# Patient Record
Sex: MALE | Race: WHITE | NOT HISPANIC OR LATINO | Employment: OTHER | URBAN - METROPOLITAN AREA
[De-identification: names, ages, dates, MRNs, and addresses within clinical notes are randomized per-mention and may not be internally consistent; named-entity substitution may affect disease eponyms.]

---

## 2022-07-01 ENCOUNTER — TRANSCRIBE ORDERS (OUTPATIENT)
Dept: ADMINISTRATIVE | Facility: HOSPITAL | Age: 77
End: 2022-07-01

## 2022-07-01 DIAGNOSIS — I72.3 ILIAC ARTERY ANEURYSM: ICD-10-CM

## 2022-07-01 DIAGNOSIS — E78.2 MIXED HYPERLIPIDEMIA: ICD-10-CM

## 2022-07-01 DIAGNOSIS — I71.40 ABDOMINAL AORTIC ANEURYSM, WITHOUT RUPTURE: Primary | ICD-10-CM

## 2022-07-12 ENCOUNTER — HOSPITAL ENCOUNTER (OUTPATIENT)
Dept: CARDIOLOGY | Facility: HOSPITAL | Age: 77
Discharge: HOME | End: 2022-07-12
Admitting: SURGERY

## 2022-07-12 DIAGNOSIS — E78.2 MIXED HYPERLIPIDEMIA: ICD-10-CM

## 2022-07-12 DIAGNOSIS — I72.3 ILIAC ARTERY ANEURYSM: ICD-10-CM

## 2022-07-12 DIAGNOSIS — I71.40 ABDOMINAL AORTIC ANEURYSM, WITHOUT RUPTURE: ICD-10-CM

## 2022-07-12 LAB
ABDOMINAL DIST AORTA AP: 4.07 CM
ABDOMINAL DIST AORTA TRANS: 4.5 CM
ABDOMINAL LT COM ILIAC AP: 0.77 CM
ABDOMINAL LT COM ILIAC TRANS: 0.66 CM
ABDOMINAL MID AORTA AP: 2.71 CM
ABDOMINAL MID AORTA TRANS: 2.75 CM
ABDOMINAL PROX AORTA AP: 1.92 CM
ABDOMINAL PROX AORTA TRANS: 1.64 CM
ABDOMINAL RT COM ILIAC AP: 0.75 CM
ABDOMINAL RT COM ILIAC TRANS: 1.07 CM
MAXIMAL PREDICTED HEART RATE: 141 BPM
STRESS TARGET HR: 120 BPM

## 2022-07-12 PROCEDURE — 93978 VASCULAR STUDY: CPT | Performed by: SURGERY

## 2022-07-12 PROCEDURE — 93978 VASCULAR STUDY: CPT

## 2022-09-20 ENCOUNTER — TRANSCRIBE ORDERS (OUTPATIENT)
Dept: ADMINISTRATIVE | Facility: HOSPITAL | Age: 77
End: 2022-09-20

## 2022-09-20 DIAGNOSIS — I71.30 AAA (ABDOMINAL AORTIC ANEURYSM, RUPTURED): Primary | ICD-10-CM

## 2022-10-07 ENCOUNTER — HOSPITAL ENCOUNTER (OUTPATIENT)
Dept: CARDIOLOGY | Facility: HOSPITAL | Age: 77
Discharge: HOME | End: 2022-10-07
Admitting: SURGERY

## 2022-10-07 DIAGNOSIS — I71.30 AAA (ABDOMINAL AORTIC ANEURYSM, RUPTURED): ICD-10-CM

## 2022-10-07 LAB
ABDOMINAL DIST AORTA AP: 4.1 CM
ABDOMINAL DIST AORTA TRANS: 4.5 CM
ABDOMINAL DIST AORTA VEL: 51 CM/S
ABDOMINAL MID AORTA AP: 3.1 CM
ABDOMINAL MID AORTA TRANS: 3 CM
ABDOMINAL MID AORTA VEL: 60 CM/S
ABDOMINAL PROX AORTA AP: 1.9 CM
ABDOMINAL PROX AORTA TRANS: 2.1 CM
ABDOMINAL PROX AORTA VEL: 89 CM/S
MAXIMAL PREDICTED HEART RATE: 141 BPM
STRESS TARGET HR: 120 BPM

## 2022-10-07 PROCEDURE — 93978 VASCULAR STUDY: CPT

## 2022-10-13 PROCEDURE — 51702 INSERT TEMP BLADDER CATH: CPT

## 2022-10-13 PROCEDURE — 99283 EMERGENCY DEPT VISIT LOW MDM: CPT

## 2022-10-14 ENCOUNTER — HOSPITAL ENCOUNTER (EMERGENCY)
Facility: HOSPITAL | Age: 77
Discharge: HOME | End: 2022-10-14
Attending: EMERGENCY MEDICINE | Admitting: EMERGENCY MEDICINE

## 2022-10-14 ENCOUNTER — APPOINTMENT (OUTPATIENT)
Dept: CT IMAGING | Facility: HOSPITAL | Age: 77
End: 2022-10-14

## 2022-10-14 VITALS
HEART RATE: 64 BPM | TEMPERATURE: 98.1 F | SYSTOLIC BLOOD PRESSURE: 143 MMHG | BODY MASS INDEX: 25.33 KG/M2 | OXYGEN SATURATION: 96 % | HEIGHT: 75 IN | WEIGHT: 203.71 LBS | DIASTOLIC BLOOD PRESSURE: 82 MMHG | RESPIRATION RATE: 18 BRPM

## 2022-10-14 DIAGNOSIS — R31.0 GROSS HEMATURIA: Primary | ICD-10-CM

## 2022-10-14 LAB
ALBUMIN SERPL-MCNC: 3.8 G/DL (ref 3.5–5.2)
ALBUMIN/GLOB SERPL: 1.2 G/DL
ALP SERPL-CCNC: 107 U/L (ref 39–117)
ALT SERPL W P-5'-P-CCNC: 14 U/L (ref 1–41)
ANION GAP SERPL CALCULATED.3IONS-SCNC: 10.2 MMOL/L (ref 5–15)
AST SERPL-CCNC: 15 U/L (ref 1–40)
BACTERIA UR QL AUTO: ABNORMAL /HPF
BASOPHILS # BLD AUTO: 0.03 10*3/MM3 (ref 0–0.2)
BASOPHILS NFR BLD AUTO: 0.5 % (ref 0–1.5)
BILIRUB SERPL-MCNC: 0.4 MG/DL (ref 0–1.2)
BILIRUB UR QL STRIP: ABNORMAL
BUN SERPL-MCNC: 26 MG/DL (ref 8–23)
BUN/CREAT SERPL: 25.5 (ref 7–25)
CALCIUM SPEC-SCNC: 9 MG/DL (ref 8.6–10.5)
CHLORIDE SERPL-SCNC: 106 MMOL/L (ref 98–107)
CLARITY UR: ABNORMAL
CO2 SERPL-SCNC: 23.8 MMOL/L (ref 22–29)
COLOR UR: ABNORMAL
CREAT SERPL-MCNC: 1.02 MG/DL (ref 0.76–1.27)
DEPRECATED RDW RBC AUTO: 41.4 FL (ref 37–54)
EGFRCR SERPLBLD CKD-EPI 2021: 74.8 ML/MIN/1.73
EOSINOPHIL # BLD AUTO: 0.12 10*3/MM3 (ref 0–0.4)
EOSINOPHIL NFR BLD AUTO: 1.9 % (ref 0.3–6.2)
ERYTHROCYTE [DISTWIDTH] IN BLOOD BY AUTOMATED COUNT: 12.9 % (ref 12.3–15.4)
GLOBULIN UR ELPH-MCNC: 3.3 GM/DL
GLUCOSE SERPL-MCNC: 129 MG/DL (ref 65–99)
GLUCOSE UR STRIP-MCNC: ABNORMAL MG/DL
GRAN CASTS URNS QL MICRO: ABNORMAL /LPF
HCT VFR BLD AUTO: 37.7 % (ref 37.5–51)
HGB BLD-MCNC: 12.6 G/DL (ref 13–17.7)
HGB UR QL STRIP.AUTO: ABNORMAL
HYALINE CASTS UR QL AUTO: ABNORMAL /LPF
IMM GRANULOCYTES # BLD AUTO: 0.03 10*3/MM3 (ref 0–0.05)
IMM GRANULOCYTES NFR BLD AUTO: 0.5 % (ref 0–0.5)
KETONES UR QL STRIP: ABNORMAL
LEUKOCYTE ESTERASE UR QL STRIP.AUTO: ABNORMAL
LIPASE SERPL-CCNC: 23 U/L (ref 13–60)
LYMPHOCYTES # BLD AUTO: 0.7 10*3/MM3 (ref 0.7–3.1)
LYMPHOCYTES NFR BLD AUTO: 11.3 % (ref 19.6–45.3)
MCH RBC QN AUTO: 29.6 PG (ref 26.6–33)
MCHC RBC AUTO-ENTMCNC: 33.4 G/DL (ref 31.5–35.7)
MCV RBC AUTO: 88.7 FL (ref 79–97)
MONOCYTES # BLD AUTO: 0.59 10*3/MM3 (ref 0.1–0.9)
MONOCYTES NFR BLD AUTO: 9.5 % (ref 5–12)
NEUTROPHILS NFR BLD AUTO: 4.75 10*3/MM3 (ref 1.7–7)
NEUTROPHILS NFR BLD AUTO: 76.3 % (ref 42.7–76)
NITRITE UR QL STRIP: ABNORMAL
NRBC BLD AUTO-RTO: 0 /100 WBC (ref 0–0.2)
PH UR STRIP.AUTO: ABNORMAL [PH]
PLATELET # BLD AUTO: 209 10*3/MM3 (ref 140–450)
PMV BLD AUTO: 9.5 FL (ref 6–12)
POTASSIUM SERPL-SCNC: 4.3 MMOL/L (ref 3.5–5.2)
PROT SERPL-MCNC: 7.1 G/DL (ref 6–8.5)
PROT UR QL STRIP: ABNORMAL
RBC # BLD AUTO: 4.25 10*6/MM3 (ref 4.14–5.8)
RBC # UR STRIP: ABNORMAL /HPF
REF LAB TEST METHOD: ABNORMAL
SODIUM SERPL-SCNC: 140 MMOL/L (ref 136–145)
SP GR UR STRIP: 1.01 (ref 1–1.03)
SQUAMOUS #/AREA URNS HPF: ABNORMAL /HPF
UROBILINOGEN UR QL STRIP: ABNORMAL
WBC # UR STRIP: ABNORMAL /HPF
WBC NRBC COR # BLD: 6.22 10*3/MM3 (ref 3.4–10.8)

## 2022-10-14 PROCEDURE — 36415 COLL VENOUS BLD VENIPUNCTURE: CPT | Performed by: EMERGENCY MEDICINE

## 2022-10-14 PROCEDURE — 81001 URINALYSIS AUTO W/SCOPE: CPT | Performed by: EMERGENCY MEDICINE

## 2022-10-14 PROCEDURE — 87086 URINE CULTURE/COLONY COUNT: CPT | Performed by: EMERGENCY MEDICINE

## 2022-10-14 PROCEDURE — 80053 COMPREHEN METABOLIC PANEL: CPT | Performed by: EMERGENCY MEDICINE

## 2022-10-14 PROCEDURE — 85025 COMPLETE CBC W/AUTO DIFF WBC: CPT | Performed by: EMERGENCY MEDICINE

## 2022-10-14 PROCEDURE — 0 IOPAMIDOL PER 1 ML: Performed by: EMERGENCY MEDICINE

## 2022-10-14 PROCEDURE — 83690 ASSAY OF LIPASE: CPT | Performed by: EMERGENCY MEDICINE

## 2022-10-14 PROCEDURE — 74177 CT ABD & PELVIS W/CONTRAST: CPT

## 2022-10-14 RX ORDER — ASPIRIN 81 MG/1
81 TABLET, CHEWABLE ORAL DAILY
COMMUNITY
End: 2022-10-19 | Stop reason: HOSPADM

## 2022-10-14 RX ADMIN — IOPAMIDOL 100 ML: 755 INJECTION, SOLUTION INTRAVENOUS at 02:41

## 2022-10-14 NOTE — DISCHARGE INSTRUCTIONS
Please follow-up with the urologist for further testing to determine the source of the bleeding in your urine.  CT scan is unable to identify if there are any suspicious lesions within your bladder.

## 2022-10-14 NOTE — ED PROVIDER NOTES
Time: 3:10 AM EDT  Chief Complaint: difficulty urinating, hematuria  Chief Complaint   Patient presents with   • Difficulty Urinating       History of Present Illness:  Patient is a 77 y.o. year old male who presents to the emergency department with difficulty urinating and hematuria. Pt reports he has been experiencing hematuria for approximately one month, and he states the blood in his urine is blood clots. He reports he hasn't been able to urinate since noon today. He reports he did have abdominal pain earlier today, but he states it has completely resolved now. He also reports mild fatigue, and he denies associated flank pain, back pain, fever, or chills. He denies use of blood thinners. He reports hx of liver failure.            History provided by:  Patient   used: No            Patient Care Team  Primary Care Provider: Vita Starks APRN    Past Medical History:     No Known Allergies  Past Medical History:   Diagnosis Date   • Aneurysm of abdominal aorta branch vessel      Past Surgical History:   Procedure Laterality Date   • SKIN CANCER EXCISION Right      History reviewed. No pertinent family history.    Home Medications:  Prior to Admission medications    Not on File        Social History:   Social History     Tobacco Use   • Smoking status: Never   Substance Use Topics   • Alcohol use: Yes     Alcohol/week: 1.0 standard drink     Types: 1 Cans of beer per week     Comment: 1 beer daily   • Drug use: Never         Review of Systems:  Review of Systems   Constitutional: Positive for fatigue (mild). Negative for chills and fever.   HENT: Negative for congestion, rhinorrhea and sore throat.    Eyes: Negative for pain and visual disturbance.   Respiratory: Negative for apnea, cough, chest tightness and shortness of breath.    Cardiovascular: Negative for chest pain and palpitations.   Gastrointestinal: Negative for abdominal pain, diarrhea, nausea and vomiting.   Genitourinary:  "Positive for difficulty urinating and hematuria (blood clots in urine). Negative for dysuria and flank pain.   Musculoskeletal: Negative for back pain, joint swelling and myalgias.   Skin: Negative for color change.   Neurological: Negative for seizures and headaches.   Psychiatric/Behavioral: Negative.    All other systems reviewed and are negative.       Physical Exam:  /82   Pulse 64   Temp 98.1 °F (36.7 °C) (Oral)   Resp 18   Ht 190.5 cm (75\")   Wt 92.4 kg (203 lb 11.3 oz)   SpO2 96%   BMI 25.46 kg/m²     Physical Exam  Vitals and nursing note reviewed.   Constitutional:       General: He is not in acute distress.     Appearance: Normal appearance. He is not toxic-appearing.   HENT:      Head: Normocephalic and atraumatic.      Jaw: There is normal jaw occlusion.   Eyes:      General: Lids are normal.      Extraocular Movements: Extraocular movements intact.      Conjunctiva/sclera: Conjunctivae normal.      Pupils: Pupils are equal, round, and reactive to light.   Cardiovascular:      Rate and Rhythm: Normal rate and regular rhythm.      Pulses: Normal pulses.      Heart sounds: Normal heart sounds.   Pulmonary:      Effort: Pulmonary effort is normal. No respiratory distress.      Breath sounds: Normal breath sounds. No wheezing or rhonchi.   Abdominal:      General: Abdomen is flat. There is no distension.      Palpations: Abdomen is soft.      Tenderness: There is no abdominal tenderness. There is no guarding or rebound.   Genitourinary:     Comments: Beckford catheter in place with bright red blood in catheter bag  Musculoskeletal:         General: Normal range of motion.      Cervical back: Normal range of motion and neck supple.      Right lower leg: No edema.      Left lower leg: No edema.   Skin:     General: Skin is warm and dry.      Coloration: Skin is not cyanotic.   Neurological:      Mental Status: He is alert and oriented to person, place, and time. Mental status is at baseline. "   Psychiatric:         Attention and Perception: Attention and perception normal.         Mood and Affect: Mood normal.                Medications in the Emergency Department:  Medications   iopamidol (ISOVUE-370) 76 % injection 100 mL (100 mL Intravenous Given 10/14/22 0241)        Labs  Lab Results (last 24 hours)     ** No results found for the last 24 hours. **           Imaging:  No Radiology Exams Resulted Within Past 24 Hours    Procedures:  Procedures    Progress  ED Course as of 10/15/22 0646   Fri Oct 14, 2022   0013 --- PROVIDER IN TRIAGE NOTE ---    The patient was evaluated my Bessy cuellar in triage. Orders were placed and the patient is currently awaiting disposition.    [AJ]      ED Course User Index  [AJ] Bessy Cano PA-C                            The patient was initially evaluated in the triage area where orders were placed. The patient was later dispositioned by aRmses Ware MD.      The patient was advised to stay for completion of workup which includes but is not limited to communication of labs and radiological results, reassessment and plan. The patient was advised that leaving prior to disposition by a provider could result in critical findings that are not communicated to the patient.     Medical Decision Making:  MDM  Number of Diagnoses or Management Options  Gross hematuria  Diagnosis management comments: Discussed patient's CT findings and the importance of close follow-up with urology as patient will likely need cystoscopy for further evaluation due to concern for possible malignancy.  Patient and spouse at bedside expressed understanding and agreement with plan.  We discussed return precautions including worsening symptoms or any additional concerns.       Amount and/or Complexity of Data Reviewed  Clinical lab tests: reviewed and ordered  Tests in the radiology section of CPT®: ordered and reviewed  Tests in the medicine section of CPT®: ordered and reviewed  Decide  to obtain previous medical records or to obtain history from someone other than the patient: yes  Review and summarize past medical records: yes             The following orders were placed after triage and evaluation:  Orders Placed This Encounter   Procedures   • Urine Culture - Urine,   • CT Abdomen Pelvis With Contrast   • Comprehensive Metabolic Panel   • Urinalysis With Microscopic If Indicated (No Culture) - Urine, Clean Catch   • Lipase   • CBC Auto Differential   • Urinalysis, Microscopic Only - Urine, Clean Catch   • Insert Indwelling Urinary Catheter   • CBC & Differential       Final diagnoses:   Gross hematuria          Disposition:  ED Disposition     ED Disposition   Discharge    Condition   Stable    Comment   --             This medical record created using voice recognition software.    Documentation assistance provided by Juan Macario acting as scribe for Ramses Ware MD. Information recorded by the scribe was done at my direction and has been verified and validated by me.         Juan Macario  10/14/22 0317       Ramses Ware MD  10/15/22 0651

## 2022-10-15 ENCOUNTER — NURSE TRIAGE (OUTPATIENT)
Dept: CALL CENTER | Facility: HOSPITAL | Age: 77
End: 2022-10-15

## 2022-10-15 LAB — BACTERIA SPEC AEROBE CULT: NO GROWTH

## 2022-10-15 NOTE — TELEPHONE ENCOUNTER
"Caller went to ER yesterday with hematuria.  He had a catheter placed and has a leg bag.  He had clots at home but has had none since catheter insertion.  He is weak but no weaker than he was prior to going to the ER, denies dizziness.  States that urine is unchanged and is a medium red.  Discussed that the would need to return to the ER if he became weaker, dizzy, or was unable to pass urine.  He has an appointment with urology on Tuesday.  Call back if further questions or problems.  Reason for Disposition  • Health Information question, no triage required and triager able to answer question    Additional Information  • Negative: [1] Caller is not with the adult (patient) AND [2] reporting urgent symptoms  • Negative: Lab result questions  • Negative: Medication questions  • Negative: Caller can't be reached by phone  • Negative: Caller has already spoken to PCP or another triager  • Negative: RN needs further essential information from caller in order to complete triage  • Negative: Requesting regular office appointment  • Negative: [1] Caller requesting NON-URGENT health information AND [2] PCP's office is the best resource  • Negative: General information question, no triage required and triager able to answer question    Answer Assessment - Initial Assessment Questions  1. REASON FOR CALL or QUESTION: \"What is your reason for calling today?\" or \"How can I best help you?\" or \"What question do you have that I can help answer?\"      I need to know at what point I would I need to return to the ER?    Protocols used: INFORMATION ONLY CALL - NO TRIAGE-ADULT-    "

## 2022-10-16 ENCOUNTER — HOSPITAL ENCOUNTER (EMERGENCY)
Facility: HOSPITAL | Age: 77
Discharge: HOME | End: 2022-10-16
Attending: EMERGENCY MEDICINE | Admitting: EMERGENCY MEDICINE

## 2022-10-16 VITALS
OXYGEN SATURATION: 95 % | SYSTOLIC BLOOD PRESSURE: 148 MMHG | HEART RATE: 81 BPM | DIASTOLIC BLOOD PRESSURE: 90 MMHG | TEMPERATURE: 98.2 F | HEIGHT: 75 IN | RESPIRATION RATE: 16 BRPM | WEIGHT: 200.62 LBS | BODY MASS INDEX: 24.94 KG/M2

## 2022-10-16 VITALS
HEART RATE: 59 BPM | WEIGHT: 202.38 LBS | RESPIRATION RATE: 18 BRPM | TEMPERATURE: 98.2 F | OXYGEN SATURATION: 100 % | HEIGHT: 75 IN | DIASTOLIC BLOOD PRESSURE: 89 MMHG | SYSTOLIC BLOOD PRESSURE: 174 MMHG | BODY MASS INDEX: 25.16 KG/M2

## 2022-10-16 DIAGNOSIS — N13.1 HYDRONEPHROSIS WITH URETERAL STRICTURE, NOT ELSEWHERE CLASSIFIED: ICD-10-CM

## 2022-10-16 DIAGNOSIS — R31.0 GROSS HEMATURIA: Primary | ICD-10-CM

## 2022-10-16 DIAGNOSIS — N32.89 BLADDER MASS: ICD-10-CM

## 2022-10-16 DIAGNOSIS — R33.9 URINARY RETENTION: ICD-10-CM

## 2022-10-16 PROBLEM — N20.0 NEPHROLITHIASIS: Status: ACTIVE | Noted: 2022-10-16

## 2022-10-16 PROCEDURE — 99283 EMERGENCY DEPT VISIT LOW MDM: CPT

## 2022-10-16 PROCEDURE — 99282 EMERGENCY DEPT VISIT SF MDM: CPT

## 2022-10-16 PROCEDURE — 51702 INSERT TEMP BLADDER CATH: CPT | Performed by: UROLOGY

## 2022-10-16 PROCEDURE — 51702 INSERT TEMP BLADDER CATH: CPT

## 2022-10-16 RX ORDER — LIDOCAINE HYDROCHLORIDE 20 MG/ML
JELLY TOPICAL AS NEEDED
Status: DISCONTINUED | OUTPATIENT
Start: 2022-10-16 | End: 2022-10-17 | Stop reason: HOSPADM

## 2022-10-16 RX ADMIN — LIDOCAINE HYDROCHLORIDE: 20 JELLY TOPICAL at 22:12

## 2022-10-16 NOTE — DISCHARGE INSTRUCTIONS
CT evaluation of your abdomen/pelvis on October 14 showed abnormalities involving the bladder and right ureter, these are concerning for possible mass.  Follow-up with Dr. Westfall on Tuesday as scheduled.

## 2022-10-16 NOTE — ED NOTES
Upon assessment of urinary catheter it appeared to be partially occluded at the bag. Replaced the tubing and bag and flushed catheter with good return using new tubing and bag. Beckford did have leakage while pt states he relaxes. Post replacement of tubing bladder scan was zero. Provider made aware.

## 2022-10-16 NOTE — ED NOTES
Pt was here earlier today, had catheter replaced due to blockage. Pt states there is blood and clots in the urine.  Pt states the catheter is not draining and he is having to urinate on his own around the catheter.

## 2022-10-16 NOTE — ED PROVIDER NOTES
Time: 5:50 PM EDT  Chief Complaint:   Chief Complaint   Patient presents with   • Urinary Retention           History of Present Illness:  Patient is a 77 y.o. year old male who presents to the emergency department with complaints of his Beckford catheter not working since 1:30 PM today when he emptied his bag.  He reports that he was here earlier to our ER and had the bag replaced and now this is occurred.  He is in a lot of pain and rates his pain an 9 out of 10 currently.        History provided by:  Patient  Urinary Retention  Location:  Urinary catheter  Quality:  Not draining  Severity:  Unable to specify  Onset quality:  Unable to specify  Duration:  4 days  Timing:  Constant  Progression:  Waxing and waning  Chronicity:  New  Context:  Patient has been unable to urinate since Thursday.  Came in and had a catheter placed.  Saturday had to return for irrigation because it would not drain and returned earlier today for similar problems.  Back tonight because catheter will not drain and has not been able to urinate since 1  Relieved by:  Nothing  Worsened by:  Nothing  Ineffective treatments:  Position changes and repeated irrigation  Associated symptoms: abdominal pain ( Low abdomen pressure)    Associated symptoms: no chest pain, no cough, no diarrhea, no ear pain, no fatigue, no fever, no headaches, no myalgias, no rash, no rhinorrhea, no shortness of breath, no sore throat and no vomiting            Patient Care Team  Primary Care Provider: Vita Starks APRN    Past Medical History:     No Known Allergies  Past Medical History:   Diagnosis Date   • Aneurysm of abdominal aorta branch vessel      Past Surgical History:   Procedure Laterality Date   • SKIN CANCER EXCISION Right      History reviewed. No pertinent family history.    Home Medications:  Prior to Admission medications    Medication Sig Start Date End Date Taking? Authorizing Provider   aspirin 81 MG chewable tablet Chew 1 tablet Daily.     "Provider, Historical, MD        Social History:   Social History     Tobacco Use   • Smoking status: Never   Substance Use Topics   • Alcohol use: Yes     Alcohol/week: 1.0 standard drink     Types: 1 Cans of beer per week     Comment: 1 beer daily   • Drug use: Never         Review of Systems:  Review of Systems   Constitutional: Negative for chills, fatigue and fever.   HENT: Negative for ear pain, rhinorrhea and sore throat.    Eyes: Negative for visual disturbance.   Respiratory: Negative for cough and shortness of breath.    Cardiovascular: Negative for chest pain.   Gastrointestinal: Positive for abdominal pain ( Low abdomen pressure). Negative for diarrhea and vomiting.   Genitourinary: Positive for hematuria. Negative for difficulty urinating.   Musculoskeletal: Negative for arthralgias, back pain and myalgias.   Skin: Negative for rash.   Neurological: Negative for light-headedness and headaches.   Hematological: Negative for adenopathy.   Psychiatric/Behavioral: Negative.    All other systems reviewed and are negative.       Physical Exam:  /90 (BP Location: Right arm, Patient Position: Sitting)   Pulse 81   Temp 98.2 °F (36.8 °C) (Oral)   Resp 16   Ht 190.5 cm (75\")   Wt 91 kg (200 lb 9.9 oz)   SpO2 95%   BMI 25.08 kg/m²     Physical Exam  Vitals and nursing note reviewed.   Constitutional:       Appearance: Normal appearance.   HENT:      Head: Normocephalic.   Eyes:      Extraocular Movements: Extraocular movements intact.      Conjunctiva/sclera: Conjunctivae normal.   Cardiovascular:      Rate and Rhythm: Normal rate and regular rhythm.      Heart sounds: Normal heart sounds.   Pulmonary:      Effort: Pulmonary effort is normal.      Breath sounds: Normal breath sounds.   Abdominal:      General: Bowel sounds are normal. There is no distension.      Palpations: Abdomen is soft.      Tenderness: There is no right CVA tenderness or left CVA tenderness.   Genitourinary:     Comments: " Hypospadias    Catheter in place with clotted tubing  Musculoskeletal:         General: Normal range of motion.      Cervical back: Normal range of motion.   Skin:     General: Skin is warm and dry.      Coloration: Skin is not cyanotic.   Neurological:      Mental Status: He is alert and oriented to person, place, and time.   Psychiatric:         Attention and Perception: Attention and perception normal.         Mood and Affect: Mood normal.         Behavior: Behavior normal.                Medications in the Emergency Department:  Medications   Lidocaine HCl gel (XYLOCAINE) urethral/mucosal syringe ( Topical Given by Other 10/16/22 2212)        Labs  Lab Results (last 24 hours)     ** No results found for the last 24 hours. **           Imaging:  No Radiology Exams Resulted Within Past 24 Hours    Procedures:  Procedures    Progress  ED Course as of 10/17/22 0018   Sun Oct 16, 2022   1749 --- PROVIDER IN TRIAGE NOTE ---    The patient was seen and evaluated by polo Mcgowan in triage. Orders were placed and the patient is currently awaiting disposition. [KS]   5757 Patient had a catheter placed by Dr. Suki De that is currently draining light red urine.  She feels patient can be discharged and keep follow-up with Dr. Westfall on Tuesday [DS]      ED Course User Index  [DS] Mei Sanchez APRN  [KS] Nanci Mcgowan APRN                            The patient was initially evaluated in the triage area where orders were placed. The patient was later dispositioned by CATA Garcia.      The patient was advised to stay for completion of workup which includes but is not limited to communication of labs and radiological results, reassessment and plan. The patient was advised that leaving prior to disposition by a provider could result in critical findings that are not communicated to the patient.     Medical Decision Making:  MDM  Number of Diagnoses or Management Options  Bladder mass  Gross  hematuria  Hydronephrosis with ureteral stricture, not elsewhere classified  Urinary retention  Diagnosis management comments: Pt seen and evaluated by dr de in ED and catheter placed. Pt ok to dc and keep appt on Tuesday with dr parkinson. Pt in agreement with plan       Amount and/or Complexity of Data Reviewed  Tests in the medicine section of CPT®: ordered and reviewed  Decide to obtain previous medical records or to obtain history from someone other than the patient: yes  Review and summarize past medical records: yes (Reviewed CT from October 14 which shows findings concerning for possible malignancy in bladder as well as ureteral mass possible.  Right-sided hydronephrosis and hydroureter)  Discuss the patient with other providers: yes (2111-spoke with Dr. De the on-call urologist reviewed patient's recent HPI as well as imaging findings and inability to replace catheter.  Dr. De will come to the emergency department for evaluation)    Risk of Complications, Morbidity, and/or Mortality  Presenting problems: low  Diagnostic procedures: low  Management options: moderate    Patient Progress  Patient progress: stable           The following orders were placed after triage and evaluation:  Orders Placed This Encounter   Procedures   • Irrigation of Bladder   • Replace catheter with coude that is larger  Misc Nursing Order (Specify)   • Measure post void residual   • Urology (on-call MD unless specified)       Final diagnoses:   Gross hematuria   Urinary retention   Bladder mass   Hydronephrosis with ureteral stricture, not elsewhere classified          Disposition:  ED Disposition     ED Disposition   Discharge    Condition   Stable    Comment   --             This medical record created using voice recognition software.           Mei Sanchez, APRN  10/17/22 0018

## 2022-10-16 NOTE — ED PROVIDER NOTES
Time: 1:34 PM EDT  Arrived by: private car  Chief Complaint: urinary retention  History provided by: patient, spouse (wife), medical records  History is limited by: N/A    History of Present Illness:  Patient is a 77 y.o. year old male who presents to the emergency department with urinary retention.    Patient arrives to ED via private car with spouse at bedside who helps report clinical history. Patient has a medical history of abdominal aortic aneurysm (infrarenal), irregular heart rhythm with PACs, back pain, and bilateral hearing loss. Patient has no history of smoking, is a current alcohol user, and has no history of drug use.    Patient was evaluated by Dr. Malik at [facility] (07/15/2022) who monitors his abdominal, infrarenal aortic aneurysm.    Today (10/16/2022), patient complains of leaking around the insertion point and notes his bladder feels full. Patient reports he started experiencing gross hematuria, without pain, symptoms approximately 1 month ago that did not resolve. Patient states he had a bradley catheterization on Thursday (10/13/2022) for urinary retention and blood clots in urine and had a CT Abdomen Pelvis with contrast study (10/14/2022) but notes he and his spouse state they were not aware of the abnormalities found in the study (abnormalities including concerns for hemorrhage and/or malignancy of urinary bladder, hydronephrosis, nephrolithiasis bilaterally, infrarenal abdominal aortic aneurysm, and atheroscleoritic changes noted. See imaging findings and impression for specific results). Patient is following up with Dr. Westfall, Urology on Tuesday (10/20/2022).      History provided by:  Patient, relative and medical records   used: No        Patient Care Team  Primary Care Provider: Vita Starks APRN    Past Medical History:     No Known Allergies  Past Medical History:   Diagnosis Date   • Aneurysm of abdominal aorta branch vessel      Past Surgical  "History:   Procedure Laterality Date   • SKIN CANCER EXCISION Right      No family history on file.    Home Medications:  Prior to Admission medications    Medication Sig Start Date End Date Taking? Authorizing Provider   aspirin 81 MG chewable tablet Chew 1 tablet Daily.    Provider, Kristel, MD        Social History:   Social History     Tobacco Use   • Smoking status: Never   • Smokeless tobacco: Never   Substance Use Topics   • Alcohol use: Yes     Alcohol/week: 1.0 standard drink     Types: 1 Cans of beer per week     Comment: 1 beer daily   • Drug use: Never     Recent travel: not applicable    Review of Systems:  Review of Systems   Constitutional: Negative for chills and fever.   HENT: Negative for sore throat.    Eyes: Negative for photophobia.   Respiratory: Negative for shortness of breath.    Cardiovascular: Negative for chest pain.   Gastrointestinal: Negative for abdominal pain, diarrhea, nausea and vomiting.   Genitourinary: Positive for decreased urine volume, difficulty urinating and hematuria (gross). Negative for dysuria, flank pain and frequency.        Positive urinary retention   Musculoskeletal: Negative for neck pain.   Skin: Negative for wound.   Neurological: Negative for headaches.   All other systems reviewed and are negative.       Physical Exam:  /89   Pulse 59   Temp 98.2 °F (36.8 °C)   Resp 18   Ht 190.5 cm (75\")   Wt 91.8 kg (202 lb 6.1 oz)   SpO2 100%   BMI 25.30 kg/m²     Physical Exam  Vitals and nursing note reviewed.   Constitutional:       General: He is not in acute distress.  HENT:      Head: Normocephalic and atraumatic.   Eyes:      Extraocular Movements: Extraocular movements intact.   Cardiovascular:      Rate and Rhythm: Normal rate and regular rhythm.   Pulmonary:      Effort: Pulmonary effort is normal. No respiratory distress.      Breath sounds: Normal breath sounds.   Abdominal:      General: Abdomen is flat.      Palpations: Abdomen is soft.      " Tenderness: There is no abdominal tenderness.   Genitourinary:     Comments: Beckford catheterization replaced and draining now, but gross hematuria is still present.  Musculoskeletal:         General: Normal range of motion.      Cervical back: Normal range of motion and neck supple.   Skin:     General: Skin is warm and dry.      Capillary Refill: Capillary refill takes less than 2 seconds.   Neurological:      Mental Status: He is alert and oriented to person, place, and time. Mental status is at baseline.                Medications in the Emergency Department:  Medications - No data to display     Labs  Lab Results (last 24 hours)     ** No results found for the last 24 hours. **           Imaging:  FL < 1 Hour    Result Date: 10/19/2022  This procedure was auto-finalized with no dictation required.    XR Abdomen KUB    Result Date: 10/19/2022  PROCEDURE: XR ABDOMEN KUB  COMPARISON: [facility], CT, CT ABDOMEN PELVIS W CONTRAST, 10/14/2022, 2:35.  INDICATIONS:  IMAGE/BLADDER TUMOR/0:01 SEC FLUORO TIME/1.13mGy/ 1 image  FINDINGS:  Single intraoperative fluoroscopic image demonstrates presence of multiple calcified pelvic phleboliths.  There is a possible stone within the right kidney.  There is a dextrocurvature of the lumbar spine.        1. Intraoperative fluoroscopy during cystoscopy.  Please see operative report for full findings and details.      TARA LANTIGUA MD       Electronically Signed and Approved By: TARA LANTIGUA MD on 10/19/2022 at 16:02               Procedures:  Procedures    Progress                            Medical Decision Making:  MDM   77-year-old male patient presents with a Beckford catheter.  Patient has gross hematuria present  Ongoing for several weeks.  The Beckford catheter flushes fine after tubing replacement.  Patient will follow up with his urologist.        Final diagnoses:   Gross hematuria        Disposition:  ED Disposition     ED Disposition   Discharge     Condition   Stable    Comment   --             This medical record created using voice recognition software and a virtual scribe.    Documentation assistance provided by Gin Kidd acting as scribe for Dr. Russell Botello DO. Information recorded by the scribe was done at my direction and has been verified and validated by me.       Gin Kidd  10/16/22 1336       Gin Kidd  10/16/22 1336       Gin Kidd  10/16/22 1337       Russell Irvin DO  10/19/22 4298

## 2022-10-16 NOTE — PROGRESS NOTES
Chief Complaint: Urologic complaint    Subjective         History of Present Illness  Korey Rodriguez is a 77 y.o. male       Gross hematuria  Nephrolithiasis        Patient with gross hematuria with clots last week.  Catheter was placed in the emergency room, has had some trouble with clots and is been back to the ER couple more times to have this flushed.    Currently draining okay but still bloody.  Mildly uncomfortable.    10/22 UA-TNTC RBCs, 3-5 WBC, no bacteria,    1.0, GFR 74  10/22 urine culture-negative  10/14/2022 CT abdomen/pelvis with - moderate to severe right-sided hydro.  Left-sided delayed phase good.  6 mm and 4 mm nonobstructing left renal stone.  3 stones in the right kidney, nonobstructing largest being 5 mm.  Images reviewed .  No stone in ureter.  Abnormal density in the lumen of the urinary bladder.  5 cm AAA    Patient voiding without issue before as far he knew.    No history of kidney  stone.    No urologic family history,   no history of urologic surgery.    No cardiopulmonary history.  Non-smoker.  Aspirin 81        Objective     Past Medical History:   Diagnosis Date   • Aneurysm of abdominal aorta branch vessel        Past Surgical History:   Procedure Laterality Date   • SKIN CANCER EXCISION Right          Current Outpatient Medications:   •  aspirin 81 MG chewable tablet, Chew 1 tablet Daily., Disp: , Rfl:     No Known Allergies     No family history on file.    Social History     Socioeconomic History   • Marital status:    Tobacco Use   • Smoking status: Never   Substance and Sexual Activity   • Alcohol use: Yes     Alcohol/week: 1.0 standard drink     Types: 1 Cans of beer per week     Comment: 1 beer daily   • Drug use: Never       Vital Signs:   There were no vitals taken for this visit.     Physical exam    Alert and orient x3  Well appearing, well developed, in no acute distress   Unlabored respirations  Nontender/nondistended      Grossly oriented to person, place and  time, judgment is intact, normal mood and affect              Assessment and Plan    Diagnoses and all orders for this visit:    1. Gross hematuria (Primary)    2. Nephrolithiasis      Patient with continued gross hematuria we need to rule out underlying urologic pathology which could be detrimental to his health or cause death.  Patient voiced understanding we will get him set up for       Cystoscopy possible TURBT right ureteroscopy with laser and basket extraction of stone.  Left retrograde pyelogram.  Risks and benefits were discussed including bleeding, infection and damage to the urinary system.  We also discussed the risk of anesthesia up to and including death.  Patient voiced understanding and would like to proceed.    Hold ASA 81

## 2022-10-16 NOTE — ED TRIAGE NOTES
Bradley cath placed on Thursday 10/13 for urinary retention and blood clots in urine. Pt complains of bradley cath leaking around insertion and feeling like his bladder is full.

## 2022-10-17 NOTE — DISCHARGE INSTRUCTIONS
Keep your appointment Tuesday with Dr. Westfall for further evaluation and any additional treatment deemed indicated.    Return for new or worsening symptoms

## 2022-10-17 NOTE — POST-PROCEDURE NOTE
Irrigation of Bladder    Date/Time: 10/16/2022 10:07 PM  Performed by: Suki De MD  Authorized by: Suki De MD   Consent: Verbal consent obtained.  Risks and benefits: risks, benefits and alternatives were discussed  Consent given by: patient  Patient understanding: patient states understanding of the procedure being performed  Preparation: Patient was prepped and draped in the usual sterile fashion.  Local anesthesia used: yes    Anesthesia:  Local anesthesia used: yes  Local anesthetic: Lidocaine Urojet.    Sedation:  Patient sedated: no    Patient tolerance: patient tolerated the procedure well with no immediate complications  Comments: Patient was prepped and draped in normal fashion for catheter placement.      A 16 Persian coude catheter was inserted per urethra into the bladder without difficulty.  The patient does have hypospadias.  There was return of red urine.      After inserting the catheter, I hand irrigated 2L of sterile water using a 60cc cath tip syringe.  There was return of a few small clots.  Urine at the end of irrigation was light pink with no clots.     Patient tolerated procedure well.      He will follow up with Dr. Westfall as planned on Tuesday in the office.

## 2022-10-18 ENCOUNTER — OFFICE VISIT (OUTPATIENT)
Dept: UROLOGY | Facility: CLINIC | Age: 77
End: 2022-10-18

## 2022-10-18 ENCOUNTER — LAB (OUTPATIENT)
Dept: LAB | Facility: HOSPITAL | Age: 77
End: 2022-10-18

## 2022-10-18 ENCOUNTER — PREP FOR SURGERY (OUTPATIENT)
Dept: OTHER | Facility: HOSPITAL | Age: 77
End: 2022-10-18

## 2022-10-18 VITALS — RESPIRATION RATE: 18 BRPM

## 2022-10-18 DIAGNOSIS — R31.0 GROSS HEMATURIA: Primary | ICD-10-CM

## 2022-10-18 DIAGNOSIS — R31.9 HEMATURIA: Primary | ICD-10-CM

## 2022-10-18 DIAGNOSIS — R31.0 GROSS HEMATURIA: ICD-10-CM

## 2022-10-18 DIAGNOSIS — N20.0 NEPHROLITHIASIS: ICD-10-CM

## 2022-10-18 LAB
DEPRECATED RDW RBC AUTO: 41.3 FL (ref 37–54)
ERYTHROCYTE [DISTWIDTH] IN BLOOD BY AUTOMATED COUNT: 12.7 % (ref 12.3–15.4)
HCT VFR BLD AUTO: 37.1 % (ref 37.5–51)
HGB BLD-MCNC: 11.9 G/DL (ref 13–17.7)
MCH RBC QN AUTO: 28.7 PG (ref 26.6–33)
MCHC RBC AUTO-ENTMCNC: 32.1 G/DL (ref 31.5–35.7)
MCV RBC AUTO: 89.4 FL (ref 79–97)
PLATELET # BLD AUTO: 257 10*3/MM3 (ref 140–450)
PMV BLD AUTO: 10.1 FL (ref 6–12)
PSA SERPL-MCNC: 1.12 NG/ML (ref 0–4)
RBC # BLD AUTO: 4.15 10*6/MM3 (ref 4.14–5.8)
WBC NRBC COR # BLD: 7.22 10*3/MM3 (ref 3.4–10.8)

## 2022-10-18 PROCEDURE — 36415 COLL VENOUS BLD VENIPUNCTURE: CPT

## 2022-10-18 PROCEDURE — 84153 ASSAY OF PSA TOTAL: CPT

## 2022-10-18 PROCEDURE — 85027 COMPLETE CBC AUTOMATED: CPT

## 2022-10-18 PROCEDURE — 99204 OFFICE O/P NEW MOD 45 MIN: CPT | Performed by: UROLOGY

## 2022-10-18 RX ORDER — ATORVASTATIN CALCIUM 20 MG/1
20 TABLET, FILM COATED ORAL NIGHTLY
COMMUNITY
Start: 2022-09-12

## 2022-10-18 RX ORDER — SODIUM CHLORIDE 9 MG/ML
100 INJECTION, SOLUTION INTRAVENOUS CONTINUOUS
Status: CANCELLED | OUTPATIENT
Start: 2022-10-18

## 2022-10-18 RX ORDER — LEVOFLOXACIN 5 MG/ML
500 INJECTION, SOLUTION INTRAVENOUS ONCE
Status: CANCELLED | OUTPATIENT
Start: 2022-10-18 | End: 2022-10-18

## 2022-10-18 NOTE — H&P
[facility]   UROLOGY HISTORY AND PHYSICAL    Patient Name: Korey Rodriguez  : 1945  MRN: [id number]  Primary Care Physician:  Vita Starks APRN  Date of admission: (Not on file)    Subjective   Subjective     Chief Complaint:     Gross hematuria    HPI:    Korey Rodriguez is a 77 y.o. male gross hematuria    Review of Systems     10 systems reviewed and are negative other than what is listed in HPI    Personal History     Past Medical History:   Diagnosis Date   • Aneurysm of abdominal aorta branch vessel        Past Surgical History:   Procedure Laterality Date   • SKIN CANCER EXCISION Right        Family History: family history is not on file. Otherwise pertinent FHx was reviewed and not pertinent to current issue.    Social History:  reports that he has never smoked. He does not have any smokeless tobacco history on file. He reports current alcohol use of about 1.0 standard drink per week. He reports that he does not use drugs.    Home Medications:  aspirin and atorvastatin      Allergies:  No Known Allergies    Objective   Objective     Vitals:   Resp:  [18] 18  Physical Exam    Constitutional: Awake, alert    Respiratory: Clear to auscultation bilaterally, nonlabored respirations    Cardiovascular: RRR, no murmurs, rubs, or gallops, palpable pedal pulses bilaterally   Gastrointestinal: Positive bowel sounds, soft, nontender, nondistended    Skin: No rashes     Result Review    Result Review:  I have personally reviewed the results from the time of this admission to 10/18/2022 13:59 EDT and agree with these findings:  []  Laboratory  []  Microbiology  []  Radiology  []  EKG/Telemetry   []  Cardiology/Vascular   []  Pathology  []  Old records  []  Other:    Assessment & Plan   Assessment / Plan     Brief Patient Summary:  Korey Rodriguez is a 77 y.o. male     Active Hospital Problems:  There are no active hospital problems to display for this patient.  Gross hematuria    Plan:     Cystoscopy  with possible transurethral resection of bladder tumor right ureteroscopy with laser and basket extraction of stone.  Left retrograde pyelogram..  Risks and benefits were discussed including bleeding, infection and damage to the urinary system.  We also discussed the risk of anesthesia up to and including death.  Patient voiced understanding and would like to proceed.      Electronically signed by Aung Westfall MD, 10/18/22, 1:59 PM EDT.

## 2022-10-18 NOTE — H&P (VIEW-ONLY)
[facility]   UROLOGY HISTORY AND PHYSICAL    Patient Name: Korey Rodriguez  : 1945  MRN: [id number]  Primary Care Physician:  Vita Starks APRN  Date of admission: (Not on file)    Subjective   Subjective     Chief Complaint:     Gross hematuria    HPI:    Korey Rodriguez is a 77 y.o. male gross hematuria    Review of Systems     10 systems reviewed and are negative other than what is listed in HPI    Personal History     Past Medical History:   Diagnosis Date   • Aneurysm of abdominal aorta branch vessel        Past Surgical History:   Procedure Laterality Date   • SKIN CANCER EXCISION Right        Family History: family history is not on file. Otherwise pertinent FHx was reviewed and not pertinent to current issue.    Social History:  reports that he has never smoked. He does not have any smokeless tobacco history on file. He reports current alcohol use of about 1.0 standard drink per week. He reports that he does not use drugs.    Home Medications:  aspirin and atorvastatin      Allergies:  No Known Allergies    Objective   Objective     Vitals:   Resp:  [18] 18  Physical Exam    Constitutional: Awake, alert    Respiratory: Clear to auscultation bilaterally, nonlabored respirations    Cardiovascular: RRR, no murmurs, rubs, or gallops, palpable pedal pulses bilaterally   Gastrointestinal: Positive bowel sounds, soft, nontender, nondistended    Skin: No rashes     Result Review    Result Review:  I have personally reviewed the results from the time of this admission to 10/18/2022 13:59 EDT and agree with these findings:  []  Laboratory  []  Microbiology  []  Radiology  []  EKG/Telemetry   []  Cardiology/Vascular   []  Pathology  []  Old records  []  Other:    Assessment & Plan   Assessment / Plan     Brief Patient Summary:  Korey Rodriguez is a 77 y.o. male     Active Hospital Problems:  There are no active hospital problems to display for this patient.  Gross hematuria    Plan:     Cystoscopy  with possible transurethral resection of bladder tumor right ureteroscopy with laser and basket extraction of stone.  Left retrograde pyelogram..  Risks and benefits were discussed including bleeding, infection and damage to the urinary system.  We also discussed the risk of anesthesia up to and including death.  Patient voiced understanding and would like to proceed.      Electronically signed by Aung Westfall MD, 10/18/22, 1:59 PM EDT.

## 2022-10-19 ENCOUNTER — ANESTHESIA (OUTPATIENT)
Dept: PERIOP | Facility: HOSPITAL | Age: 77
End: 2022-10-19

## 2022-10-19 ENCOUNTER — HOSPITAL ENCOUNTER (OUTPATIENT)
Facility: HOSPITAL | Age: 77
Setting detail: HOSPITAL OUTPATIENT SURGERY
Discharge: HOME | End: 2022-10-19
Attending: UROLOGY | Admitting: UROLOGY

## 2022-10-19 ENCOUNTER — APPOINTMENT (OUTPATIENT)
Dept: GENERAL RADIOLOGY | Facility: HOSPITAL | Age: 77
End: 2022-10-19

## 2022-10-19 ENCOUNTER — ANESTHESIA EVENT (OUTPATIENT)
Dept: PERIOP | Facility: HOSPITAL | Age: 77
End: 2022-10-19

## 2022-10-19 VITALS
WEIGHT: 199.74 LBS | HEART RATE: 64 BPM | OXYGEN SATURATION: 94 % | BODY MASS INDEX: 24.83 KG/M2 | TEMPERATURE: 97.7 F | SYSTOLIC BLOOD PRESSURE: 170 MMHG | DIASTOLIC BLOOD PRESSURE: 70 MMHG | RESPIRATION RATE: 16 BRPM | HEIGHT: 75 IN

## 2022-10-19 DIAGNOSIS — N20.0 NEPHROLITHIASIS: Primary | ICD-10-CM

## 2022-10-19 DIAGNOSIS — R31.9 HEMATURIA: ICD-10-CM

## 2022-10-19 PROCEDURE — 25010000002 ONDANSETRON PER 1 MG: Performed by: NURSE ANESTHETIST, CERTIFIED REGISTERED

## 2022-10-19 PROCEDURE — 25010000002 HYDROMORPHONE 1 MG/ML SOLUTION

## 2022-10-19 PROCEDURE — 76000 FLUOROSCOPY <1 HR PHYS/QHP: CPT

## 2022-10-19 PROCEDURE — 74018 RADEX ABDOMEN 1 VIEW: CPT

## 2022-10-19 PROCEDURE — 88307 TISSUE EXAM BY PATHOLOGIST: CPT | Performed by: UROLOGY

## 2022-10-19 PROCEDURE — 25010000002 FENTANYL CITRATE (PF) 50 MCG/ML SOLUTION: Performed by: NURSE ANESTHETIST, CERTIFIED REGISTERED

## 2022-10-19 PROCEDURE — 52001 CYSTO W/IRRG&EVAC MLT CLOTS: CPT | Performed by: UROLOGY

## 2022-10-19 PROCEDURE — 25010000002 MIDAZOLAM PER 1 MG: Performed by: ANESTHESIOLOGY

## 2022-10-19 PROCEDURE — 25010000002 PROPOFOL 10 MG/ML EMULSION: Performed by: NURSE ANESTHETIST, CERTIFIED REGISTERED

## 2022-10-19 PROCEDURE — C1758 CATHETER, URETERAL: HCPCS | Performed by: UROLOGY

## 2022-10-19 PROCEDURE — 52240 CYSTOSCOPY AND TREATMENT: CPT | Performed by: UROLOGY

## 2022-10-19 PROCEDURE — C1769 GUIDE WIRE: HCPCS | Performed by: UROLOGY

## 2022-10-19 PROCEDURE — 25010000002 LEVOFLOXACIN PER 250 MG: Performed by: UROLOGY

## 2022-10-19 PROCEDURE — 25010000002 DEXAMETHASONE PER 1 MG: Performed by: NURSE ANESTHETIST, CERTIFIED REGISTERED

## 2022-10-19 RX ORDER — SODIUM CHLORIDE 9 MG/ML
100 INJECTION, SOLUTION INTRAVENOUS CONTINUOUS
Status: DISCONTINUED | OUTPATIENT
Start: 2022-10-19 | End: 2022-10-19 | Stop reason: HOSPADM

## 2022-10-19 RX ORDER — MAGNESIUM HYDROXIDE 1200 MG/15ML
LIQUID ORAL AS NEEDED
Status: DISCONTINUED | OUTPATIENT
Start: 2022-10-19 | End: 2022-10-19 | Stop reason: HOSPADM

## 2022-10-19 RX ORDER — FENTANYL CITRATE 50 UG/ML
INJECTION, SOLUTION INTRAMUSCULAR; INTRAVENOUS AS NEEDED
Status: DISCONTINUED | OUTPATIENT
Start: 2022-10-19 | End: 2022-10-19 | Stop reason: SURG

## 2022-10-19 RX ORDER — MIDAZOLAM HYDROCHLORIDE 1 MG/ML
0.5 INJECTION INTRAMUSCULAR; INTRAVENOUS ONCE
Status: COMPLETED | OUTPATIENT
Start: 2022-10-19 | End: 2022-10-19

## 2022-10-19 RX ORDER — ACETAMINOPHEN 500 MG
1000 TABLET ORAL ONCE
Status: COMPLETED | OUTPATIENT
Start: 2022-10-19 | End: 2022-10-19

## 2022-10-19 RX ORDER — ROCURONIUM BROMIDE 10 MG/ML
INJECTION, SOLUTION INTRAVENOUS AS NEEDED
Status: DISCONTINUED | OUTPATIENT
Start: 2022-10-19 | End: 2022-10-19 | Stop reason: SURG

## 2022-10-19 RX ORDER — HYDROCODONE BITARTRATE AND ACETAMINOPHEN 5; 325 MG/1; MG/1
1 TABLET ORAL ONCE AS NEEDED
Status: DISCONTINUED | OUTPATIENT
Start: 2022-10-19 | End: 2022-10-19 | Stop reason: HOSPADM

## 2022-10-19 RX ORDER — LIDOCAINE HYDROCHLORIDE 20 MG/ML
INJECTION, SOLUTION EPIDURAL; INFILTRATION; INTRACAUDAL; PERINEURAL AS NEEDED
Status: DISCONTINUED | OUTPATIENT
Start: 2022-10-19 | End: 2022-10-19 | Stop reason: SURG

## 2022-10-19 RX ORDER — ONDANSETRON 2 MG/ML
4 INJECTION INTRAMUSCULAR; INTRAVENOUS ONCE AS NEEDED
Status: DISCONTINUED | OUTPATIENT
Start: 2022-10-19 | End: 2022-10-19 | Stop reason: HOSPADM

## 2022-10-19 RX ORDER — HYDROCODONE BITARTRATE AND ACETAMINOPHEN 5; 325 MG/1; MG/1
1 TABLET ORAL EVERY 6 HOURS PRN
Qty: 12 TABLET | Refills: 0 | Status: SHIPPED | OUTPATIENT
Start: 2022-10-19 | End: 2023-01-03

## 2022-10-19 RX ORDER — LEVOFLOXACIN 5 MG/ML
500 INJECTION, SOLUTION INTRAVENOUS ONCE
Status: COMPLETED | OUTPATIENT
Start: 2022-10-19 | End: 2022-10-19

## 2022-10-19 RX ORDER — ONDANSETRON 4 MG/1
4 TABLET, FILM COATED ORAL ONCE AS NEEDED
Status: DISCONTINUED | OUTPATIENT
Start: 2022-10-19 | End: 2022-10-19 | Stop reason: HOSPADM

## 2022-10-19 RX ORDER — ACETAMINOPHEN 325 MG/1
650 TABLET ORAL ONCE
Status: DISCONTINUED | OUTPATIENT
Start: 2022-10-19 | End: 2022-10-19 | Stop reason: HOSPADM

## 2022-10-19 RX ORDER — OXYCODONE HYDROCHLORIDE 5 MG/1
5 TABLET ORAL
Status: DISCONTINUED | OUTPATIENT
Start: 2022-10-19 | End: 2022-10-19 | Stop reason: HOSPADM

## 2022-10-19 RX ORDER — ONDANSETRON 2 MG/ML
INJECTION INTRAMUSCULAR; INTRAVENOUS AS NEEDED
Status: DISCONTINUED | OUTPATIENT
Start: 2022-10-19 | End: 2022-10-19 | Stop reason: SURG

## 2022-10-19 RX ORDER — PROPOFOL 10 MG/ML
VIAL (ML) INTRAVENOUS AS NEEDED
Status: DISCONTINUED | OUTPATIENT
Start: 2022-10-19 | End: 2022-10-19 | Stop reason: SURG

## 2022-10-19 RX ORDER — PROMETHAZINE HYDROCHLORIDE 12.5 MG/1
12.5 TABLET ORAL ONCE AS NEEDED
Status: DISCONTINUED | OUTPATIENT
Start: 2022-10-19 | End: 2022-10-19 | Stop reason: HOSPADM

## 2022-10-19 RX ORDER — GLYCOPYRROLATE 0.2 MG/ML
0.2 INJECTION INTRAMUSCULAR; INTRAVENOUS
Status: COMPLETED | OUTPATIENT
Start: 2022-10-19 | End: 2022-10-19

## 2022-10-19 RX ORDER — SODIUM CHLORIDE, SODIUM LACTATE, POTASSIUM CHLORIDE, CALCIUM CHLORIDE 600; 310; 30; 20 MG/100ML; MG/100ML; MG/100ML; MG/100ML
9 INJECTION, SOLUTION INTRAVENOUS CONTINUOUS PRN
Status: DISCONTINUED | OUTPATIENT
Start: 2022-10-19 | End: 2022-10-19 | Stop reason: HOSPADM

## 2022-10-19 RX ORDER — DEXAMETHASONE SODIUM PHOSPHATE 4 MG/ML
INJECTION, SOLUTION INTRA-ARTICULAR; INTRALESIONAL; INTRAMUSCULAR; INTRAVENOUS; SOFT TISSUE AS NEEDED
Status: DISCONTINUED | OUTPATIENT
Start: 2022-10-19 | End: 2022-10-19 | Stop reason: SURG

## 2022-10-19 RX ADMIN — HYDROMORPHONE HYDROCHLORIDE 0.5 MG: 1 INJECTION, SOLUTION INTRAMUSCULAR; INTRAVENOUS; SUBCUTANEOUS at 16:25

## 2022-10-19 RX ADMIN — GLYCOPYRROLATE 0.2 MG: 0.2 INJECTION INTRAMUSCULAR; INTRAVENOUS at 14:11

## 2022-10-19 RX ADMIN — PROPOFOL 50 MG: 10 INJECTION, EMULSION INTRAVENOUS at 14:56

## 2022-10-19 RX ADMIN — SUGAMMADEX 200 MG: 100 INJECTION, SOLUTION INTRAVENOUS at 15:46

## 2022-10-19 RX ADMIN — DEXAMETHASONE SODIUM PHOSPHATE 8 MG: 4 INJECTION, SOLUTION INTRA-ARTICULAR; INTRALESIONAL; INTRAMUSCULAR; INTRAVENOUS; SOFT TISSUE at 14:40

## 2022-10-19 RX ADMIN — ACETAMINOPHEN 1000 MG: 500 TABLET, FILM COATED ORAL at 14:09

## 2022-10-19 RX ADMIN — ROCURONIUM BROMIDE 10 MG: 10 INJECTION INTRAVENOUS at 14:56

## 2022-10-19 RX ADMIN — LIDOCAINE HYDROCHLORIDE 60 MG: 20 INJECTION, SOLUTION EPIDURAL; INFILTRATION; INTRACAUDAL; PERINEURAL at 14:33

## 2022-10-19 RX ADMIN — SODIUM CHLORIDE, POTASSIUM CHLORIDE, SODIUM LACTATE AND CALCIUM CHLORIDE: 600; 310; 30; 20 INJECTION, SOLUTION INTRAVENOUS at 14:28

## 2022-10-19 RX ADMIN — ROCURONIUM BROMIDE 50 MG: 10 INJECTION INTRAVENOUS at 14:33

## 2022-10-19 RX ADMIN — ONDANSETRON 4 MG: 2 INJECTION INTRAMUSCULAR; INTRAVENOUS at 15:46

## 2022-10-19 RX ADMIN — LEVOFLOXACIN 500 MG: 500 INJECTION, SOLUTION INTRAVENOUS at 14:38

## 2022-10-19 RX ADMIN — Medication 0.5 MG: at 16:25

## 2022-10-19 RX ADMIN — ROCURONIUM BROMIDE 20 MG: 10 INJECTION INTRAVENOUS at 15:23

## 2022-10-19 RX ADMIN — ROCURONIUM BROMIDE 10 MG: 10 INJECTION INTRAVENOUS at 15:38

## 2022-10-19 RX ADMIN — FENTANYL CITRATE 50 MCG: 50 INJECTION, SOLUTION INTRAMUSCULAR; INTRAVENOUS at 14:56

## 2022-10-19 RX ADMIN — MIDAZOLAM 0.5 MG: 1 INJECTION, SOLUTION INTRAMUSCULAR; INTRAVENOUS at 14:11

## 2022-10-19 RX ADMIN — PROPOFOL 150 MG: 10 INJECTION, EMULSION INTRAVENOUS at 14:33

## 2022-10-19 RX ADMIN — FENTANYL CITRATE 50 MCG: 50 INJECTION, SOLUTION INTRAMUSCULAR; INTRAVENOUS at 14:33

## 2022-10-19 NOTE — ANESTHESIA POSTPROCEDURE EVALUATION
Patient: Korey Rodriguez    Procedure Summary     Date: 10/19/22 Room / Location: [facility] OR 07 / [facility] MAIN OR    Anesthesia Start: 1428 Anesthesia Stop: 1602    Procedure: Cystoscopy with transurethral resection of bladder tumor (Right) Diagnosis:       Hematuria      (Hematuria [R31.9])    Surgeons: Aung Westfall MD Provider: Dimitri Hirsch MD    Anesthesia Type: general ASA Status: 2          Anesthesia Type: general    Vitals  Vitals Value Taken Time   /69 10/19/22 1651   Temp 36.9 °C (98.4 °F) 10/19/22 1650   Pulse 66 10/19/22 1655   Resp 15 10/19/22 1650   SpO2 94 % 10/19/22 1655           Post Anesthesia Care and Evaluation    Patient location during evaluation: bedside  Patient participation: complete - patient participated  Level of consciousness: awake  Pain management: adequate    Airway patency: patent  Anesthetic complications: No anesthetic complications  PONV Status: none  Cardiovascular status: acceptable and stable  Respiratory status: acceptable  Hydration status: acceptable    Comments: An Anesthesiologist personally participated in the most demanding procedures (including induction and emergence if applicable) in the anesthesia plan, monitored the course of anesthesia administration at frequent intervals and remained physically present and available for immediate diagnosis and treatment of emergencies.

## 2022-10-19 NOTE — DISCHARGE INSTRUCTIONS
DISCHARGE INSTRUCTIONS   TRANSURETHRAL RESECTION   OF BLADDER TUMOR            For your surgery you had:  [x] General anesthesia (you may have a sore throat for the first 24 hours)    You may experience dizziness, drowsiness, or lightheadedness for several hours following surgery.  Do not stay alone today or tonight.  Limit your activity for 24 hours.   You should not drive, operate machinery, drink alcohol, or sign legally binding documents for 24 hours or while you are taking pain medication.   Resume your diet slowly. Follow any special dietary instructions you may have been given by your doctor.     NOTIFY YOUR DOCTOR IF YOU EXPERIENCE ANY OF THE FOLLOWING:  Temperature greater than 101 degrees Fahrenheit  Shaking chills  Nausea, vomiting, and/or pain that is not controlled by prescribed medications  Increase in bleeding or bleeding that is excessive  If unable to reach your doctor, please go to the closest Emergency Room   You will have a catheter to drain your bladder. Catheters are usually removed in 24-48 hours. Wash around the catheter with soap and water and rinse well.   Drink 6 glasses of water a day for 3-4 days.   You may see blood in your urine for up to a week, there may be small blood clots. If so, increase the amount you are drinking.   If you are passing large clots, call your doctor.   Avoid lifting or straining for 4 weeks.   You may have burning, pain, and frequency of urination for 48 hours after catheter is removed. Call your doctor if it continues longer.     [x] Last dose of pain medication given at:          SPECIAL INSTRUCTIONS:  -Remove catheter at home in 12 days per instructions.  -Arie's office will call with follow up appointment.

## 2022-10-19 NOTE — OP NOTE
CYSTOSCOPY TRANSURETHRAL RESECTION OF BLADDER TUMOR  Procedure Report    Patient Name:  Korey Rodriguez  YOB: 1945    Date of Surgery:  10/19/2022      Pre-op Diagnosis:   Hematuria [R31.9]       Postop diagnosis:    Same    Procedure/CPT® Codes:      Procedure(s):       transurethral resection of bladder tumor,, large 7 cm x 4 cm  Cystoscopy clot evacuation fulguration of bleeding    Staff:  Surgeon(s):  Aung Westfall MD         Anesthesia: General    Estimated Blood Loss: 50 mL    Implants:    Nothing was implanted during the procedure    Specimen:          Specimens     ID Source Type Tests Collected By Collected At Frozen?    A Urinary Bladder Tissue · TISSUE PATHOLOGY EXAM   Aung Westfall MD 10/19/22 [id number]     Description: bladder tumor              Findings:    4 cm prostate    Grossly abnormal bladder    60% of the bladder involved    Erythematous raised tumor with papillary components overlying the trigone right lateral wall some of the posterior wall most of the dome and surrounding the bladder neck.  Tumor overlying the right ureteral orifice.  Resected this and opened the right ureteral orifice without issue.  Wide open at the end of the procedure.  No tumor emanating from the right ureter.  It was hydronephrotic      All tumor was not removed during this procedure.  There was still tumor on the left trigone and some scattered areas of tumor around the bladder neck.    Some of the other mucosal also look erythematous and abnormal.  Really not much bladder mucosa looked normal      Complications: none    Description of Procedure:     Brief description of procedure    After informed consent patient was taken to the operating room.  Patient was placed supine and placed under general anesthesia by the anesthesia team.  Patient was prepped and draped in normal sterile fashion after being placed in dorsal lithotomy position.  A multidisciplinary timeout was undertaken documenting the  correct patient site and procedure.  At this point a 22 rigid cystoscope was advanced into the urethra.  Anterior urethra and prostatic urethra were normal.  Bladder was examined    Bladder was grossly abnormal, lots of clots so could not get a good examination.     At this time I used a visual obturator to place a resectoscope into the bladder.    Ellik evacuator was used to evacuate all the clots.      A medium bipolar loop was then placed into the bladder      Patient was found to have very abnormal bladder.  See findings section    I resected starting at the right dome all the way down the right wall and onto the right trigone.  This area was bleeding and also covering the right ureteral orifice.  Large area resected approximately 7 cm x 4 cm.    Spent greater than 1 hour fulgurating bleeding.    All pieces were flushed out of the bladder.  Good hemostasis was obtained.    22 Mauritanian two-way catheter placed with 10 cc of sterile water placed to straight drainage.  Flushed and clear.     This was placed to straight drainage and also attached to continuous bladder irrigation.  Patient tolerated the procedure well and was taken to the post anesthesia area without issue.    Going to leave the catheter in for 10 to 12 days as some of the bladder wall was thin      Aung Westfall MD     Date: 10/19/2022  Time: 15:58 EDT

## 2022-10-19 NOTE — ANESTHESIA PREPROCEDURE EVALUATION
Anesthesia Evaluation     Patient summary reviewed and Nursing notes reviewed   no history of anesthetic complications:  NPO Solid Status: > 8 hours  NPO Liquid Status: > 2 hours           Airway   Mallampati: II  TM distance: >3 FB  Neck ROM: full  No difficulty expected  Dental    (+) partials    Pulmonary - negative pulmonary ROS and normal exam    breath sounds clear to auscultation  Cardiovascular - normal exam  Exercise tolerance: good (4-7 METS)    Rhythm: regular  Rate: normal    (+) PVD (AAA), hyperlipidemia,       Neuro/Psych- negative ROS  GI/Hepatic/Renal/Endo    (+)   renal disease stones,     Musculoskeletal (-) negative ROS    Abdominal    Substance History - negative use     OB/GYN negative ob/gyn ROS         Other - negative ROS       ROS/Med Hx Other: PAT Nursing Notes unavailable.                 Anesthesia Plan    ASA 2     general     (Patient understands anesthesia not responsible for dental damage.)  intravenous induction     Anesthetic plan, risks, benefits, and alternatives have been provided, discussed and informed consent has been obtained with: patient.  Pre-procedure education provided  Use of blood products discussed with patient .   Plan discussed with CRNA.        CODE STATUS:

## 2022-10-20 ENCOUNTER — TELEPHONE (OUTPATIENT)
Dept: UROLOGY | Facility: CLINIC | Age: 77
End: 2022-10-20

## 2022-10-20 DIAGNOSIS — I71.43 INFRARENAL ABDOMINAL AORTIC ANEURYSM (AAA) WITHOUT RUPTURE: ICD-10-CM

## 2022-10-20 DIAGNOSIS — R31.0 GROSS HEMATURIA: Primary | ICD-10-CM

## 2022-10-20 NOTE — TELEPHONE ENCOUNTER
Spoke to  Vascular Surgery to cancel patients appt with Dr Kirk, as pt already sees vascular with [facility]     ----- Message from Aung Westfall MD sent at 10/19/2022  4:05 PM EDT -----  Regarding: apt  Needs appointment with Dr. Bansal for consideration of cystectomy in 1 to 2 weeks.  No sooner than 1 week to let the pathology come back.  Also needs an appointment with vascular surgery for 5 cm AAA.  He could also do this at the [facility]

## 2022-10-21 ENCOUNTER — TELEPHONE (OUTPATIENT)
Dept: UROLOGY | Facility: CLINIC | Age: 77
End: 2022-10-21

## 2022-10-21 LAB
CYTO UR: NORMAL
LAB AP CASE REPORT: NORMAL
LAB AP CLINICAL INFORMATION: NORMAL
PATH REPORT.FINAL DX SPEC: NORMAL
PATH REPORT.GROSS SPEC: NORMAL

## 2022-10-21 NOTE — TELEPHONE ENCOUNTER
Pastora called from pathology.  Urinary bladder transurethral resection: high grade papillary urothelial carcinoma, invasive into muscularis propria.

## 2022-11-16 ENCOUNTER — TRANSCRIBE ORDERS (OUTPATIENT)
Dept: ADMINISTRATIVE | Facility: HOSPITAL | Age: 77
End: 2022-11-16

## 2022-11-16 DIAGNOSIS — C67.2 MALIGNANT NEOPLASM OF LATERAL WALL OF URINARY BLADDER: Primary | ICD-10-CM

## 2022-11-17 ENCOUNTER — HOSPITAL ENCOUNTER (OUTPATIENT)
Dept: PET IMAGING | Facility: HOSPITAL | Age: 77
Discharge: HOME | End: 2022-11-17

## 2022-11-17 DIAGNOSIS — C67.2 MALIGNANT NEOPLASM OF LATERAL WALL OF URINARY BLADDER: ICD-10-CM

## 2022-11-17 PROCEDURE — 0 FLUDEOXYGLUCOSE F18 SOLUTION: Performed by: UROLOGY

## 2022-11-17 PROCEDURE — 78815 PET IMAGE W/CT SKULL-THIGH: CPT

## 2022-11-17 PROCEDURE — A9552 F18 FDG: HCPCS | Performed by: UROLOGY

## 2022-11-17 RX ADMIN — FLUDEOXYGLUCOSE F18 1 DOSE: 300 INJECTION INTRAVENOUS at 14:51

## 2022-12-19 ENCOUNTER — TRANSCRIBE ORDERS (OUTPATIENT)
Dept: LAB | Facility: HOSPITAL | Age: 77
End: 2022-12-19

## 2022-12-19 ENCOUNTER — LAB (OUTPATIENT)
Dept: LAB | Facility: HOSPITAL | Age: 77
End: 2022-12-19

## 2022-12-19 DIAGNOSIS — N17.9 ACUTE KIDNEY INJURY: Primary | ICD-10-CM

## 2022-12-19 DIAGNOSIS — D64.9 ANEMIA, UNSPECIFIED TYPE: ICD-10-CM

## 2022-12-19 DIAGNOSIS — N17.9 ACUTE KIDNEY INJURY: ICD-10-CM

## 2022-12-19 LAB
ANION GAP SERPL CALCULATED.3IONS-SCNC: 10 MMOL/L (ref 5–15)
BUN SERPL-MCNC: 18 MG/DL (ref 8–23)
BUN/CREAT SERPL: 21.2 (ref 7–25)
CALCIUM SPEC-SCNC: 9.4 MG/DL (ref 8.6–10.5)
CHLORIDE SERPL-SCNC: 102 MMOL/L (ref 98–107)
CO2 SERPL-SCNC: 27 MMOL/L (ref 22–29)
CREAT SERPL-MCNC: 0.85 MG/DL (ref 0.76–1.27)
DEPRECATED RDW RBC AUTO: 37.4 FL (ref 37–54)
EGFRCR SERPLBLD CKD-EPI 2021: 88.4 ML/MIN/1.73
ERYTHROCYTE [DISTWIDTH] IN BLOOD BY AUTOMATED COUNT: 12.6 % (ref 12.3–15.4)
GLUCOSE SERPL-MCNC: 123 MG/DL (ref 65–99)
HCT VFR BLD AUTO: 34.1 % (ref 37.5–51)
HGB BLD-MCNC: 11.1 G/DL (ref 13–17.7)
MCH RBC QN AUTO: 26.6 PG (ref 26.6–33)
MCHC RBC AUTO-ENTMCNC: 32.6 G/DL (ref 31.5–35.7)
MCV RBC AUTO: 81.8 FL (ref 79–97)
PLATELET # BLD AUTO: 290 10*3/MM3 (ref 140–450)
PMV BLD AUTO: 10.3 FL (ref 6–12)
POTASSIUM SERPL-SCNC: 4.4 MMOL/L (ref 3.5–5.2)
RBC # BLD AUTO: 4.17 10*6/MM3 (ref 4.14–5.8)
SODIUM SERPL-SCNC: 139 MMOL/L (ref 136–145)
WBC NRBC COR # BLD: 8.91 10*3/MM3 (ref 3.4–10.8)

## 2022-12-19 PROCEDURE — 85027 COMPLETE CBC AUTOMATED: CPT

## 2022-12-19 PROCEDURE — 36415 COLL VENOUS BLD VENIPUNCTURE: CPT

## 2022-12-19 PROCEDURE — 80048 BASIC METABOLIC PNL TOTAL CA: CPT

## 2022-12-30 ENCOUNTER — TRANSCRIBE ORDERS (OUTPATIENT)
Dept: ADMINISTRATIVE | Facility: HOSPITAL | Age: 77
End: 2022-12-30
Payer: MEDICARE

## 2022-12-30 DIAGNOSIS — C67.2 MALIGNANT NEOPLASM OF LATERAL WALL OF URINARY BLADDER: Primary | ICD-10-CM

## 2023-01-01 ENCOUNTER — HOSPITAL ENCOUNTER (INPATIENT)
Facility: HOSPITAL | Age: 78
LOS: 3 days | DRG: 065 | End: 2023-12-09
Attending: EMERGENCY MEDICINE | Admitting: INTERNAL MEDICINE
Payer: MEDICARE

## 2023-01-01 ENCOUNTER — APPOINTMENT (OUTPATIENT)
Dept: INTERVENTIONAL RADIOLOGY/VASCULAR | Facility: HOSPITAL | Age: 78
DRG: 065 | End: 2023-01-01
Payer: MEDICARE

## 2023-01-01 ENCOUNTER — APPOINTMENT (OUTPATIENT)
Dept: MRI IMAGING | Facility: HOSPITAL | Age: 78
DRG: 065 | End: 2023-01-01
Payer: MEDICARE

## 2023-01-01 ENCOUNTER — TELEPHONE (OUTPATIENT)
Dept: INTERNAL MEDICINE | Facility: CLINIC | Age: 78
End: 2023-01-01

## 2023-01-01 ENCOUNTER — APPOINTMENT (OUTPATIENT)
Dept: CT IMAGING | Facility: HOSPITAL | Age: 78
DRG: 065 | End: 2023-01-01
Payer: MEDICARE

## 2023-01-01 ENCOUNTER — APPOINTMENT (OUTPATIENT)
Facility: HOSPITAL | Age: 78
DRG: 065 | End: 2023-01-01
Payer: MEDICARE

## 2023-01-01 ENCOUNTER — APPOINTMENT (OUTPATIENT)
Dept: GENERAL RADIOLOGY | Facility: HOSPITAL | Age: 78
DRG: 065 | End: 2023-01-01
Payer: MEDICARE

## 2023-01-01 VITALS
HEART RATE: 115 BPM | SYSTOLIC BLOOD PRESSURE: 130 MMHG | BODY MASS INDEX: 23.77 KG/M2 | RESPIRATION RATE: 32 BRPM | HEIGHT: 75 IN | DIASTOLIC BLOOD PRESSURE: 67 MMHG | OXYGEN SATURATION: 76 % | WEIGHT: 191.14 LBS | TEMPERATURE: 98.1 F

## 2023-01-01 DIAGNOSIS — Z78.9 DECREASED ACTIVITIES OF DAILY LIVING (ADL): ICD-10-CM

## 2023-01-01 DIAGNOSIS — R26.2 DIFFICULTY IN WALKING: ICD-10-CM

## 2023-01-01 DIAGNOSIS — R13.12 OROPHARYNGEAL DYSPHAGIA: ICD-10-CM

## 2023-01-01 DIAGNOSIS — I63.9 CEREBROVASCULAR ACCIDENT (CVA), UNSPECIFIED MECHANISM: Primary | ICD-10-CM

## 2023-01-01 LAB
ALBUMIN SERPL-MCNC: 2.8 G/DL (ref 3.5–5.2)
ALBUMIN SERPL-MCNC: 3.2 G/DL (ref 3.5–5.2)
ALBUMIN/GLOB SERPL: 1.1 G/DL
ALBUMIN/GLOB SERPL: 1.1 G/DL
ALP SERPL-CCNC: 616 U/L (ref 39–117)
ALP SERPL-CCNC: 768 U/L (ref 39–117)
ALT SERPL W P-5'-P-CCNC: 125 U/L (ref 1–41)
ALT SERPL W P-5'-P-CCNC: 135 U/L (ref 1–41)
ANION GAP SERPL CALCULATED.3IONS-SCNC: 11.2 MMOL/L (ref 5–15)
ANION GAP SERPL CALCULATED.3IONS-SCNC: 13 MMOL/L (ref 5–15)
APTT PPP: 30.8 SECONDS (ref 24.2–34.2)
AST SERPL-CCNC: 132 U/L (ref 1–40)
AST SERPL-CCNC: 141 U/L (ref 1–40)
BACTERIA UR QL AUTO: ABNORMAL /HPF
BASOPHILS # BLD AUTO: 0.02 10*3/MM3 (ref 0–0.2)
BASOPHILS NFR BLD AUTO: 0.2 % (ref 0–1.5)
BH CV XLRA MEAS LEFT CAROTID BULB EDV: 7.45 CM/SEC
BH CV XLRA MEAS LEFT CAROTID BULB PSV: 25.1 CM/SEC
BH CV XLRA MEAS LEFT DIST CCA EDV: -10.2 CM/SEC
BH CV XLRA MEAS LEFT DIST CCA PSV: -129.4 CM/SEC
BH CV XLRA MEAS LEFT DIST ICA EDV: -31 CM/SEC
BH CV XLRA MEAS LEFT DIST ICA PSV: -143.3 CM/SEC
BH CV XLRA MEAS LEFT ICA/CCA RATIO: -0.76
BH CV XLRA MEAS LEFT MID ICA EDV: -28.1 CM/SEC
BH CV XLRA MEAS LEFT MID ICA PSV: -122 CM/SEC
BH CV XLRA MEAS LEFT PROX CCA EDV: 12.5 CM/SEC
BH CV XLRA MEAS LEFT PROX CCA PSV: 101.9 CM/SEC
BH CV XLRA MEAS LEFT PROX ECA EDV: 0 CM/SEC
BH CV XLRA MEAS LEFT PROX ECA PSV: -103.6 CM/SEC
BH CV XLRA MEAS LEFT PROX ICA EDV: 24.9 CM/SEC
BH CV XLRA MEAS LEFT PROX ICA PSV: 98.2 CM/SEC
BH CV XLRA MEAS LEFT VERTEBRAL A EDV: 11.5 CM/SEC
BH CV XLRA MEAS LEFT VERTEBRAL A PSV: 78.5 CM/SEC
BH CV XLRA MEAS RIGHT CAROTID BULB EDV: 12.4 CM/SEC
BH CV XLRA MEAS RIGHT CAROTID BULB PSV: 81.4 CM/SEC
BH CV XLRA MEAS RIGHT DIST CCA EDV: 7.5 CM/SEC
BH CV XLRA MEAS RIGHT DIST CCA PSV: 78.3 CM/SEC
BH CV XLRA MEAS RIGHT DIST ICA EDV: -20.3 CM/SEC
BH CV XLRA MEAS RIGHT DIST ICA PSV: -89 CM/SEC
BH CV XLRA MEAS RIGHT ICA/CCA RATIO: -1.08
BH CV XLRA MEAS RIGHT MID ICA EDV: -21.7 CM/SEC
BH CV XLRA MEAS RIGHT MID ICA PSV: -98.8 CM/SEC
BH CV XLRA MEAS RIGHT PROX CCA EDV: 9.9 CM/SEC
BH CV XLRA MEAS RIGHT PROX CCA PSV: 89.5 CM/SEC
BH CV XLRA MEAS RIGHT PROX ECA PSV: 199 CM/SEC
BH CV XLRA MEAS RIGHT PROX ICA EDV: -9.8 CM/SEC
BH CV XLRA MEAS RIGHT PROX ICA PSV: -84.8 CM/SEC
BH CV XLRA MEAS RIGHT VERTEBRAL A EDV: 6 CM/SEC
BH CV XLRA MEAS RIGHT VERTEBRAL A PSV: 29.4 CM/SEC
BILIRUB SERPL-MCNC: 1 MG/DL (ref 0–1.2)
BILIRUB SERPL-MCNC: 1.1 MG/DL (ref 0–1.2)
BILIRUB UR QL STRIP: NEGATIVE
BUN SERPL-MCNC: 41 MG/DL (ref 8–23)
BUN SERPL-MCNC: 43 MG/DL (ref 8–23)
BUN/CREAT SERPL: 25.3 (ref 7–25)
BUN/CREAT SERPL: 29.5 (ref 7–25)
CALCIUM SPEC-SCNC: 8.4 MG/DL (ref 8.6–10.5)
CALCIUM SPEC-SCNC: 8.7 MG/DL (ref 8.6–10.5)
CHLORIDE SERPL-SCNC: 100 MMOL/L (ref 98–107)
CHLORIDE SERPL-SCNC: 99 MMOL/L (ref 98–107)
CHOLEST SERPL-MCNC: 151 MG/DL (ref 0–200)
CLARITY UR: CLEAR
CO2 SERPL-SCNC: 23.8 MMOL/L (ref 22–29)
CO2 SERPL-SCNC: 24 MMOL/L (ref 22–29)
COLOR UR: YELLOW
CREAT SERPL-MCNC: 1.46 MG/DL (ref 0.76–1.27)
CREAT SERPL-MCNC: 1.62 MG/DL (ref 0.76–1.27)
DEPRECATED RDW RBC AUTO: 47.4 FL (ref 37–54)
DEPRECATED RDW RBC AUTO: 48.2 FL (ref 37–54)
EGFRCR SERPLBLD CKD-EPI 2021: 42.6 ML/MIN/1.73
EGFRCR SERPLBLD CKD-EPI 2021: 48.3 ML/MIN/1.73
EOSINOPHIL # BLD AUTO: 0.04 10*3/MM3 (ref 0–0.4)
EOSINOPHIL NFR BLD AUTO: 0.4 % (ref 0.3–6.2)
ERYTHROCYTE [DISTWIDTH] IN BLOOD BY AUTOMATED COUNT: 16 % (ref 12.3–15.4)
ERYTHROCYTE [DISTWIDTH] IN BLOOD BY AUTOMATED COUNT: 16.1 % (ref 12.3–15.4)
GLOBULIN UR ELPH-MCNC: 2.6 GM/DL
GLOBULIN UR ELPH-MCNC: 3 GM/DL
GLUCOSE BLDC GLUCOMTR-MCNC: 111 MG/DL (ref 70–99)
GLUCOSE BLDC GLUCOMTR-MCNC: 83 MG/DL (ref 70–99)
GLUCOSE SERPL-MCNC: 108 MG/DL (ref 65–99)
GLUCOSE SERPL-MCNC: 87 MG/DL (ref 65–99)
GLUCOSE UR STRIP-MCNC: NEGATIVE MG/DL
HBA1C MFR BLD: 5.7 % (ref 4.8–5.6)
HCT VFR BLD AUTO: 28.4 % (ref 37.5–51)
HCT VFR BLD AUTO: 30.6 % (ref 37.5–51)
HDLC SERPL-MCNC: 52 MG/DL (ref 40–60)
HGB BLD-MCNC: 9.1 G/DL (ref 13–17.7)
HGB BLD-MCNC: 9.5 G/DL (ref 13–17.7)
HGB UR QL STRIP.AUTO: ABNORMAL
HOLD SPECIMEN: NORMAL
HOLD SPECIMEN: NORMAL
HYALINE CASTS UR QL AUTO: ABNORMAL /LPF
IMM GRANULOCYTES # BLD AUTO: 0.08 10*3/MM3 (ref 0–0.05)
IMM GRANULOCYTES NFR BLD AUTO: 0.7 % (ref 0–0.5)
INR PPP: 1.29 (ref 0.86–1.15)
KETONES UR QL STRIP: NEGATIVE
LDLC SERPL CALC-MCNC: 77 MG/DL (ref 0–100)
LDLC/HDLC SERPL: 1.44 {RATIO}
LEFT ARM BP: NORMAL MMHG
LEUKOCYTE ESTERASE UR QL STRIP.AUTO: ABNORMAL
LYMPHOCYTES # BLD AUTO: 0.28 10*3/MM3 (ref 0.7–3.1)
LYMPHOCYTES NFR BLD AUTO: 2.6 % (ref 19.6–45.3)
MCH RBC QN AUTO: 27.3 PG (ref 26.6–33)
MCH RBC QN AUTO: 27.7 PG (ref 26.6–33)
MCHC RBC AUTO-ENTMCNC: 31 G/DL (ref 31.5–35.7)
MCHC RBC AUTO-ENTMCNC: 32 G/DL (ref 31.5–35.7)
MCV RBC AUTO: 86.6 FL (ref 79–97)
MCV RBC AUTO: 87.9 FL (ref 79–97)
MONOCYTES # BLD AUTO: 0.44 10*3/MM3 (ref 0.1–0.9)
MONOCYTES NFR BLD AUTO: 4.1 % (ref 5–12)
NEUTROPHILS NFR BLD AUTO: 9.81 10*3/MM3 (ref 1.7–7)
NEUTROPHILS NFR BLD AUTO: 92 % (ref 42.7–76)
NITRITE UR QL STRIP: NEGATIVE
NRBC BLD AUTO-RTO: 0.4 /100 WBC (ref 0–0.2)
PH UR STRIP.AUTO: 5.5 [PH] (ref 5–8)
PLATELET # BLD AUTO: 20 10*3/MM3 (ref 140–450)
PLATELET # BLD AUTO: 30 10*3/MM3 (ref 140–450)
PMV BLD AUTO: 11.1 FL (ref 6–12)
PMV BLD AUTO: 11.1 FL (ref 6–12)
POTASSIUM SERPL-SCNC: 4.7 MMOL/L (ref 3.5–5.2)
POTASSIUM SERPL-SCNC: 4.7 MMOL/L (ref 3.5–5.2)
PROT SERPL-MCNC: 5.4 G/DL (ref 6–8.5)
PROT SERPL-MCNC: 6.2 G/DL (ref 6–8.5)
PROT UR QL STRIP: ABNORMAL
PROTHROMBIN TIME: 16.3 SECONDS (ref 11.8–14.9)
QT INTERVAL: 369 MS
QTC INTERVAL: 461 MS
RBC # BLD AUTO: 3.28 10*6/MM3 (ref 4.14–5.8)
RBC # BLD AUTO: 3.48 10*6/MM3 (ref 4.14–5.8)
RBC # UR STRIP: ABNORMAL /HPF
RBC MORPH BLD: NORMAL
REF LAB TEST METHOD: ABNORMAL
RIGHT ARM BP: NORMAL MMHG
SMALL PLATELETS BLD QL SMEAR: NORMAL
SODIUM SERPL-SCNC: 135 MMOL/L (ref 136–145)
SODIUM SERPL-SCNC: 136 MMOL/L (ref 136–145)
SP GR UR STRIP: 1.02 (ref 1–1.03)
SQUAMOUS #/AREA URNS HPF: ABNORMAL /HPF
TRIGL SERPL-MCNC: 121 MG/DL (ref 0–150)
TROPONIN T SERPL HS-MCNC: 38 NG/L
UROBILINOGEN UR QL STRIP: ABNORMAL
VLDLC SERPL-MCNC: 22 MG/DL (ref 5–40)
WBC # UR STRIP: ABNORMAL /HPF
WBC MORPH BLD: NORMAL
WBC NRBC COR # BLD AUTO: 10.67 10*3/MM3 (ref 3.4–10.8)
WBC NRBC COR # BLD AUTO: 9.16 10*3/MM3 (ref 3.4–10.8)
WHOLE BLOOD HOLD COAG: NORMAL
WHOLE BLOOD HOLD SPECIMEN: NORMAL

## 2023-01-01 PROCEDURE — 80053 COMPREHEN METABOLIC PANEL: CPT | Performed by: INTERNAL MEDICINE

## 2023-01-01 PROCEDURE — 80061 LIPID PANEL: CPT | Performed by: INTERNAL MEDICINE

## 2023-01-01 PROCEDURE — 70450 CT HEAD/BRAIN W/O DYE: CPT

## 2023-01-01 PROCEDURE — 84484 ASSAY OF TROPONIN QUANT: CPT | Performed by: EMERGENCY MEDICINE

## 2023-01-01 PROCEDURE — 83036 HEMOGLOBIN GLYCOSYLATED A1C: CPT | Performed by: INTERNAL MEDICINE

## 2023-01-01 PROCEDURE — 25010000002 MORPHINE PER 10 MG: Performed by: INTERNAL MEDICINE

## 2023-01-01 PROCEDURE — 97165 OT EVAL LOW COMPLEX 30 MIN: CPT

## 2023-01-01 PROCEDURE — 93880 EXTRACRANIAL BILAT STUDY: CPT

## 2023-01-01 PROCEDURE — 99232 SBSQ HOSP IP/OBS MODERATE 35: CPT | Performed by: INTERNAL MEDICINE

## 2023-01-01 PROCEDURE — 99222 1ST HOSP IP/OBS MODERATE 55: CPT | Performed by: PSYCHIATRY & NEUROLOGY

## 2023-01-01 PROCEDURE — 97161 PT EVAL LOW COMPLEX 20 MIN: CPT

## 2023-01-01 PROCEDURE — 85007 BL SMEAR W/DIFF WBC COUNT: CPT | Performed by: EMERGENCY MEDICINE

## 2023-01-01 PROCEDURE — 25010000002 ONDANSETRON PER 1 MG: Performed by: EMERGENCY MEDICINE

## 2023-01-01 PROCEDURE — 93880 EXTRACRANIAL BILAT STUDY: CPT | Performed by: SURGERY

## 2023-01-01 PROCEDURE — 92610 EVALUATE SWALLOWING FUNCTION: CPT

## 2023-01-01 PROCEDURE — 99223 1ST HOSP IP/OBS HIGH 75: CPT | Performed by: INTERNAL MEDICINE

## 2023-01-01 PROCEDURE — 25810000003 LACTATED RINGERS PER 1000 ML: Performed by: INTERNAL MEDICINE

## 2023-01-01 PROCEDURE — 70551 MRI BRAIN STEM W/O DYE: CPT

## 2023-01-01 PROCEDURE — 85025 COMPLETE CBC W/AUTO DIFF WBC: CPT | Performed by: EMERGENCY MEDICINE

## 2023-01-01 PROCEDURE — 93010 ELECTROCARDIOGRAM REPORT: CPT | Performed by: SPECIALIST

## 2023-01-01 PROCEDURE — 93005 ELECTROCARDIOGRAM TRACING: CPT | Performed by: EMERGENCY MEDICINE

## 2023-01-01 PROCEDURE — 80053 COMPREHEN METABOLIC PANEL: CPT | Performed by: EMERGENCY MEDICINE

## 2023-01-01 PROCEDURE — 82948 REAGENT STRIP/BLOOD GLUCOSE: CPT

## 2023-01-01 PROCEDURE — 81001 URINALYSIS AUTO W/SCOPE: CPT | Performed by: EMERGENCY MEDICINE

## 2023-01-01 PROCEDURE — 85027 COMPLETE CBC AUTOMATED: CPT | Performed by: INTERNAL MEDICINE

## 2023-01-01 PROCEDURE — 25010000002 ONDANSETRON PER 1 MG: Performed by: INTERNAL MEDICINE

## 2023-01-01 PROCEDURE — 85730 THROMBOPLASTIN TIME PARTIAL: CPT | Performed by: EMERGENCY MEDICINE

## 2023-01-01 PROCEDURE — 71045 X-RAY EXAM CHEST 1 VIEW: CPT

## 2023-01-01 PROCEDURE — 99285 EMERGENCY DEPT VISIT HI MDM: CPT

## 2023-01-01 PROCEDURE — 99233 SBSQ HOSP IP/OBS HIGH 50: CPT | Performed by: INTERNAL MEDICINE

## 2023-01-01 PROCEDURE — 85610 PROTHROMBIN TIME: CPT | Performed by: EMERGENCY MEDICINE

## 2023-01-01 RX ORDER — SODIUM CHLORIDE 0.9 % (FLUSH) 0.9 %
10 SYRINGE (ML) INJECTION AS NEEDED
Status: DISCONTINUED | OUTPATIENT
Start: 2023-01-01 | End: 2023-01-01

## 2023-01-01 RX ORDER — IPRATROPIUM BROMIDE AND ALBUTEROL SULFATE 2.5; .5 MG/3ML; MG/3ML
3 SOLUTION RESPIRATORY (INHALATION) EVERY 4 HOURS PRN
Status: DISCONTINUED | OUTPATIENT
Start: 2023-01-01 | End: 2023-01-01 | Stop reason: HOSPADM

## 2023-01-01 RX ORDER — LORAZEPAM 2 MG/ML
2 CONCENTRATE ORAL
Status: DISCONTINUED | OUTPATIENT
Start: 2023-01-01 | End: 2023-01-01

## 2023-01-01 RX ORDER — LORAZEPAM 2 MG/ML
1 CONCENTRATE ORAL
Status: DISCONTINUED | OUTPATIENT
Start: 2023-01-01 | End: 2023-01-01 | Stop reason: HOSPADM

## 2023-01-01 RX ORDER — LORAZEPAM 0.5 MG/1
0.5 TABLET ORAL
Status: DISCONTINUED | OUTPATIENT
Start: 2023-01-01 | End: 2023-01-01

## 2023-01-01 RX ORDER — LORAZEPAM 2 MG/ML
0.5 INJECTION INTRAMUSCULAR
Status: DISCONTINUED | OUTPATIENT
Start: 2023-01-01 | End: 2023-01-01

## 2023-01-01 RX ORDER — SODIUM CHLORIDE, SODIUM LACTATE, POTASSIUM CHLORIDE, CALCIUM CHLORIDE 600; 310; 30; 20 MG/100ML; MG/100ML; MG/100ML; MG/100ML
75 INJECTION, SOLUTION INTRAVENOUS CONTINUOUS
Status: DISCONTINUED | OUTPATIENT
Start: 2023-01-01 | End: 2023-01-01

## 2023-01-01 RX ORDER — GLYCOPYRROLATE 0.2 MG/ML
0.2 INJECTION INTRAMUSCULAR; INTRAVENOUS
Status: DISCONTINUED | OUTPATIENT
Start: 2023-01-01 | End: 2023-01-01 | Stop reason: HOSPADM

## 2023-01-01 RX ORDER — ACETAMINOPHEN 650 MG/1
650 SUPPOSITORY RECTAL EVERY 4 HOURS PRN
Status: DISCONTINUED | OUTPATIENT
Start: 2023-01-01 | End: 2023-01-01

## 2023-01-01 RX ORDER — MORPHINE SULFATE 20 MG/ML
10 SOLUTION ORAL
Status: DISCONTINUED | OUTPATIENT
Start: 2023-01-01 | End: 2023-01-01 | Stop reason: HOSPADM

## 2023-01-01 RX ORDER — GLYCOPYRROLATE 0.2 MG/ML
0.4 INJECTION INTRAMUSCULAR; INTRAVENOUS
Status: DISCONTINUED | OUTPATIENT
Start: 2023-01-01 | End: 2023-01-01

## 2023-01-01 RX ORDER — ACETAMINOPHEN 650 MG/1
650 SUPPOSITORY RECTAL EVERY 4 HOURS PRN
Status: DISCONTINUED | OUTPATIENT
Start: 2023-01-01 | End: 2023-01-01 | Stop reason: HOSPADM

## 2023-01-01 RX ORDER — ONDANSETRON 2 MG/ML
4 INJECTION INTRAMUSCULAR; INTRAVENOUS EVERY 6 HOURS PRN
Status: DISCONTINUED | OUTPATIENT
Start: 2023-01-01 | End: 2023-01-01 | Stop reason: HOSPADM

## 2023-01-01 RX ORDER — LORAZEPAM 2 MG/ML
1 CONCENTRATE ORAL
Status: DISCONTINUED | OUTPATIENT
Start: 2023-01-01 | End: 2023-01-01

## 2023-01-01 RX ORDER — LIDOCAINE HYDROCHLORIDE 20 MG/ML
20 INJECTION, SOLUTION INFILTRATION; PERINEURAL ONCE
Status: DISCONTINUED | OUTPATIENT
Start: 2023-01-01 | End: 2023-01-01

## 2023-01-01 RX ORDER — LORAZEPAM 0.5 MG/1
1 TABLET ORAL
Status: DISCONTINUED | OUTPATIENT
Start: 2023-01-01 | End: 2023-01-01

## 2023-01-01 RX ORDER — ACETAMINOPHEN 325 MG/1
650 TABLET ORAL EVERY 4 HOURS PRN
Status: DISCONTINUED | OUTPATIENT
Start: 2023-01-01 | End: 2023-01-01 | Stop reason: HOSPADM

## 2023-01-01 RX ORDER — MORPHINE SULFATE 20 MG/ML
10 SOLUTION ORAL
Status: DISCONTINUED | OUTPATIENT
Start: 2023-01-01 | End: 2023-01-01

## 2023-01-01 RX ORDER — LORAZEPAM 0.5 MG/1
2 TABLET ORAL
Status: DISCONTINUED | OUTPATIENT
Start: 2023-01-01 | End: 2023-01-01

## 2023-01-01 RX ORDER — LORAZEPAM 2 MG/ML
0.5 CONCENTRATE ORAL
Status: DISCONTINUED | OUTPATIENT
Start: 2023-01-01 | End: 2023-01-01

## 2023-01-01 RX ORDER — PANTOPRAZOLE SODIUM 40 MG/10ML
40 INJECTION, POWDER, LYOPHILIZED, FOR SOLUTION INTRAVENOUS
Status: DISCONTINUED | OUTPATIENT
Start: 2023-01-01 | End: 2023-01-01

## 2023-01-01 RX ORDER — BISACODYL 10 MG
10 SUPPOSITORY, RECTAL RECTAL DAILY PRN
Status: DISCONTINUED | OUTPATIENT
Start: 2023-01-01 | End: 2023-01-01 | Stop reason: HOSPADM

## 2023-01-01 RX ORDER — LORAZEPAM 2 MG/ML
2 INJECTION INTRAMUSCULAR
Status: DISCONTINUED | OUTPATIENT
Start: 2023-01-01 | End: 2023-01-01

## 2023-01-01 RX ORDER — MORPHINE SULFATE 20 MG/ML
20 SOLUTION ORAL
Status: DISCONTINUED | OUTPATIENT
Start: 2023-01-01 | End: 2023-01-01

## 2023-01-01 RX ORDER — LORAZEPAM 2 MG/ML
1 INJECTION INTRAMUSCULAR
Status: DISCONTINUED | OUTPATIENT
Start: 2023-01-01 | End: 2023-01-01

## 2023-01-01 RX ORDER — GLYCOPYRROLATE 0.2 MG/ML
0.2 INJECTION INTRAMUSCULAR; INTRAVENOUS
Status: DISCONTINUED | OUTPATIENT
Start: 2023-01-01 | End: 2023-01-01

## 2023-01-01 RX ORDER — ACETAMINOPHEN 325 MG/1
650 TABLET ORAL EVERY 4 HOURS PRN
Status: DISCONTINUED | OUTPATIENT
Start: 2023-01-01 | End: 2023-01-01

## 2023-01-01 RX ORDER — ONDANSETRON 2 MG/ML
4 INJECTION INTRAMUSCULAR; INTRAVENOUS ONCE
Status: COMPLETED | OUTPATIENT
Start: 2023-01-01 | End: 2023-01-01

## 2023-01-01 RX ORDER — ATROPINE SULFATE 10 MG/ML
2 SOLUTION/ DROPS OPHTHALMIC 2 TIMES DAILY PRN
Status: DISCONTINUED | OUTPATIENT
Start: 2023-01-01 | End: 2023-01-01 | Stop reason: HOSPADM

## 2023-01-01 RX ORDER — ACETAMINOPHEN 160 MG/5ML
650 SOLUTION ORAL EVERY 4 HOURS PRN
Status: DISCONTINUED | OUTPATIENT
Start: 2023-01-01 | End: 2023-01-01 | Stop reason: HOSPADM

## 2023-01-01 RX ORDER — LORAZEPAM 2 MG/ML
1 INJECTION INTRAMUSCULAR
Status: DISCONTINUED | OUTPATIENT
Start: 2023-01-01 | End: 2023-01-01 | Stop reason: HOSPADM

## 2023-01-01 RX ORDER — SODIUM CHLORIDE 9 MG/ML
40 INJECTION, SOLUTION INTRAVENOUS AS NEEDED
Status: DISCONTINUED | OUTPATIENT
Start: 2023-01-01 | End: 2023-01-01

## 2023-01-01 RX ORDER — SODIUM CHLORIDE 0.9 % (FLUSH) 0.9 %
10 SYRINGE (ML) INJECTION EVERY 12 HOURS SCHEDULED
Status: DISCONTINUED | OUTPATIENT
Start: 2023-01-01 | End: 2023-01-01

## 2023-01-01 RX ORDER — SCOLOPAMINE TRANSDERMAL SYSTEM 1 MG/1
1 PATCH, EXTENDED RELEASE TRANSDERMAL
Status: DISCONTINUED | OUTPATIENT
Start: 2023-01-01 | End: 2023-01-01 | Stop reason: HOSPADM

## 2023-01-01 RX ORDER — DIPHENOXYLATE HYDROCHLORIDE AND ATROPINE SULFATE 2.5; .025 MG/1; MG/1
1 TABLET ORAL
Status: DISCONTINUED | OUTPATIENT
Start: 2023-01-01 | End: 2023-01-01 | Stop reason: HOSPADM

## 2023-01-01 RX ADMIN — MORPHINE SULFATE 10 MG: 10 SOLUTION ORAL at 06:47

## 2023-01-01 RX ADMIN — MORPHINE SULFATE 4 MG: 4 INJECTION, SOLUTION INTRAMUSCULAR; INTRAVENOUS at 07:57

## 2023-01-01 RX ADMIN — SODIUM CHLORIDE, POTASSIUM CHLORIDE, SODIUM LACTATE AND CALCIUM CHLORIDE 75 ML/HR: 600; 310; 30; 20 INJECTION, SOLUTION INTRAVENOUS at 05:08

## 2023-01-01 RX ADMIN — SCOPALAMINE 1 PATCH: 1 PATCH, EXTENDED RELEASE TRANSDERMAL at 16:02

## 2023-01-01 RX ADMIN — LORAZEPAM 1 MG: 2 SOLUTION, CONCENTRATE ORAL at 10:15

## 2023-01-01 RX ADMIN — ONDANSETRON 4 MG: 2 INJECTION INTRAMUSCULAR; INTRAVENOUS at 06:47

## 2023-01-01 RX ADMIN — MORPHINE SULFATE 10 MG: 10 SOLUTION ORAL at 01:38

## 2023-01-01 RX ADMIN — ONDANSETRON 4 MG: 2 INJECTION INTRAMUSCULAR; INTRAVENOUS at 11:59

## 2023-01-01 RX ADMIN — PANTOPRAZOLE SODIUM 40 MG: 40 INJECTION, POWDER, FOR SOLUTION INTRAVENOUS at 05:08

## 2023-01-01 RX ADMIN — ATROPINE SULFATE 2 DROP: 10 SOLUTION/ DROPS OPHTHALMIC at 16:02

## 2023-01-01 RX ADMIN — GLYCOPYRROLATE 0.2 MG: 0.2 INJECTION INTRAMUSCULAR; INTRAVENOUS at 09:38

## 2023-01-01 RX ADMIN — LORAZEPAM 1 MG: 2 SOLUTION, CONCENTRATE ORAL at 01:38

## 2023-01-01 RX ADMIN — SODIUM CHLORIDE, POTASSIUM CHLORIDE, SODIUM LACTATE AND CALCIUM CHLORIDE 75 ML/HR: 600; 310; 30; 20 INJECTION, SOLUTION INTRAVENOUS at 18:08

## 2023-01-01 RX ADMIN — MORPHINE SULFATE 4 MG: 4 INJECTION, SOLUTION INTRAMUSCULAR; INTRAVENOUS at 09:42

## 2023-01-01 RX ADMIN — MORPHINE SULFATE 10 MG: 10 SOLUTION ORAL at 11:53

## 2023-01-01 RX ADMIN — MORPHINE SULFATE 4 MG: 4 INJECTION, SOLUTION INTRAMUSCULAR; INTRAVENOUS at 19:57

## 2023-01-03 ENCOUNTER — CONSULT (OUTPATIENT)
Dept: ONCOLOGY | Facility: HOSPITAL | Age: 78
End: 2023-01-03
Payer: MEDICARE

## 2023-01-03 VITALS
SYSTOLIC BLOOD PRESSURE: 146 MMHG | DIASTOLIC BLOOD PRESSURE: 63 MMHG | RESPIRATION RATE: 18 BRPM | WEIGHT: 194.45 LBS | BODY MASS INDEX: 24.3 KG/M2 | OXYGEN SATURATION: 98 % | HEART RATE: 50 BPM | TEMPERATURE: 97.8 F

## 2023-01-03 DIAGNOSIS — C67.9 UROTHELIAL CARCINOMA OF BLADDER WITH INVASION OF MUSCLE: ICD-10-CM

## 2023-01-03 DIAGNOSIS — C67.2 MALIGNANT NEOPLASM OF LATERAL WALL OF BLADDER: Primary | ICD-10-CM

## 2023-01-03 PROBLEM — H93.19 TINNITUS: Status: ACTIVE | Noted: 2022-02-09

## 2023-01-03 PROBLEM — H91.90 HEARING LOSS: Status: ACTIVE | Noted: 2022-02-09

## 2023-01-03 PROBLEM — D04.9 SQUAMOUS CELL CARCINOMA IN SITU (SCCIS) OF SKIN: Status: ACTIVE | Noted: 2021-11-23

## 2023-01-03 PROBLEM — G89.29 CHRONIC BACK PAIN: Status: ACTIVE | Noted: 2022-02-09

## 2023-01-03 PROBLEM — Z00.00 ENCOUNTER FOR GENERAL ADULT MEDICAL EXAMINATION WITHOUT ABNORMAL FINDINGS: Status: ACTIVE | Noted: 2017-11-30

## 2023-01-03 PROBLEM — M54.9 CHRONIC BACK PAIN: Status: ACTIVE | Noted: 2022-02-09

## 2023-01-03 PROCEDURE — 1160F RVW MEDS BY RX/DR IN RCRD: CPT | Performed by: INTERNAL MEDICINE

## 2023-01-03 PROCEDURE — 1159F MED LIST DOCD IN RCRD: CPT | Performed by: INTERNAL MEDICINE

## 2023-01-03 PROCEDURE — 99205 OFFICE O/P NEW HI 60 MIN: CPT | Performed by: INTERNAL MEDICINE

## 2023-01-03 PROCEDURE — G0463 HOSPITAL OUTPT CLINIC VISIT: HCPCS | Performed by: INTERNAL MEDICINE

## 2023-01-03 PROCEDURE — 1126F AMNT PAIN NOTED NONE PRSNT: CPT | Performed by: INTERNAL MEDICINE

## 2023-01-03 RX ORDER — LEVOFLOXACIN 250 MG/1
TABLET ORAL
COMMUNITY
Start: 2022-12-15 | End: 2023-01-06

## 2023-01-03 RX ORDER — ENOXAPARIN SODIUM 100 MG/ML
40 INJECTION SUBCUTANEOUS
COMMUNITY
Start: 2022-12-14 | End: 2023-01-07

## 2023-01-03 RX ORDER — POLYETHYLENE GLYCOL 3350 17 G/17G
POWDER, FOR SOLUTION ORAL
COMMUNITY
Start: 2022-12-08 | End: 2023-01-06

## 2023-01-03 RX ORDER — TRAMADOL HYDROCHLORIDE 50 MG/1
TABLET ORAL
COMMUNITY
Start: 2022-12-15 | End: 2023-01-06

## 2023-01-03 RX ORDER — POLYETHYLENE GLYCOL 3350, SODIUM SULFATE ANHYDROUS, SODIUM BICARBONATE, SODIUM CHLORIDE, POTASSIUM CHLORIDE 236; 22.74; 6.74; 5.86; 2.97 G/4L; G/4L; G/4L; G/4L; G/4L
POWDER, FOR SOLUTION ORAL
COMMUNITY
Start: 2022-11-22 | End: 2023-01-06

## 2023-01-03 RX ORDER — ASPIRIN 81 MG/1
81 TABLET ORAL DAILY
COMMUNITY
End: 2023-01-06

## 2023-01-03 RX ORDER — CIPROFLOXACIN 500 MG/1
500 TABLET, FILM COATED ORAL 2 TIMES DAILY
COMMUNITY
Start: 2022-11-26 | End: 2023-01-06

## 2023-01-03 NOTE — PROGRESS NOTES
Chief Complaint  Bladder Cancer    Vita Starks, CATA Starks, Vita, CATA    Subjective          Korey Rodriguez presents to [facility] HEMATOLOGY & ONCOLOGY for urothelial carcinoma    Mr. Rodriguez is a pleasant 76 yo male with a history of AAA (infrarenal), chronic back pain, HLD, hearing loss who presents for new oncology evaluation regarding urothelial carcinoma.  Patient had radical cystoprostatectomy on December 12 [facility] by Dr. Bansal.  He is recovered well from that surgery.  He has ileal conduit in place is draining clear yellow fluid.  He denies fevers, chills, infection.  His chronic back pain.  He states that he is active and that his wife is on for increasing his activity.  He has had low appetite.  His fatigue is stable.  He is willing to get chemotherapy and radiation.     Oncology/Hematology History Overview Note   10/14/22: CT abdomen/pelvis obtained due to gross hematuria for 6 weeks. This showed right hydroureteronephrosis down to bladder with findings concerning for large bladder tumor. He also had 20 lbs weight loss during this time.     10/19/2022: TURBT performed by Dr. Westfall. Per op reprot large area resected (7 x 4 cm), visualization difficult due to clot burden. 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     11/17/2022: PET CT showed asymmetric thickening along the right lateral and posterior walls of the bladder. No evidence of metastatic disease.     12/12/22: Radical cystoprostectomy, bilateral PLND, ileal conduit by Dr. Bansal ([facility]): Left distal and right distal ureter both positive for carcinoma, 2/3 right external lymph nodes positive for metastatic carcinoma, 2/3 right common external lymph nodes positive for carcinoma, bladder and prostate with urothelial carcinoma with micropapillary features and invades directly into prostate, extensive CIS noted, 4/22 total lymph nodes positive, [id number]     Malignant  neoplasm of lateral wall of bladder (HCC)   10/26/2022 Initial Diagnosis    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 (HCC)           Review of Systems   Constitutional: Positive for fatigue.   Musculoskeletal: Positive for back pain.   Psychiatric/Behavioral: Positive for sleep disturbance.   All other systems reviewed and are negative.    Current Outpatient Medications on File Prior to Visit   Medication Sig Dispense Refill   • Enoxaparin Sodium (LOVENOX) 40 MG/0.4ML solution prefilled syringe syringe 40 mg.     • levoFLOXacin (LEVAQUIN) 250 MG tablet TAKE ONE TABLET BY MOUTH EVERY 24 HOURS FOR 3 DAYS. START ON 12/20 AND TAKE 12/20, 12/21, AND 12/22.     • polyethylene glycol (MIRALAX) 17 GM/SCOOP powder MIX 17 GRAMS OF POWDER IN 8 OUNCES OF LIQUID AND DRINK ONCE DAILY FOR 14 DAYS     • traMADol (ULTRAM) 50 MG tablet TAKE 1 TABLET BY MOUTH EVERY 4 HOURS AS NEEDED FOR PAIN FOR 3 DAYS     • aspirin 81 MG EC tablet Take 81 mg by mouth Daily.     • atorvastatin (LIPITOR) 20 MG tablet Take 1 tablet by mouth Daily.     • ciprofloxacin (CIPRO) 500 MG tablet Take 500 mg by mouth 2 (Two) Times a Day. for 10 days     • PEG 3350-KCl-NaBcb-NaCl-NaSulf (PEG-3350/Electrolytes) 236 g reconstituted solution TAKE 4,000 ML BY MOUTH ONE TIME FOR ONE DOSE TAKE BEGINNING 12/11/22 (NIGHT BEFORE SURGERY) ONE CUP EVERY 15 MINUTES UNTIL AT LEAST HALF OF THE SOLUTION HAS BEEN INGESTED.     • [DISCONTINUED] HYDROcodone-acetaminophen (NORCO) 5-325 MG per tablet Take 1 tablet by mouth Every 6 (Six) Hours As Needed (Pain). 12 tablet 0     No current facility-administered medications on file prior to visit.       No Known Allergies  Past Medical History:   Diagnosis Date   • Aneurysm of abdominal aorta branch vessel      Past Surgical History:   Procedure Laterality Date   • SKIN CANCER EXCISION Right    • TRANSURETHRAL RESECTION OF BLADDER TUMOR Right 10/19/2022    Procedure: Cystoscopy with transurethral resection of bladder tumor;   Surgeon: Aung Westfall MD;  Location: [facility];  Service: Urology;  Laterality: Right;     Social History     Socioeconomic History   • Marital status:    Tobacco Use   • Smoking status: Never   • Smokeless tobacco: Never   Substance and Sexual Activity   • Alcohol use: Yes     Alcohol/week: 1.0 standard drink     Types: 1 Cans of beer per week     Comment: 1 beer daily   • Drug use: Never   • Sexual activity: Defer     History reviewed. No pertinent family history.    Objective   Physical Exam  Well appearing patient in no acute distress on RA  Anicteric sclera, no rash on exposed skin  Respirations non-labored  Awake, alert, and oriented x 4. Speech intact. No gross neurologic deficit  Appropriate mood and affect    Vitals:    01/03/23 1415   BP: 146/63   Pulse: 50   Resp: 18   Temp: 97.8 °F (36.6 °C)   TempSrc: Temporal   SpO2: 98%   Weight: 88.2 kg (194 lb 7.1 oz)   PainSc: 0-No pain               PHQ-9 Total Score:                      Result Review :   The following data was reviewed by: Arvin Coburn MD on 01/03/2023:  Lab Results   Component Value Date    HGB 11.1 (L) 12/19/2022    HCT 34.1 (L) 12/19/2022    MCV 81.8 12/19/2022     12/19/2022    WBC 8.91 12/19/2022    NEUTROABS 6.1 (H) 12/12/2022    LYMPHSABS 0.70 10/14/2022    MONOSABS 0.59 10/14/2022    EOSABS 0.1 12/12/2022    BASOSABS 0.1 12/12/2022     Lab Results   Component Value Date    GLUCOSE 123 (H) 12/19/2022    BUN 18 12/19/2022    CREATININE 0.85 12/19/2022     12/19/2022    K 4.4 12/19/2022     12/19/2022    CO2 27.0 12/19/2022    CALCIUM 9.4 12/19/2022    PROTEINTOT 7.1 10/14/2022    ALBUMIN 3.80 10/14/2022    BILITOT 0.4 10/14/2022    ALKPHOS 107 10/14/2022    AST 15 10/14/2022    ALT 14 10/14/2022     No results found for: MG, PHOS, FREET4, TSH       Surgical Pathology Reviewed: [id number] with left distal margin positive.    [facility] urology documentation and progress notes from  hospital personally reviewed.      Assessment and Plan    Diagnoses and all orders for this visit:    1. 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 (HCC) (Primary)  -     Ambulatory Referral to General Surgery    Mr. Thompson is a 77-year-old male status post radical cystoprostatectomy bilateral PLND which demonstrated stage [id number], stage IIIB urothelial carcinoma. 4/22 lymph nodes positive and right and left and right ureter positive with distal margin of left ureter positive.  Patient with disease that is at high risk of recurrence. With this stage, would favor a recommendation of adjuvant radiation and chemotherapy to hopefully decrease the chance of recurrence. Plan to personally discussed management with radiation oncologist.  Discussed chemotherapy regimen with 5-FU and mitomycin.  This included discussion of risk, benefit, side effects and dosing schedule.  Discussed need for port.  All questions were answered and consent was obtained.  We will refer for port placement.  5-FU and mitomycin are both IV medicines.  5-FU will be given via continuous IV infusion over 5 days. Mitomycin only given on Day 1. Instructional sheets provided. PRN anti-emetics to be prescribed.    This is an acute or chronic illness that poses a threat to life or bodily function. The above treatment plan involves a high risk of complications and/or mortality of patient management.    The patient was seen and examined. Work by the provider also included review and/or ordering of lab tests, review and/or ordering of radiology tests, review and/or ordering of medicine tests, discussion with other physicians or providers, independent review of data, obtaining old records, review/summation of old records, and/or other review.     I have reviewed the family history, social history, and past medical history for this patient. Previous information and data has been reviewed and updated as needed. I have reviewed and verified the chief  complaint, history, and other documentation. The patient was interviewed and examined at the bedside and the chart reviewed. The previous observations, recommendations, and conclusions were reviewed including those of other providers.     The plan was discussed with the patient and/or family. The patient was given time to ask questions and these questions were answered. At the conclusion of their visit they had no additional questions or concerns and all questions were answered to their satisfaction.         I spent 60 minutes caring for Korey on this date of service. This time includes time spent by me in the following activities:preparing for the visit, reviewing tests, obtaining and/or reviewing a separately obtained history, performing a medically appropriate examination and/or evaluation , counseling and educating the patient/family/caregiver, ordering medications, tests, or procedures, referring and communicating with other health care professionals , documenting information in the medical record, independently interpreting results and communicating that information with the patient/family/caregiver and care coordination    Patient Follow Up: 2-3 weeks   Patient was given instructions and counseling regarding his condition or for health maintenance advice. Please see specific information pulled into the AVS if appropriate.

## 2023-01-05 ENCOUNTER — PREP FOR SURGERY (OUTPATIENT)
Dept: OTHER | Facility: HOSPITAL | Age: 78
End: 2023-01-05
Payer: MEDICARE

## 2023-01-05 DIAGNOSIS — C67.2 MALIGNANT NEOPLASM OF LATERAL WALL OF BLADDER: Primary | ICD-10-CM

## 2023-01-05 RX ORDER — CEFAZOLIN SODIUM 2 G/100ML
2 INJECTION, SOLUTION INTRAVENOUS ONCE
Status: CANCELLED | OUTPATIENT
Start: 2023-01-05 | End: 2023-01-05

## 2023-01-05 NOTE — PROGRESS NOTES
Chief Complaint:  Port consult     Primary Care Provider: Vita Starks APRN    Referring Provider:  Dr. Coburn    History of Present Illness  Korey Rodriguez is a 77 y.o. male referred to have a portacatheter by Dr. Coburn.  Patient has bladder cancer and is going to receive intravenous therapy.  Patient's bladder has been removed.  Portacatheter placement is requested.  She is currently taking Lovenox but he is going to stop taking it tomorrow.    Allergies: Patient has no known allergies.    Outpatient Medications Marked as Taking for the 23 encounter (Office Visit) with Richie Edward MD   Medication Sig Dispense Refill   • atorvastatin (LIPITOR) 20 MG tablet Take 20 mg by mouth Every Night.     • Enoxaparin Sodium (LOVENOX) 40 MG/0.4ML solution prefilled syringe syringe Inject 40 mg under the skin into the appropriate area as directed Daily. LAST DOSE 23 TREATMENT COMPLETED POST CYSTECTOMY         Past Medical History:   • AAA (abdominal aortic aneurysm)    BEING MONITORED NO REPAIR NEEDED YET   • Aneurysm of abdominal aorta branch vessel   • Cancer (HCC)    BLADDER AND SKIN CANCERS/REMOVED   • History of urostomy    PT CURRENTLY HAS HOME HEALTH TO HELP WITH OSTOMY        Past Surgical History:   • CYSTECTOMY    2022  W/UROSTOMY   • SKIN CANCER EXCISION   • TRANSURETHRAL RESECTION OF BLADDER TUMOR    Procedure: Cystoscopy with transurethral resection of bladder tumor;  Surgeon: Aung Westfall MD;  Location: [facility];  Service: Urology;  Laterality: Right;       Family History:   Family History   Problem Relation Age of Onset   • Arthritis Mother    • Cancer Father    • Hearing loss Father    • Hypertension Father    • Malig Hyperthermia Neg Hx         Social History:  Social History     Tobacco Use   • Smoking status: Former     Packs/day: 1.00     Years: 20.00     Pack years: 20.00     Types: Cigarettes     Start date: 1962     Quit date: 1990     Years since quittin.0    • Smokeless tobacco: Never   Substance Use Topics   • Alcohol use: Yes     Alcohol/week: 1.0 standard drink     Types: 1 Cans of beer per week     Comment: 1 beer daily       Objective     Vital Signs:  Resp 14   Ht 190.5 cm (75\")   Wt 86.5 kg (190 lb 12.8 oz)   BMI 23.85 kg/m²   • Constitutional: alert, no acute distress, reliable historian  • HENT:  NCAT, no visible deformities or lesions  • Eyes:  sclerae clear, conjunctivae clear, EOMI  • Neck:  normal appearance, no masses, trachea midline  • Respiratory:  breathing not labored, respiratory effort appears normal  • Cardiovascular:  heart regular rate  • Abdomen:  nondistended    • Skin and subcutaneous tissue:  no visible concerning rashes or lesions, no jaundice  • Musculoskeletal: moving all extremities symmetrically and purposefully  • Neurologic:  no obvious motor or sensory deficits, normal gait, able to stand without difficulty, cerebellar function without any obvious abnormalities, alert & oriented x 3, speech clear  • Psychiatric:  judgment and insight intact, mood normal, affect appropriate, cooperative      Assessment:  Diagnoses and all orders for this visit:    1. Urothelial carcinoma of bladder with invasion of muscle (HCC) (Primary)      Plan:  Port-a-catheter placement    Discussion: Indications, options, risks, benefits, and expected outcomes of planned surgery were discussed with the patient and he agrees to proceed.    Richie Edward MD  01/06/2023    Electronically signed by Richie Edward MD, 01/06/23, 1:55 PM EST.

## 2023-01-05 NOTE — H&P (VIEW-ONLY)
Chief Complaint:  Port consult     Primary Care Provider: Vita Starks APRN    Referring Provider:  Dr. Coburn    History of Present Illness  Korey Rodriguez is a 77 y.o. male referred to have a portacatheter by Dr. Coburn.  Patient has bladder cancer and is going to receive intravenous therapy.  Patient's bladder has been removed.  Portacatheter placement is requested.  She is currently taking Lovenox but he is going to stop taking it tomorrow.    Allergies: Patient has no known allergies.    Outpatient Medications Marked as Taking for the 23 encounter (Office Visit) with Richie Edward MD   Medication Sig Dispense Refill   • atorvastatin (LIPITOR) 20 MG tablet Take 20 mg by mouth Every Night.     • Enoxaparin Sodium (LOVENOX) 40 MG/0.4ML solution prefilled syringe syringe Inject 40 mg under the skin into the appropriate area as directed Daily. LAST DOSE 23 TREATMENT COMPLETED POST CYSTECTOMY         Past Medical History:   • AAA (abdominal aortic aneurysm)    BEING MONITORED NO REPAIR NEEDED YET   • Aneurysm of abdominal aorta branch vessel   • Cancer (HCC)    BLADDER AND SKIN CANCERS/REMOVED   • History of urostomy    PT CURRENTLY HAS HOME HEALTH TO HELP WITH OSTOMY        Past Surgical History:   • CYSTECTOMY    2022  W/UROSTOMY   • SKIN CANCER EXCISION   • TRANSURETHRAL RESECTION OF BLADDER TUMOR    Procedure: Cystoscopy with transurethral resection of bladder tumor;  Surgeon: Aung Westfall MD;  Location: [facility];  Service: Urology;  Laterality: Right;       Family History:   Family History   Problem Relation Age of Onset   • Arthritis Mother    • Cancer Father    • Hearing loss Father    • Hypertension Father    • Malig Hyperthermia Neg Hx         Social History:  Social History     Tobacco Use   • Smoking status: Former     Packs/day: 1.00     Years: 20.00     Pack years: 20.00     Types: Cigarettes     Start date: 1962     Quit date: 1990     Years since quittin.0  "  • Smokeless tobacco: Never   Substance Use Topics   • Alcohol use: Yes     Alcohol/week: 1.0 standard drink     Types: 1 Cans of beer per week     Comment: 1 beer daily       Objective     Vital Signs:  Resp 14   Ht 190.5 cm (75\")   Wt 86.5 kg (190 lb 12.8 oz)   BMI 23.85 kg/m²   • Constitutional: alert, no acute distress, reliable historian  • HENT:  NCAT, no visible deformities or lesions  • Eyes:  sclerae clear, conjunctivae clear, EOMI  • Neck:  normal appearance, no masses, trachea midline  • Respiratory:  breathing not labored, respiratory effort appears normal  • Cardiovascular:  heart regular rate  • Abdomen:  nondistended    • Skin and subcutaneous tissue:  no visible concerning rashes or lesions, no jaundice  • Musculoskeletal: moving all extremities symmetrically and purposefully  • Neurologic:  no obvious motor or sensory deficits, normal gait, able to stand without difficulty, cerebellar function without any obvious abnormalities, alert & oriented x 3, speech clear  • Psychiatric:  judgment and insight intact, mood normal, affect appropriate, cooperative      Assessment:  Diagnoses and all orders for this visit:    1. Urothelial carcinoma of bladder with invasion of muscle (HCC) (Primary)      Plan:  Port-a-catheter placement    Discussion: Indications, options, risks, benefits, and expected outcomes of planned surgery were discussed with the patient and he agrees to proceed.    Richie Edward MD  01/06/2023    Electronically signed by Richei Edward MD, 01/06/23, 1:55 PM EST.      "

## 2023-01-06 ENCOUNTER — PATIENT ROUNDING (BHMG ONLY) (OUTPATIENT)
Dept: ONCOLOGY | Facility: HOSPITAL | Age: 78
End: 2023-01-06
Payer: MEDICARE

## 2023-01-06 ENCOUNTER — TRANSCRIBE ORDERS (OUTPATIENT)
Dept: ADMINISTRATIVE | Facility: HOSPITAL | Age: 78
End: 2023-01-06
Payer: MEDICARE

## 2023-01-06 ENCOUNTER — OFFICE VISIT (OUTPATIENT)
Dept: SURGERY | Facility: CLINIC | Age: 78
End: 2023-01-06
Payer: MEDICARE

## 2023-01-06 VITALS — HEIGHT: 75 IN | RESPIRATION RATE: 14 BRPM | WEIGHT: 190.8 LBS | BODY MASS INDEX: 23.72 KG/M2

## 2023-01-06 DIAGNOSIS — C67.9 UROTHELIAL CARCINOMA OF BLADDER WITH INVASION OF MUSCLE: Primary | ICD-10-CM

## 2023-01-06 DIAGNOSIS — I71.43 INFRARENAL ABDOMINAL AORTIC ANEURYSM (AAA) WITHOUT RUPTURE: Primary | ICD-10-CM

## 2023-01-06 DIAGNOSIS — I72.3 ANEURYSM OF ILIAC ARTERY: ICD-10-CM

## 2023-01-06 PROCEDURE — 99202 OFFICE O/P NEW SF 15 MIN: CPT | Performed by: SURGERY

## 2023-01-06 RX ORDER — ONDANSETRON HYDROCHLORIDE 8 MG/1
8 TABLET, FILM COATED ORAL 3 TIMES DAILY PRN
Qty: 30 TABLET | Refills: 5 | Status: SHIPPED | OUTPATIENT
Start: 2023-01-06 | End: 2023-01-10

## 2023-01-06 NOTE — PROGRESS NOTES
January 6, 2023    Hello, may I speak with Korey Rodriguez?    My name is Bhavani.    I am  with [facility]  [address] [place], [place] [address]  [place] [address] [phone].    Before we get started may I verify your date of birth? 1945    I am calling to officially welcome you to our practice and ask about your recent visit. Is this a good time to talk? YES    Tell me about your visit with us. What things went well?      I spoke to the patient and he stated that the visit went wonderful. He did not have any questions or concerns at this time but I made sure he had our phone number just in case.     We're always looking for ways to make our patients' experiences even better. Do you have recommendations on ways we may improve?  NO    Overall were you satisfied with your first visit to our practice? YES       I appreciate you taking the time to speak with me today. Is there anything else I can do for you? NO      Thank you, and have a great day.

## 2023-01-09 ENCOUNTER — HOSPITAL ENCOUNTER (OUTPATIENT)
Facility: HOSPITAL | Age: 78
Setting detail: HOSPITAL OUTPATIENT SURGERY
Discharge: HOME | End: 2023-01-09
Attending: SURGERY | Admitting: SURGERY
Payer: MEDICARE

## 2023-01-09 ENCOUNTER — ANESTHESIA EVENT (OUTPATIENT)
Dept: PERIOP | Facility: HOSPITAL | Age: 78
End: 2023-01-09
Payer: MEDICARE

## 2023-01-09 ENCOUNTER — ANESTHESIA (OUTPATIENT)
Dept: PERIOP | Facility: HOSPITAL | Age: 78
End: 2023-01-09
Payer: MEDICARE

## 2023-01-09 ENCOUNTER — APPOINTMENT (OUTPATIENT)
Dept: GENERAL RADIOLOGY | Facility: HOSPITAL | Age: 78
End: 2023-01-09
Payer: MEDICARE

## 2023-01-09 VITALS
SYSTOLIC BLOOD PRESSURE: 138 MMHG | RESPIRATION RATE: 17 BRPM | TEMPERATURE: 97.8 F | WEIGHT: 193.78 LBS | DIASTOLIC BLOOD PRESSURE: 60 MMHG | BODY MASS INDEX: 24.09 KG/M2 | OXYGEN SATURATION: 95 % | HEART RATE: 68 BPM | HEIGHT: 75 IN

## 2023-01-09 DIAGNOSIS — C67.9 UROTHELIAL CARCINOMA OF BLADDER WITH INVASION OF MUSCLE: Primary | ICD-10-CM

## 2023-01-09 DIAGNOSIS — C67.2 MALIGNANT NEOPLASM OF LATERAL WALL OF BLADDER: ICD-10-CM

## 2023-01-09 PROCEDURE — 71045 X-RAY EXAM CHEST 1 VIEW: CPT

## 2023-01-09 PROCEDURE — 25010000002 FENTANYL CITRATE (PF) 50 MCG/ML SOLUTION: Performed by: NURSE ANESTHETIST, CERTIFIED REGISTERED

## 2023-01-09 PROCEDURE — 25010000002 CEFAZOLIN IN DEXTROSE 2-4 GM/100ML-% SOLUTION: Performed by: SURGERY

## 2023-01-09 PROCEDURE — 25010000002 HEPARIN (PORCINE) PER 1000 UNITS: Performed by: SURGERY

## 2023-01-09 PROCEDURE — 76000 FLUOROSCOPY <1 HR PHYS/QHP: CPT

## 2023-01-09 PROCEDURE — C1788 PORT, INDWELLING, IMP: HCPCS | Performed by: SURGERY

## 2023-01-09 PROCEDURE — 25010000002 MIDAZOLAM PER 1 MG: Performed by: ANESTHESIOLOGY

## 2023-01-09 PROCEDURE — 25010000002 PROPOFOL 10 MG/ML EMULSION: Performed by: NURSE ANESTHETIST, CERTIFIED REGISTERED

## 2023-01-09 PROCEDURE — 36561 INSERT TUNNELED CV CATH: CPT | Performed by: SURGERY

## 2023-01-09 PROCEDURE — 77001 FLUOROGUIDE FOR VEIN DEVICE: CPT | Performed by: SURGERY

## 2023-01-09 DEVICE — PRT INTRO VASC/INTERV VORTEX FILL/HL DETACH/POLYURET/CATH 8F: Type: IMPLANTABLE DEVICE | Site: CHEST | Status: FUNCTIONAL

## 2023-01-09 RX ORDER — CEFAZOLIN SODIUM 2 G/100ML
2 INJECTION, SOLUTION INTRAVENOUS ONCE
Status: COMPLETED | OUTPATIENT
Start: 2023-01-09 | End: 2023-01-09

## 2023-01-09 RX ORDER — MEPERIDINE HYDROCHLORIDE 25 MG/ML
12.5 INJECTION INTRAMUSCULAR; INTRAVENOUS; SUBCUTANEOUS
Status: DISCONTINUED | OUTPATIENT
Start: 2023-01-09 | End: 2023-01-09 | Stop reason: HOSPADM

## 2023-01-09 RX ORDER — SODIUM CHLORIDE, SODIUM LACTATE, POTASSIUM CHLORIDE, CALCIUM CHLORIDE 600; 310; 30; 20 MG/100ML; MG/100ML; MG/100ML; MG/100ML
9 INJECTION, SOLUTION INTRAVENOUS CONTINUOUS PRN
Status: DISCONTINUED | OUTPATIENT
Start: 2023-01-09 | End: 2023-01-09 | Stop reason: HOSPADM

## 2023-01-09 RX ORDER — PROMETHAZINE HYDROCHLORIDE 25 MG/1
25 SUPPOSITORY RECTAL ONCE AS NEEDED
Status: DISCONTINUED | OUTPATIENT
Start: 2023-01-09 | End: 2023-01-09 | Stop reason: HOSPADM

## 2023-01-09 RX ORDER — PROMETHAZINE HYDROCHLORIDE 12.5 MG/1
25 TABLET ORAL ONCE AS NEEDED
Status: DISCONTINUED | OUTPATIENT
Start: 2023-01-09 | End: 2023-01-09 | Stop reason: HOSPADM

## 2023-01-09 RX ORDER — OXYCODONE HYDROCHLORIDE 5 MG/1
5 TABLET ORAL
Status: DISCONTINUED | OUTPATIENT
Start: 2023-01-09 | End: 2023-01-09 | Stop reason: HOSPADM

## 2023-01-09 RX ORDER — FENTANYL CITRATE 50 UG/ML
INJECTION, SOLUTION INTRAMUSCULAR; INTRAVENOUS AS NEEDED
Status: DISCONTINUED | OUTPATIENT
Start: 2023-01-09 | End: 2023-01-09 | Stop reason: SURG

## 2023-01-09 RX ORDER — ONDANSETRON 2 MG/ML
4 INJECTION INTRAMUSCULAR; INTRAVENOUS ONCE AS NEEDED
Status: DISCONTINUED | OUTPATIENT
Start: 2023-01-09 | End: 2023-01-09 | Stop reason: HOSPADM

## 2023-01-09 RX ORDER — HYDROCODONE BITARTRATE AND ACETAMINOPHEN 5; 325 MG/1; MG/1
1 TABLET ORAL EVERY 6 HOURS PRN
Qty: 6 TABLET | Refills: 0 | Status: SHIPPED | OUTPATIENT
Start: 2023-01-09 | End: 2023-01-10

## 2023-01-09 RX ORDER — ACETAMINOPHEN 500 MG
1000 TABLET ORAL ONCE
Status: COMPLETED | OUTPATIENT
Start: 2023-01-09 | End: 2023-01-09

## 2023-01-09 RX ORDER — PROPOFOL 10 MG/ML
VIAL (ML) INTRAVENOUS AS NEEDED
Status: DISCONTINUED | OUTPATIENT
Start: 2023-01-09 | End: 2023-01-09 | Stop reason: SURG

## 2023-01-09 RX ORDER — MAGNESIUM HYDROXIDE 1200 MG/15ML
LIQUID ORAL AS NEEDED
Status: DISCONTINUED | OUTPATIENT
Start: 2023-01-09 | End: 2023-01-09 | Stop reason: HOSPADM

## 2023-01-09 RX ORDER — BUPIVACAINE HYDROCHLORIDE 2.5 MG/ML
INJECTION, SOLUTION EPIDURAL; INFILTRATION; INTRACAUDAL AS NEEDED
Status: DISCONTINUED | OUTPATIENT
Start: 2023-01-09 | End: 2023-01-09 | Stop reason: HOSPADM

## 2023-01-09 RX ORDER — MIDAZOLAM HYDROCHLORIDE 1 MG/ML
0.5 INJECTION INTRAMUSCULAR; INTRAVENOUS ONCE
Status: COMPLETED | OUTPATIENT
Start: 2023-01-09 | End: 2023-01-09

## 2023-01-09 RX ADMIN — PROPOFOL 150 MCG/KG/MIN: 10 INJECTION, EMULSION INTRAVENOUS at 09:30

## 2023-01-09 RX ADMIN — ACETAMINOPHEN 1000 MG: 500 TABLET ORAL at 08:18

## 2023-01-09 RX ADMIN — FENTANYL CITRATE 25 MCG: 50 INJECTION, SOLUTION INTRAMUSCULAR; INTRAVENOUS at 09:30

## 2023-01-09 RX ADMIN — MIDAZOLAM HYDROCHLORIDE 0.5 MG: 2 INJECTION, SOLUTION INTRAMUSCULAR; INTRAVENOUS at 09:16

## 2023-01-09 RX ADMIN — CEFAZOLIN SODIUM 2 G: 2 INJECTION, SOLUTION INTRAVENOUS at 09:26

## 2023-01-09 RX ADMIN — FENTANYL CITRATE 25 MCG: 50 INJECTION, SOLUTION INTRAMUSCULAR; INTRAVENOUS at 09:26

## 2023-01-09 RX ADMIN — SODIUM CHLORIDE, POTASSIUM CHLORIDE, SODIUM LACTATE AND CALCIUM CHLORIDE 9 ML/HR: 600; 310; 30; 20 INJECTION, SOLUTION INTRAVENOUS at 08:14

## 2023-01-09 RX ADMIN — PROPOFOL 50 MG: 10 INJECTION, EMULSION INTRAVENOUS at 09:30

## 2023-01-09 NOTE — DISCHARGE INSTRUCTIONS
Dr. Edward's Instructions          DIET  Resume your regular diet.     ACTIVITY  Do not lift anything greater than 25 pounds with the arm on the same side the port was placed for one week.  After one week, you have no activity restrictions and may gradually increase your activities using common sense.     WOUND CARE & SHOWERING/BATHING  Your incision is covered with a plastic type material that will flake off on its own in the next few weeks.  If the plastic type material has not flaked off on its own by 2 weeks after your surgery then use tweezers to pull it off.  You have sutures in your incision but they are placed below the level of the skin and they will slowly dissolve (they do not need to be removed).  The skin around your incision will likely have some bruising.  This is normal.  You can shower beginning tomorrow but wait two weeks after your surgery before taking any tub baths.      HOME MEDICATIONS  Resume all your normal home medications.     PAIN CONTROL  You will receive a narcotic pain medicine prescription before you are discharged home.  Be sure to take the narcotic pain medication with some food so as not to upset your stomach.  Do not drive while you are taking the prescription pain medication.  Take Tylenol or Motrin for mild pain.     BOWEL MOVEMENTS  It is not unusual for narcotic pain medications to cause constipation.  Also, the medications and anesthesia you received for your surgery can have a constipating effect.  To help avoid constipation, drink at least four eight-ounce glasses of water each day and use over-the-counter laxatives/stool softeners (dulcolax, milk of magnesia, senokot, etc.).  I recommend drinking the aforementioned amount of water daily and taking 30 ml of milk of magnesia two times each day while you are using the prescribed narcotic pain medication.  If your bowel movements become too loose or too frequent, then simply stop following these recommendations unless you  start to feel constipated.     FOLLOW-UP VISIT & QUESTIONS/CONCERNS  Call Dr. Noel office at [phone] and schedule a follow-up appointment for about two weeks after your surgery date.  However, if you are doing fine and don´t have any concerns then simply cancel the follow-up appointment.  Should you have any questions or concerns, please call Dr. Edward´s office.

## 2023-01-09 NOTE — ANESTHESIA POSTPROCEDURE EVALUATION
Patient: Korey Rodriguez    Procedure Summary     Date: 01/09/23 Room / Location: [facility]  / [facility]    Anesthesia Start: 0926 Anesthesia Stop: 1005    Procedure: INSERTION VENOUS ACCESS DEVICE (Right) Diagnosis:       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 (HCC)      (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 (HCC) [C67.2])    Surgeons: Richie Edward MD Provider: Jackson Malhotra MD    Anesthesia Type: general ASA Status: 3          Anesthesia Type: general    Vitals  Vitals Value Taken Time   /81 01/09/23 1036   Temp 36.2 °C (97.1 °F) 01/09/23 1005   Pulse 61 01/09/23 1039   Resp 17 01/09/23 1005   SpO2 96 % 01/09/23 1039   Vitals shown include unvalidated device data.        Post Anesthesia Care and Evaluation    Patient location during evaluation: bedside  Patient participation: complete - patient participated  Level of consciousness: awake  Pain management: adequate    Airway patency: patent  PONV Status: none  Cardiovascular status: acceptable  Respiratory status: acceptable  Hydration status: acceptable    Comments: An Anesthesiologist personally participated in the most demanding procedures (including induction and emergence if applicable) in the anesthesia plan, monitored the course of anesthesia administration at frequent intervals and remained physically present and available for immediate diagnosis and treatment of emergencies.

## 2023-01-09 NOTE — ANESTHESIA PREPROCEDURE EVALUATION
Anesthesia Evaluation     Patient summary reviewed and Nursing notes reviewed                Airway   Mallampati: I  TM distance: >3 FB  Neck ROM: full  No difficulty expected  Dental    (+) lower dentures and upper dentures    Pulmonary - negative pulmonary ROS and normal exam    breath sounds clear to auscultation  Cardiovascular - normal exam    Rhythm: regular  Rate: normal    (+) hyperlipidemia,       Neuro/Psych- negative ROS  GI/Hepatic/Renal/Endo    (+)   renal disease,     Musculoskeletal (-) negative ROS    Abdominal    Substance History - negative use     OB/GYN negative ob/gyn ROS         Other      history of cancer                    Anesthesia Plan    ASA 3     general     intravenous induction     Anesthetic plan, risks, benefits, and alternatives have been provided, discussed and informed consent has been obtained with: patient.        CODE STATUS:

## 2023-01-09 NOTE — OP NOTE
INSERTION VENOUS ACCESS DEVICE  Procedure Report    Patient Name:  Korey Rodriguez  YOB: 1945    Date of Surgery:  1/9/2023     Pre-op Diagnosis:   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 (HCC) [C67.2]    Pre-Op Diagnosis Codes:     * 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 (HCC) [C67.2]       Post-op Diagnosis:   Post-Op Diagnosis Codes:     * 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 (HCC) [C67.2]    Procedure/CPT® Codes:   76385    Procedure(s):  INSERTION VENOUS ACCESS DEVICE    Staff:  Surgeon(s):  Richie Edward MD    Anesthesia: Monitored Anesthesia Care    Estimated Blood Loss: Minimal    Complications:  None    Drains:  None    Packing:  None    Implants:    Implant Name Type Inv. Item Serial No.  Lot No. LRB No. Used Action   PRT INTRO VASC/INTERV VORTEX FILL/HL DETACH/POLYURET/CATH 8F - [id number] Implant PRT INTRO VASC/INTERV VORTEX FILL/HL DETACH/POLYURET/CATH 8F  [employer] [id number] Right 1 Implanted     Specimen: None    Indications: The patient has bladder cancer and is going to receive intravenous therapy.  Portacatheter placement is needed.     Findings:  Angiodynamics Smartport placed via right subclavian vein.     Description of Procedure: Patient was taken to the operating room placed supine on the operating table.  Timeout verification was performed. MAC anesthesia was administered.  Patient was prepped and draped in usual fashion.  Using a needle attached to a syringe, the right subclavian vein was accessed with two passes of the needle.  Fluoroscopy was used to confirm the guidewire was positioned appropriately in the superior vena cava.  A subcutaneous pocket was created at the right upper chest to accommodate the port.  The inner dilator was placed inside of the tear-away sheath and both were placed over the guidewire into the right subclavian vein.  The inner guidewire and dilator were removed.  The catheter was placed through the tear-away  sheath and the sheath was withdrawn.  Then under direct visualization with fluoroscopy, the catheter was pulled back until the tip was positioned appropriately in the superior vena cava.  The catheter was cut to the appropriate length and the locking cap was placed onto the catheter.   The catheter was connected to the port, the locking cap was secured, and the port was placed within the subcutaneous pocket.  I accessed the port with a Barrios needle.  I could easily aspirate venous blood.  The port was then flushed with heparinized solution.  The wound was closed appropriately with buried absorbable suture followed by appropriate dressings.  No complications.  Sponge needle and instrument counts were correct.  Patient was transported to the recovery area in stable condition.    Assistant: Leopoldo Smith CSA was responsible for performing the following activities: Retraction, Suturing, Closing and Placing Dressing.  His skilled assistance was necessary for the success of this case.    Richie Edward MD     Date: 1/9/2023  Time: 10:09 EST    Electronically signed by Richie Edward MD, 01/09/23, 10:09 AM EST.

## 2023-01-10 ENCOUNTER — CONSULT (OUTPATIENT)
Dept: RADIATION ONCOLOGY | Facility: HOSPITAL | Age: 78
End: 2023-01-10
Payer: MEDICARE

## 2023-01-10 VITALS
HEART RATE: 77 BPM | OXYGEN SATURATION: 100 % | BODY MASS INDEX: 24.39 KG/M2 | SYSTOLIC BLOOD PRESSURE: 136 MMHG | WEIGHT: 195.11 LBS | TEMPERATURE: 97.7 F | DIASTOLIC BLOOD PRESSURE: 78 MMHG | RESPIRATION RATE: 16 BRPM

## 2023-01-10 DIAGNOSIS — C67.2 MALIGNANT NEOPLASM OF LATERAL WALL OF URINARY BLADDER: Primary | ICD-10-CM

## 2023-01-10 PROBLEM — R31.9 BLOOD IN URINE: Status: ACTIVE | Noted: 2022-10-18

## 2023-01-10 PROBLEM — D04.9 SQUAMOUS CELL CARCINOMA IN SITU OF SKIN: Status: ACTIVE | Noted: 2021-11-23

## 2023-01-10 PROBLEM — I71.9 AORTIC ANEURYSM: Status: ACTIVE | Noted: 2023-01-10

## 2023-01-10 PROBLEM — N20.0 KIDNEY STONE: Status: ACTIVE | Noted: 2022-10-16

## 2023-01-10 PROBLEM — N39.0 UTI (URINARY TRACT INFECTION): Status: ACTIVE | Noted: 2023-01-10

## 2023-01-10 PROBLEM — C67.9 TRANSITIONAL CELL CARCINOMA OF BLADDER: Status: ACTIVE | Noted: 2022-10-25

## 2023-01-10 PROBLEM — H91.93 BILATERAL HEARING LOSS: Status: ACTIVE | Noted: 2022-02-09

## 2023-01-10 PROBLEM — E78.2 MIXED HYPERLIPIDEMIA: Status: ACTIVE | Noted: 2022-02-09

## 2023-01-10 PROBLEM — I72.3 ANEURYSM OF ILIAC ARTERY: Status: ACTIVE | Noted: 2017-11-30

## 2023-01-10 LAB
ALBUMIN SERPL-MCNC: 3.7 G/DL (ref 3.5–5.2)
ALBUMIN/GLOB SERPL: 1.2 G/DL
ALP SERPL-CCNC: 92 U/L (ref 39–117)
ALT SERPL W P-5'-P-CCNC: 12 U/L (ref 1–41)
ANION GAP SERPL CALCULATED.3IONS-SCNC: 6.3 MMOL/L (ref 5–15)
AST SERPL-CCNC: 14 U/L (ref 1–40)
BILIRUB SERPL-MCNC: 0.4 MG/DL (ref 0–1.2)
BUN SERPL-MCNC: 17 MG/DL (ref 8–23)
BUN/CREAT SERPL: 22.4 (ref 7–25)
CALCIUM SPEC-SCNC: 9.3 MG/DL (ref 8.6–10.5)
CHLORIDE SERPL-SCNC: 105 MMOL/L (ref 98–107)
CO2 SERPL-SCNC: 26.7 MMOL/L (ref 22–29)
CREAT SERPL-MCNC: 0.76 MG/DL (ref 0.76–1.27)
EGFRCR SERPLBLD CKD-EPI 2021: 91.4 ML/MIN/1.73
GLOBULIN UR ELPH-MCNC: 3.2 GM/DL
GLUCOSE SERPL-MCNC: 101 MG/DL (ref 65–99)
POTASSIUM SERPL-SCNC: 4.6 MMOL/L (ref 3.5–5.2)
PROT SERPL-MCNC: 6.9 G/DL (ref 6–8.5)
SODIUM SERPL-SCNC: 138 MMOL/L (ref 136–145)

## 2023-01-10 PROCEDURE — G0463 HOSPITAL OUTPT CLINIC VISIT: HCPCS | Performed by: RADIOLOGY

## 2023-01-10 PROCEDURE — 80053 COMPREHEN METABOLIC PANEL: CPT | Performed by: RADIOLOGY

## 2023-01-10 PROCEDURE — 99205 OFFICE O/P NEW HI 60 MIN: CPT | Performed by: RADIOLOGY

## 2023-01-10 PROCEDURE — 36415 COLL VENOUS BLD VENIPUNCTURE: CPT | Performed by: RADIOLOGY

## 2023-01-10 NOTE — PROGRESS NOTES
New Patient Office Visit      Encounter Date: 01/10/2023   Patient Name: Korey Rodriguez  YOB: 1945   Medical Record Number: [id number]   Primary Diagnosis: Malignant neoplasm of lateral wall of urinary bladder (HCC) [C67.2]   Cancer Staging: Cancer Staging   No matching staging information was found for the patient.    Chief Complaint:    Chief Complaint   Patient presents with   • Consult       History of Present Illness: Korey Rodriguez is a 77 y.o. male who is here for consultation regarding his new diagnosis of bladder cancer. He presented with gross hematuria, prompting an evaluation by Dr. Westfall. He had a TURBT in 10/2022, which identified a large bleeding bladder tumor. 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 PET/CT 11/2022 was negative for metastatic disease. He was seen by Dr. Bansal, who performed a radical cystoprostatectomy with bilateral pelvic LN dissection and formation of an ileal conduit on 12/12/22. An omental pedical flap was also created. 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 Dr. Bansal reported extension of the primary tumor into the left periurethral adventitial fat, which extended to the level of the GAY. 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 This was removed with an R1 resection, with microscopic disease identified at the left distal ureter. Left sided lymph nodes dissected were negative (external iliac 0/5, common iliac 0/4, obturator 0/2, presacral 0/1). Right obturator and presacral lymph nodes were negative. However 2/3 right external iliac nodes were involved and 2/3 right common iliac lymph nodes were involved.  He has been recovering well since his surgery.       Subjective      Prior  Radiation History: No  Pacemaker or ICD: No      Review of Systems: Review of Systems   Constitutional: Positive for appetite change (Decreased but improving) and unexpected weight change (Down 20lbs over last couple of months). Negative for fatigue.   HENT: Positive for hearing loss (Ongoing) and tinnitus (Ongoing).    Eyes: Negative for visual disturbance.   Respiratory: Negative for cough and shortness of breath.    Cardiovascular: Negative for chest pain, palpitations and leg swelling.   Gastrointestinal: Positive for constipation (On stool softener daily with relief.  ). Negative for diarrhea, nausea and vomiting.   Genitourinary: Negative for decreased urine volume and hematuria.        Has urostomy with clear, yellow urine.     Musculoskeletal: Positive for back pain (Ongoing). Negative for arthralgias, gait problem and joint swelling.   Skin: Negative for color change and rash.   Neurological: Negative for dizziness, weakness and headaches.        Occasional balance issues- x couple of months.     Psychiatric/Behavioral: Positive for sleep disturbance (Wakes due to back pain.). Negative for self-injury and suicidal ideas. The patient is not nervous/anxious.        Past Medical History:   Past Medical History:   Diagnosis Date   • AAA (abdominal aortic aneurysm)     BEING MONITORED NO REPAIR NEEDED YET   • Aneurysm of abdominal aorta branch vessel    • Cancer (HCC)     BLADDER AND SKIN CANCERS/REMOVED   • History of urostomy     PT CURRENTLY HAS HOME HEALTH TO HELP WITH OSTOMY   • Kidney stone 10/16/2022       Past Surgical History:   Past Surgical History:   Procedure Laterality Date   • COLONOSCOPY  2018   • CYSTECTOMY      12/2022  W/UROSTOMY   • SKIN CANCER EXCISION Right    • TRANSURETHRAL RESECTION OF BLADDER TUMOR Right 10/19/2022    Procedure: Cystoscopy with transurethral resection of bladder tumor;  Surgeon: Aung Westfall MD;  Location: [facility] OR;  Service: Urology;  Laterality: Right;   •  VENOUS ACCESS DEVICE (PORT) INSERTION Right 2023    Procedure: INSERTION VENOUS ACCESS DEVICE;  Surgeon: Richie Edward MD;  [facility]: [facility] OR [facility];  Service: General;  Laterality: Right;       Family History:   Family History   Problem Relation Age of Onset   • Cancer Mother    • Cancer Father    • Hearing loss Father    • Hypertension Father    • Malig Hyperthermia Neg Hx        Social History:   Social History     Socioeconomic History   • Marital status:    Tobacco Use   • Smoking status: Former     Packs/day: 1.00     Years: 20.00     Pack years: 20.00     Types: Cigarettes     Start date: 1962     Quit date: 1990     Years since quittin.0   • Smokeless tobacco: Never   Vaping Use   • Vaping Use: Never used   Substance and Sexual Activity   • Alcohol use: Yes     Alcohol/week: 1.0 standard drink     Types: 1 Cans of beer per week     Comment: 1 beer daily   • Drug use: Never   • Sexual activity: Not Currently     Partners: Female       Medications:     Current Outpatient Medications:   •  atorvastatin (LIPITOR) 20 MG tablet, Take 20 mg by mouth Every Night., Disp: , Rfl:     Allergies:   No Known Allergies    Measures:   Pain: (on a scale of 0-10)   Pain Score    01/10/23 1001   PainSc: 0-No pain       Advanced Care Plan: N Advanced Care Planning was discussed. The patient does not have a living will documented in the medical record and declined to perform one today.  KPS/Quality of Life: 80 - Restricted Physical Activity  ECOG: (1) Restricted in Physically Strenuous Activity, Ambulatory & Able to Do Work of Light Nature  Depression Screening:   Patient screened positive for depression based on a PHQ-9 score of 0 on 1/10/2023.     Objective     Physical Exam:   Vital Signs:   Vitals:    01/10/23 1001   BP: 136/78   Pulse: 77   Resp: 16   Temp: 97.7 °F (36.5 °C)   TempSrc: Temporal   SpO2: 100%   Weight: 88.5 kg (195 lb 1.7 oz)   PainSc: 0-No pain     Body mass index is 24.39 kg/m².      Constitutional: The patient is a well-developed, well-nourished male  in no acute distress.  Alert and oriented ×3.  General: NAD, sitting comfortably  Eye: EOMI, anicteric sclerae  HENT: NC/AT, MMM  Neck: No JVD or cervical lymphadenopathy  Respiratory: Symmetric expansion, nonlabored respiration  Cardiovascular: Regular rate and rhythm.  No murmurs, rubs, or gallops are appreciated.  Abdomen: nontender, nondistended. +ileal conduit  Musculoskeletal: No obvious joint deformities, range of motion intact in all 4 extremities  Neuro: Alert oriented x3, cranial nerves III through XII are grossly intact, with no focal neurological deficits noted on exam.  Psych: Mood and affect appropriate    PET/CT 11/2022:                 1. Asymmetric thickening along the right lateral and posterior walls of the urinary bladder   compatible the patient's history of bladder cancer.  There is also moderate right-sided   hydronephrosis which appears to be related to obstruction at the level of the ureteral vesicle   junction.  2. Bilateral nonobstructing renal stones.  No evidence of ureteral stone.  3. No evidence of metastatic disease within the neck, chest, abdomen, or pelvis.            Assessment / Plan      Assessment/Plan:   Korey Rodriguez is a pleasant 77 y.o. 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 An omental pedical flap was also created. He did not receive neoadjuvant cisplatin based therapy. Based on his pathologic findings, his team at [facility] recommended adjuvant chemotherapy and radiation. He is here to establish care closer to home. We talked about radiation, which would be delivered with the intention of preventing local recurrence given the stage of disease and the microscopic positive margin. Per NCCN guidelines, radiation  therapy can be considered as a category 2b recommendation for this situation. We also discussed that while radiation is an effective modality for offering local control, chemotherapy is meant to address risk of developing distant metastatic disease.   We will discuss our case in our tumor board meeting this afternoon.       Diagnoses and all orders for this visit:    1. Malignant neoplasm of lateral wall of urinary bladder (HCC) (Primary)  -     Comprehensive Metabolic Panel; Future  -     Comprehensive Metabolic Panel         Follow Up:  Return in about 2 weeks (around 1/24/2023) for Office Visit.      Time:   I spent 65 minutes on this encounter today, 01/10/23. Activities that took place during this time include: preparing to see the patient, obtaining history, reviewing separately obtained history, performing a medically appropriate examination and evaluation, counseling and educating the patient, ordering medications/tests/procedures, communicating with other healthcare providers, documenting clinical information in the health record, and coordinating care for this patient.     Sincerely,        Kimberly Melgoza MD  Radiation Oncology  [facility] [place]    This document has been signed by Kimebrly Melgoza MD on January 10, 2023 14:48 EST     NOTICE TO PATIENTS:   At [facility], we believe that sharing information builds trust and better relationships. You are receiving this note because you recently visited [facility]. It is possible you will see health information before a provider has talked with you about it. This kind of information can be easy to misunderstand. To help you fully understand what it means for your health, we urge you to discuss this note with your provider.

## 2023-01-12 ENCOUNTER — OFFICE VISIT (OUTPATIENT)
Dept: ONCOLOGY | Facility: HOSPITAL | Age: 78
End: 2023-01-12
Payer: MEDICARE

## 2023-01-12 ENCOUNTER — APPOINTMENT (OUTPATIENT)
Dept: ONCOLOGY | Facility: HOSPITAL | Age: 78
End: 2023-01-12
Payer: MEDICARE

## 2023-01-12 VITALS
HEART RATE: 88 BPM | OXYGEN SATURATION: 97 % | DIASTOLIC BLOOD PRESSURE: 84 MMHG | WEIGHT: 193.78 LBS | BODY MASS INDEX: 24.22 KG/M2 | RESPIRATION RATE: 18 BRPM | TEMPERATURE: 98.5 F | SYSTOLIC BLOOD PRESSURE: 155 MMHG

## 2023-01-12 DIAGNOSIS — C67.2 MALIGNANT NEOPLASM OF LATERAL WALL OF BLADDER: Primary | ICD-10-CM

## 2023-01-12 PROBLEM — C67.9 TRANSITIONAL CELL CARCINOMA OF BLADDER: Status: RESOLVED | Noted: 2022-10-25 | Resolved: 2023-01-12

## 2023-01-12 PROBLEM — C67.9 UROTHELIAL CARCINOMA OF BLADDER WITH INVASION OF MUSCLE: Status: RESOLVED | Noted: 2022-10-25 | Resolved: 2023-01-12

## 2023-01-12 PROCEDURE — 99214 OFFICE O/P EST MOD 30 MIN: CPT | Performed by: INTERNAL MEDICINE

## 2023-01-12 PROCEDURE — G0463 HOSPITAL OUTPT CLINIC VISIT: HCPCS | Performed by: INTERNAL MEDICINE

## 2023-01-12 RX ORDER — ONDANSETRON HYDROCHLORIDE 8 MG/1
8 TABLET, FILM COATED ORAL 3 TIMES DAILY PRN
Qty: 30 TABLET | Refills: 5 | Status: SHIPPED | OUTPATIENT
Start: 2023-01-12 | End: 2023-04-07

## 2023-01-12 RX ORDER — OLANZAPINE 5 MG/1
5 TABLET ORAL NIGHTLY
Qty: 3 TABLET | Refills: 5 | Status: SHIPPED | OUTPATIENT
Start: 2023-01-12 | End: 2023-04-07

## 2023-01-12 NOTE — PROGRESS NOTES
Chief Complaint  Bladder Cancer    Vita Starks, CATA Starks, Vita, CATA    Subjective          Koreyabram Rodriguez presents to [facility] for urothelial carcinoma    Mr. Rodriguez is a pleasant 78 yo male with a history of AAA (infrarenal), chronic back pain, HLD, hearing loss who presents for new oncology evaluation regarding urothelial carcinoma.  Patient had radical cystoprostatectomy on December 12 [facility] by Dr. Bansal.  He is recovered well from that surgery.  He has ileal conduit in place is draining clear yellow fluid.  He is doing well today. No acute complaints.     Oncology/Hematology History Overview Note   10/14/22: CT abdomen/pelvis obtained due to gross hematuria for 6 weeks. This showed right hydroureteronephrosis down to bladder with findings concerning for large bladder tumor. He also had 20 lbs weight loss during this time.     10/19/2022: TURBT performed by Dr. Westfall. Per op reprot large area resected (7 x 4 cm), visualization difficult due to clot burden. 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     11/17/2022: PET CT showed asymmetric thickening along the right lateral and posterior walls of the bladder. No evidence of metastatic disease.     12/12/22: Radical cystoprostectomy, bilateral PLND, ileal conduit by Dr. Bansal ([facility]): Left distal and right distal ureter both positive for carcinoma, 2/3 right external lymph nodes positive for metastatic carcinoma, 2/3 right common external lymph nodes positive for carcinoma, bladder and prostate with urothelial carcinoma with micropapillary features and invades directly into prostate, extensive CIS noted, 4/22 total lymph nodes positive, [id number]     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 (HCC)   10/26/2022 Initial Diagnosis    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 (HCC)     1/13/2023 - 1/13/2023 Chemotherapy    OP BLADDER MitoMYcin /  Fluorouracil CIV + XRT     Urothelial carcinoma of bladder with invasion of muscle (HCC)   10/25/2022 Initial Diagnosis    Urothelial carcinoma of bladder with invasion of muscle (HCC)     2023 - 2023 Chemotherapy    OP BLADDER MitoMYcin / Fluorouracil CIV + XRT           Review of Systems   Constitutional: Positive for fatigue.   Musculoskeletal: Positive for back pain.   Psychiatric/Behavioral: Positive for sleep disturbance.   All other systems reviewed and are negative.    Current Outpatient Medications on File Prior to Visit   Medication Sig Dispense Refill   • atorvastatin (LIPITOR) 20 MG tablet Take 20 mg by mouth Every Night.       No current facility-administered medications on file prior to visit.       No Known Allergies  Past Medical History:   Diagnosis Date   • AAA (abdominal aortic aneurysm)     BEING MONITORED NO REPAIR NEEDED YET   • Aneurysm of abdominal aorta branch vessel    • Cancer (HCC)     BLADDER AND SKIN CANCERS/REMOVED   • History of urostomy     PT CURRENTLY HAS HOME HEALTH TO HELP WITH OSTOMY   • Kidney stone 10/16/2022     Past Surgical History:   Procedure Laterality Date   • COLONOSCOPY  2018   • CYSTECTOMY      2022  W/UROSTOMY   • SKIN CANCER EXCISION Right    • TRANSURETHRAL RESECTION OF BLADDER TUMOR Right 10/19/2022    Procedure: Cystoscopy with transurethral resection of bladder tumor;  Surgeon: Aung Westfall MD;  Location: [facility] OR;  Service: Urology;  Laterality: Right;   • VENOUS ACCESS DEVICE (PORT) INSERTION Right 2023    Procedure: INSERTION VENOUS ACCESS DEVICE;  Surgeon: Richie Edward MD;  Location: [facility];  Service: General;  Laterality: Right;     Social History     Socioeconomic History   • Marital status:    Tobacco Use   • Smoking status: Former     Packs/day: 1.00     Years: 20.00     Pack years: 20.00     Types: Cigarettes     Start date: 1962     Quit date: 1990     Years since quittin.0   • Smokeless  tobacco: Never   Vaping Use   • Vaping Use: Never used   Substance and Sexual Activity   • Alcohol use: Yes     Alcohol/week: 1.0 standard drink     Types: 1 Cans of beer per week     Comment: 1 beer daily   • Drug use: Never   • Sexual activity: Not Currently     Partners: Female     Family History   Problem Relation Age of Onset   • Cancer Mother    • Cancer Father    • Hearing loss Father    • Hypertension Father    • Malig Hyperthermia Neg Hx        Objective   Physical Exam    Well appearing patient in no acute distress on RA  Anicteric sclera, no rash on exposed skin  Respirations non-labored  Awake, alert, and oriented x 4. Speech intact.   Appropriate mood and affect    Vitals:    01/12/23 0754   BP: 155/84   Pulse: 88   Resp: 18   Temp: 98.5 °F (36.9 °C)   TempSrc: Temporal   SpO2: 97%   Weight: 87.9 kg (193 lb 12.6 oz)   PainSc: 0-No pain               PHQ-9 Total Score:                Result Review :   The following data was reviewed by: Arvin Coburn MD on 01/03/2023:  Lab Results   Component Value Date    HGB 11.1 (L) 12/19/2022    HCT 34.1 (L) 12/19/2022    MCV 81.8 12/19/2022     12/19/2022    WBC 8.91 12/19/2022    NEUTROABS 6.1 (H) 12/12/2022    LYMPHSABS 0.70 10/14/2022    MONOSABS 0.59 10/14/2022    EOSABS 0.1 12/12/2022    BASOSABS 0.1 12/12/2022     Lab Results   Component Value Date    GLUCOSE 101 (H) 01/10/2023    BUN 17 01/10/2023    CREATININE 0.76 01/10/2023     01/10/2023    K 4.6 01/10/2023     01/10/2023    CO2 26.7 01/10/2023    CALCIUM 9.3 01/10/2023    PROTEINTOT 6.9 01/10/2023    ALBUMIN 3.7 01/10/2023    BILITOT 0.4 01/10/2023    ALKPHOS 92 01/10/2023    AST 14 01/10/2023    ALT 12 01/10/2023     No results found for: MG, PHOS, FREET4, TSH       Surgical Pathology Reviewed: [id number] with left distal margin positive.    [facility] urology documentation and progress notes from hospital personally reviewed.      Assessment and Plan    Diagnoses  "and all orders for this visit:    1. 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 (HCC) (Primary)  -     OLANZapine (zyPREXA) 5 MG tablet; Take 1 tablet by mouth Every Night. Take on days 2, 3 and 4 after chemotherapy.  Dispense: 3 tablet; Refill: 5  -     ondansetron (ZOFRAN) 8 MG tablet; Take 1 tablet by mouth 3 (Three) Times a Day As Needed for Nausea or Vomiting.  Dispense: 30 tablet; Refill: 5    Mr. Thompson is a 77-year-old male status post radical cystoprostatectomy bilateral PLND which demonstrated stage [id number], stage IIIB urothelial carcinoma. 4/22 lymph nodes positive and right and left and right ureter positive with distal margin of left ureter positive.  Patient with disease that is at high risk of recurrence. With this stage, would favor a recommendation of adjuvant radiation and chemotherapy to hopefully decrease the chance of recurrence. Management has been personally discussed with Dr. Melgoza with radiation oncology and we have decided to do \"sandwich\" therapy with 2 cycles of chemotherapy followed by radiation followed by 2 additional cycles of chemotherapy. This is a change in plan previously discussed with patient. The chemotherapy will include IV Gemcitabine and Cisplatin with Gemcitabine given on D1 and both drugs given on D8 of a 21 day cycle. Side effect profile was discussed with patient.   All questions were answered and consent was obtained.  Port has been placed. Plan to start chemotherapy next week.  Instructional sheets provided. PRN anti-emetics prescribed.    This is an acute or chronic illness that poses a threat to life or bodily function. The above treatment plan involves a high risk of complications and/or mortality of patient management.    The patient was seen and examined. Work by the provider also included review and/or ordering of lab tests, review and/or ordering of radiology tests, review and/or ordering of medicine tests, discussion with other physicians or providers, " independent review of data, obtaining old records, review/summation of old records, and/or other review.     I have reviewed the family history, social history, and past medical history for this patient. Previous information and data has been reviewed and updated as needed. I have reviewed and verified the chief complaint, history, and other documentation. The patient was interviewed and examined at the bedside and the chart reviewed. The previous observations, recommendations, and conclusions were reviewed including those of other providers.     The plan was discussed with the patient and/or family. The patient was given time to ask questions and these questions were answered. At the conclusion of their visit they had no additional questions or concerns and all questions were answered to their satisfaction.         I spent 25 minutes caring for Korey on this date of service. This time includes time spent by me in the following activities:preparing for the visit, reviewing tests, obtaining and/or reviewing a separately obtained history, performing a medically appropriate examination and/or evaluation , counseling and educating the patient/family/caregiver, ordering medications, tests, or procedures, referring and communicating with other health care professionals , documenting information in the medical record, independently interpreting results and communicating that information with the patient/family/caregiver and care coordination    Patient Follow Up: Prior to C2 chemo  Patient was given instructions and counseling regarding his condition or for health maintenance advice. Please see specific information pulled into the AVS if appropriate.

## 2023-01-17 ENCOUNTER — LAB (OUTPATIENT)
Dept: ONCOLOGY | Facility: HOSPITAL | Age: 78
End: 2023-01-17
Payer: MEDICARE

## 2023-01-17 ENCOUNTER — OFFICE VISIT (OUTPATIENT)
Dept: ONCOLOGY | Facility: HOSPITAL | Age: 78
End: 2023-01-17
Payer: MEDICARE

## 2023-01-17 VITALS
DIASTOLIC BLOOD PRESSURE: 72 MMHG | HEART RATE: 86 BPM | OXYGEN SATURATION: 97 % | SYSTOLIC BLOOD PRESSURE: 141 MMHG | BODY MASS INDEX: 24.22 KG/M2 | WEIGHT: 193.78 LBS | TEMPERATURE: 97.7 F | RESPIRATION RATE: 18 BRPM

## 2023-01-17 DIAGNOSIS — C67.2 MALIGNANT NEOPLASM OF LATERAL WALL OF BLADDER: Primary | ICD-10-CM

## 2023-01-17 DIAGNOSIS — Z71.9 ENCOUNTER FOR EDUCATION: ICD-10-CM

## 2023-01-17 DIAGNOSIS — C67.2 MALIGNANT NEOPLASM OF LATERAL WALL OF BLADDER: ICD-10-CM

## 2023-01-17 LAB
ALBUMIN SERPL-MCNC: 3.8 G/DL (ref 3.5–5.2)
ALBUMIN/GLOB SERPL: 1.3 G/DL
ALP SERPL-CCNC: 99 U/L (ref 39–117)
ALT SERPL W P-5'-P-CCNC: 11 U/L (ref 1–41)
ANION GAP SERPL CALCULATED.3IONS-SCNC: 8 MMOL/L (ref 5–15)
AST SERPL-CCNC: 12 U/L (ref 1–40)
BASOPHILS # BLD AUTO: 0.03 10*3/MM3 (ref 0–0.2)
BASOPHILS NFR BLD AUTO: 0.6 % (ref 0–1.5)
BILIRUB SERPL-MCNC: 0.3 MG/DL (ref 0–1.2)
BUN SERPL-MCNC: 19 MG/DL (ref 8–23)
BUN/CREAT SERPL: 23.5 (ref 7–25)
CALCIUM SPEC-SCNC: 9 MG/DL (ref 8.6–10.5)
CHLORIDE SERPL-SCNC: 106 MMOL/L (ref 98–107)
CO2 SERPL-SCNC: 27 MMOL/L (ref 22–29)
CREAT SERPL-MCNC: 0.81 MG/DL (ref 0.76–1.27)
DEPRECATED RDW RBC AUTO: 51.2 FL (ref 37–54)
EGFRCR SERPLBLD CKD-EPI 2021: 89.7 ML/MIN/1.73
EOSINOPHIL # BLD AUTO: 0.39 10*3/MM3 (ref 0–0.4)
EOSINOPHIL NFR BLD AUTO: 7.7 % (ref 0.3–6.2)
ERYTHROCYTE [DISTWIDTH] IN BLOOD BY AUTOMATED COUNT: 16.2 % (ref 12.3–15.4)
GLOBULIN UR ELPH-MCNC: 3 GM/DL
GLUCOSE SERPL-MCNC: 114 MG/DL (ref 65–99)
HCT VFR BLD AUTO: 35.7 % (ref 37.5–51)
HGB BLD-MCNC: 11.1 G/DL (ref 13–17.7)
IMM GRANULOCYTES # BLD AUTO: 0.02 10*3/MM3 (ref 0–0.05)
IMM GRANULOCYTES NFR BLD AUTO: 0.4 % (ref 0–0.5)
LYMPHOCYTES # BLD AUTO: 0.9 10*3/MM3 (ref 0.7–3.1)
LYMPHOCYTES NFR BLD AUTO: 17.7 % (ref 19.6–45.3)
MAGNESIUM SERPL-MCNC: 1.9 MG/DL (ref 1.6–2.4)
MCH RBC QN AUTO: 26.7 PG (ref 26.6–33)
MCHC RBC AUTO-ENTMCNC: 31.1 G/DL (ref 31.5–35.7)
MCV RBC AUTO: 85.8 FL (ref 79–97)
MONOCYTES # BLD AUTO: 0.52 10*3/MM3 (ref 0.1–0.9)
MONOCYTES NFR BLD AUTO: 10.2 % (ref 5–12)
NEUTROPHILS NFR BLD AUTO: 3.23 10*3/MM3 (ref 1.7–7)
NEUTROPHILS NFR BLD AUTO: 63.4 % (ref 42.7–76)
PLATELET # BLD AUTO: 189 10*3/MM3 (ref 140–450)
PMV BLD AUTO: 9 FL (ref 6–12)
POTASSIUM SERPL-SCNC: 4.1 MMOL/L (ref 3.5–5.2)
PROT SERPL-MCNC: 6.8 G/DL (ref 6–8.5)
RBC # BLD AUTO: 4.16 10*6/MM3 (ref 4.14–5.8)
SODIUM SERPL-SCNC: 141 MMOL/L (ref 136–145)
WBC NRBC COR # BLD: 5.09 10*3/MM3 (ref 3.4–10.8)

## 2023-01-17 PROCEDURE — 36415 COLL VENOUS BLD VENIPUNCTURE: CPT

## 2023-01-17 PROCEDURE — 85025 COMPLETE CBC W/AUTO DIFF WBC: CPT

## 2023-01-17 PROCEDURE — 83735 ASSAY OF MAGNESIUM: CPT

## 2023-01-17 PROCEDURE — 80053 COMPREHEN METABOLIC PANEL: CPT

## 2023-01-17 PROCEDURE — 99215 OFFICE O/P EST HI 40 MIN: CPT | Performed by: NURSE PRACTITIONER

## 2023-01-17 RX ORDER — OLANZAPINE 5 MG/1
5 TABLET ORAL ONCE
Status: CANCELLED | OUTPATIENT
Start: 2023-01-18 | End: 2023-01-18

## 2023-01-17 RX ORDER — PALONOSETRON 0.05 MG/ML
0.25 INJECTION, SOLUTION INTRAVENOUS ONCE
Status: CANCELLED | OUTPATIENT
Start: 2023-01-18

## 2023-01-17 RX ORDER — SODIUM CHLORIDE 9 MG/ML
250 INJECTION, SOLUTION INTRAVENOUS ONCE
Status: CANCELLED | OUTPATIENT
Start: 2023-01-18

## 2023-01-17 RX ORDER — SODIUM CHLORIDE 9 MG/ML
250 INJECTION, SOLUTION INTRAVENOUS ONCE
Status: CANCELLED | OUTPATIENT
Start: 2023-01-25

## 2023-01-17 NOTE — PROGRESS NOTES
CHEMOTHERAPY PREPARATION    Korey Rodriguez  [id number]  1945    Chief Complaint:   Chemo Education    History of present illness:  Korey Rodriguez is a 77 y.o. year old male who is here today for chemotherapy preparation and needs assessment.  The patient has been diagnosed with urothelial cancer and is scheduled to begin treatment with Cisplatin / Gemcitabine day 1, day 8 every 21 days x 6 cycles + radiation. Plans to start on 1/18/23 at 0800.     Oncology History:    Oncology/Hematology History Overview Note   10/14/22: CT abdomen/pelvis obtained due to gross hematuria for 6 weeks. This showed right hydroureteronephrosis down to bladder with findings concerning for large bladder tumor. He also had 20 lbs weight loss during this time.     10/19/2022: TURBT performed by Dr. Westfall. Per op reprot large area resected (7 x 4 cm), visualization difficult due to clot burden. 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     11/17/2022: PET CT showed asymmetric thickening along the right lateral and posterior walls of the bladder. No evidence of metastatic disease.     12/12/22: Radical cystoprostectomy, bilateral PLND, ileal conduit by Dr. Bansal ([facility]): Left distal and right distal ureter both positive for carcinoma, 2/3 right external lymph nodes positive for metastatic carcinoma, 2/3 right common external lymph nodes positive for carcinoma, bladder and prostate with urothelial carcinoma with micropapillary features and invades directly into prostate, extensive CIS noted, 4/22 total lymph nodes positive, [id number].         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     Past Medical History, Past Surgical History, Social History, Family History have been reviewed and are without significant changes except as mentioned.    Review of Systems:    Review of Systems   Constitutional: Negative.    HENT: Positive for tinnitus (underlying tinnitus. Rates tinnitus an 8 on scale. ).    Eyes: Negative.    Respiratory: Negative.    Cardiovascular: Negative.    Gastrointestinal: Negative.    Endocrine: Negative.    Genitourinary: Negative.    Skin: Negative.  Negative for color change.   Allergic/Immunologic: Negative.    Neurological: Negative.    Hematological: Negative.    Psychiatric/Behavioral: Negative.        Physical Exam:    Physical Exam     General: well appearing, in no acute distress  Cardiovascular: regular rate and rhythm, no murmurs noted  Lungs: clear to auscultation bilaterally, respirations even and unlabored  Extremities: no lower extremity edema  Skin: no rashes, lesions, bruising, or petechiae  Psych: Mood is stable    01/17/2023: Reviewed: CATA Charles.       Vitals:    01/17/23 0848   BP: 141/72   Pulse: 86   Resp: 18   Temp: 97.7 °F (36.5 °C)   SpO2: 97%     Vitals:    01/17/23 0848   PainSc: 0-No pain          ECOG: {findings; ecog performance status:47118            NEEDS ASSESSMENTS    VAD Assessment  The patient and I discussed planned intravenous chemotherapy as well as other IV treatments that are often needed throughout the course of treatment. These may include, but are not limited to blood transfusions, antibiotics, and IV hydration. The vasculature does not appear to be adequate for multiple peripheral IVs throughout their treatment course. Discussed risks and  benefits of VADs. The patient would like to pursue Port-A-Cath insertion prior to initiation of treatment.       Additional Referral needs  none      CHEMOTHERAPY EDUCATION    Booklets Given: Chemotherapy and You [x]  Eating Hints [x]    Sexuality/Fertility Books []      Chemotherapy/Biotherapy Education Sheets: (list all that apply)  wig information                                                                                                                                                                 Chemotherapy Regimen:   Treatment Plans     Name Type Plan Dates Plan Provider         Active    OP BLADDER CISplatin D1 / Gemcitabine D1,D8 ONCOLOGY TREATMENT  1/11/2023 - Present Arvin Coburn MD                    TOPICS EDUCATION PROVIDED COMMENTS   ANEMIA:  role of RBC, cause, s/s, ways to manage, role of transfusion [x]    THROMBOCYTOPENIA:  role of platelet, cause, s/s, ways to prevent bleeding, things to avoid, when to seek help [x]    NEUTROPENIA:  role of WBC, cause, infection precautions, s/s of infection, when to call MD [x]    NUTRITION & APPETITE CHANGES:  importance of maintaining healthy diet & weight, ways to manage to improve intake, dietary consult, exercise regimen [x]    DIARRHEA:  causes, s/s of dehydration, ways to manage, dietary changes, when to call MD [x]    CONSTIPATION:  causes, ways to manage, dietary changes, when to call MD [x]    NAUSEA & VOMITING:  cause, use of antiemetics, dietary changes, when to call MD [x]    MOUTH SORES:  causes, oral care, ways to manage [x]    ALOPECIA:  cause, ways to manage, resources [x]    INFERTILITY & SEXUALITY:  causes, fertility preservation options, sexuality changes, ways to manage, importance of birth control []    NERVOUS SYSTEM CHANGES:  causes, s/s, neuropathies, cognitive changes, ways to manage [x]    PAIN:  causes, ways to manage [x]    SKIN & NAIL CHANGES:  cause, s/s, ways to manage [x]    ORGAN TOXICITIES:  cause, s/s, need  for diagnostic tests, labs, when to notify MD [x]    SURVIVORSHIP:  distress, distress assessment, secondary malignancies, early/late effects, follow-up, social issues, social support []    HOME CARE:  use of spill kits, storing of PO chemo, how to manage bodily fluids []    MISCELLANEOUS:  drug interactions, administration, vesicant, et [x]        Assessment and Plan:    Diagnoses and all orders for this visit:    1. 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 (HCC) (Primary)  -     CBC and Differential; Future    2. Encounter for education    Other orders  -     sodium chloride 0.9 % infusion 250 mL  -     magnesium sulfate 1 g, potassium chloride 20 mEq in sodium chloride 0.9 % 1,000 mL IVPB  -     OLANZapine (zyPREXA) tablet 5 mg  -     palonosetron (ALOXI) injection 0.25 mg  -     fosaprepitant (EMEND) 150 mg in sodium chloride 0.9 % 100 mL IVPB  -     dexamethasone (DECADRON) 12 mg in sodium chloride 0.9 % IVPB  -     gemcitabine (GEMZAR) 2,150 mg in sodium chloride 0.9 % 306.5 mL chemo IVPB  -     CISplatin (PLATINOL) 152 mg in sodium chloride 0.9 % 652 mL chemo IVPB  -     magnesium sulfate 1 g, potassium chloride 20 mEq in sodium chloride 0.9 % 1,000 mL IVPB  -     sodium chloride 0.9 % infusion 250 mL  -     dexamethasone (DECADRON) 12 mg in sodium chloride 0.9 % IVPB  -     gemcitabine (GEMZAR) 2,150 mg in sodium chloride 0.9 % 306.5 mL chemo IVPB      Plans to start chemotherapy on 1/18/23 at 0800.   Dr. Coburn spoke to patient and family regarding change in chemotherapy plan with  after meeting with tumor board and discussing his case.    The patient, family and I have reviewed their cancer diagnosis and scheduled treatment plan. Needs assessment was completed including genetics, psychosocial needs, barriers to care, VAD evaluation, advanced care planning, and palliative care services. Referrals have been ordered as appropriate based upon our evaluation and patient desires.     Chemotherapy teaching  was also completed today as documented above. Adequate time was given to answer all questions to his satisfaction. Patient and family are aware of their care team members and contact information if they have questions or problems throughout the treatment course. The patient is adequately prepared to begin treatment as scheduled.     Reviewed with patient education regarding Zyprexa  and Zofran prescriptions sent to pharmacy.       I advised the patient that he can take Tylenol as needed for aches/pains related to cancer/treatment.  I also advised that his can use Senakot or Miralax as needed for constipation or Imodium as needed for diarrhea.       I reviewed with the patient the care team members. I also reviewed the option of the urgent care clinic through our oncology office for evaluation and management of symptoms related to treatment.    I spent 45 minutes caring for Korey on this date of service. This time includes time spent by me in the following activities: preparing for the visit, reviewing tests, obtaining and/or reviewing a separately obtained history, performing a medically appropriate examination and/or evaluation, counseling and educating the patient/family/caregiver, ordering medications, tests, or procedures, referring and communicating with other health care professionals, documenting information in the medical record, independently interpreting results and communicating that information with the patient/family/caregiver and care coordination.     Taylor Hansen, APRN

## 2023-01-17 NOTE — PROGRESS NOTES
Chief Complaint/Reason for Referral:  Bladder Cancer    Vita Starks APRN Reinberg, Emily, CATA    Records Obtained:  Records of the patients history including those obtained from  *** were reviewed and summarized in detail.    Subjective    History of Present Illness    Korey Rodriguez presents to [facility] for ***    Cancer Staging   No matching staging information was found for the patient.     Treatment intent: {curative/palliative:06256}    Oncology/Hematology History Overview Note   10/14/22: CT abdomen/pelvis obtained due to gross hematuria for 6 weeks. This showed right hydroureteronephrosis down to bladder with findings concerning for large bladder tumor. He also had 20 lbs weight loss during this time.     10/19/2022: TURBT performed by Dr. Westfall. Per op reprot large area resected (7 x 4 cm), visualization difficult due to clot burden. 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     11/17/2022: PET CT showed asymmetric thickening along the right lateral and posterior walls of the bladder. No evidence of metastatic disease.     12/12/22: Radical cystoprostectomy, bilateral PLND, ileal conduit by Dr. Bansal ([facility]): Left distal and right distal ureter both positive for carcinoma, 2/3 right external lymph nodes positive for metastatic carcinoma, 2/3 right common external lymph nodes positive for carcinoma, bladder and prostate with urothelial carcinoma with micropapillary features and invades directly into prostate, extensive CIS noted, 4/22 total lymph nodes positive, [id number].         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 (HCC)   10/26/2022 Initial Diagnosis    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 (HCC)     1/12/2023 -  Chemotherapy    OP BLADDER CISplatin D1 / Gemcitabine D1,D8     1/13/2023 - 1/13/2023 Chemotherapy    OP BLADDER MitoMYcin / Fluorouracil CIV + XRT     Urothelial carcinoma of bladder with  invasion of muscle (HCC) (Resolved)   10/25/2022 Initial Diagnosis    Urothelial carcinoma of bladder with invasion of muscle (HCC)     1/13/2023 - 1/13/2023 Chemotherapy    OP BLADDER MitoMYcin / Fluorouracil CIV + XRT         Review of Systems   Constitutional: Negative for appetite change, diaphoresis, fatigue, fever, unexpected weight gain and unexpected weight loss.   HENT: Positive for hearing loss. Negative for mouth sores, sore throat, swollen glands, trouble swallowing and voice change.    Eyes: Negative for blurred vision.   Respiratory: Negative for cough, shortness of breath and wheezing.    Cardiovascular: Negative for chest pain and palpitations.   Gastrointestinal: Negative for abdominal pain, blood in stool, constipation, diarrhea, nausea and vomiting.   Endocrine: Negative for cold intolerance and heat intolerance.   Genitourinary: Negative for difficulty urinating, dysuria, frequency, hematuria and urinary incontinence.   Musculoskeletal: Negative for arthralgias, back pain and myalgias.   Skin: Negative for rash, skin lesions and wound.   Neurological: Negative for dizziness, seizures, weakness, numbness and headache.   Hematological: Does not bruise/bleed easily.   Psychiatric/Behavioral: Negative for depressed mood. The patient is not nervous/anxious.        Current Outpatient Medications on File Prior to Visit   Medication Sig Dispense Refill   • atorvastatin (LIPITOR) 20 MG tablet Take 20 mg by mouth Every Night.     • OLANZapine (zyPREXA) 5 MG tablet Take 1 tablet by mouth Every Night. Take on days 2, 3 and 4 after chemotherapy. 3 tablet 5   • ondansetron (ZOFRAN) 8 MG tablet Take 1 tablet by mouth 3 (Three) Times a Day As Needed for Nausea or Vomiting. 30 tablet 5     No current facility-administered medications on file prior to visit.       No Known Allergies  Past Medical History:   Diagnosis Date   • AAA (abdominal aortic aneurysm)     BEING MONITORED NO REPAIR NEEDED YET   • Aneurysm of  abdominal aorta branch vessel    • Cancer (HCC)     BLADDER AND SKIN CANCERS/REMOVED   • History of urostomy     PT CURRENTLY HAS HOME HEALTH TO HELP WITH OSTOMY   • Kidney stone 10/16/2022     Past Surgical History:   Procedure Laterality Date   • COLONOSCOPY  2018   • CYSTECTOMY      2022  W/UROSTOMY   • SKIN CANCER EXCISION Right    • TRANSURETHRAL RESECTION OF BLADDER TUMOR Right 10/19/2022    Procedure: Cystoscopy with transurethral resection of bladder tumor;  Surgeon: Aung Westfall MD;  Location: [facility] OR;  Service: Urology;  Laterality: Right;   • VENOUS ACCESS DEVICE (PORT) INSERTION Right 2023    Procedure: INSERTION VENOUS ACCESS DEVICE;  Surgeon: Richie Edward MD;  Location: [facility] OR [facility];  Service: General;  Laterality: Right;     Social History     Socioeconomic History   • Marital status:    Tobacco Use   • Smoking status: Former     Packs/day: 1.00     Years: 20.00     Pack years: 20.00     Types: Cigarettes     Start date: 1962     Quit date: 1990     Years since quittin.0   • Smokeless tobacco: Never   Vaping Use   • Vaping Use: Never used   Substance and Sexual Activity   • Alcohol use: Yes     Alcohol/week: 1.0 standard drink     Types: 1 Cans of beer per week     Comment: 1 beer daily   • Drug use: Never   • Sexual activity: Not Currently     Partners: Female     Family History   Problem Relation Age of Onset   • Cancer Mother    • Cancer Father    • Hearing loss Father    • Hypertension Father    • Malig Hyperthermia Neg Hx      Immunization History   Administered Date(s) Administered   • COVID-19 (PFIZER) PURPLE CAP 2021, 2021, 10/04/2021   • Covid-19 (Pfizer) Gray Cap 2022   • Fluad Quad 65+ 2021   • Fluzone High Dose =>65 Years ([employer] ONLY) 2015, 10/19/2017, 10/22/2018, 10/21/2019, 2020, 2021   • Fluzone High-Dose 65+yrs 2020   • Pneumococcal Conjugate 13-Valent (PCV13) 2015   • Pneumococcal  Polysaccharide (PPSV23) 10/04/2016, 10/04/2016       Tobacco Use: Medium Risk   • Smoking Tobacco Use: Former   • Smokeless Tobacco Use: Never   • Passive Exposure: Not on file       Objective     Physical Exam    There were no vitals filed for this visit.    Wt Readings from Last 3 Encounters:   01/12/23 87.9 kg (193 lb 12.6 oz)   01/10/23 88.5 kg (195 lb 1.7 oz)   01/09/23 87.9 kg (193 lb 12.6 oz)        BMI is within normal parameters. No other follow-up for BMI required.                 ECOG: {findings; ecog performance status:65236}  Fall Risk Assessment was completed, and patient is at {LOW/MODERATE/HIGH:78138} risk for falls.  PHQ-9 Total Score:         The patient {is/is not:43137}  experiencing fatigue. Fatigue score: {0-10:17510}    PT/OT Functional Screening: {PT fx screen (Optional):66681}  Speech Functional Screening: {Speech fx screen (Optional):64372}  Rehab to be ordered: {Rehab to be ordered (Optional):83787}        Result Review :  The following data was reviewed by: Sravanthi Chandler MA on 01/17/2023:  Lab Results   Component Value Date    HGB 11.1 (L) 12/19/2022    HCT 34.1 (L) 12/19/2022    MCV 81.8 12/19/2022     12/19/2022    WBC 8.91 12/19/2022    NEUTROABS 6.1 (H) 12/12/2022    LYMPHSABS 0.70 10/14/2022    MONOSABS 0.59 10/14/2022    EOSABS 0.1 12/12/2022    BASOSABS 0.1 12/12/2022     Lab Results   Component Value Date    GLUCOSE 101 (H) 01/10/2023    BUN 17 01/10/2023    CREATININE 0.76 01/10/2023     01/10/2023    K 4.6 01/10/2023     01/10/2023    CO2 26.7 01/10/2023    CALCIUM 9.3 01/10/2023    PROTEINTOT 6.9 01/10/2023    ALBUMIN 3.7 01/10/2023    BILITOT 0.4 01/10/2023    ALKPHOS 92 01/10/2023    AST 14 01/10/2023    ALT 12 01/10/2023       CT Abdomen Pelvis With Contrast    Result Date: 10/14/2022    1. Moderate-to-severe right-sided hydronephrosis and right hydroureter are identified.  Differential considerations for the findings would include a malignant distal  right ureteral stricture.  2. Abnormal density is seen in the lumen of the urinary bladder, which contains a urinary bladder catheter.  Hemorrhage and/or a malignant urinary bladder process cannot be excluded.  3. There are bilateral nonobstructing renal calyceal stones, which measure about 1.4 cm in greatest diameter.  4. Consider Urology consultation for further assessment, management, treatment options, and surveillance if clinically warranted (and if not already obtained).  5. There is a 5 cm infrarenal abdominal aortic aneurysm without acute rupture.  No acute intraperitoneal or retroperitoneal hemorrhage.  Consider Vascular Surgery consultation for further assessment and management of this finding.  6. Extensive atherosclerotic changes are noted.  7. The study is limited by slight motion artifact.  8. Please see above comments for further detail.   1.   Please note that portions of this note were completed with a voice recognition program.  ABIODUN ULRICH JR, MD       Electronically Signed and Approved By: ABIODUN ULRICH JR, MD on 10/14/2022 at 3:47              XR Chest 1 View    Result Date: 1/9/2023    1. No acute cardiopulmonary disease       Alvaro Christian M.D.       Electronically Signed and Approved By: Alvaro Christian M.D. on 1/09/2023 at 11:27             NM PET/CT Skull Base to Mid Thigh    Result Date: 11/18/2022    1. Asymmetric thickening along the right lateral and posterior walls of the urinary bladder compatible the patient's history of bladder cancer.  There is also moderate right-sided hydronephrosis which appears to be related to obstruction at the level of the ureteral vesicle junction. 2. Bilateral nonobstructing renal stones.  No evidence of ureteral stone. 3. No evidence of metastatic disease within the neck, chest, abdomen, or pelvis.     JEANINE WOODS MD       Electronically Signed and Approved By: JEANINE WOODS MD on 11/18/2022 at 10:52             XR Abdomen KUB    Result Date:  10/19/2022    1. Intraoperative fluoroscopy during cystoscopy.  Please see operative report for full findings and details.      TARA LANTIGUA MD       Electronically Signed and Approved By: TARA LANTIGUA MD on 10/19/2022 at 16:02                    Assessment and Plan:  There are no diagnoses linked to this encounter.    {Time Spent (Optional):58301}    Patient Follow Up: ***  Patient was given instructions and counseling regarding his condition or for health maintenance advice. Please see specific information pulled into the AVS if appropriate.     Sravanthi Chandler MA    1/17/2023    .tob

## 2023-01-18 ENCOUNTER — DOCUMENTATION (OUTPATIENT)
Dept: ONCOLOGY | Facility: HOSPITAL | Age: 78
End: 2023-01-18
Payer: MEDICARE

## 2023-01-18 ENCOUNTER — HOSPITAL ENCOUNTER (OUTPATIENT)
Dept: ONCOLOGY | Facility: HOSPITAL | Age: 78
Setting detail: INFUSION SERIES
Discharge: HOME | End: 2023-01-18
Payer: MEDICARE

## 2023-01-18 VITALS
WEIGHT: 194.22 LBS | HEIGHT: 71 IN | BODY MASS INDEX: 27.19 KG/M2 | RESPIRATION RATE: 18 BRPM | HEART RATE: 83 BPM | SYSTOLIC BLOOD PRESSURE: 169 MMHG | TEMPERATURE: 97.4 F | DIASTOLIC BLOOD PRESSURE: 61 MMHG | OXYGEN SATURATION: 99 %

## 2023-01-18 DIAGNOSIS — C67.2 MALIGNANT NEOPLASM OF LATERAL WALL OF BLADDER: Primary | ICD-10-CM

## 2023-01-18 DIAGNOSIS — Z45.2 ENCOUNTER FOR ADJUSTMENT OR MANAGEMENT OF VASCULAR ACCESS DEVICE: ICD-10-CM

## 2023-01-18 PROCEDURE — 96368 THER/DIAG CONCURRENT INF: CPT

## 2023-01-18 PROCEDURE — 25010000002 PALONOSETRON PER 25 MCG: Performed by: INTERNAL MEDICINE

## 2023-01-18 PROCEDURE — 25010000002 MAGNESIUM SULFATE PER 500 MG OF MAGNESIUM: Performed by: INTERNAL MEDICINE

## 2023-01-18 PROCEDURE — 25010000002 CISPLATIN PER 50 MG: Performed by: INTERNAL MEDICINE

## 2023-01-18 PROCEDURE — 96413 CHEMO IV INFUSION 1 HR: CPT

## 2023-01-18 PROCEDURE — 96375 TX/PRO/DX INJ NEW DRUG ADDON: CPT

## 2023-01-18 PROCEDURE — 96367 TX/PROPH/DG ADDL SEQ IV INF: CPT

## 2023-01-18 PROCEDURE — 25010000002 POTASSIUM CHLORIDE PER 2 MEQ OF POTASSIUM: Performed by: INTERNAL MEDICINE

## 2023-01-18 PROCEDURE — 96415 CHEMO IV INFUSION ADDL HR: CPT

## 2023-01-18 PROCEDURE — 25010000002 FOSAPREPITANT PER 1 MG: Performed by: INTERNAL MEDICINE

## 2023-01-18 PROCEDURE — 25010000002 HEPARIN LOCK FLUSH PER 10 UNITS: Performed by: INTERNAL MEDICINE

## 2023-01-18 PROCEDURE — 25010000002 GEMCITABINE 1 GM/26.3ML SOLUTION 26.3 ML VIAL: Performed by: INTERNAL MEDICINE

## 2023-01-18 PROCEDURE — 25010000002 DEXAMETHASONE SODIUM PHOSPHATE 120 MG/30ML SOLUTION: Performed by: INTERNAL MEDICINE

## 2023-01-18 PROCEDURE — 96417 CHEMO IV INFUS EACH ADDL SEQ: CPT

## 2023-01-18 RX ORDER — HEPARIN SODIUM (PORCINE) LOCK FLUSH IV SOLN 100 UNIT/ML 100 UNIT/ML
500 SOLUTION INTRAVENOUS AS NEEDED
Status: CANCELLED | OUTPATIENT
Start: 2023-01-18

## 2023-01-18 RX ORDER — OLANZAPINE 2.5 MG/1
5 TABLET ORAL ONCE
Status: COMPLETED | OUTPATIENT
Start: 2023-01-18 | End: 2023-01-18

## 2023-01-18 RX ORDER — SODIUM CHLORIDE 0.9 % (FLUSH) 0.9 %
20 SYRINGE (ML) INJECTION AS NEEDED
Status: CANCELLED | OUTPATIENT
Start: 2023-01-18

## 2023-01-18 RX ORDER — SODIUM CHLORIDE 9 MG/ML
250 INJECTION, SOLUTION INTRAVENOUS ONCE
Status: COMPLETED | OUTPATIENT
Start: 2023-01-18 | End: 2023-01-18

## 2023-01-18 RX ORDER — AMOXICILLIN 250 MG
1 CAPSULE ORAL DAILY
COMMUNITY

## 2023-01-18 RX ORDER — HEPARIN SODIUM (PORCINE) LOCK FLUSH IV SOLN 100 UNIT/ML 100 UNIT/ML
500 SOLUTION INTRAVENOUS AS NEEDED
Status: DISCONTINUED | OUTPATIENT
Start: 2023-01-18 | End: 2023-01-19 | Stop reason: HOSPADM

## 2023-01-18 RX ORDER — SODIUM CHLORIDE 0.9 % (FLUSH) 0.9 %
20 SYRINGE (ML) INJECTION AS NEEDED
Status: DISCONTINUED | OUTPATIENT
Start: 2023-01-18 | End: 2023-01-19 | Stop reason: HOSPADM

## 2023-01-18 RX ORDER — PALONOSETRON 0.05 MG/ML
0.25 INJECTION, SOLUTION INTRAVENOUS ONCE
Status: COMPLETED | OUTPATIENT
Start: 2023-01-18 | End: 2023-01-18

## 2023-01-18 RX ADMIN — Medication 20 ML: at 15:52

## 2023-01-18 RX ADMIN — POTASSIUM CHLORIDE 1000 ML: 2 INJECTION, SOLUTION, CONCENTRATE INTRAVENOUS at 08:25

## 2023-01-18 RX ADMIN — SODIUM CHLORIDE 250 ML: 9 INJECTION, SOLUTION INTRAVENOUS at 08:26

## 2023-01-18 RX ADMIN — CISPLATIN 150 MG: 1 INJECTION, SOLUTION INTRAVENOUS at 11:57

## 2023-01-18 RX ADMIN — POTASSIUM CHLORIDE 1000 ML: 2 INJECTION, SOLUTION, CONCENTRATE INTRAVENOUS at 13:57

## 2023-01-18 RX ADMIN — GEMCITABINE HYDROCHLORIDE 2150 MG: 1 INJECTION, SOLUTION INTRAVENOUS at 11:25

## 2023-01-18 RX ADMIN — DEXAMETHASONE SODIUM PHOSPHATE 12 MG: 4 INJECTION, SOLUTION INTRA-ARTICULAR; INTRALESIONAL; INTRAMUSCULAR; INTRAVENOUS; SOFT TISSUE at 10:30

## 2023-01-18 RX ADMIN — OLANZAPINE 5 MG: 2.5 TABLET, FILM COATED ORAL at 08:29

## 2023-01-18 RX ADMIN — HEPARIN SODIUM (PORCINE) LOCK FLUSH IV SOLN 100 UNIT/ML 500 UNITS: 100 SOLUTION at 15:53

## 2023-01-18 RX ADMIN — FOSAPREPITANT 100 ML: 150 INJECTION, POWDER, LYOPHILIZED, FOR SOLUTION INTRAVENOUS at 10:46

## 2023-01-18 RX ADMIN — PALONOSETRON 0.25 MG: 0.05 INJECTION, SOLUTION INTRAVENOUS at 10:30

## 2023-01-18 NOTE — PROGRESS NOTES
"  Reason for Referral: Rounding with new patients at infusion    Diagnosis: Bladder cancer    Distress Score: 2 indicated for pain, sleep, and fatigue    PHQ Score: 0    Current Treatment Plan: Surgery and adjuvant chemotherapy and radiation    Previous Cancer TX: Patient stated this was his first cancer diagnosis.     Mental Status: Patient was very pleasant. OSW noted patient was experiencing considerable hearing loss. OSW also noted patient was struggling to recall and comprehend which could be related to his hearing loss. Patient was oriented x 3. Patient was very respectful and easy to engage. Patient denied suicidal or homicidal thoughts. OSW provided emotional support by active listening, providing empathy, normalizing and validating patient's thoughts and feelings.     Mental Health Treatment: Patient denied any prior history of being treated for depression or anxiety. Patient declined a behavioral health referral.     Substance Use/Tobacco Use: Patient stated it has been over 30 years since his last cigarette. Patient stated he has one light beer in the evening daily.     Spirituality: Patient stated he uses prayer and belongs to [employer].    Hobbies: Patient stated he enjoyed fishing but now it is outside of the house working in the yard trimming shrubs and mowing the grass.    Support systems: Patient stated he has 5 children, 7 grandchildren, and 2 great grandchildren and \"one on the way\".    Residential status/Who lives in the home: Patient stated he lives in his home with his wife. He stated there are no pets in the home.    Transportation: Patient stated his wife and daughter will be providing his transportation to treatment.     Financial Concerns: Patient stated his wife takes care of everything but he has sufficient income to meet his basic needs and adequate healthcare coverage.    Home Care Needs: Patient stated he has home health coming into his home and does not identify any " other needs.    Advanced Directive/Living Will: None on file    Insurance: Medicare and AARP supplement    Resources/Referrals: OSW provided patient with her business card and an overview of oncology social work services. Patient stated he appreciated OSW meeting with him but felt he had all of his supports needed in place. OSW will continue to monitor his need for additional support services as he progresses through treatment.

## 2023-01-18 NOTE — PROGRESS NOTES
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0   • Hematuria R31.9   • AAA (abdominal aortic aneurysm) without rupture I71.40   • Aneurysm of iliac artery (HCC) I72.3   • Bilateral hearing loss H91.93   • Chronic back pain M54.9, G89.29   • Encounter for general adult medical examination without abnormal findings Z00.00   • Hearing loss H91.90   • 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 (HCC) C67.2   • Mixed hyperlipidemia E78.2   • 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   • Tinnitus H93.19   • UTI (urinary tract infection) N39.0   • Aneurysm of iliac artery (HCC) I72.3   • Aortic aneurysm (HCC) I71.9   • Bilateral hearing loss H91.93   • Blood in urine R31.9   • 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    Weight Hx  Wt Readings from " "Last 30 Encounters:   01/18/23 0758 88.1 kg (194 lb 3.6 oz)   01/17/23 0848 87.9 kg (193 lb 12.6 oz)   01/12/23 0754 87.9 kg (193 lb 12.6 oz)   01/10/23 1001 88.5 kg (195 lb 1.7 oz)   01/09/23 0739 87.9 kg (193 lb 12.6 oz)   01/06/23 1321 86.5 kg (190 lb 12.8 oz)   01/03/23 1415 88.2 kg (194 lb 7.1 oz)   10/19/22 1100 90.6 kg (199 lb 11.8 oz)   10/16/22 1751 91 kg (200 lb 9.9 oz)   10/16/22 1132 91.8 kg (202 lb 6.1 oz)   10/13/22 2326 92.4 kg (203 lb 11.3 oz)         Estimated/Assessed Needs - Anthropometrics     Row Name 01/18/23 1305 01/18/23 0758       Anthropometrics    Height -- 181.1 cm (71.3\")    Weight -- 88.1 kg (194 lb 3.6 oz)    Weight for Calculation 88.1 kg (194 lb 3.6 oz) --       Estimated/Assessed Needs    Additional Documentation KCAL/KG (Group);Protein Requirements (Group);Fluid Requirements (Group) --       KCAL/KG    KCAL/KG 30 Kcal/Kg (kcal);35 Kcal/Kg (kcal) --    30 Kcal/Kg (kcal) 2643 --    35 Kcal/Kg (kcal) 3083.5 --       Protein Requirements    Weight Used For Protein Calculations 88.1 kg (194 lb 3.6 oz) --    Est Protein Requirement Amount (gms/kg) 1.5 gm protein --    Estimated Protein Requirements (gms/day) 132.15 --       Fluid Requirements    Fluid Requirements (mL/day) 2600 --    RDA Method (mL) 2600 --                 Labs/Medications        Pertinent Labs Reviewed.   Results from last 7 days   Lab Units 01/17/23  0847   SODIUM mmol/L 141   POTASSIUM mmol/L 4.1   CHLORIDE mmol/L 106   CO2 mmol/L 27.0   BUN mg/dL 19   CREATININE mg/dL 0.81   CALCIUM mg/dL 9.0   BILIRUBIN mg/dL 0.3   ALK PHOS U/L 99   ALT (SGPT) U/L 11   AST (SGOT) U/L 12   GLUCOSE mg/dL 114*     Results from last 7 days   Lab Units 01/17/23  0847   MAGNESIUM mg/dL 1.9   HEMOGLOBIN g/dL 11.1*   HEMATOCRIT % 35.7*     SARS-CoV-2, JADYN   Date Value Ref Range Status   12/12/2022 Negative Negative Final     Comment:     The 2019-CoV rRT-PCR Assay is only for use under a Food and Drug Administration Emergency Use " Authorization. The performance characteristics of the assay were verified by the Clinical Microbiology Laboratory at the [facility].   Results should be used in conjunction with the patient's clinical symptoms, medical history, and other clinical/laboratory findings to determine an overall clinical diagnosis. Negative results do not preclude infection with SARS-CoV-2 (COVID-19).    Test parameters have not been validated for screening asymptomatic patients.     No results found for: HGBA1C      Pertinent Medications OLANZapine, atorvastatin, ondansetron, and sennosides-docusate     Physical Findings        Malnutrition Severity Assessment     Row Name 01/18/23 1305          Malnutrition Severity Assessment    Malnutrition Type Chronic Disease - Related Malnutrition        Muscle Loss    Temple Region Moderate - slight depression                  Current Nutrition Orders & Evaluation of Intake       Oral Nutrition     Current PO Diet 3 meals/day, good variety, good protein sources   Supplement      Enteral Nutrition     Current Formula    Schedule      Parenteral Nutrition     Current Prescription    Schedule      Nutrition Diagnosis        Nutrition Dx Problem 1 Increased nutrient needs related to increased nutrient needs due to catabolic disease as evidenced by physiological causes increasing nutrient needs.       Nutrition Intervention       RD Action Nutritional counseling  Written materials: High Calorie / High protein diet, Nausea and Vomiting, Diarrhea, Oral Care, Taste Alterations  Nutrition F/U      Monitor/Evaluation       Monitor Per oncology nutrition protocol.     Comments:  Spoke with pt during initial infusion. He reports good appetite, consumes 3 meals/day with occasional snacks. He has a good variety as well as consumes high protein foods throughout the day. Pt has good support from family at home. He denies any food allergies. Discussed with pt importance of adequate nutrient  intake / kcal intake throughout tx as well as role of oncology RD. Discussed possible side effects from tx that may impact nutrition status and appropriate MNT (N/V/D, taste alterations, mucositis, anorexia). Pt v/u. Written materials provided. Encouraged pt to reach out with any nutrition related questions or concerns. Will f/u per protocol.     Electronically signed by:  Anayeli Guillermo RD  01/18/23 13:06 EST

## 2023-01-19 ENCOUNTER — HOSPITAL ENCOUNTER (OUTPATIENT)
Dept: ULTRASOUND IMAGING | Facility: HOSPITAL | Age: 78
Discharge: HOME | End: 2023-01-19
Payer: MEDICARE

## 2023-01-19 ENCOUNTER — OFFICE VISIT (OUTPATIENT)
Dept: RADIATION ONCOLOGY | Facility: HOSPITAL | Age: 78
End: 2023-01-19
Payer: MEDICARE

## 2023-01-19 VITALS
HEART RATE: 91 BPM | BODY MASS INDEX: 27.56 KG/M2 | WEIGHT: 199.3 LBS | TEMPERATURE: 98.5 F | SYSTOLIC BLOOD PRESSURE: 148 MMHG | OXYGEN SATURATION: 98 % | DIASTOLIC BLOOD PRESSURE: 79 MMHG | RESPIRATION RATE: 16 BRPM

## 2023-01-19 DIAGNOSIS — C67.2 MALIGNANT NEOPLASM OF LATERAL WALL OF URINARY BLADDER: ICD-10-CM

## 2023-01-19 DIAGNOSIS — C67.2 MALIGNANT NEOPLASM OF LATERAL WALL OF BLADDER: ICD-10-CM

## 2023-01-19 PROCEDURE — 99214 OFFICE O/P EST MOD 30 MIN: CPT | Performed by: RADIOLOGY

## 2023-01-19 PROCEDURE — G0463 HOSPITAL OUTPT CLINIC VISIT: HCPCS | Performed by: RADIOLOGY

## 2023-01-19 PROCEDURE — 76775 US EXAM ABDO BACK WALL LIM: CPT

## 2023-01-23 ENCOUNTER — PATIENT OUTREACH (OUTPATIENT)
Dept: ONCOLOGY | Facility: HOSPITAL | Age: 78
End: 2023-01-23
Payer: MEDICARE

## 2023-01-23 ENCOUNTER — TELEPHONE (OUTPATIENT)
Dept: ONCOLOGY | Facility: HOSPITAL | Age: 78
End: 2023-01-23
Payer: MEDICARE

## 2023-01-23 ENCOUNTER — OFFICE VISIT (OUTPATIENT)
Dept: SURGERY | Facility: CLINIC | Age: 78
End: 2023-01-23
Payer: MEDICARE

## 2023-01-23 VITALS — WEIGHT: 196 LBS | HEIGHT: 71 IN | BODY MASS INDEX: 27.44 KG/M2

## 2023-01-23 DIAGNOSIS — Z09 ENCOUNTER FOR FOLLOW-UP: Primary | ICD-10-CM

## 2023-01-23 DIAGNOSIS — C67.2 MALIGNANT NEOPLASM OF LATERAL WALL OF BLADDER: ICD-10-CM

## 2023-01-23 PROCEDURE — 99024 POSTOP FOLLOW-UP VISIT: CPT | Performed by: SURGERY

## 2023-01-23 RX ORDER — OLANZAPINE 5 MG/1
5 TABLET ORAL NIGHTLY
Qty: 3 TABLET | Refills: 5 | Status: CANCELLED | OUTPATIENT
Start: 2023-01-23

## 2023-01-23 NOTE — PROGRESS NOTES
Patient is here for follow-up after portacatheter placement on 1/9/2023.  Patient has no complaints or concerns.  Port site looks fine.  No new issues to address.  Patient will see me PRN.

## 2023-01-23 NOTE — TELEPHONE ENCOUNTER
Caller: Korey Rodriguez    Relationship: Self    Best call back number: [phone]    Who are you requesting to speak with (clinical staff, provider,  specific staff member): CLINICAL      What was the call regarding: NEED TO VERIFY IF OFFICE ORDERS OLANZAPINE OR IF PATIENT WILL NEED TO BEFORE EVERY CHEMO.    PATIENT IS CURRENTLY OUT AND COMES IN FOR TREATMENT WEDNESDAY    Do you require a callback: YES

## 2023-01-24 NOTE — TELEPHONE ENCOUNTER
Left voicemail for patient that he is only to take the Olanzapine with the first dose of each cycle when he gets his Cisplatin. Advised that he has 5 refills to be able to get them prior to the treatment. Instructed to call the office with any questions or concerns and to maintain all follow up visit and treatment visits.

## 2023-01-25 ENCOUNTER — HOSPITAL ENCOUNTER (OUTPATIENT)
Dept: ONCOLOGY | Facility: HOSPITAL | Age: 78
Setting detail: INFUSION SERIES
Discharge: HOME | End: 2023-01-25
Payer: MEDICARE

## 2023-01-25 ENCOUNTER — DOCUMENTATION (OUTPATIENT)
Dept: ONCOLOGY | Facility: HOSPITAL | Age: 78
End: 2023-01-25
Payer: MEDICARE

## 2023-01-25 VITALS
WEIGHT: 196.21 LBS | TEMPERATURE: 98 F | DIASTOLIC BLOOD PRESSURE: 64 MMHG | OXYGEN SATURATION: 95 % | SYSTOLIC BLOOD PRESSURE: 129 MMHG | HEIGHT: 71 IN | HEART RATE: 72 BPM | BODY MASS INDEX: 27.47 KG/M2 | RESPIRATION RATE: 18 BRPM

## 2023-01-25 DIAGNOSIS — Z45.2 ENCOUNTER FOR ADJUSTMENT OR MANAGEMENT OF VASCULAR ACCESS DEVICE: ICD-10-CM

## 2023-01-25 DIAGNOSIS — C67.2 MALIGNANT NEOPLASM OF LATERAL WALL OF BLADDER: Primary | ICD-10-CM

## 2023-01-25 LAB
BASOPHILS # BLD AUTO: 0.02 10*3/MM3 (ref 0–0.2)
BASOPHILS NFR BLD AUTO: 0.5 % (ref 0–1.5)
DEPRECATED RDW RBC AUTO: 49.7 FL (ref 37–54)
EOSINOPHIL # BLD AUTO: 0.13 10*3/MM3 (ref 0–0.4)
EOSINOPHIL NFR BLD AUTO: 3.4 % (ref 0.3–6.2)
ERYTHROCYTE [DISTWIDTH] IN BLOOD BY AUTOMATED COUNT: 15.7 % (ref 12.3–15.4)
HCT VFR BLD AUTO: 31.7 % (ref 37.5–51)
HGB BLD-MCNC: 10 G/DL (ref 13–17.7)
IMM GRANULOCYTES # BLD AUTO: 0.01 10*3/MM3 (ref 0–0.05)
IMM GRANULOCYTES NFR BLD AUTO: 0.3 % (ref 0–0.5)
LYMPHOCYTES # BLD AUTO: 0.76 10*3/MM3 (ref 0.7–3.1)
LYMPHOCYTES NFR BLD AUTO: 20.2 % (ref 19.6–45.3)
MCH RBC QN AUTO: 26.7 PG (ref 26.6–33)
MCHC RBC AUTO-ENTMCNC: 31.5 G/DL (ref 31.5–35.7)
MCV RBC AUTO: 84.8 FL (ref 79–97)
MONOCYTES # BLD AUTO: 0.22 10*3/MM3 (ref 0.1–0.9)
MONOCYTES NFR BLD AUTO: 5.8 % (ref 5–12)
NEUTROPHILS NFR BLD AUTO: 2.63 10*3/MM3 (ref 1.7–7)
NEUTROPHILS NFR BLD AUTO: 69.8 % (ref 42.7–76)
PLATELET # BLD AUTO: 97 10*3/MM3 (ref 140–450)
PMV BLD AUTO: 9.3 FL (ref 6–12)
RBC # BLD AUTO: 3.74 10*6/MM3 (ref 4.14–5.8)
WBC NRBC COR # BLD: 3.77 10*3/MM3 (ref 3.4–10.8)

## 2023-01-25 PROCEDURE — 96413 CHEMO IV INFUSION 1 HR: CPT

## 2023-01-25 PROCEDURE — 25010000002 HEPARIN LOCK FLUSH PER 10 UNITS: Performed by: INTERNAL MEDICINE

## 2023-01-25 PROCEDURE — 25010000002 DEXAMETHASONE SODIUM PHOSPHATE 120 MG/30ML SOLUTION: Performed by: INTERNAL MEDICINE

## 2023-01-25 PROCEDURE — 96375 TX/PRO/DX INJ NEW DRUG ADDON: CPT

## 2023-01-25 PROCEDURE — 85025 COMPLETE CBC W/AUTO DIFF WBC: CPT | Performed by: INTERNAL MEDICINE

## 2023-01-25 PROCEDURE — 25010000002 GEMCITABINE 1 GM/26.3ML SOLUTION 26.3 ML VIAL: Performed by: INTERNAL MEDICINE

## 2023-01-25 RX ORDER — SODIUM CHLORIDE 9 MG/ML
250 INJECTION, SOLUTION INTRAVENOUS ONCE
Status: COMPLETED | OUTPATIENT
Start: 2023-01-25 | End: 2023-01-25

## 2023-01-25 RX ORDER — SODIUM CHLORIDE 0.9 % (FLUSH) 0.9 %
20 SYRINGE (ML) INJECTION AS NEEDED
Status: CANCELLED | OUTPATIENT
Start: 2023-01-25

## 2023-01-25 RX ORDER — HEPARIN SODIUM (PORCINE) LOCK FLUSH IV SOLN 100 UNIT/ML 100 UNIT/ML
500 SOLUTION INTRAVENOUS AS NEEDED
Status: DISCONTINUED | OUTPATIENT
Start: 2023-01-25 | End: 2023-01-26 | Stop reason: HOSPADM

## 2023-01-25 RX ORDER — SODIUM CHLORIDE 0.9 % (FLUSH) 0.9 %
20 SYRINGE (ML) INJECTION AS NEEDED
Status: DISCONTINUED | OUTPATIENT
Start: 2023-01-25 | End: 2023-01-26 | Stop reason: HOSPADM

## 2023-01-25 RX ORDER — HEPARIN SODIUM (PORCINE) LOCK FLUSH IV SOLN 100 UNIT/ML 100 UNIT/ML
500 SOLUTION INTRAVENOUS AS NEEDED
Status: CANCELLED | OUTPATIENT
Start: 2023-01-25

## 2023-01-25 RX ADMIN — DEXAMETHASONE SODIUM PHOSPHATE 12 MG: 4 INJECTION, SOLUTION INTRA-ARTICULAR; INTRALESIONAL; INTRAMUSCULAR; INTRAVENOUS; SOFT TISSUE at 08:44

## 2023-01-25 RX ADMIN — SODIUM CHLORIDE 250 ML: 9 INJECTION, SOLUTION INTRAVENOUS at 08:41

## 2023-01-25 RX ADMIN — Medication 20 ML: at 10:04

## 2023-01-25 RX ADMIN — HEPARIN SODIUM (PORCINE) LOCK FLUSH IV SOLN 100 UNIT/ML 500 UNITS: 100 SOLUTION at 10:04

## 2023-01-25 RX ADMIN — GEMCITABINE HYDROCHLORIDE 2150 MG: 1 INJECTION, SOLUTION INTRAVENOUS at 09:13

## 2023-01-27 ENCOUNTER — TRANSCRIBE ORDERS (OUTPATIENT)
Dept: ADMINISTRATIVE | Facility: HOSPITAL | Age: 78
End: 2023-01-27
Payer: MEDICARE

## 2023-01-27 DIAGNOSIS — N13.30 HYDRONEPHROSIS, UNSPECIFIED HYDRONEPHROSIS TYPE: Primary | ICD-10-CM

## 2023-02-01 ENCOUNTER — HOSPITAL ENCOUNTER (OUTPATIENT)
Dept: CT IMAGING | Facility: HOSPITAL | Age: 78
Discharge: HOME | End: 2023-02-01
Admitting: SURGERY
Payer: MEDICARE

## 2023-02-01 DIAGNOSIS — I71.43 INFRARENAL ABDOMINAL AORTIC ANEURYSM (AAA) WITHOUT RUPTURE: ICD-10-CM

## 2023-02-01 DIAGNOSIS — I72.3 ANEURYSM OF ILIAC ARTERY: ICD-10-CM

## 2023-02-01 PROCEDURE — 74174 CTA ABD&PLVS W/CONTRAST: CPT

## 2023-02-01 PROCEDURE — 0 IOPAMIDOL PER 1 ML: Performed by: SURGERY

## 2023-02-01 RX ORDER — HEPARIN SODIUM (PORCINE) LOCK FLUSH IV SOLN 100 UNIT/ML 100 UNIT/ML
SOLUTION INTRAVENOUS
Status: DISPENSED
Start: 2023-02-01 | End: 2023-02-01

## 2023-02-01 RX ADMIN — IOPAMIDOL 100 ML: 755 INJECTION, SOLUTION INTRAVENOUS at 10:31

## 2023-02-07 ENCOUNTER — HOSPITAL ENCOUNTER (OUTPATIENT)
Dept: CARDIOLOGY | Facility: HOSPITAL | Age: 78
Discharge: HOME | End: 2023-02-07
Payer: MEDICARE

## 2023-02-07 ENCOUNTER — LAB (OUTPATIENT)
Dept: ONCOLOGY | Facility: HOSPITAL | Age: 78
End: 2023-02-07
Payer: MEDICARE

## 2023-02-07 ENCOUNTER — OFFICE VISIT (OUTPATIENT)
Dept: ONCOLOGY | Facility: HOSPITAL | Age: 78
End: 2023-02-07
Payer: MEDICARE

## 2023-02-07 VITALS
TEMPERATURE: 98.2 F | OXYGEN SATURATION: 95 % | DIASTOLIC BLOOD PRESSURE: 67 MMHG | HEART RATE: 63 BPM | RESPIRATION RATE: 16 BRPM | SYSTOLIC BLOOD PRESSURE: 154 MMHG

## 2023-02-07 DIAGNOSIS — C67.2 MALIGNANT NEOPLASM OF LATERAL WALL OF BLADDER: ICD-10-CM

## 2023-02-07 DIAGNOSIS — Z45.2 ENCOUNTER FOR ADJUSTMENT OR MANAGEMENT OF VASCULAR ACCESS DEVICE: ICD-10-CM

## 2023-02-07 DIAGNOSIS — R60.0 EDEMA OF LEFT LOWER LEG: ICD-10-CM

## 2023-02-07 DIAGNOSIS — C67.2 MALIGNANT NEOPLASM OF LATERAL WALL OF BLADDER: Primary | ICD-10-CM

## 2023-02-07 LAB
ALBUMIN SERPL-MCNC: 3.7 G/DL (ref 3.5–5.2)
ALBUMIN/GLOB SERPL: 1.4 G/DL
ALP SERPL-CCNC: 100 U/L (ref 39–117)
ALT SERPL W P-5'-P-CCNC: 11 U/L (ref 1–41)
ANION GAP SERPL CALCULATED.3IONS-SCNC: 10.2 MMOL/L (ref 5–15)
AST SERPL-CCNC: 13 U/L (ref 1–40)
BASOPHILS # BLD AUTO: 0.01 10*3/MM3 (ref 0–0.2)
BASOPHILS NFR BLD AUTO: 0.3 % (ref 0–1.5)
BH CV LOW VAS LEFT PERONEAL VESSEL: 1
BH CV LOW VAS LEFT POPLITEAL SPONT: 1
BH CV LOW VAS LEFT POSTERIOR TIBIAL VESSEL: 1
BH CV LOWER VASCULAR LEFT COMMON FEMORAL AUGMENT: NORMAL
BH CV LOWER VASCULAR LEFT COMMON FEMORAL COMPETENT: NORMAL
BH CV LOWER VASCULAR LEFT COMMON FEMORAL COMPRESS: NORMAL
BH CV LOWER VASCULAR LEFT COMMON FEMORAL PHASIC: NORMAL
BH CV LOWER VASCULAR LEFT COMMON FEMORAL SPONT: NORMAL
BH CV LOWER VASCULAR LEFT DISTAL FEMORAL COMPRESS: NORMAL
BH CV LOWER VASCULAR LEFT GASTRONEMIUS COMPRESS: NORMAL
BH CV LOWER VASCULAR LEFT GREATER SAPH AK COMPRESS: NORMAL
BH CV LOWER VASCULAR LEFT GREATER SAPH BK COMPRESS: NORMAL
BH CV LOWER VASCULAR LEFT LESSER SAPH COMPRESS: NORMAL
BH CV LOWER VASCULAR LEFT MID FEMORAL AUGMENT: NORMAL
BH CV LOWER VASCULAR LEFT MID FEMORAL COMPETENT: NORMAL
BH CV LOWER VASCULAR LEFT MID FEMORAL COMPRESS: NORMAL
BH CV LOWER VASCULAR LEFT MID FEMORAL PHASIC: NORMAL
BH CV LOWER VASCULAR LEFT MID FEMORAL SPONT: NORMAL
BH CV LOWER VASCULAR LEFT PERONEAL COMPRESS: NORMAL
BH CV LOWER VASCULAR LEFT PERONEAL THROMBUS: NORMAL
BH CV LOWER VASCULAR LEFT POPLITEAL AUGMENT: NORMAL
BH CV LOWER VASCULAR LEFT POPLITEAL COMPETENT: NORMAL
BH CV LOWER VASCULAR LEFT POPLITEAL COMPRESS: NORMAL
BH CV LOWER VASCULAR LEFT POPLITEAL PHASIC: NORMAL
BH CV LOWER VASCULAR LEFT POPLITEAL SPONT: NORMAL
BH CV LOWER VASCULAR LEFT POPLITEAL THROMBUS: NORMAL
BH CV LOWER VASCULAR LEFT POSTERIOR TIBIAL COMPRESS: NORMAL
BH CV LOWER VASCULAR LEFT POSTERIOR TIBIAL THROMBUS: NORMAL
BH CV LOWER VASCULAR LEFT PROXIMAL FEMORAL COMPRESS: NORMAL
BH CV LOWER VASCULAR LEFT SAPHENOFEMORAL JUNCTION COMPRESS: NORMAL
BH CV LOWER VASCULAR RIGHT COMMON FEMORAL AUGMENT: NORMAL
BH CV LOWER VASCULAR RIGHT COMMON FEMORAL COMPETENT: NORMAL
BH CV LOWER VASCULAR RIGHT COMMON FEMORAL COMPRESS: NORMAL
BH CV LOWER VASCULAR RIGHT COMMON FEMORAL PHASIC: NORMAL
BH CV LOWER VASCULAR RIGHT COMMON FEMORAL SPONT: NORMAL
BH CV VAS PRELIMINARY FINDINGS SCRIPTING: 1
BILIRUB SERPL-MCNC: 0.2 MG/DL (ref 0–1.2)
BUN SERPL-MCNC: 19 MG/DL (ref 8–23)
BUN/CREAT SERPL: 21.3 (ref 7–25)
CALCIUM SPEC-SCNC: 8.9 MG/DL (ref 8.6–10.5)
CHLORIDE SERPL-SCNC: 108 MMOL/L (ref 98–107)
CO2 SERPL-SCNC: 23.8 MMOL/L (ref 22–29)
CREAT SERPL-MCNC: 0.89 MG/DL (ref 0.76–1.27)
DEPRECATED RDW RBC AUTO: 50.3 FL (ref 37–54)
EGFRCR SERPLBLD CKD-EPI 2021: 87.2 ML/MIN/1.73
EOSINOPHIL # BLD AUTO: 0.15 10*3/MM3 (ref 0–0.4)
EOSINOPHIL NFR BLD AUTO: 5 % (ref 0.3–6.2)
ERYTHROCYTE [DISTWIDTH] IN BLOOD BY AUTOMATED COUNT: 17.3 % (ref 12.3–15.4)
GLOBULIN UR ELPH-MCNC: 2.7 GM/DL
GLUCOSE SERPL-MCNC: 109 MG/DL (ref 65–99)
HCT VFR BLD AUTO: 30.8 % (ref 37.5–51)
HGB BLD-MCNC: 9.8 G/DL (ref 13–17.7)
IMM GRANULOCYTES # BLD AUTO: 0 10*3/MM3 (ref 0–0.05)
IMM GRANULOCYTES NFR BLD AUTO: 0 % (ref 0–0.5)
LYMPHOCYTES # BLD AUTO: 0.75 10*3/MM3 (ref 0.7–3.1)
LYMPHOCYTES NFR BLD AUTO: 25 % (ref 19.6–45.3)
MAGNESIUM SERPL-MCNC: 2 MG/DL (ref 1.6–2.4)
MAXIMAL PREDICTED HEART RATE: 141 BPM
MCH RBC QN AUTO: 27.2 PG (ref 26.6–33)
MCHC RBC AUTO-ENTMCNC: 31.8 G/DL (ref 31.5–35.7)
MCV RBC AUTO: 85.6 FL (ref 79–97)
MONOCYTES # BLD AUTO: 0.5 10*3/MM3 (ref 0.1–0.9)
MONOCYTES NFR BLD AUTO: 16.7 % (ref 5–12)
NEUTROPHILS NFR BLD AUTO: 1.59 10*3/MM3 (ref 1.7–7)
NEUTROPHILS NFR BLD AUTO: 53 % (ref 42.7–76)
PLATELET # BLD AUTO: 258 10*3/MM3 (ref 140–450)
PMV BLD AUTO: 8.8 FL (ref 6–12)
POTASSIUM SERPL-SCNC: 4.4 MMOL/L (ref 3.5–5.2)
PROT SERPL-MCNC: 6.4 G/DL (ref 6–8.5)
RBC # BLD AUTO: 3.6 10*6/MM3 (ref 4.14–5.8)
SODIUM SERPL-SCNC: 142 MMOL/L (ref 136–145)
STRESS TARGET HR: 120 BPM
WBC NRBC COR # BLD: 3 10*3/MM3 (ref 3.4–10.8)

## 2023-02-07 PROCEDURE — 36591 DRAW BLOOD OFF VENOUS DEVICE: CPT

## 2023-02-07 PROCEDURE — 93971 EXTREMITY STUDY: CPT | Performed by: SURGERY

## 2023-02-07 PROCEDURE — 85025 COMPLETE CBC W/AUTO DIFF WBC: CPT

## 2023-02-07 PROCEDURE — 99214 OFFICE O/P EST MOD 30 MIN: CPT | Performed by: INTERNAL MEDICINE

## 2023-02-07 PROCEDURE — 25010000002 HEPARIN LOCK FLUSH PER 10 UNITS: Performed by: INTERNAL MEDICINE

## 2023-02-07 PROCEDURE — 80053 COMPREHEN METABOLIC PANEL: CPT

## 2023-02-07 PROCEDURE — 93971 EXTREMITY STUDY: CPT

## 2023-02-07 PROCEDURE — 83735 ASSAY OF MAGNESIUM: CPT

## 2023-02-07 RX ORDER — SODIUM CHLORIDE 0.9 % (FLUSH) 0.9 %
20 SYRINGE (ML) INJECTION AS NEEDED
Status: CANCELLED | OUTPATIENT
Start: 2023-02-07

## 2023-02-07 RX ORDER — HEPARIN SODIUM (PORCINE) LOCK FLUSH IV SOLN 100 UNIT/ML 100 UNIT/ML
500 SOLUTION INTRAVENOUS AS NEEDED
Status: CANCELLED | OUTPATIENT
Start: 2023-02-07

## 2023-02-07 RX ORDER — SODIUM CHLORIDE 9 MG/ML
250 INJECTION, SOLUTION INTRAVENOUS ONCE
Status: CANCELLED | OUTPATIENT
Start: 2023-02-08

## 2023-02-07 RX ORDER — APIXABAN 5 MG (74)
5 KIT ORAL TAKE AS DIRECTED
Qty: 74 TABLET | Refills: 0 | Status: SHIPPED | OUTPATIENT
Start: 2023-02-07 | End: 2023-02-07 | Stop reason: ALTCHOICE

## 2023-02-07 RX ORDER — SODIUM CHLORIDE 9 MG/ML
250 INJECTION, SOLUTION INTRAVENOUS ONCE
Status: CANCELLED | OUTPATIENT
Start: 2023-02-15

## 2023-02-07 RX ORDER — HEPARIN SODIUM (PORCINE) LOCK FLUSH IV SOLN 100 UNIT/ML 100 UNIT/ML
500 SOLUTION INTRAVENOUS AS NEEDED
Status: DISCONTINUED | OUTPATIENT
Start: 2023-02-07 | End: 2023-02-07 | Stop reason: HOSPADM

## 2023-02-07 RX ORDER — SODIUM CHLORIDE 0.9 % (FLUSH) 0.9 %
20 SYRINGE (ML) INJECTION AS NEEDED
Status: DISCONTINUED | OUTPATIENT
Start: 2023-02-07 | End: 2023-02-07 | Stop reason: HOSPADM

## 2023-02-07 RX ORDER — OLANZAPINE 5 MG/1
5 TABLET ORAL ONCE
Status: CANCELLED | OUTPATIENT
Start: 2023-02-08 | End: 2023-02-08

## 2023-02-07 RX ORDER — MELOXICAM 15 MG/1
15 TABLET ORAL DAILY
Qty: 30 TABLET | Refills: 3 | Status: SHIPPED | OUTPATIENT
Start: 2023-02-07

## 2023-02-07 RX ORDER — PALONOSETRON 0.05 MG/ML
0.25 INJECTION, SOLUTION INTRAVENOUS ONCE
Status: CANCELLED | OUTPATIENT
Start: 2023-02-08

## 2023-02-07 RX ADMIN — HEPARIN SODIUM (PORCINE) LOCK FLUSH IV SOLN 100 UNIT/ML 500 UNITS: 100 SOLUTION at 08:05

## 2023-02-07 RX ADMIN — Medication 20 ML: at 08:05

## 2023-02-07 NOTE — PROGRESS NOTES
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 Rodriguez is a pleasant 78 yo male with a history of AAA (infrarenal), chronic back pain, HLD, hearing loss who presents for follow up regarding urothelial carcinoma.  Patient had radical cystoprostatectomy on December 12 [facility] by Dr. Bansal.  He received first dose of chemotherapy with Cisplatin and Gemcitabine on 1/18/23. He tolerated first cycle well. He was fatigued and sleepy the day of chemo. He denies any nausea or vomiting. He is eating well. No fevers, chills, infections. Staying active. He denies any bleeding. His chronic tinnitus is stable. He denies any neuropathy. He did not have any diarrhea. He has had one week of left lower extremity swelling. It is not painful or red. It is worse today.     Oncology/Hematology History Overview Note   10/14/22: CT abdomen/pelvis obtained due to gross hematuria for 6 weeks. This showed right hydroureteronephrosis down to bladder with findings concerning for large bladder tumor. He also had 20 lbs weight loss during this time.     10/19/2022: TURBT performed by Dr. Westfall. Per op reprot large area resected (7 x 4 cm), visualization difficult due to clot burden. 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     11/17/2022: PET CT showed asymmetric thickening along the right lateral and posterior walls of the bladder. No evidence of metastatic disease.     12/12/22: Radical cystoprostectomy, bilateral PLND, ileal conduit by Dr. Bansal ([facility]): Left distal and right distal ureter both positive for carcinoma, 2/3 right external lymph nodes positive for metastatic carcinoma, 2/3 right common external lymph nodes positive for carcinoma, bladder and prostate with urothelial  carcinoma with micropapillary features and invades directly into prostate, extensive CIS noted, 4/22 total lymph nodes positive, [id number].    1/18//23: C1D1 Cisplatin/Gemcitabine. 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   Musculoskeletal: Positive for back pain and joint swelling (LEft ankle swelling ).   Psychiatric/Behavioral: Positive for sleep disturbance.   All other systems reviewed and are negative.    Current Outpatient Medications on File Prior to Visit   Medication Sig Dispense Refill   • atorvastatin (LIPITOR) 20 MG tablet Take 20 mg by mouth Every Night.     • OLANZapine (zyPREXA) 5 MG tablet Take 1 tablet by mouth Every Night. Take on days 2, 3 and 4 after chemotherapy. 3 tablet 5   • ondansetron (ZOFRAN) 8 MG tablet Take 1 tablet by mouth 3 (Three) Times a Day As Needed for Nausea or Vomiting. 30 tablet 5   • sennosides-docusate (PERICOLACE) 8.6-50 MG per tablet Take 1 tablet by mouth Daily.       Current Facility-Administered Medications on File Prior to Visit   Medication Dose Route Frequency Provider Last Rate Last Admin   • heparin injection 500 Units  500 Units Intravenous PRN Arvin Coburn MD   500 Units at 02/07/23 0805   • sodium chloride 0.9 % flush 20 mL  20 mL Intravenous PRN Arvin Coburn MD   20 mL at 02/07/23 0805       No Known  Allergies  Past Medical History:   Diagnosis Date   • AAA (abdominal aortic aneurysm)     BEING MONITORED NO REPAIR NEEDED YET   • Aneurysm of abdominal aorta branch vessel    • Cancer (HCC)     BLADDER AND SKIN CANCERS/REMOVED   • History of urostomy     PT CURRENTLY HAS HOME HEALTH TO HELP WITH OSTOMY   • Kidney stone 10/16/2022     Past Surgical History:   Procedure Laterality Date   • COLONOSCOPY  2018   • CYSTECTOMY      2022  W/UROSTOMY   • SKIN CANCER EXCISION Right    • TRANSURETHRAL RESECTION OF BLADDER TUMOR Right 10/19/2022    Procedure: Cystoscopy with transurethral resection of bladder tumor;  Surgeon: Aung Westfall MD;  Location: [facility] MAIN OR;  Service: Urology;  Laterality: Right;   • VENOUS ACCESS DEVICE (PORT) INSERTION Right 2023    Procedure: INSERTION VENOUS ACCESS DEVICE;  Surgeon: Richie Edward MD;  [place] [facility] [place] [facility]  Service: General;  Laterality: Right;     Social History     Socioeconomic History   • Marital status:    Tobacco Use   • Smoking status: Former     Packs/day: 1.00     Years: 20.00     Pack years: 20.00     Types: Cigarettes     Start date: 1962     Quit date: 1990     Years since quittin.1   • Smokeless tobacco: Never   Vaping Use   • Vaping Use: Never used   Substance and Sexual Activity   • Alcohol use: Yes     Alcohol/week: 1.0 standard drink     Types: 1 Cans of beer per week     Comment: 1 beer daily   • Drug use: Never   • Sexual activity: Not Currently     Partners: Female     Family History   Problem Relation Age of Onset   • Cancer Mother    • Cancer Father    • Hearing loss Father    • Hypertension Father    • Malig Hyperthermia Neg Hx        Objective   Physical Exam    Well appearing patient in no acute distress on RA  Anicteric sclera, no rash on exposed skin  Respirations non-labored  Awake, alert, and oriented x 4. Speech intact.   Appropriate mood and affect  + LLE edema, nonerythematous, nontender    Vitals:     02/07/23 0815   BP: 154/67   Pulse: 63   Resp: 16   Temp: 98.2 °F (36.8 °C)   TempSrc: Temporal   SpO2: 95%   Weight: Comment: Scale broken   PainSc: 0-No pain               PHQ-9 Total Score: 0              Result Review :   The following data was reviewed by: Arvin Coburn MD on 02/07/23:  Lab Results   Component Value Date    HGB 10.0 (L) 01/25/2023    HCT 31.7 (L) 01/25/2023    MCV 84.8 01/25/2023    PLT 97 (L) 01/25/2023    WBC 3.77 01/25/2023    NEUTROABS 2.63 01/25/2023    LYMPHSABS 0.76 01/25/2023    MONOSABS 0.22 01/25/2023    EOSABS 0.13 01/25/2023    BASOSABS 0.02 01/25/2023     Lab Results   Component Value Date    GLUCOSE 114 (H) 01/17/2023    BUN 19 01/17/2023    CREATININE 0.81 01/17/2023     01/17/2023    K 4.1 01/17/2023     01/17/2023    CO2 27.0 01/17/2023    CALCIUM 9.0 01/17/2023    PROTEINTOT 6.8 01/17/2023    ALBUMIN 3.8 01/17/2023    BILITOT 0.3 01/17/2023    ALKPHOS 99 01/17/2023    AST 12 01/17/2023    ALT 11 01/17/2023     Lab Results   Component Value Date    MG 1.9 01/17/2023       Labs personally reviewed. Mild anemia, mild neutropenia.     Surgical Pathology Reviewed: [id number] with left distal margin positive.    [facility] urology documentation and progress notes from hospital personally reviewed.      Assessment and Plan    Diagnoses and all orders for this visit:    1. 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 Edema of left lower leg  -     US Venous Doppler Lower Extremity Left (duplex); Future    Other orders  -     meloxicam (Mobic) 15 MG tablet; Take 1 tablet by mouth Daily.  Dispense: 30 tablet; Refill: 3  -     sodium chloride 0.9 % infusion 250 mL  -     magnesium sulfate 1 g, potassium chloride 20 mEq in sodium chloride 0.9 % 1,000 mL IVPB  -     OLANZapine (zyPREXA) tablet 5 mg  -     palonosetron (ALOXI) injection 0.25  "mg  -     fosaprepitant (EMEND) 150 mg in sodium chloride 0.9 % 100 mL IVPB  -     dexamethasone (DECADRON) 12 mg in sodium chloride 0.9 % IVPB  -     gemcitabine (GEMZAR) 2,150 mg in sodium chloride 0.9 % 306.5 mL chemo IVPB  -     CISplatin (PLATINOL) 152 mg in sodium chloride 0.9 % 652 mL chemo IVPB  -     magnesium sulfate 1 g, potassium chloride 20 mEq in sodium chloride 0.9 % 1,000 mL IVPB  -     sodium chloride 0.9 % infusion 250 mL  -     dexamethasone (DECADRON) 12 mg in sodium chloride 0.9 % IVPB  -     gemcitabine (GEMZAR) 2,150 mg in sodium chloride 0.9 % 306.5 mL chemo IVPB    Mr. Thompson is a 77-year-old male status post radical cystoprostatectomy bilateral PLND which demonstrated stage [id number], stage IIIB urothelial carcinoma. 4/22 lymph nodes positive and right and left and right ureter positive with distal margin of left ureter positive.  Patient with disease that is at high risk of recurrence. With this stage, would favor a recommendation of adjuvant radiation and chemotherapy to hopefully decrease the chance of recurrence. Management has been personally discussed with Dr. Melgoza with radiation oncology and we have decided to do \"sandwich\" therapy with 2 cycles of chemotherapy followed by radiation followed by 2 additional cycles of chemotherapy.  The chemotherapy will include IV Gemcitabine and Cisplatin. Both drugs on D1 and Ness alone on D8.     1/18/23: C1D1 Cisplatin/Gemcitabine. Tolerated well.    2/8/23: C2D1 planned Cisplatin/gemcitabine. Labs appropriate to proceed.    PRN anti-emetics prescribed.     This is last cycle of first round of chemotherapy prior to radiation. Will provide last 2 cycles of chemotherapy once radiation is completed.     This is an acute or chronic illness that poses a threat to life or bodily function. The above treatment plan involves a high risk of complications and/or mortality of patient management. Continue with labs to monitor for severe toxicities.     LLE " edema  DVT found on duplex ordered 2/7/23. Start Eliquis. Will need to be on this until completion of cancer treatment.           I spent 25 minutes caring for Korey on this date of service. This time includes time spent by me in the following activities:preparing for the visit, reviewing tests, obtaining and/or reviewing a separately obtained history, performing a medically appropriate examination and/or evaluation , counseling and educating the patient/family/caregiver, ordering medications, tests, or procedures, referring and communicating with other health care professionals , documenting information in the medical record, independently interpreting results and communicating that information with the patient/family/caregiver and care coordination    Patient Follow Up: Prior to C3 chemo (after radiation)  Patient was given instructions and counseling regarding his condition or for health maintenance advice. Please see specific information pulled into the AVS if appropriate.

## 2023-02-08 ENCOUNTER — HOSPITAL ENCOUNTER (OUTPATIENT)
Dept: ONCOLOGY | Facility: HOSPITAL | Age: 78
Discharge: HOME | End: 2023-02-08
Admitting: INTERNAL MEDICINE
Payer: MEDICARE

## 2023-02-08 VITALS
HEIGHT: 71 IN | TEMPERATURE: 97.6 F | HEART RATE: 70 BPM | SYSTOLIC BLOOD PRESSURE: 152 MMHG | DIASTOLIC BLOOD PRESSURE: 59 MMHG | RESPIRATION RATE: 18 BRPM | OXYGEN SATURATION: 96 % | WEIGHT: 197.75 LBS | BODY MASS INDEX: 27.69 KG/M2

## 2023-02-08 DIAGNOSIS — Z45.2 ENCOUNTER FOR ADJUSTMENT OR MANAGEMENT OF VASCULAR ACCESS DEVICE: ICD-10-CM

## 2023-02-08 DIAGNOSIS — C67.2 MALIGNANT NEOPLASM OF LATERAL WALL OF BLADDER: Primary | ICD-10-CM

## 2023-02-08 PROCEDURE — A9270 NON-COVERED ITEM OR SERVICE: HCPCS | Performed by: INTERNAL MEDICINE

## 2023-02-08 PROCEDURE — 25010000002 HEPARIN LOCK FLUSH PER 10 UNITS: Performed by: INTERNAL MEDICINE

## 2023-02-08 PROCEDURE — 96375 TX/PRO/DX INJ NEW DRUG ADDON: CPT

## 2023-02-08 PROCEDURE — 25010000002 CISPLATIN PER 50 MG: Performed by: INTERNAL MEDICINE

## 2023-02-08 PROCEDURE — 25010000002 DEXAMETHASONE SODIUM PHOSPHATE 120 MG/30ML SOLUTION: Performed by: INTERNAL MEDICINE

## 2023-02-08 PROCEDURE — 96417 CHEMO IV INFUS EACH ADDL SEQ: CPT

## 2023-02-08 PROCEDURE — 25010000002 POTASSIUM CHLORIDE PER 2 MEQ OF POTASSIUM: Performed by: INTERNAL MEDICINE

## 2023-02-08 PROCEDURE — 63710000001 OLANZAPINE 2.5 MG TABLET: Performed by: INTERNAL MEDICINE

## 2023-02-08 PROCEDURE — 25010000002 GEMCITABINE 1 GM/26.3ML SOLUTION 26.3 ML VIAL: Performed by: INTERNAL MEDICINE

## 2023-02-08 PROCEDURE — 96366 THER/PROPH/DIAG IV INF ADDON: CPT

## 2023-02-08 PROCEDURE — 96413 CHEMO IV INFUSION 1 HR: CPT

## 2023-02-08 PROCEDURE — 96415 CHEMO IV INFUSION ADDL HR: CPT

## 2023-02-08 PROCEDURE — 25010000002 FOSAPREPITANT PER 1 MG: Performed by: INTERNAL MEDICINE

## 2023-02-08 PROCEDURE — 25010000002 MAGNESIUM SULFATE PER 500 MG OF MAGNESIUM: Performed by: INTERNAL MEDICINE

## 2023-02-08 PROCEDURE — 96367 TX/PROPH/DG ADDL SEQ IV INF: CPT

## 2023-02-08 PROCEDURE — 25010000002 PALONOSETRON PER 25 MCG: Performed by: INTERNAL MEDICINE

## 2023-02-08 RX ORDER — OLANZAPINE 2.5 MG/1
5 TABLET ORAL ONCE
Status: COMPLETED | OUTPATIENT
Start: 2023-02-08 | End: 2023-02-08

## 2023-02-08 RX ORDER — SODIUM CHLORIDE 9 MG/ML
250 INJECTION, SOLUTION INTRAVENOUS ONCE
Status: COMPLETED | OUTPATIENT
Start: 2023-02-08 | End: 2023-02-08

## 2023-02-08 RX ORDER — SODIUM CHLORIDE 0.9 % (FLUSH) 0.9 %
20 SYRINGE (ML) INJECTION AS NEEDED
Status: DISCONTINUED | OUTPATIENT
Start: 2023-02-08 | End: 2023-02-09 | Stop reason: HOSPADM

## 2023-02-08 RX ORDER — SODIUM CHLORIDE 0.9 % (FLUSH) 0.9 %
20 SYRINGE (ML) INJECTION AS NEEDED
Status: CANCELLED | OUTPATIENT
Start: 2023-02-08

## 2023-02-08 RX ORDER — HEPARIN SODIUM (PORCINE) LOCK FLUSH IV SOLN 100 UNIT/ML 100 UNIT/ML
500 SOLUTION INTRAVENOUS AS NEEDED
Status: DISCONTINUED | OUTPATIENT
Start: 2023-02-08 | End: 2023-02-09 | Stop reason: HOSPADM

## 2023-02-08 RX ORDER — HEPARIN SODIUM (PORCINE) LOCK FLUSH IV SOLN 100 UNIT/ML 100 UNIT/ML
500 SOLUTION INTRAVENOUS AS NEEDED
Status: CANCELLED | OUTPATIENT
Start: 2023-02-08

## 2023-02-08 RX ORDER — APIXABAN 5 MG/1
2 TABLET, FILM COATED ORAL EVERY 12 HOURS
COMMUNITY
Start: 2023-02-07

## 2023-02-08 RX ORDER — PALONOSETRON 0.05 MG/ML
0.25 INJECTION, SOLUTION INTRAVENOUS ONCE
Status: COMPLETED | OUTPATIENT
Start: 2023-02-08 | End: 2023-02-08

## 2023-02-08 RX ADMIN — CISPLATIN 150 MG: 100 INJECTION, SOLUTION INTRAVENOUS at 11:28

## 2023-02-08 RX ADMIN — Medication 20 ML: at 15:41

## 2023-02-08 RX ADMIN — OLANZAPINE 5 MG: 2.5 TABLET, FILM COATED ORAL at 09:52

## 2023-02-08 RX ADMIN — DEXAMETHASONE SODIUM PHOSPHATE 12 MG: 4 INJECTION, SOLUTION INTRA-ARTICULAR; INTRALESIONAL; INTRAMUSCULAR; INTRAVENOUS; SOFT TISSUE at 10:39

## 2023-02-08 RX ADMIN — PALONOSETRON 0.25 MG: 0.05 INJECTION, SOLUTION INTRAVENOUS at 10:11

## 2023-02-08 RX ADMIN — HEPARIN SODIUM (PORCINE) LOCK FLUSH IV SOLN 100 UNIT/ML 500 UNITS: 100 SOLUTION at 15:41

## 2023-02-08 RX ADMIN — GEMCITABINE HYDROCHLORIDE 2150 MG: 1 INJECTION, SOLUTION INTRAVENOUS at 10:54

## 2023-02-08 RX ADMIN — POTASSIUM CHLORIDE 1000 ML: 2 INJECTION, SOLUTION, CONCENTRATE INTRAVENOUS at 13:29

## 2023-02-08 RX ADMIN — SODIUM CHLORIDE 250 ML: 9 INJECTION, SOLUTION INTRAVENOUS at 09:51

## 2023-02-08 RX ADMIN — POTASSIUM CHLORIDE 1000 ML: 2 INJECTION, SOLUTION, CONCENTRATE INTRAVENOUS at 08:08

## 2023-02-08 RX ADMIN — FOSAPREPITANT 100 ML: 150 INJECTION, POWDER, LYOPHILIZED, FOR SOLUTION INTRAVENOUS at 10:12

## 2023-02-10 ENCOUNTER — TELEPHONE (OUTPATIENT)
Dept: ONCOLOGY | Facility: HOSPITAL | Age: 78
End: 2023-02-10

## 2023-02-13 ENCOUNTER — TELEPHONE (OUTPATIENT)
Dept: ONCOLOGY | Facility: HOSPITAL | Age: 78
End: 2023-02-13
Payer: MEDICARE

## 2023-02-13 NOTE — TELEPHONE ENCOUNTER
Mimi called and states that the pt's  nurse informed them that the 2 meds that Dr Coburn rx the pt can interact with each other and they should call his office and ask if he should continue both meds. This nurse informed Mimi that when a pt is taking both an NSAID (Meloxicam) and a anticoagulant (Xarelto) that the pt's risk for bleeding is increased. This nurse instructed Mimi to have the pt to continue the meds as rx. This nurse informed Mimi that the pt will be receiving lab draws on a regular basis and that Dr Coburn will be monitoring the pt's Plt's and other lab levels. This nurse informed Mimi that Dr Coburn will be informed of this note.This nurse encouraged Mimi to call back with any concerns. Mimi voiced understanding.

## 2023-02-15 ENCOUNTER — HOSPITAL ENCOUNTER (OUTPATIENT)
Dept: ONCOLOGY | Facility: HOSPITAL | Age: 78
Discharge: HOME | End: 2023-02-15
Admitting: INTERNAL MEDICINE
Payer: MEDICARE

## 2023-02-15 VITALS
RESPIRATION RATE: 20 BRPM | HEIGHT: 71 IN | BODY MASS INDEX: 28.24 KG/M2 | DIASTOLIC BLOOD PRESSURE: 71 MMHG | OXYGEN SATURATION: 96 % | SYSTOLIC BLOOD PRESSURE: 168 MMHG | TEMPERATURE: 98.1 F | HEART RATE: 88 BPM | WEIGHT: 201.72 LBS

## 2023-02-15 DIAGNOSIS — C67.2 MALIGNANT NEOPLASM OF LATERAL WALL OF BLADDER: Primary | ICD-10-CM

## 2023-02-15 DIAGNOSIS — Z45.2 ENCOUNTER FOR ADJUSTMENT OR MANAGEMENT OF VASCULAR ACCESS DEVICE: ICD-10-CM

## 2023-02-15 LAB
BASOPHILS # BLD AUTO: 0.04 10*3/MM3 (ref 0–0.2)
BASOPHILS NFR BLD AUTO: 1.5 % (ref 0–1.5)
DEPRECATED RDW RBC AUTO: 52.1 FL (ref 37–54)
EOSINOPHIL # BLD AUTO: 0.08 10*3/MM3 (ref 0–0.4)
EOSINOPHIL NFR BLD AUTO: 3 % (ref 0.3–6.2)
ERYTHROCYTE [DISTWIDTH] IN BLOOD BY AUTOMATED COUNT: 17 % (ref 12.3–15.4)
HCT VFR BLD AUTO: 29.2 % (ref 37.5–51)
HGB BLD-MCNC: 9.4 G/DL (ref 13–17.7)
IMM GRANULOCYTES # BLD AUTO: 0.02 10*3/MM3 (ref 0–0.05)
IMM GRANULOCYTES NFR BLD AUTO: 0.8 % (ref 0–0.5)
LYMPHOCYTES # BLD AUTO: 0.63 10*3/MM3 (ref 0.7–3.1)
LYMPHOCYTES NFR BLD AUTO: 24 % (ref 19.6–45.3)
MCH RBC QN AUTO: 27.5 PG (ref 26.6–33)
MCHC RBC AUTO-ENTMCNC: 32.2 G/DL (ref 31.5–35.7)
MCV RBC AUTO: 85.4 FL (ref 79–97)
MONOCYTES # BLD AUTO: 0.29 10*3/MM3 (ref 0.1–0.9)
MONOCYTES NFR BLD AUTO: 11 % (ref 5–12)
NEUTROPHILS NFR BLD AUTO: 1.57 10*3/MM3 (ref 1.7–7)
NEUTROPHILS NFR BLD AUTO: 59.7 % (ref 42.7–76)
PLATELET # BLD AUTO: 252 10*3/MM3 (ref 140–450)
PMV BLD AUTO: 9 FL (ref 6–12)
RBC # BLD AUTO: 3.42 10*6/MM3 (ref 4.14–5.8)
WBC NRBC COR # BLD: 2.63 10*3/MM3 (ref 3.4–10.8)

## 2023-02-15 PROCEDURE — 25010000002 GEMCITABINE 1 GM/26.3ML SOLUTION 26.3 ML VIAL: Performed by: INTERNAL MEDICINE

## 2023-02-15 PROCEDURE — 96375 TX/PRO/DX INJ NEW DRUG ADDON: CPT

## 2023-02-15 PROCEDURE — 25010000002 HEPARIN LOCK FLUSH PER 10 UNITS: Performed by: INTERNAL MEDICINE

## 2023-02-15 PROCEDURE — 25010000002 DEXAMETHASONE SODIUM PHOSPHATE 120 MG/30ML SOLUTION: Performed by: INTERNAL MEDICINE

## 2023-02-15 PROCEDURE — 85025 COMPLETE CBC W/AUTO DIFF WBC: CPT | Performed by: INTERNAL MEDICINE

## 2023-02-15 PROCEDURE — 96413 CHEMO IV INFUSION 1 HR: CPT

## 2023-02-15 RX ORDER — HEPARIN SODIUM (PORCINE) LOCK FLUSH IV SOLN 100 UNIT/ML 100 UNIT/ML
500 SOLUTION INTRAVENOUS AS NEEDED
OUTPATIENT
Start: 2023-02-15

## 2023-02-15 RX ORDER — SODIUM CHLORIDE 0.9 % (FLUSH) 0.9 %
20 SYRINGE (ML) INJECTION AS NEEDED
Status: DISCONTINUED | OUTPATIENT
Start: 2023-02-15 | End: 2023-02-16 | Stop reason: HOSPADM

## 2023-02-15 RX ORDER — HEPARIN SODIUM (PORCINE) LOCK FLUSH IV SOLN 100 UNIT/ML 100 UNIT/ML
500 SOLUTION INTRAVENOUS AS NEEDED
Status: DISCONTINUED | OUTPATIENT
Start: 2023-02-15 | End: 2023-02-16 | Stop reason: HOSPADM

## 2023-02-15 RX ORDER — SODIUM CHLORIDE 9 MG/ML
250 INJECTION, SOLUTION INTRAVENOUS ONCE
Status: COMPLETED | OUTPATIENT
Start: 2023-02-15 | End: 2023-02-15

## 2023-02-15 RX ORDER — SODIUM CHLORIDE 0.9 % (FLUSH) 0.9 %
20 SYRINGE (ML) INJECTION AS NEEDED
OUTPATIENT
Start: 2023-02-15

## 2023-02-15 RX ADMIN — HEPARIN SODIUM (PORCINE) LOCK FLUSH IV SOLN 100 UNIT/ML 500 UNITS: 100 SOLUTION at 10:29

## 2023-02-15 RX ADMIN — SODIUM CHLORIDE 250 ML: 9 INJECTION, SOLUTION INTRAVENOUS at 09:14

## 2023-02-15 RX ADMIN — Medication 20 ML: at 10:29

## 2023-02-15 RX ADMIN — GEMCITABINE HYDROCHLORIDE 2150 MG: 1 INJECTION, SOLUTION INTRAVENOUS at 09:47

## 2023-02-15 RX ADMIN — DEXAMETHASONE SODIUM PHOSPHATE 12 MG: 4 INJECTION, SOLUTION INTRA-ARTICULAR; INTRALESIONAL; INTRAMUSCULAR; INTRAVENOUS; SOFT TISSUE at 09:14

## 2023-02-16 ENCOUNTER — DOCUMENTATION (OUTPATIENT)
Dept: NUTRITION | Facility: HOSPITAL | Age: 78
End: 2023-02-16
Payer: MEDICARE

## 2023-02-16 ENCOUNTER — OFFICE VISIT (OUTPATIENT)
Dept: RADIATION ONCOLOGY | Facility: HOSPITAL | Age: 78
End: 2023-02-16
Payer: MEDICARE

## 2023-02-16 VITALS
RESPIRATION RATE: 16 BRPM | DIASTOLIC BLOOD PRESSURE: 80 MMHG | WEIGHT: 203.48 LBS | TEMPERATURE: 98 F | BODY MASS INDEX: 28.14 KG/M2 | OXYGEN SATURATION: 99 % | HEART RATE: 66 BPM | SYSTOLIC BLOOD PRESSURE: 149 MMHG

## 2023-02-16 DIAGNOSIS — C67.2 MALIGNANT NEOPLASM OF LATERAL WALL OF BLADDER: Primary | ICD-10-CM

## 2023-02-16 LAB
ALBUMIN SERPL-MCNC: 3.8 G/DL (ref 3.5–5.2)
ALBUMIN/GLOB SERPL: 1.3 G/DL
ALP SERPL-CCNC: 93 U/L (ref 39–117)
ALT SERPL W P-5'-P-CCNC: 16 U/L (ref 1–41)
ANION GAP SERPL CALCULATED.3IONS-SCNC: 9.3 MMOL/L (ref 5–15)
AST SERPL-CCNC: 16 U/L (ref 1–40)
BILIRUB SERPL-MCNC: 0.3 MG/DL (ref 0–1.2)
BUN SERPL-MCNC: 29 MG/DL (ref 8–23)
BUN/CREAT SERPL: 27.9 (ref 7–25)
CALCIUM SPEC-SCNC: 9.1 MG/DL (ref 8.6–10.5)
CHLORIDE SERPL-SCNC: 108 MMOL/L (ref 98–107)
CO2 SERPL-SCNC: 23.7 MMOL/L (ref 22–29)
CREAT SERPL-MCNC: 1.04 MG/DL (ref 0.76–1.27)
EGFRCR SERPLBLD CKD-EPI 2021: 73 ML/MIN/1.73
GLOBULIN UR ELPH-MCNC: 3 GM/DL
GLUCOSE SERPL-MCNC: 101 MG/DL (ref 65–99)
POTASSIUM SERPL-SCNC: 4.9 MMOL/L (ref 3.5–5.2)
PROT SERPL-MCNC: 6.8 G/DL (ref 6–8.5)
SODIUM SERPL-SCNC: 141 MMOL/L (ref 136–145)

## 2023-02-16 PROCEDURE — 36415 COLL VENOUS BLD VENIPUNCTURE: CPT | Performed by: RADIOLOGY

## 2023-02-16 PROCEDURE — 80053 COMPREHEN METABOLIC PANEL: CPT | Performed by: RADIOLOGY

## 2023-02-16 PROCEDURE — 99214 OFFICE O/P EST MOD 30 MIN: CPT | Performed by: RADIOLOGY

## 2023-02-16 PROCEDURE — G0463 HOSPITAL OUTPT CLINIC VISIT: HCPCS | Performed by: RADIOLOGY

## 2023-02-16 RX ORDER — ONDANSETRON 4 MG/1
4 TABLET, FILM COATED ORAL DAILY
Qty: 30 TABLET | Refills: 3 | Status: SHIPPED | OUTPATIENT
Start: 2023-02-16

## 2023-02-16 NOTE — PROGRESS NOTES
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  Cancer Tx: Cisplatin, Gemcitabine (completed 2 cycles, plan to initiate XRT and then resume chemo once XRT completed - \"sandwich method\"), XRT (planned to start)    Today's Weight: 92.3 kg  Weight Change: Per EMR, pt with weight gain of 4.8% X 1 month (since Oncology RD initial assessment)    Nutrition-related concerns: Taste Alterations (occasionally)    Current PO intake: 3 meals/day, limited snacks; high protein foods (meats ~2 times a day); wide variety of fruits and vegetables, grains, fats     Consult or Intervention:   Please see full nutrition assessment completed 1/18/23  MD Consult received to speak with pt / pt's wife regarding nutrition related questions. Spoke with pt's wife via phone.She inquired about what patient should be consuming during tx. Discussed with pt's wife recommended intake of good variety, high kcal, and high protein intake. Also discussed what MNT to follow if pt develops nutrition related concerns (N/V/D, anorexia). Pt is not experiencing any nutrition related concerns except mild taste alterations. Plan for pt to start XRT next week per discussion (pelvis area). Pt's wife provides excellent support and reports pt consumes a wide variety of fruits / vegetables, grains, nuts/seeds, nut butters, and meats. He consumes ~3 meals/day, but not many snacks. He also consumes fluids such as water and orange juice throughout the day. Encouraged pt to continue current nutrition POC, but did emphasize to follow recommended MNT if side effects occur during tx that may impact nutrition status. Discussed MNT for N/V/D and taste alterations; oral care. Pt's wife requested additional handouts to be mailed (N/V/D, taste alterations, oral care). Will " continue to f/u per protocol.     Nutrition Focused Physical Findings:      Estimated Nutrition Needs:      Nutrition Diagnosis: Increased nutrient needs related to increased nutrient needs due to catabolic disease as evidenced by physiological causes increasing nutrient needs.    Plan: Will follow up per oncology nutrition protocol    Electronically signed by:  Anayeli Guillermo RD  02/16/23 15:52 EST

## 2023-02-16 NOTE — PROGRESS NOTES
Follow Up Office Visit      Encounter Date: 02/16/2023   Patient Name: Korey Rodriguez  YOB: 1945   Medical Record Number: [id number]   Primary Diagnosis: No primary diagnosis found.   Cancer Staging: Cancer Staging   No matching staging information was found for the patient.              Cancer Staging   No matching staging information was found for the patient.        Chief Complaint:    Chief Complaint   Patient presents with   • Follow-up       Oncologic History: Korey Rodriguez is a 77 y.o. male  here for consultation regarding his new diagnosis of bladder cancer. He presented with gross hematuria, prompting an evaluation by Dr. Westfall. He had a TURBT in 10/2022, which identified a large bleeding bladder tumor. 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 PET/CT 11/2022 was negative for metastatic disease. He was seen by Dr. Bansal, who performed a radical cystoprostatectomy with bilateral pelvic LN dissection and formation of an ileal conduit on 12/12/22. An omental pedical flap was also created. 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 Dr. Bansal reported extension of the primary tumor into the left periurethral adventitial fat, which extended to the level of the GAY. 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 This was removed with an R1 resection, with microscopic disease identified at the left distal ureter. Left sided lymph nodes dissected were negative (external iliac 0/5, common iliac 0/4, obturator 0/2, presacral 0/1). Right obturator and presacral lymph nodes were negative. However 2/3 right external iliac nodes were involved and 2/3 right common iliac lymph nodes were involved.    We talked about a plan for 2  cycles of chemotherapy followed by radiation, followed by additional chemotherapy  He has had 2 cycles of chemo  Doing well. Recently had a clot and was started on anticoagulation    Prior Radiation: None    Subjective      Review of Systems: Review of Systems   Constitutional: Positive for fatigue (5/10, ongoing  ). Negative for appetite change and unexpected weight change.   HENT: Positive for hearing loss, postnasal drip and tinnitus (ongoing). Negative for sore throat, trouble swallowing and voice change.    Eyes: Negative for visual disturbance.   Respiratory: Positive for cough (Started yesterday, productive with clear secretions.   Noted it at night.). Negative for shortness of breath.    Cardiovascular: Negative for chest pain, palpitations and leg swelling.   Gastrointestinal: Positive for constipation (Takes an otc stool softener with relief.). Negative for diarrhea, nausea and vomiting.   Genitourinary: Negative for decreased urine volume.        Has urostomy with clear yellow urine.     Musculoskeletal: Positive for back pain (Ongoing) and joint swelling (Left ankle, states has blood clot and is on Eliquis.      ). Negative for arthralgias and gait problem.   Skin: Positive for wound (Left side of jaw under ear, states that he cut his self shaving. Hx of skin cancer, informed patient to follow up with dermatology if it doesn't heal or looks suspicious.). Negative for color change and rash.   Neurological: Negative for dizziness, weakness and headaches.   Psychiatric/Behavioral: Positive for sleep disturbance (Woke throughout the night due to cough.).       The following portions of the patient's history were reviewed and updated as appropriate: allergies, current medications, past family history, past medical history, past social history, past surgical history and problem list.    Measures:   Pain: (on a scale of 0-10)   Pain Score    02/16/23 0933   PainSc: 0-No pain       Advanced Care Plan: N Advance  Care Planning   ACP discussion was declined by the patient. Patient does not have an advance directive, declines further assistance.       KPS/Quality of Life: 80 - Restricted Physical Activity  ECOG: (1) Restricted in physically strenuous activity, ambulatory and able to do work of light nature  Depression Screening:    PHQ-9 score of 0 on 2/7/2023.      Objective     Physical Exam:   Vital Signs:   Vitals:    02/16/23 0933   BP: 149/80   Pulse: 66   Resp: 16   Temp: 98 °F (36.7 °C)   TempSrc: Temporal   SpO2: 99%   Weight: 92.3 kg (203 lb 7.8 oz)   PainSc: 0-No pain     Body mass index is 28.14 kg/m².     Constitutional: No acute distress, sitting comfortably  Eye: EOMI, anicteric sclerae  HENT: NC/AT, MMM   Respiratory: Symmetric expansion, nonlabored respiration  MSK: ROM intact in all four extremities, no obvious deformities  Neuro: Alert, oriented x3, CN3-12 grossly intact.   Psych: Appropriate mood and affect.            Assessment / Plan        Assessment/Plan:     Diagnoses and all orders for this visit:    1. 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 (HCC)  -     Comprehensive Metabolic Panel; Future        Korey Rodriguez is a pleasant 77 y.o. 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 An omental pedical flap was also created. He did not receive neoadjuvant cisplatin based therapy. Based on his pathologic findings, his team at [facility] recommended adjuvant chemotherapy and radiation. He is here to establish care closer to home.    We talked about radiation, which would be delivered with the intention of preventing local recurrence given the stage of disease and the microscopic positive margin. Per NCCN guidelines, radiation therapy can be considered as a category 2b recommendation for this situation.     His case was  discussed in a multidisciplinary environment, with the consensus recommendation of sandwich therapy. He has seen Dr. Coburn and started his first cycle of gemcitabine and cisplatin. After completion of 2 cycles, we will do external beam RT, then move forward with additional chemo. He is doing well today. I have scheduled him for a CT simulation to facilitate treatment planning.     Follow Up:   No follow-ups on file.        Time:   I spent 35 minutes on this encounter today, 02/16/23. Activities that took place during this time include:   - preparing to see the patient  - obtaining and reviewing separately obtained history  - performing a medically appropriate examination and evaluation  - counseling and educating the patient  - communicating with other healthcare providers  - documenting clinical information in the health record  - coordinating care for this patient.     Sincerely,        Kimberly Melgoza MD  Radiation Oncology  [facility]    This document has been signed by Kimberly Melgoza MD on February 16, 2023 10:41 EST           NOTICE TO PATIENTS  At [facility], we believe that sharing information builds trust and better relationships. You are receiving this note because you recently visited [facility]. It is possible you will see health information before a provider has talked with you about it. This kind of information can be easy to misunderstand. To help you fully understand what it means for your health, we urge you to discuss this note with your provider.

## 2023-02-17 ENCOUNTER — HOSPITAL ENCOUNTER (OUTPATIENT)
Dept: RADIATION ONCOLOGY | Facility: HOSPITAL | Age: 78
Setting detail: RADIATION/ONCOLOGY SERIES
End: 2023-02-17
Payer: MEDICARE

## 2023-02-20 ENCOUNTER — HOSPITAL ENCOUNTER (OUTPATIENT)
Dept: GENERAL RADIOLOGY | Facility: HOSPITAL | Age: 78
Discharge: HOME | End: 2023-02-20
Admitting: UROLOGY
Payer: MEDICARE

## 2023-02-20 DIAGNOSIS — N13.30 HYDRONEPHROSIS, UNSPECIFIED HYDRONEPHROSIS TYPE: ICD-10-CM

## 2023-02-20 PROCEDURE — 0 IOPAMIDOL PER 1 ML: Performed by: UROLOGY

## 2023-02-20 PROCEDURE — 74425 UROGRAPHY ANTEGRADE RS&I: CPT

## 2023-02-20 RX ADMIN — IOPAMIDOL 20 ML: 510 INJECTION, SOLUTION INTRAVASCULAR at 13:08

## 2023-02-21 ENCOUNTER — HOSPITAL ENCOUNTER (OUTPATIENT)
Dept: RADIATION ONCOLOGY | Facility: HOSPITAL | Age: 78
Discharge: HOME | End: 2023-02-21

## 2023-02-21 DIAGNOSIS — C67.8 MALIGNANT NEOPLASM OF OVERLAPPING SITES OF BLADDER: ICD-10-CM

## 2023-02-21 PROCEDURE — 77334 RADIATION TREATMENT AID(S): CPT | Performed by: RADIOLOGY

## 2023-02-21 PROCEDURE — 77263 THER RADIOLOGY TX PLNG CPLX: CPT | Performed by: RADIOLOGY

## 2023-02-21 PROCEDURE — 0 IOPAMIDOL PER 1 ML: Performed by: RADIOLOGY

## 2023-02-21 RX ADMIN — IOPAMIDOL 100 ML: 755 INJECTION, SOLUTION INTRAVENOUS at 09:40

## 2023-02-22 ENCOUNTER — TELEPHONE (OUTPATIENT)
Dept: ONCOLOGY | Facility: HOSPITAL | Age: 78
End: 2023-02-22
Payer: MEDICARE

## 2023-02-22 ENCOUNTER — DOCUMENTATION (OUTPATIENT)
Dept: PHARMACY | Facility: HOSPITAL | Age: 78
End: 2023-02-22
Payer: MEDICARE

## 2023-02-22 NOTE — TELEPHONE ENCOUNTER
Mimi called and states that the  Nurse told her and the pt that he should be sitting down most of the time. Mimi states that the pt is standing most of the. Mimi is asking what the pt should and should not be doing.     Shouldn't the pt be free of mobility restrictions at this time?      Note: The pt was dx with a DVT in his left leg on 2/7/23. The pt was placed on Eliquis the same day.

## 2023-02-24 ENCOUNTER — HOSPITAL ENCOUNTER (OUTPATIENT)
Dept: RADIATION ONCOLOGY | Facility: HOSPITAL | Age: 78
Discharge: HOME | End: 2023-02-24

## 2023-02-24 PROCEDURE — 77301 RADIOTHERAPY DOSE PLAN IMRT: CPT | Performed by: RADIOLOGY

## 2023-02-24 PROCEDURE — 77338 DESIGN MLC DEVICE FOR IMRT: CPT | Performed by: RADIOLOGY

## 2023-02-24 PROCEDURE — 77300 RADIATION THERAPY DOSE PLAN: CPT | Performed by: RADIOLOGY

## 2023-02-27 ENCOUNTER — HOSPITAL ENCOUNTER (OUTPATIENT)
Dept: RADIATION ONCOLOGY | Facility: HOSPITAL | Age: 78
Discharge: HOME | End: 2023-02-27

## 2023-02-27 LAB
RAD ONC ARIA COURSE ID: NORMAL
RAD ONC ARIA COURSE INTENT: NORMAL
RAD ONC ARIA COURSE LAST TREATMENT DATE: NORMAL
RAD ONC ARIA COURSE START DATE: NORMAL
RAD ONC ARIA COURSE TREATMENT ELAPSED DAYS: 0
RAD ONC ARIA FIRST TREATMENT DATE: NORMAL
RAD ONC ARIA PLAN FRACTIONS TREATED TO DATE: 1
RAD ONC ARIA PLAN ID: NORMAL
RAD ONC ARIA PLAN PRESCRIBED DOSE PER FRACTION: 1.8 GY
RAD ONC ARIA PLAN PRIMARY REFERENCE POINT: NORMAL
RAD ONC ARIA PLAN TOTAL FRACTIONS PRESCRIBED: 28
RAD ONC ARIA PLAN TOTAL PRESCRIBED DOSE: 5040 CGY
RAD ONC ARIA REFERENCE POINT DOSAGE GIVEN TO DATE: 1.8 GY
RAD ONC ARIA REFERENCE POINT ID: NORMAL
RAD ONC ARIA REFERENCE POINT SESSION DOSAGE GIVEN: 1.8 GY

## 2023-02-27 PROCEDURE — 77427 RADIATION TX MANAGEMENT X5: CPT | Performed by: RADIOLOGY

## 2023-02-27 PROCEDURE — 77014 CHG CT GUIDANCE RADIATION THERAPY FLDS PLACEMENT: CPT | Performed by: RADIOLOGY

## 2023-02-27 PROCEDURE — 77386: CPT | Performed by: RADIOLOGY

## 2023-02-28 ENCOUNTER — HOSPITAL ENCOUNTER (OUTPATIENT)
Dept: RADIATION ONCOLOGY | Facility: HOSPITAL | Age: 78
Discharge: HOME | End: 2023-02-28
Payer: MEDICARE

## 2023-02-28 LAB
RAD ONC ARIA COURSE ID: NORMAL
RAD ONC ARIA COURSE INTENT: NORMAL
RAD ONC ARIA COURSE LAST TREATMENT DATE: NORMAL
RAD ONC ARIA COURSE START DATE: NORMAL
RAD ONC ARIA COURSE TREATMENT ELAPSED DAYS: 1
RAD ONC ARIA FIRST TREATMENT DATE: NORMAL
RAD ONC ARIA PLAN FRACTIONS TREATED TO DATE: 2
RAD ONC ARIA PLAN ID: NORMAL
RAD ONC ARIA PLAN PRESCRIBED DOSE PER FRACTION: 1.8 GY
RAD ONC ARIA PLAN PRIMARY REFERENCE POINT: NORMAL
RAD ONC ARIA PLAN TOTAL FRACTIONS PRESCRIBED: 28
RAD ONC ARIA PLAN TOTAL PRESCRIBED DOSE: 5040 CGY
RAD ONC ARIA REFERENCE POINT DOSAGE GIVEN TO DATE: 3.6 GY
RAD ONC ARIA REFERENCE POINT ID: NORMAL
RAD ONC ARIA REFERENCE POINT SESSION DOSAGE GIVEN: 1.8 GY

## 2023-02-28 PROCEDURE — 77014 CHG CT GUIDANCE RADIATION THERAPY FLDS PLACEMENT: CPT | Performed by: RADIOLOGY

## 2023-02-28 PROCEDURE — 77386: CPT | Performed by: RADIOLOGY

## 2023-03-01 ENCOUNTER — HOSPITAL ENCOUNTER (OUTPATIENT)
Dept: RADIATION ONCOLOGY | Facility: HOSPITAL | Age: 78
Discharge: HOME | End: 2023-03-01

## 2023-03-01 ENCOUNTER — TELEPHONE (OUTPATIENT)
Dept: ONCOLOGY | Facility: HOSPITAL | Age: 78
End: 2023-03-01

## 2023-03-01 ENCOUNTER — HOSPITAL ENCOUNTER (OUTPATIENT)
Dept: RADIATION ONCOLOGY | Facility: HOSPITAL | Age: 78
Setting detail: RADIATION/ONCOLOGY SERIES
End: 2023-03-01
Payer: MEDICARE

## 2023-03-01 VITALS
SYSTOLIC BLOOD PRESSURE: 147 MMHG | TEMPERATURE: 98.6 F | WEIGHT: 205.03 LBS | DIASTOLIC BLOOD PRESSURE: 68 MMHG | BODY MASS INDEX: 28.36 KG/M2 | RESPIRATION RATE: 16 BRPM | OXYGEN SATURATION: 97 % | HEART RATE: 77 BPM

## 2023-03-01 DIAGNOSIS — C67.8 MALIGNANT NEOPLASM OF OVERLAPPING SITES OF BLADDER: Primary | ICD-10-CM

## 2023-03-01 LAB
RAD ONC ARIA COURSE ID: NORMAL
RAD ONC ARIA COURSE INTENT: NORMAL
RAD ONC ARIA COURSE LAST TREATMENT DATE: NORMAL
RAD ONC ARIA COURSE START DATE: NORMAL
RAD ONC ARIA COURSE TREATMENT ELAPSED DAYS: 2
RAD ONC ARIA FIRST TREATMENT DATE: NORMAL
RAD ONC ARIA PLAN FRACTIONS TREATED TO DATE: 3
RAD ONC ARIA PLAN ID: NORMAL
RAD ONC ARIA PLAN PRESCRIBED DOSE PER FRACTION: 1.8 GY
RAD ONC ARIA PLAN PRIMARY REFERENCE POINT: NORMAL
RAD ONC ARIA PLAN TOTAL FRACTIONS PRESCRIBED: 28
RAD ONC ARIA PLAN TOTAL PRESCRIBED DOSE: 5040 CGY
RAD ONC ARIA REFERENCE POINT DOSAGE GIVEN TO DATE: 5.4 GY
RAD ONC ARIA REFERENCE POINT ID: NORMAL
RAD ONC ARIA REFERENCE POINT SESSION DOSAGE GIVEN: 1.8 GY

## 2023-03-01 PROCEDURE — 77014 CHG CT GUIDANCE RADIATION THERAPY FLDS PLACEMENT: CPT | Performed by: RADIOLOGY

## 2023-03-01 PROCEDURE — 77386: CPT | Performed by: RADIOLOGY

## 2023-03-01 NOTE — TELEPHONE ENCOUNTER
Caller: TRA GARNER    Relationship: Emergency Contact    Best call back number:[phone]    What is the best time to reach you: ANY    Who are you requesting to speak with (clinical staff, provider,  specific staff member): CLINICAL     What was the call regarding: TRA IS CALLING TO ALERT DR NY THAT CHIQUI NEEDS A CT SCAN AT THE END OF HIS LAST CHEMO TREATMENT     Do you require a callback: YES

## 2023-03-01 NOTE — ADDENDUM NOTE
Encounter addended by: Michelle Ruvalcaba MD on: 3/1/2023 8:16 AM   Actions taken: Clinical Note Signed

## 2023-03-01 NOTE — PROGRESS NOTES
On Treatment Visit       Patient: Korey Rodriguez   YOB: 1945   Medical Record Number: [id number]     Date of Visit  March 1, 2023   Primary Diagnosis:Malignant neoplasm of overlapping sites of bladder (HCC) [C67.8]  Cancer Staging: Cancer Staging   No matching staging information was found for the patient.       was seen today for an on treatment visit.  He is receiving radiation therapy to the pelvis.  He  has received 540 cGy in 3 fractions out of a planned dose of 5040 cGy in 28 fractions.  He received chemotherapy prior to radiation and will continue after completion of radiation.     Today on exam the patient is tolerating radiation therapy well and has no new disease or treatment-related complaints.  He is on Eliquis now for a left lower extremity DVT.                                            Review of Systems:   Review of Systems   Constitutional: Positive for fatigue (5/10). Negative for appetite change and unexpected weight change.   Respiratory: Negative for cough and shortness of breath.    Cardiovascular: Positive for leg swelling (Left leg, currently being treated for blood clot.). Negative for chest pain and palpitations.   Gastrointestinal: Negative for blood in stool, constipation, diarrhea, nausea and vomiting.   Genitourinary: Negative for hematuria.        Has urostomy with clear, yellow urine.   Musculoskeletal: Positive for back pain (Chronic, lower back.) and joint swelling (Left ankle, currently has a blood clot). Negative for arthralgias and gait problem.   Skin: Positive for rash (Noted on face.). Negative for color change.   Neurological: Negative for dizziness, weakness and headaches.   Psychiatric/Behavioral: Negative for sleep disturbance.       Vitals:     Vitals:    03/01/23 0753   BP: 147/68   Pulse: 77   Resp: 16   Temp: 98.6 °F (37 °C)   SpO2: 97%       Weight:   Wt Readings from Last 3 Encounters:   03/01/23 93 kg (205 lb 0.4 oz)   02/16/23 92.3 kg (203  lb 7.8 oz)   02/15/23 91.5 kg (201 lb 11.5 oz)      Pain:    Pain Score    03/01/23 0753   PainSc: 0-No pain         Physical Exam:  Physical Exam  Constitutional:       General: He is not in acute distress.  Pulmonary:      Effort: Pulmonary effort is normal.   Abdominal:      Comments: Right-sided urinary ostomy.  Site clean.  Bag contains clear yellow urine.   Musculoskeletal:      Left lower leg: Edema (3+) present.   Skin:     Findings: Rash present.   Neurological:      Mental Status: He is alert.           Plan: I have reviewed treatment setup notes, checked and approved the daily guidance images.  I reviewed dose delivery, treatment parameters and deemed them appropriate. We plan to continue radiation therapy as prescribed.  Encouraged leg elevation.      Michelle Ruvalcaba MD  Radiation Oncology   Electronically signed 3/1/2023  08:11 EST

## 2023-03-02 ENCOUNTER — HOSPITAL ENCOUNTER (OUTPATIENT)
Dept: RADIATION ONCOLOGY | Facility: HOSPITAL | Age: 78
Discharge: HOME | End: 2023-03-02

## 2023-03-02 VITALS — BODY MASS INDEX: 28.17 KG/M2 | WEIGHT: 203.71 LBS

## 2023-03-02 LAB
RAD ONC ARIA COURSE ID: NORMAL
RAD ONC ARIA COURSE INTENT: NORMAL
RAD ONC ARIA COURSE LAST TREATMENT DATE: NORMAL
RAD ONC ARIA COURSE START DATE: NORMAL
RAD ONC ARIA COURSE TREATMENT ELAPSED DAYS: 3
RAD ONC ARIA FIRST TREATMENT DATE: NORMAL
RAD ONC ARIA PLAN FRACTIONS TREATED TO DATE: 4
RAD ONC ARIA PLAN ID: NORMAL
RAD ONC ARIA PLAN PRESCRIBED DOSE PER FRACTION: 1.8 GY
RAD ONC ARIA PLAN PRIMARY REFERENCE POINT: NORMAL
RAD ONC ARIA PLAN TOTAL FRACTIONS PRESCRIBED: 28
RAD ONC ARIA PLAN TOTAL PRESCRIBED DOSE: 5040 CGY
RAD ONC ARIA REFERENCE POINT DOSAGE GIVEN TO DATE: 7.2 GY
RAD ONC ARIA REFERENCE POINT ID: NORMAL
RAD ONC ARIA REFERENCE POINT SESSION DOSAGE GIVEN: 1.8 GY

## 2023-03-02 PROCEDURE — 77386: CPT | Performed by: RADIOLOGY

## 2023-03-02 PROCEDURE — 77014 CHG CT GUIDANCE RADIATION THERAPY FLDS PLACEMENT: CPT | Performed by: RADIOLOGY

## 2023-03-02 NOTE — PROGRESS NOTES
Outpatient Nutrition Oncology Assessment    Patient Name: Korey Rodriguez  YOB: 1945  MRN: [id number]  Assessment Date: 3/2/2023    CLINICAL NUTRITION ASSESSMENT    Dx:  Bladder Ca      Type of Cancer Treatment XRT to dav pelvis          Reason for Assessment  Assessment, RN referral       Current Problems:   Patient Active Problem List   Diagnosis Code   • Gross hematuria R31.0   • Nephrolithiasis N20.0   • Hematuria R31.9   • AAA (abdominal aortic aneurysm) without rupture I71.40   • Aneurysm of iliac artery (HCC) I72.3   • Bilateral hearing loss H91.93   • Chronic back pain M54.9, G89.29   • Encounter for general adult medical examination without abnormal findings Z00.00   • Hearing loss H91.90   • 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 (HCC) C67.2   • Mixed hyperlipidemia E78.2   • 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   • Tinnitus H93.19   • UTI (urinary tract infection) N39.0   • Aneurysm of iliac artery (HCC) I72.3   • Aortic aneurysm (HCC) I71.9   • Bilateral hearing loss H91.93   • Blood in urine R31.9   • 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    Weight Hx  Wt Readings from Last 30 Encounters:   03/02/23 0700 92.4 kg (203 lb 11.3 oz)   03/01/23 0753 93 kg (205 lb 0.4 oz)   02/16/23 0933 92.3 kg (203 lb 7.8 oz)   02/15/23 0846 91.5 kg (201 lb 11.5 oz)   02/08/23 0758 89.7 kg (197 lb 12 oz)   01/25/23 0815 89 kg (196 lb 3.4 oz)   01/23/23 1316 88.9 kg (196 lb)   01/19/23 0958 90.4 kg (199 lb 4.7 oz)   01/18/23 0758 88.1 kg (194 lb 3.6  oz)   01/17/23 0848 87.9 kg (193 lb 12.6 oz)   01/12/23 0754 87.9 kg (193 lb 12.6 oz)   01/10/23 1001 88.5 kg (195 lb 1.7 oz)   01/09/23 0739 87.9 kg (193 lb 12.6 oz)   01/06/23 1321 86.5 kg (190 lb 12.8 oz)   01/03/23 1415 88.2 kg (194 lb 7.1 oz)   10/19/22 1100 90.6 kg (199 lb 11.8 oz)   10/16/22 1751 91 kg (200 lb 9.9 oz)   10/16/22 1132 91.8 kg (202 lb 6.1 oz)   10/13/22 2326 92.4 kg (203 lb 11.3 oz)         Estimated/Assessed Needs - Anthropometrics     Row Name 03/02/23 0757 03/02/23 0700       Anthropometrics    Weight -- 92.4 kg (203 lb 11.3 oz)    Weight for Calculation 92.4 kg (203 lb 11.3 oz) --       Estimated/Assessed Needs    Additional Documentation KCAL/KG (Group);Protein Requirements (Group);Fluid Requirements (Group) --       KCAL/KG    KCAL/KG 30 Kcal/Kg (kcal) --    30 Kcal/Kg (kcal) 2772 --       Protein Requirements    Weight Used For Protein Calculations 92.4 kg (203 lb 11.3 oz) --    Est Protein Requirement Amount (gms/kg) 1.3 gm protein --    Estimated Protein Requirements (gms/day) 120.12 --       Fluid Requirements    Fluid Requirements (mL/day) 2771 --    RDA Method (mL) 2771 --                 Labs/Medications        Pertinent Labs Reviewed.       Pertinent Medications OLANZapine, apixaban, atorvastatin, meloxicam, ondansetron, and sennosides-docusate     Current Nutrition Orders & Evaluation of Intake       Oral Nutrition     Current PO Diet 3 regular meals/day; consumes a healthy variety of meats, fruits, vegetables, minimal caffeine (mainly water that he sips on all day- takes a Yeti with him everywhere)   Supplement Not consuming at this time, however has Boost supplements at home     Nutrition Diagnosis        Nutrition Dx Problem 1 Increased nutrient needs related to increased nutrient needs due to catabolic disease as evidenced by physiological causes increasing nutrient needs. and hypermetabolic state.       Nutrition Intervention       RD Action Nutrition  assessment    Discussion with pt regarding:  Current nutritional intake / nutritional state  Importance of wt maintenance during XRT through adequate consumption of kcals, proteins, and fluids for hydration  Possible side effect:  Diarrhea  Brief description / review of MNT for diarrhea (explained RD will provide more detailed list to go by if he begins experiencing loose BMs or diarrhea)     Monitor/Evaluation       Monitor Per oncology nutrition protocol.     Comments:    Initial week of XRT to pelvic region.  Pt reports he was seen by Oncology RD, Anayeli, when receiving his chemotherapy and more recently on via phone on 2/16/23.  Pt has received handouts regarding:  high kcal, high protein food sources, altered taste, diarrhea, n/v, and oral care.  Today pt reports his appetite has increased since receiving chemo, however he had dropped from 210# to 183# during that time.  Wt back up to 203#.  He struggled to eat during chemotherapy due to above nutrition-related concerns.  Pt denies nutrition-related concerns at this time.  His wife is very supportive about making sure he eats properly and eats a healthy variety.  Pt was previously constipated and eats a variety of fruits / veg / high fiber foods.  Explained MNT briefly for diarrhea, however will provide handout for reference if pt experiences this during XRT. (Pt to be monitored weekly in XRT by Oncology RD).    Electronically signed by:  Ana Atkins RD  03/02/23 07:57 EST

## 2023-03-03 ENCOUNTER — HOSPITAL ENCOUNTER (OUTPATIENT)
Dept: RADIATION ONCOLOGY | Facility: HOSPITAL | Age: 78
Discharge: HOME | End: 2023-03-03

## 2023-03-03 LAB
RAD ONC ARIA COURSE ID: NORMAL
RAD ONC ARIA COURSE INTENT: NORMAL
RAD ONC ARIA COURSE LAST TREATMENT DATE: NORMAL
RAD ONC ARIA COURSE START DATE: NORMAL
RAD ONC ARIA COURSE TREATMENT ELAPSED DAYS: 4
RAD ONC ARIA FIRST TREATMENT DATE: NORMAL
RAD ONC ARIA PLAN FRACTIONS TREATED TO DATE: 5
RAD ONC ARIA PLAN ID: NORMAL
RAD ONC ARIA PLAN PRESCRIBED DOSE PER FRACTION: 1.8 GY
RAD ONC ARIA PLAN PRIMARY REFERENCE POINT: NORMAL
RAD ONC ARIA PLAN TOTAL FRACTIONS PRESCRIBED: 28
RAD ONC ARIA PLAN TOTAL PRESCRIBED DOSE: 5040 CGY
RAD ONC ARIA REFERENCE POINT DOSAGE GIVEN TO DATE: 9 GY
RAD ONC ARIA REFERENCE POINT ID: NORMAL
RAD ONC ARIA REFERENCE POINT SESSION DOSAGE GIVEN: 1.8 GY

## 2023-03-03 PROCEDURE — 77336 RADIATION PHYSICS CONSULT: CPT | Performed by: RADIOLOGY

## 2023-03-03 PROCEDURE — 77014 CHG CT GUIDANCE RADIATION THERAPY FLDS PLACEMENT: CPT | Performed by: RADIOLOGY

## 2023-03-03 PROCEDURE — 77386: CPT | Performed by: RADIOLOGY

## 2023-03-06 ENCOUNTER — HOSPITAL ENCOUNTER (OUTPATIENT)
Dept: RADIATION ONCOLOGY | Facility: HOSPITAL | Age: 78
Discharge: HOME | End: 2023-03-06

## 2023-03-06 LAB
RAD ONC ARIA COURSE ID: NORMAL
RAD ONC ARIA COURSE INTENT: NORMAL
RAD ONC ARIA COURSE LAST TREATMENT DATE: NORMAL
RAD ONC ARIA COURSE START DATE: NORMAL
RAD ONC ARIA COURSE TREATMENT ELAPSED DAYS: 7
RAD ONC ARIA FIRST TREATMENT DATE: NORMAL
RAD ONC ARIA PLAN FRACTIONS TREATED TO DATE: 6
RAD ONC ARIA PLAN ID: NORMAL
RAD ONC ARIA PLAN PRESCRIBED DOSE PER FRACTION: 1.8 GY
RAD ONC ARIA PLAN PRIMARY REFERENCE POINT: NORMAL
RAD ONC ARIA PLAN TOTAL FRACTIONS PRESCRIBED: 28
RAD ONC ARIA PLAN TOTAL PRESCRIBED DOSE: 5040 CGY
RAD ONC ARIA REFERENCE POINT DOSAGE GIVEN TO DATE: 10.8 GY
RAD ONC ARIA REFERENCE POINT ID: NORMAL
RAD ONC ARIA REFERENCE POINT SESSION DOSAGE GIVEN: 1.8 GY

## 2023-03-06 PROCEDURE — 77014 CHG CT GUIDANCE RADIATION THERAPY FLDS PLACEMENT: CPT | Performed by: RADIOLOGY

## 2023-03-06 PROCEDURE — 77427 RADIATION TX MANAGEMENT X5: CPT | Performed by: RADIOLOGY

## 2023-03-06 PROCEDURE — 77386: CPT | Performed by: RADIOLOGY

## 2023-03-07 ENCOUNTER — HOSPITAL ENCOUNTER (OUTPATIENT)
Dept: RADIATION ONCOLOGY | Facility: HOSPITAL | Age: 78
Discharge: HOME | End: 2023-03-07

## 2023-03-07 VITALS
DIASTOLIC BLOOD PRESSURE: 82 MMHG | RESPIRATION RATE: 16 BRPM | BODY MASS INDEX: 27.9 KG/M2 | TEMPERATURE: 97.8 F | OXYGEN SATURATION: 99 % | HEART RATE: 95 BPM | WEIGHT: 201.72 LBS | SYSTOLIC BLOOD PRESSURE: 146 MMHG

## 2023-03-07 DIAGNOSIS — C67.8 MALIGNANT NEOPLASM OF OVERLAPPING SITES OF BLADDER: Primary | ICD-10-CM

## 2023-03-07 PROBLEM — Z79.01 LONG TERM (CURRENT) USE OF ANTICOAGULANTS: Status: ACTIVE | Noted: 2023-02-07

## 2023-03-07 LAB
RAD ONC ARIA COURSE ID: NORMAL
RAD ONC ARIA COURSE INTENT: NORMAL
RAD ONC ARIA COURSE LAST TREATMENT DATE: NORMAL
RAD ONC ARIA COURSE START DATE: NORMAL
RAD ONC ARIA COURSE TREATMENT ELAPSED DAYS: 8
RAD ONC ARIA FIRST TREATMENT DATE: NORMAL
RAD ONC ARIA PLAN FRACTIONS TREATED TO DATE: 7
RAD ONC ARIA PLAN ID: NORMAL
RAD ONC ARIA PLAN PRESCRIBED DOSE PER FRACTION: 1.8 GY
RAD ONC ARIA PLAN PRIMARY REFERENCE POINT: NORMAL
RAD ONC ARIA PLAN TOTAL FRACTIONS PRESCRIBED: 28
RAD ONC ARIA PLAN TOTAL PRESCRIBED DOSE: 5040 CGY
RAD ONC ARIA REFERENCE POINT DOSAGE GIVEN TO DATE: 12.6 GY
RAD ONC ARIA REFERENCE POINT ID: NORMAL
RAD ONC ARIA REFERENCE POINT SESSION DOSAGE GIVEN: 1.8 GY

## 2023-03-07 PROCEDURE — 77014 CHG CT GUIDANCE RADIATION THERAPY FLDS PLACEMENT: CPT | Performed by: RADIOLOGY

## 2023-03-07 PROCEDURE — 77386: CPT | Performed by: RADIOLOGY

## 2023-03-07 RX ORDER — ACETAMINOPHEN 325 MG/1
975 TABLET ORAL
COMMUNITY
Start: 2022-12-14

## 2023-03-07 NOTE — PROGRESS NOTES
On Treatment Note      Encounter Date: 03/07/2023   Patient Name: Korey Rodriguez  YOB: 1945   Medical Record Number: [id number]         Treatment Site: pelvis  Prescribed dose: 50.4 Gy/28 fractions  Completed dose: 12.6 Gy/7 fractions  Date of First Treatment: 2/27/23      Tolerance: doing well    Radiation related toxicities and management plan: fatigue, loose stool    He is receiving concurrent chemotherapy: no    Issues raised by patient or treatment team: none      Subjective      Review of Systems: Review of Systems   Constitutional: Positive for fatigue. Negative for appetite change and fever (5/10).   HENT: Positive for tinnitus (chronic). Negative for sore throat and trouble swallowing.    Respiratory: Negative for cough and shortness of breath.    Gastrointestinal: Positive for diarrhea (last friday had one episode but none since. ). Negative for constipation, nausea and vomiting.   Genitourinary: Negative for difficulty urinating, dysuria, frequency and urgency.   Musculoskeletal: Positive for back pain (chronic) and joint swelling (left ankle, pt states has blood clot there, discovered one month ago by Dr. Coburn.).   Skin: Negative for color change and rash.   Neurological: Negative for dizziness, light-headedness and headaches.   Psychiatric/Behavioral: Negative for sleep disturbance.       The following portions of the patient's history were reviewed and updated as appropriate: allergies, current medications, past family history, past medical history, past social history, past surgical history and problem list.    Measures:   Pain: (on a scale of 0-10)   Pain Score    03/07/23 0757   PainSc: 0-No pain       Advanced Care Plan: N Advance Care Planning   ACP discussion was declined by the patient. Patient does not have an advance directive, declines further assistance.       KPS/Quality of Life: 80 - Restricted Physical Activity  ECOG: (1) Restricted  in physically strenuous activity, ambulatory and able to do work of light nature  Depression Screening:   PHQ-9 score of 0 on 2/7/2023.         Objective     Physical Exam:   Vital Signs:   Vitals:    03/07/23 0757   BP: 146/82   Pulse: 95   Resp: 16   Temp: 97.8 °F (36.6 °C)   SpO2: 99%      Body mass index is 27.9 kg/m².   Wt Readings from Last 3 Encounters:   03/07/23 91.5 kg (201 lb 11.5 oz)   03/02/23 92.4 kg (203 lb 11.3 oz)   03/01/23 93 kg (205 lb 0.4 oz)       General: NAD, sitting comfortably  Eye: EOMI, anicteric sclerae  Respiratory: Symmetric expansion, nonlabored respiration  Neuro: Alert oriented x3, cranial nerves III through XII are grossly intact.  Psych: Mood and affect appropriate      Assessment / Plan      Plan:   I reviewed technical aspects of the radiation therapy treatment administration including dose delivery and daily treatment parameters to verify that these meet the specifications from my clinical treatment planning note. I reviewed the treatment setup notes. I reviewed and approved images obtained for “image guidance” with setup and treatment.     We will continue radiation therapy as prescribed.        Kimberly Melgoza MD  Radiation Oncology  [facility]    This document has been signed by Kimberly Melgoza MD on March 7, 2023 10:35 EST

## 2023-03-08 ENCOUNTER — HOSPITAL ENCOUNTER (OUTPATIENT)
Dept: RADIATION ONCOLOGY | Facility: HOSPITAL | Age: 78
Discharge: HOME | End: 2023-03-08

## 2023-03-08 LAB
RAD ONC ARIA COURSE ID: NORMAL
RAD ONC ARIA COURSE INTENT: NORMAL
RAD ONC ARIA COURSE LAST TREATMENT DATE: NORMAL
RAD ONC ARIA COURSE START DATE: NORMAL
RAD ONC ARIA COURSE TREATMENT ELAPSED DAYS: 9
RAD ONC ARIA FIRST TREATMENT DATE: NORMAL
RAD ONC ARIA PLAN FRACTIONS TREATED TO DATE: 8
RAD ONC ARIA PLAN ID: NORMAL
RAD ONC ARIA PLAN PRESCRIBED DOSE PER FRACTION: 1.8 GY
RAD ONC ARIA PLAN PRIMARY REFERENCE POINT: NORMAL
RAD ONC ARIA PLAN TOTAL FRACTIONS PRESCRIBED: 28
RAD ONC ARIA PLAN TOTAL PRESCRIBED DOSE: 5040 CGY
RAD ONC ARIA REFERENCE POINT DOSAGE GIVEN TO DATE: 14.4 GY
RAD ONC ARIA REFERENCE POINT ID: NORMAL
RAD ONC ARIA REFERENCE POINT SESSION DOSAGE GIVEN: 1.8 GY

## 2023-03-08 PROCEDURE — 77014 CHG CT GUIDANCE RADIATION THERAPY FLDS PLACEMENT: CPT | Performed by: RADIOLOGY

## 2023-03-08 PROCEDURE — 77386: CPT | Performed by: RADIOLOGY

## 2023-03-09 ENCOUNTER — HOSPITAL ENCOUNTER (OUTPATIENT)
Dept: RADIATION ONCOLOGY | Facility: HOSPITAL | Age: 78
Discharge: HOME | End: 2023-03-09

## 2023-03-09 LAB
RAD ONC ARIA COURSE ID: NORMAL
RAD ONC ARIA COURSE INTENT: NORMAL
RAD ONC ARIA COURSE LAST TREATMENT DATE: NORMAL
RAD ONC ARIA COURSE START DATE: NORMAL
RAD ONC ARIA COURSE TREATMENT ELAPSED DAYS: 10
RAD ONC ARIA FIRST TREATMENT DATE: NORMAL
RAD ONC ARIA PLAN FRACTIONS TREATED TO DATE: 9
RAD ONC ARIA PLAN ID: NORMAL
RAD ONC ARIA PLAN PRESCRIBED DOSE PER FRACTION: 1.8 GY
RAD ONC ARIA PLAN PRIMARY REFERENCE POINT: NORMAL
RAD ONC ARIA PLAN TOTAL FRACTIONS PRESCRIBED: 28
RAD ONC ARIA PLAN TOTAL PRESCRIBED DOSE: 5040 CGY
RAD ONC ARIA REFERENCE POINT DOSAGE GIVEN TO DATE: 16.2 GY
RAD ONC ARIA REFERENCE POINT ID: NORMAL
RAD ONC ARIA REFERENCE POINT SESSION DOSAGE GIVEN: 1.8 GY

## 2023-03-09 PROCEDURE — 77386: CPT | Performed by: RADIOLOGY

## 2023-03-09 PROCEDURE — 77014 CHG CT GUIDANCE RADIATION THERAPY FLDS PLACEMENT: CPT | Performed by: RADIOLOGY

## 2023-03-10 ENCOUNTER — HOSPITAL ENCOUNTER (OUTPATIENT)
Dept: RADIATION ONCOLOGY | Facility: HOSPITAL | Age: 78
Discharge: HOME | End: 2023-03-10
Payer: MEDICARE

## 2023-03-10 LAB
RAD ONC ARIA COURSE ID: NORMAL
RAD ONC ARIA COURSE INTENT: NORMAL
RAD ONC ARIA COURSE LAST TREATMENT DATE: NORMAL
RAD ONC ARIA COURSE START DATE: NORMAL
RAD ONC ARIA COURSE TREATMENT ELAPSED DAYS: 11
RAD ONC ARIA FIRST TREATMENT DATE: NORMAL
RAD ONC ARIA PLAN FRACTIONS TREATED TO DATE: 10
RAD ONC ARIA PLAN ID: NORMAL
RAD ONC ARIA PLAN PRESCRIBED DOSE PER FRACTION: 1.8 GY
RAD ONC ARIA PLAN PRIMARY REFERENCE POINT: NORMAL
RAD ONC ARIA PLAN TOTAL FRACTIONS PRESCRIBED: 28
RAD ONC ARIA PLAN TOTAL PRESCRIBED DOSE: 5040 CGY
RAD ONC ARIA REFERENCE POINT DOSAGE GIVEN TO DATE: 18 GY
RAD ONC ARIA REFERENCE POINT ID: NORMAL
RAD ONC ARIA REFERENCE POINT SESSION DOSAGE GIVEN: 1.8 GY

## 2023-03-10 PROCEDURE — 77386: CPT | Performed by: RADIOLOGY

## 2023-03-10 PROCEDURE — 77336 RADIATION PHYSICS CONSULT: CPT | Performed by: RADIOLOGY

## 2023-03-10 PROCEDURE — 77014 CHG CT GUIDANCE RADIATION THERAPY FLDS PLACEMENT: CPT | Performed by: RADIOLOGY

## 2023-03-13 ENCOUNTER — HOSPITAL ENCOUNTER (OUTPATIENT)
Dept: RADIATION ONCOLOGY | Facility: HOSPITAL | Age: 78
Discharge: HOME | End: 2023-03-13

## 2023-03-13 LAB
RAD ONC ARIA COURSE ID: NORMAL
RAD ONC ARIA COURSE INTENT: NORMAL
RAD ONC ARIA COURSE LAST TREATMENT DATE: NORMAL
RAD ONC ARIA COURSE START DATE: NORMAL
RAD ONC ARIA COURSE TREATMENT ELAPSED DAYS: 14
RAD ONC ARIA FIRST TREATMENT DATE: NORMAL
RAD ONC ARIA PLAN FRACTIONS TREATED TO DATE: 11
RAD ONC ARIA PLAN ID: NORMAL
RAD ONC ARIA PLAN PRESCRIBED DOSE PER FRACTION: 1.8 GY
RAD ONC ARIA PLAN PRIMARY REFERENCE POINT: NORMAL
RAD ONC ARIA PLAN TOTAL FRACTIONS PRESCRIBED: 28
RAD ONC ARIA PLAN TOTAL PRESCRIBED DOSE: 5040 CGY
RAD ONC ARIA REFERENCE POINT DOSAGE GIVEN TO DATE: 19.8 GY
RAD ONC ARIA REFERENCE POINT ID: NORMAL
RAD ONC ARIA REFERENCE POINT SESSION DOSAGE GIVEN: 1.8 GY

## 2023-03-13 PROCEDURE — 77427 RADIATION TX MANAGEMENT X5: CPT | Performed by: RADIOLOGY

## 2023-03-13 PROCEDURE — 77386: CPT | Performed by: RADIOLOGY

## 2023-03-13 PROCEDURE — 77014 CHG CT GUIDANCE RADIATION THERAPY FLDS PLACEMENT: CPT | Performed by: RADIOLOGY

## 2023-03-14 ENCOUNTER — HOSPITAL ENCOUNTER (OUTPATIENT)
Dept: RADIATION ONCOLOGY | Facility: HOSPITAL | Age: 78
Discharge: HOME | End: 2023-03-14

## 2023-03-14 VITALS
OXYGEN SATURATION: 99 % | BODY MASS INDEX: 27.72 KG/M2 | HEART RATE: 80 BPM | RESPIRATION RATE: 16 BRPM | WEIGHT: 200.4 LBS | DIASTOLIC BLOOD PRESSURE: 78 MMHG | SYSTOLIC BLOOD PRESSURE: 153 MMHG | TEMPERATURE: 98 F

## 2023-03-14 DIAGNOSIS — C67.8 MALIGNANT NEOPLASM OF OVERLAPPING SITES OF BLADDER: Primary | ICD-10-CM

## 2023-03-14 LAB
RAD ONC ARIA COURSE ID: NORMAL
RAD ONC ARIA COURSE INTENT: NORMAL
RAD ONC ARIA COURSE LAST TREATMENT DATE: NORMAL
RAD ONC ARIA COURSE START DATE: NORMAL
RAD ONC ARIA COURSE TREATMENT ELAPSED DAYS: 15
RAD ONC ARIA FIRST TREATMENT DATE: NORMAL
RAD ONC ARIA PLAN FRACTIONS TREATED TO DATE: 12
RAD ONC ARIA PLAN ID: NORMAL
RAD ONC ARIA PLAN PRESCRIBED DOSE PER FRACTION: 1.8 GY
RAD ONC ARIA PLAN PRIMARY REFERENCE POINT: NORMAL
RAD ONC ARIA PLAN TOTAL FRACTIONS PRESCRIBED: 28
RAD ONC ARIA PLAN TOTAL PRESCRIBED DOSE: 5040 CGY
RAD ONC ARIA REFERENCE POINT DOSAGE GIVEN TO DATE: 21.6 GY
RAD ONC ARIA REFERENCE POINT ID: NORMAL
RAD ONC ARIA REFERENCE POINT SESSION DOSAGE GIVEN: 1.8 GY

## 2023-03-14 PROCEDURE — 77014 CHG CT GUIDANCE RADIATION THERAPY FLDS PLACEMENT: CPT | Performed by: RADIOLOGY

## 2023-03-14 PROCEDURE — 77386: CPT | Performed by: RADIOLOGY

## 2023-03-14 NOTE — PROGRESS NOTES
On Treatment Note      Encounter Date: 03/14/2023   Patient Name: Korey Rodriguez  YOB: 1945   Medical Record Number: [id number]         Treatment Site: pelvis  Prescribed dose: 50.4 Gy/28 fractions  Completed dose: 21.6 Gy/12 fractions  Date of First Treatment: 2/27/23      Tolerance: doing well    Radiation related toxicities and management plan: fatigue, loose stool    He is receiving concurrent chemotherapy: no    Issues raised by patient or treatment team: none      Subjective      Review of Systems: Review of Systems   Constitutional: Positive for fatigue (5/10) and unexpected weight change (Down 2lbs since last week.). Negative for appetite change.   HENT: Positive for tinnitus (chronic). Negative for sore throat and trouble swallowing.    Respiratory: Negative for cough and shortness of breath.    Cardiovascular: Negative for chest pain and palpitations.   Gastrointestinal: Negative for blood in stool, constipation, diarrhea, nausea and vomiting.   Genitourinary: Negative for hematuria.        Has urostomy- has clear, yellow urine.     Musculoskeletal: Positive for back pain (chronic) and joint swelling (left ankle, pt states has blood clot there, discovered one month ago by Dr. Coburn.   States that swelling has decreased.). Negative for arthralgias.   Skin: Negative for color change and rash.   Neurological: Negative for dizziness, weakness, light-headedness and headaches.   Psychiatric/Behavioral: Negative for sleep disturbance.       The following portions of the patient's history were reviewed and updated as appropriate: allergies, current medications, past family history, past medical history, past social history, past surgical history and problem list.    Measures:   Pain: (on a scale of 0-10)   Pain Score    03/14/23 0800   PainSc: 0-No pain       Advanced Care Plan: N Advance Care Planning   ACP discussion was declined by the patient. Patient  does not have an advance directive, declines further assistance.       KPS/Quality of Life: 80 - Restricted Physical Activity  ECOG: (1) Restricted in physically strenuous activity, ambulatory and able to do work of light nature  Depression Screening:   PHQ-9 score of 0 on 2/7/2023.         Objective     Physical Exam:   Vital Signs:   Vitals:    03/14/23 0800   BP: 153/78   Pulse: 80   Resp: 16   Temp: 98 °F (36.7 °C)   SpO2: 99%      Body mass index is 27.72 kg/m².   Wt Readings from Last 3 Encounters:   03/14/23 90.9 kg (200 lb 6.4 oz)   03/07/23 91.5 kg (201 lb 11.5 oz)   03/02/23 92.4 kg (203 lb 11.3 oz)       General: NAD, sitting comfortably  Eye: EOMI, anicteric sclerae  Respiratory: Symmetric expansion, nonlabored respiration  Neuro: Alert oriented x3, cranial nerves III through XII are grossly intact.  Psych: Mood and affect appropriate      Assessment / Plan      Plan:   I reviewed technical aspects of the radiation therapy treatment administration including dose delivery and daily treatment parameters to verify that these meet the specifications from my clinical treatment planning note. I reviewed the treatment setup notes. I reviewed and approved images obtained for “image guidance” with setup and treatment.     We will continue radiation therapy as prescribed.        Kimberly Melgoza MD  Radiation Oncology  [facility]    This document has been signed by Kimberly Melgoza MD on March 14, 2023 08:35 EDT

## 2023-03-15 ENCOUNTER — HOSPITAL ENCOUNTER (OUTPATIENT)
Dept: RADIATION ONCOLOGY | Facility: HOSPITAL | Age: 78
Discharge: HOME | End: 2023-03-15

## 2023-03-15 LAB
RAD ONC ARIA COURSE ID: NORMAL
RAD ONC ARIA COURSE INTENT: NORMAL
RAD ONC ARIA COURSE LAST TREATMENT DATE: NORMAL
RAD ONC ARIA COURSE START DATE: NORMAL
RAD ONC ARIA COURSE TREATMENT ELAPSED DAYS: 16
RAD ONC ARIA FIRST TREATMENT DATE: NORMAL
RAD ONC ARIA PLAN FRACTIONS TREATED TO DATE: 13
RAD ONC ARIA PLAN ID: NORMAL
RAD ONC ARIA PLAN PRESCRIBED DOSE PER FRACTION: 1.8 GY
RAD ONC ARIA PLAN PRIMARY REFERENCE POINT: NORMAL
RAD ONC ARIA PLAN TOTAL FRACTIONS PRESCRIBED: 28
RAD ONC ARIA PLAN TOTAL PRESCRIBED DOSE: 5040 CGY
RAD ONC ARIA REFERENCE POINT DOSAGE GIVEN TO DATE: 23.4 GY
RAD ONC ARIA REFERENCE POINT ID: NORMAL
RAD ONC ARIA REFERENCE POINT SESSION DOSAGE GIVEN: 1.8 GY

## 2023-03-15 PROCEDURE — 77386: CPT | Performed by: RADIOLOGY

## 2023-03-15 PROCEDURE — 77014 CHG CT GUIDANCE RADIATION THERAPY FLDS PLACEMENT: CPT | Performed by: RADIOLOGY

## 2023-03-16 ENCOUNTER — HOSPITAL ENCOUNTER (OUTPATIENT)
Dept: RADIATION ONCOLOGY | Facility: HOSPITAL | Age: 78
Discharge: HOME | End: 2023-03-16

## 2023-03-16 VITALS — WEIGHT: 200.22 LBS | BODY MASS INDEX: 27.69 KG/M2

## 2023-03-16 LAB
RAD ONC ARIA COURSE ID: NORMAL
RAD ONC ARIA COURSE INTENT: NORMAL
RAD ONC ARIA COURSE LAST TREATMENT DATE: NORMAL
RAD ONC ARIA COURSE START DATE: NORMAL
RAD ONC ARIA COURSE TREATMENT ELAPSED DAYS: 17
RAD ONC ARIA FIRST TREATMENT DATE: NORMAL
RAD ONC ARIA PLAN FRACTIONS TREATED TO DATE: 14
RAD ONC ARIA PLAN ID: NORMAL
RAD ONC ARIA PLAN PRESCRIBED DOSE PER FRACTION: 1.8 GY
RAD ONC ARIA PLAN PRIMARY REFERENCE POINT: NORMAL
RAD ONC ARIA PLAN TOTAL FRACTIONS PRESCRIBED: 28
RAD ONC ARIA PLAN TOTAL PRESCRIBED DOSE: 5040 CGY
RAD ONC ARIA REFERENCE POINT DOSAGE GIVEN TO DATE: 25.2 GY
RAD ONC ARIA REFERENCE POINT ID: NORMAL
RAD ONC ARIA REFERENCE POINT SESSION DOSAGE GIVEN: 1.8 GY

## 2023-03-16 PROCEDURE — 77386: CPT | Performed by: RADIOLOGY

## 2023-03-16 PROCEDURE — 77014 CHG CT GUIDANCE RADIATION THERAPY FLDS PLACEMENT: CPT | Performed by: RADIOLOGY

## 2023-03-16 NOTE — PROGRESS NOTES
Outpatient Nutrition Oncology Follow Up    Patient Name: Korey Rodriguez  YOB: 1945  MRN: [id number]      Reason for Consult:  Radiation Oncology referral 3/14/23       Today's Weight:   90.8 kg    Weight Change: 1# wt decline in the past week    Nutrition-related concerns: Other: only reports a few episodes of diarrhea, Altered taste    Current PO intake:  3 regular meals/day, which includes a healthy variety of meats, fruits, vegetables, minimal caffeine, drinks a lot of water (reports wife prepares all meals from scratch & knowledgeable of importance of nutrition)     Consult or Intervention:  Pt denies concerns with appetite and reports to continue eating well w/ good support from wife.  He reports only a few episodes of diarrhea- nothing severe.  He states he has had some altered taste this week.  Last infusion of Cisplatin / Gemzar 3 weeks ago & pt reports he did not experience this issue previously.  Provided homemade mouth rinse recipe & encouraged to use before & after eating meals.  Pt has been using an [employer] brand of Listerine w/ alcohol, therefore discouraged pt from using this during tx.    Nutrition Diagnosis: Increased nutrient needs related to increased nutrient needs due to catabolic disease as evidenced by physiological causes increasing nutrient needs.    Plan: Will follow up per oncology nutrition protocol

## 2023-03-17 ENCOUNTER — HOSPITAL ENCOUNTER (OUTPATIENT)
Dept: RADIATION ONCOLOGY | Facility: HOSPITAL | Age: 78
Discharge: HOME | End: 2023-03-17

## 2023-03-17 LAB
RAD ONC ARIA COURSE ID: NORMAL
RAD ONC ARIA COURSE INTENT: NORMAL
RAD ONC ARIA COURSE LAST TREATMENT DATE: NORMAL
RAD ONC ARIA COURSE START DATE: NORMAL
RAD ONC ARIA COURSE TREATMENT ELAPSED DAYS: 18
RAD ONC ARIA FIRST TREATMENT DATE: NORMAL
RAD ONC ARIA PLAN FRACTIONS TREATED TO DATE: 15
RAD ONC ARIA PLAN ID: NORMAL
RAD ONC ARIA PLAN PRESCRIBED DOSE PER FRACTION: 1.8 GY
RAD ONC ARIA PLAN PRIMARY REFERENCE POINT: NORMAL
RAD ONC ARIA PLAN TOTAL FRACTIONS PRESCRIBED: 28
RAD ONC ARIA PLAN TOTAL PRESCRIBED DOSE: 5040 CGY
RAD ONC ARIA REFERENCE POINT DOSAGE GIVEN TO DATE: 27 GY
RAD ONC ARIA REFERENCE POINT ID: NORMAL
RAD ONC ARIA REFERENCE POINT SESSION DOSAGE GIVEN: 1.8 GY

## 2023-03-17 PROCEDURE — 77014 CHG CT GUIDANCE RADIATION THERAPY FLDS PLACEMENT: CPT | Performed by: RADIOLOGY

## 2023-03-17 PROCEDURE — 77386: CPT | Performed by: RADIOLOGY

## 2023-03-17 PROCEDURE — 77336 RADIATION PHYSICS CONSULT: CPT | Performed by: RADIOLOGY

## 2023-03-20 ENCOUNTER — HOSPITAL ENCOUNTER (OUTPATIENT)
Dept: RADIATION ONCOLOGY | Facility: HOSPITAL | Age: 78
Discharge: HOME | End: 2023-03-20

## 2023-03-20 LAB
RAD ONC ARIA COURSE ID: NORMAL
RAD ONC ARIA COURSE INTENT: NORMAL
RAD ONC ARIA COURSE LAST TREATMENT DATE: NORMAL
RAD ONC ARIA COURSE START DATE: NORMAL
RAD ONC ARIA COURSE TREATMENT ELAPSED DAYS: 21
RAD ONC ARIA FIRST TREATMENT DATE: NORMAL
RAD ONC ARIA PLAN FRACTIONS TREATED TO DATE: 16
RAD ONC ARIA PLAN ID: NORMAL
RAD ONC ARIA PLAN PRESCRIBED DOSE PER FRACTION: 1.8 GY
RAD ONC ARIA PLAN PRIMARY REFERENCE POINT: NORMAL
RAD ONC ARIA PLAN TOTAL FRACTIONS PRESCRIBED: 28
RAD ONC ARIA PLAN TOTAL PRESCRIBED DOSE: 5040 CGY
RAD ONC ARIA REFERENCE POINT DOSAGE GIVEN TO DATE: 28.8 GY
RAD ONC ARIA REFERENCE POINT ID: NORMAL
RAD ONC ARIA REFERENCE POINT SESSION DOSAGE GIVEN: 1.8 GY

## 2023-03-20 PROCEDURE — 77427 RADIATION TX MANAGEMENT X5: CPT | Performed by: RADIOLOGY

## 2023-03-20 PROCEDURE — 77386: CPT | Performed by: RADIOLOGY

## 2023-03-20 PROCEDURE — 77014 CHG CT GUIDANCE RADIATION THERAPY FLDS PLACEMENT: CPT | Performed by: RADIOLOGY

## 2023-03-21 ENCOUNTER — HOSPITAL ENCOUNTER (OUTPATIENT)
Dept: RADIATION ONCOLOGY | Facility: HOSPITAL | Age: 78
Discharge: HOME | End: 2023-03-21

## 2023-03-21 VITALS
RESPIRATION RATE: 16 BRPM | WEIGHT: 199.74 LBS | SYSTOLIC BLOOD PRESSURE: 149 MMHG | BODY MASS INDEX: 27.62 KG/M2 | DIASTOLIC BLOOD PRESSURE: 66 MMHG | OXYGEN SATURATION: 99 % | TEMPERATURE: 97.8 F | HEART RATE: 80 BPM

## 2023-03-21 DIAGNOSIS — C67.8 MALIGNANT NEOPLASM OF OVERLAPPING SITES OF BLADDER: Primary | ICD-10-CM

## 2023-03-21 LAB
RAD ONC ARIA COURSE ID: NORMAL
RAD ONC ARIA COURSE INTENT: NORMAL
RAD ONC ARIA COURSE LAST TREATMENT DATE: NORMAL
RAD ONC ARIA COURSE START DATE: NORMAL
RAD ONC ARIA COURSE TREATMENT ELAPSED DAYS: 22
RAD ONC ARIA FIRST TREATMENT DATE: NORMAL
RAD ONC ARIA PLAN FRACTIONS TREATED TO DATE: 17
RAD ONC ARIA PLAN ID: NORMAL
RAD ONC ARIA PLAN PRESCRIBED DOSE PER FRACTION: 1.8 GY
RAD ONC ARIA PLAN PRIMARY REFERENCE POINT: NORMAL
RAD ONC ARIA PLAN TOTAL FRACTIONS PRESCRIBED: 28
RAD ONC ARIA PLAN TOTAL PRESCRIBED DOSE: 5040 CGY
RAD ONC ARIA REFERENCE POINT DOSAGE GIVEN TO DATE: 30.6 GY
RAD ONC ARIA REFERENCE POINT ID: NORMAL
RAD ONC ARIA REFERENCE POINT SESSION DOSAGE GIVEN: 1.8 GY

## 2023-03-21 PROCEDURE — 77386: CPT | Performed by: RADIOLOGY

## 2023-03-21 PROCEDURE — 77014 CHG CT GUIDANCE RADIATION THERAPY FLDS PLACEMENT: CPT | Performed by: RADIOLOGY

## 2023-03-21 NOTE — PROGRESS NOTES
On Treatment Note      Encounter Date: 03/21/2023   Patient Name: Korey Rodriguez  YOB: 1945   Medical Record Number: [id number]         Treatment Site: pelvis  Prescribed dose: 50.4 Gy/28 fractions  Completed dose: 30.6 Gy/17 fractions  Date of First Treatment: 2/27/23      Tolerance: doing well    Radiation related toxicities and management plan: fatigue, loose stool    He is receiving concurrent chemotherapy: no    Issues raised by patient or treatment team: none      Subjective      Review of Systems: Review of Systems   Constitutional: Positive for fatigue (5/10). Negative for appetite change and unexpected weight change (Down 2lbs since last week.).   HENT: Positive for tinnitus (chronic). Negative for sore throat and trouble swallowing.    Respiratory: Negative for cough and shortness of breath.    Cardiovascular: Negative for chest pain and palpitations.   Gastrointestinal: Positive for diarrhea. Negative for blood in stool, constipation, nausea and vomiting.   Genitourinary: Negative for hematuria.        Has urostomy- has clear, yellow urine.     Musculoskeletal: Positive for back pain (chronic) and joint swelling (left ankle, pt states has blood clot there, discovered one month ago by Dr. Coburn.   States that swelling has decreased.). Negative for arthralgias.   Skin: Negative for color change and rash.   Neurological: Negative for dizziness, weakness, light-headedness and headaches.   Psychiatric/Behavioral: Negative for sleep disturbance.       The following portions of the patient's history were reviewed and updated as appropriate: allergies, current medications, past family history, past medical history, past social history, past surgical history and problem list.    Measures:   Pain: (on a scale of 0-10)   Pain Score    03/21/23 0820   PainSc: 0-No pain       Advanced Care Plan: N Advance Care Planning   ACP discussion was declined by the  patient. Patient does not have an advance directive, declines further assistance.       KPS/Quality of Life: 80 - Restricted Physical Activity  ECOG: (1) Restricted in physically strenuous activity, ambulatory and able to do work of light nature  Depression Screening:   PHQ-9 score of 0 on 2/7/2023.         Objective     Physical Exam:   Vital Signs:   Vitals:    03/21/23 0820   BP: 149/66   Pulse: 80   Resp: 16   Temp: 97.8 °F (36.6 °C)   SpO2: 99%      Body mass index is 27.62 kg/m².   Wt Readings from Last 3 Encounters:   03/21/23 90.6 kg (199 lb 11.8 oz)   03/16/23 90.8 kg (200 lb 3.6 oz)   03/14/23 90.9 kg (200 lb 6.4 oz)       General: NAD, sitting comfortably  Eye: EOMI, anicteric sclerae  Respiratory: Symmetric expansion, nonlabored respiration  Neuro: Alert oriented x3, cranial nerves III through XII are grossly intact.  Psych: Mood and affect appropriate      Assessment / Plan      Plan:   I reviewed technical aspects of the radiation therapy treatment administration including dose delivery and daily treatment parameters to verify that these meet the specifications from my clinical treatment planning note. I reviewed the treatment setup notes. I reviewed and approved images obtained for “image guidance” with setup and treatment.     We will continue radiation therapy as prescribed.        Kimberly Melgoza MD  Radiation Oncology  [facility]    This document has been signed by Kimberly Melgoza MD on March 21, 2023 09:36 EDT

## 2023-03-22 ENCOUNTER — HOSPITAL ENCOUNTER (OUTPATIENT)
Dept: RADIATION ONCOLOGY | Facility: HOSPITAL | Age: 78
Discharge: HOME | End: 2023-03-22

## 2023-03-22 LAB
RAD ONC ARIA COURSE ID: NORMAL
RAD ONC ARIA COURSE INTENT: NORMAL
RAD ONC ARIA COURSE LAST TREATMENT DATE: NORMAL
RAD ONC ARIA COURSE START DATE: NORMAL
RAD ONC ARIA COURSE TREATMENT ELAPSED DAYS: 23
RAD ONC ARIA FIRST TREATMENT DATE: NORMAL
RAD ONC ARIA PLAN FRACTIONS TREATED TO DATE: 18
RAD ONC ARIA PLAN ID: NORMAL
RAD ONC ARIA PLAN PRESCRIBED DOSE PER FRACTION: 1.8 GY
RAD ONC ARIA PLAN PRIMARY REFERENCE POINT: NORMAL
RAD ONC ARIA PLAN TOTAL FRACTIONS PRESCRIBED: 28
RAD ONC ARIA PLAN TOTAL PRESCRIBED DOSE: 5040 CGY
RAD ONC ARIA REFERENCE POINT DOSAGE GIVEN TO DATE: 32.4 GY
RAD ONC ARIA REFERENCE POINT ID: NORMAL
RAD ONC ARIA REFERENCE POINT SESSION DOSAGE GIVEN: 1.8 GY

## 2023-03-22 PROCEDURE — 77386: CPT | Performed by: RADIOLOGY

## 2023-03-22 PROCEDURE — 77014 CHG CT GUIDANCE RADIATION THERAPY FLDS PLACEMENT: CPT | Performed by: RADIOLOGY

## 2023-03-23 ENCOUNTER — HOSPITAL ENCOUNTER (OUTPATIENT)
Dept: RADIATION ONCOLOGY | Facility: HOSPITAL | Age: 78
Discharge: HOME | End: 2023-03-23

## 2023-03-23 LAB
RAD ONC ARIA COURSE ID: NORMAL
RAD ONC ARIA COURSE INTENT: NORMAL
RAD ONC ARIA COURSE LAST TREATMENT DATE: NORMAL
RAD ONC ARIA COURSE START DATE: NORMAL
RAD ONC ARIA COURSE TREATMENT ELAPSED DAYS: 24
RAD ONC ARIA FIRST TREATMENT DATE: NORMAL
RAD ONC ARIA PLAN FRACTIONS TREATED TO DATE: 19
RAD ONC ARIA PLAN ID: NORMAL
RAD ONC ARIA PLAN PRESCRIBED DOSE PER FRACTION: 1.8 GY
RAD ONC ARIA PLAN PRIMARY REFERENCE POINT: NORMAL
RAD ONC ARIA PLAN TOTAL FRACTIONS PRESCRIBED: 28
RAD ONC ARIA PLAN TOTAL PRESCRIBED DOSE: 5040 CGY
RAD ONC ARIA REFERENCE POINT DOSAGE GIVEN TO DATE: 34.2 GY
RAD ONC ARIA REFERENCE POINT ID: NORMAL
RAD ONC ARIA REFERENCE POINT SESSION DOSAGE GIVEN: 1.8 GY

## 2023-03-23 PROCEDURE — 77014 CHG CT GUIDANCE RADIATION THERAPY FLDS PLACEMENT: CPT | Performed by: RADIOLOGY

## 2023-03-23 PROCEDURE — 77386: CPT | Performed by: RADIOLOGY

## 2023-03-23 NOTE — PROGRESS NOTES
Outpatient Nutrition Oncology Follow Up    Patient Name: Korey Rodriguez  YOB: 1945  MRN: [id number]      Reason for Consult:  Radiation tx f/u       Today's Weight:  90.6 kg    Weight Change:  1# loss since last week (<1% loss)    Nutrition-related concerns: Other: occasional diarrhea    Current PO intake:  Good intake- no changes (3 regular, healthy meals/day, minimal caffeine, tries to drink a lot of fluids / water)    Consult or Intervention:  Pt denies major concerns.  He reports occasional diarrhea, however it is well controlled.  Reminded pt of information provided previously on low residue diet (MNT for diarrhea).    Nutrition Diagnosis: Increased nutrient needs related to increased nutrient needs due to catabolic disease as evidenced by physiological causes increasing nutrient needs.    Plan: Will follow up per oncology nutrition protocol

## 2023-03-24 ENCOUNTER — HOSPITAL ENCOUNTER (OUTPATIENT)
Dept: RADIATION ONCOLOGY | Facility: HOSPITAL | Age: 78
Discharge: HOME | End: 2023-03-24

## 2023-03-24 LAB
RAD ONC ARIA COURSE ID: NORMAL
RAD ONC ARIA COURSE INTENT: NORMAL
RAD ONC ARIA COURSE LAST TREATMENT DATE: NORMAL
RAD ONC ARIA COURSE START DATE: NORMAL
RAD ONC ARIA COURSE TREATMENT ELAPSED DAYS: 25
RAD ONC ARIA FIRST TREATMENT DATE: NORMAL
RAD ONC ARIA PLAN FRACTIONS TREATED TO DATE: 20
RAD ONC ARIA PLAN ID: NORMAL
RAD ONC ARIA PLAN PRESCRIBED DOSE PER FRACTION: 1.8 GY
RAD ONC ARIA PLAN PRIMARY REFERENCE POINT: NORMAL
RAD ONC ARIA PLAN TOTAL FRACTIONS PRESCRIBED: 28
RAD ONC ARIA PLAN TOTAL PRESCRIBED DOSE: 5040 CGY
RAD ONC ARIA REFERENCE POINT DOSAGE GIVEN TO DATE: 36 GY
RAD ONC ARIA REFERENCE POINT ID: NORMAL
RAD ONC ARIA REFERENCE POINT SESSION DOSAGE GIVEN: 1.8 GY

## 2023-03-24 PROCEDURE — 77014 CHG CT GUIDANCE RADIATION THERAPY FLDS PLACEMENT: CPT | Performed by: RADIOLOGY

## 2023-03-24 PROCEDURE — 77386: CPT | Performed by: RADIOLOGY

## 2023-03-27 ENCOUNTER — HOSPITAL ENCOUNTER (OUTPATIENT)
Dept: RADIATION ONCOLOGY | Facility: HOSPITAL | Age: 78
Discharge: HOME | End: 2023-03-27

## 2023-03-27 LAB
RAD ONC ARIA COURSE ID: NORMAL
RAD ONC ARIA COURSE INTENT: NORMAL
RAD ONC ARIA COURSE LAST TREATMENT DATE: NORMAL
RAD ONC ARIA COURSE START DATE: NORMAL
RAD ONC ARIA COURSE TREATMENT ELAPSED DAYS: 28
RAD ONC ARIA FIRST TREATMENT DATE: NORMAL
RAD ONC ARIA PLAN FRACTIONS TREATED TO DATE: 21
RAD ONC ARIA PLAN ID: NORMAL
RAD ONC ARIA PLAN PRESCRIBED DOSE PER FRACTION: 1.8 GY
RAD ONC ARIA PLAN PRIMARY REFERENCE POINT: NORMAL
RAD ONC ARIA PLAN TOTAL FRACTIONS PRESCRIBED: 28
RAD ONC ARIA PLAN TOTAL PRESCRIBED DOSE: 5040 CGY
RAD ONC ARIA REFERENCE POINT DOSAGE GIVEN TO DATE: 37.8 GY
RAD ONC ARIA REFERENCE POINT ID: NORMAL
RAD ONC ARIA REFERENCE POINT SESSION DOSAGE GIVEN: 1.8 GY

## 2023-03-27 PROCEDURE — 77427 RADIATION TX MANAGEMENT X5: CPT | Performed by: RADIOLOGY

## 2023-03-27 PROCEDURE — 77386: CPT | Performed by: RADIOLOGY

## 2023-03-27 PROCEDURE — 77014 CHG CT GUIDANCE RADIATION THERAPY FLDS PLACEMENT: CPT | Performed by: RADIOLOGY

## 2023-03-28 ENCOUNTER — HOSPITAL ENCOUNTER (OUTPATIENT)
Dept: RADIATION ONCOLOGY | Facility: HOSPITAL | Age: 78
Discharge: HOME | End: 2023-03-28

## 2023-03-28 ENCOUNTER — HOSPITAL ENCOUNTER (OUTPATIENT)
Dept: RADIATION ONCOLOGY | Facility: HOSPITAL | Age: 78
Discharge: HOME | End: 2023-03-28
Payer: MEDICARE

## 2023-03-28 VITALS
HEART RATE: 56 BPM | TEMPERATURE: 98 F | RESPIRATION RATE: 16 BRPM | WEIGHT: 199.74 LBS | BODY MASS INDEX: 27.62 KG/M2 | OXYGEN SATURATION: 92 % | SYSTOLIC BLOOD PRESSURE: 161 MMHG | DIASTOLIC BLOOD PRESSURE: 53 MMHG

## 2023-03-28 DIAGNOSIS — C67.2 MALIGNANT NEOPLASM OF LATERAL WALL OF BLADDER: Primary | ICD-10-CM

## 2023-03-28 LAB
RAD ONC ARIA COURSE ID: NORMAL
RAD ONC ARIA COURSE INTENT: NORMAL
RAD ONC ARIA COURSE LAST TREATMENT DATE: NORMAL
RAD ONC ARIA COURSE START DATE: NORMAL
RAD ONC ARIA COURSE TREATMENT ELAPSED DAYS: 29
RAD ONC ARIA FIRST TREATMENT DATE: NORMAL
RAD ONC ARIA PLAN FRACTIONS TREATED TO DATE: 22
RAD ONC ARIA PLAN ID: NORMAL
RAD ONC ARIA PLAN PRESCRIBED DOSE PER FRACTION: 1.8 GY
RAD ONC ARIA PLAN PRIMARY REFERENCE POINT: NORMAL
RAD ONC ARIA PLAN TOTAL FRACTIONS PRESCRIBED: 28
RAD ONC ARIA PLAN TOTAL PRESCRIBED DOSE: 5040 CGY
RAD ONC ARIA REFERENCE POINT DOSAGE GIVEN TO DATE: 39.6 GY
RAD ONC ARIA REFERENCE POINT ID: NORMAL
RAD ONC ARIA REFERENCE POINT SESSION DOSAGE GIVEN: 1.8 GY

## 2023-03-28 PROCEDURE — 77386: CPT | Performed by: RADIOLOGY

## 2023-03-28 PROCEDURE — 77014 CHG CT GUIDANCE RADIATION THERAPY FLDS PLACEMENT: CPT | Performed by: RADIOLOGY

## 2023-03-28 NOTE — PROGRESS NOTES
On Treatment Note      Encounter Date: 03/28/2023   Patient Name: Korey Rodriguez  YOB: 1945   Medical Record Number: [id number]         Treatment Site: pelvis  Prescribed dose: 50.4 Gy/28 fractions  Completed dose: 39.6 Gy/22 fractions  Date of First Treatment: 2/27/23      Tolerance: doing well    Radiation related toxicities and management plan: fatigue, loose stool    He is receiving concurrent chemotherapy: no    Issues raised by patient or treatment team: none      Subjective      Review of Systems: Review of Systems   Constitutional: Positive for fatigue (5/10 ). Negative for appetite change and unexpected weight change (Down 2lbs since last week.).   HENT: Positive for tinnitus (chronic). Negative for sore throat and trouble swallowing.    Respiratory: Negative for cough and shortness of breath.    Cardiovascular: Negative for chest pain and palpitations.   Gastrointestinal: Positive for diarrhea. Negative for blood in stool, constipation, nausea and vomiting.   Genitourinary: Negative for hematuria.        Has urostomy- has clear, yellow urine.     Musculoskeletal: Positive for back pain (chronic) and joint swelling (left ankle, pt states has blood clot there, discovered one month ago by Dr. Coburn.   States that swelling has decreased.). Negative for arthralgias.   Skin: Negative for color change and rash.   Neurological: Negative for dizziness, weakness, light-headedness and headaches.   Psychiatric/Behavioral: Negative for sleep disturbance.       The following portions of the patient's history were reviewed and updated as appropriate: allergies, current medications, past family history, past medical history, past social history, past surgical history and problem list.    Measures:   Pain: (on a scale of 0-10)   Pain Score    03/28/23 0814   PainSc: 0-No pain       Advanced Care Plan: N Advance Care Planning   ACP discussion was declined by the  patient. Patient does not have an advance directive, declines further assistance.       KPS/Quality of Life: 80 - Restricted Physical Activity  ECOG: (1) Restricted in physically strenuous activity, ambulatory and able to do work of light nature  Depression Screening:   PHQ-9 score of 0 on 2/7/2023.         Objective     Physical Exam:   Vital Signs:   Vitals:    03/28/23 0814   BP: 161/53   Pulse: 56   Resp: 16   Temp: 98 °F (36.7 °C)   SpO2: 92%      Body mass index is 27.62 kg/m².   Wt Readings from Last 3 Encounters:   03/28/23 90.6 kg (199 lb 11.8 oz)   03/21/23 90.6 kg (199 lb 11.8 oz)   03/16/23 90.8 kg (200 lb 3.6 oz)       General: NAD, sitting comfortably  Eye: EOMI, anicteric sclerae  Respiratory: Symmetric expansion, nonlabored respiration  Neuro: Alert oriented x3, cranial nerves III through XII are grossly intact.  Psych: Mood and affect appropriate      Assessment / Plan      Plan:   I reviewed technical aspects of the radiation therapy treatment administration including dose delivery and daily treatment parameters to verify that these meet the specifications from my clinical treatment planning note. I reviewed the treatment setup notes. I reviewed and approved images obtained for “image guidance” with setup and treatment.     We will continue radiation therapy as prescribed.        Kimberly Melgoza MD  Radiation Oncology  [facility]    This document has been signed by Kimberly Melgoza MD on March 28, 2023 09:57 EDT

## 2023-03-29 ENCOUNTER — HOSPITAL ENCOUNTER (OUTPATIENT)
Dept: RADIATION ONCOLOGY | Facility: HOSPITAL | Age: 78
Discharge: HOME | End: 2023-03-29

## 2023-03-29 LAB
RAD ONC ARIA COURSE ID: NORMAL
RAD ONC ARIA COURSE INTENT: NORMAL
RAD ONC ARIA COURSE LAST TREATMENT DATE: NORMAL
RAD ONC ARIA COURSE START DATE: NORMAL
RAD ONC ARIA COURSE TREATMENT ELAPSED DAYS: 30
RAD ONC ARIA FIRST TREATMENT DATE: NORMAL
RAD ONC ARIA PLAN FRACTIONS TREATED TO DATE: 23
RAD ONC ARIA PLAN ID: NORMAL
RAD ONC ARIA PLAN PRESCRIBED DOSE PER FRACTION: 1.8 GY
RAD ONC ARIA PLAN PRIMARY REFERENCE POINT: NORMAL
RAD ONC ARIA PLAN TOTAL FRACTIONS PRESCRIBED: 28
RAD ONC ARIA PLAN TOTAL PRESCRIBED DOSE: 5040 CGY
RAD ONC ARIA REFERENCE POINT DOSAGE GIVEN TO DATE: 41.4 GY
RAD ONC ARIA REFERENCE POINT ID: NORMAL
RAD ONC ARIA REFERENCE POINT SESSION DOSAGE GIVEN: 1.8 GY

## 2023-03-29 PROCEDURE — 77386: CPT | Performed by: RADIOLOGY

## 2023-03-29 PROCEDURE — 77014 CHG CT GUIDANCE RADIATION THERAPY FLDS PLACEMENT: CPT | Performed by: RADIOLOGY

## 2023-03-30 ENCOUNTER — HOSPITAL ENCOUNTER (OUTPATIENT)
Dept: RADIATION ONCOLOGY | Facility: HOSPITAL | Age: 78
Discharge: HOME | End: 2023-03-30

## 2023-03-30 VITALS — WEIGHT: 199.74 LBS | BODY MASS INDEX: 27.62 KG/M2

## 2023-03-30 LAB
RAD ONC ARIA COURSE ID: NORMAL
RAD ONC ARIA COURSE INTENT: NORMAL
RAD ONC ARIA COURSE LAST TREATMENT DATE: NORMAL
RAD ONC ARIA COURSE START DATE: NORMAL
RAD ONC ARIA COURSE TREATMENT ELAPSED DAYS: 31
RAD ONC ARIA FIRST TREATMENT DATE: NORMAL
RAD ONC ARIA PLAN FRACTIONS TREATED TO DATE: 24
RAD ONC ARIA PLAN ID: NORMAL
RAD ONC ARIA PLAN PRESCRIBED DOSE PER FRACTION: 1.8 GY
RAD ONC ARIA PLAN PRIMARY REFERENCE POINT: NORMAL
RAD ONC ARIA PLAN TOTAL FRACTIONS PRESCRIBED: 28
RAD ONC ARIA PLAN TOTAL PRESCRIBED DOSE: 5040 CGY
RAD ONC ARIA REFERENCE POINT DOSAGE GIVEN TO DATE: 43.2 GY
RAD ONC ARIA REFERENCE POINT ID: NORMAL
RAD ONC ARIA REFERENCE POINT SESSION DOSAGE GIVEN: 1.8 GY

## 2023-03-30 PROCEDURE — 77014 CHG CT GUIDANCE RADIATION THERAPY FLDS PLACEMENT: CPT | Performed by: RADIOLOGY

## 2023-03-30 PROCEDURE — 77386: CPT | Performed by: RADIOLOGY

## 2023-03-30 NOTE — PROGRESS NOTES
3/30/23:  Met with pt briefly in radiation oncology today.  Wt:  90.6 kg, indicating wt has been stable over the past week.  Pt denies additional nutrition-related concerns at this time, other than occasional diarrhea (not severe.    Oncology RD will continue to monitor per protocol.

## 2023-03-31 ENCOUNTER — HOSPITAL ENCOUNTER (OUTPATIENT)
Dept: RADIATION ONCOLOGY | Facility: HOSPITAL | Age: 78
Discharge: HOME | End: 2023-03-31

## 2023-03-31 LAB
RAD ONC ARIA COURSE ID: NORMAL
RAD ONC ARIA COURSE INTENT: NORMAL
RAD ONC ARIA COURSE LAST TREATMENT DATE: NORMAL
RAD ONC ARIA COURSE START DATE: NORMAL
RAD ONC ARIA COURSE TREATMENT ELAPSED DAYS: 32
RAD ONC ARIA FIRST TREATMENT DATE: NORMAL
RAD ONC ARIA PLAN FRACTIONS TREATED TO DATE: 25
RAD ONC ARIA PLAN ID: NORMAL
RAD ONC ARIA PLAN PRESCRIBED DOSE PER FRACTION: 1.8 GY
RAD ONC ARIA PLAN PRIMARY REFERENCE POINT: NORMAL
RAD ONC ARIA PLAN TOTAL FRACTIONS PRESCRIBED: 28
RAD ONC ARIA PLAN TOTAL PRESCRIBED DOSE: 5040 CGY
RAD ONC ARIA REFERENCE POINT DOSAGE GIVEN TO DATE: 45 GY
RAD ONC ARIA REFERENCE POINT ID: NORMAL
RAD ONC ARIA REFERENCE POINT SESSION DOSAGE GIVEN: 1.8 GY

## 2023-03-31 PROCEDURE — 77336 RADIATION PHYSICS CONSULT: CPT | Performed by: RADIOLOGY

## 2023-03-31 PROCEDURE — 77386: CPT | Performed by: RADIOLOGY

## 2023-03-31 PROCEDURE — 77014 CHG CT GUIDANCE RADIATION THERAPY FLDS PLACEMENT: CPT | Performed by: RADIOLOGY

## 2023-04-03 ENCOUNTER — HOSPITAL ENCOUNTER (OUTPATIENT)
Dept: RADIATION ONCOLOGY | Facility: HOSPITAL | Age: 78
Setting detail: RADIATION/ONCOLOGY SERIES
End: 2023-04-03
Payer: MEDICARE

## 2023-04-03 ENCOUNTER — HOSPITAL ENCOUNTER (OUTPATIENT)
Dept: RADIATION ONCOLOGY | Facility: HOSPITAL | Age: 78
Discharge: HOME | End: 2023-04-03

## 2023-04-03 LAB
RAD ONC ARIA COURSE ID: NORMAL
RAD ONC ARIA COURSE INTENT: NORMAL
RAD ONC ARIA COURSE LAST TREATMENT DATE: NORMAL
RAD ONC ARIA COURSE START DATE: NORMAL
RAD ONC ARIA COURSE TREATMENT ELAPSED DAYS: 35
RAD ONC ARIA FIRST TREATMENT DATE: NORMAL
RAD ONC ARIA PLAN FRACTIONS TREATED TO DATE: 26
RAD ONC ARIA PLAN ID: NORMAL
RAD ONC ARIA PLAN PRESCRIBED DOSE PER FRACTION: 1.8 GY
RAD ONC ARIA PLAN PRIMARY REFERENCE POINT: NORMAL
RAD ONC ARIA PLAN TOTAL FRACTIONS PRESCRIBED: 28
RAD ONC ARIA PLAN TOTAL PRESCRIBED DOSE: 5040 CGY
RAD ONC ARIA REFERENCE POINT DOSAGE GIVEN TO DATE: 46.8 GY
RAD ONC ARIA REFERENCE POINT ID: NORMAL
RAD ONC ARIA REFERENCE POINT SESSION DOSAGE GIVEN: 1.8 GY

## 2023-04-03 PROCEDURE — 77014 CHG CT GUIDANCE RADIATION THERAPY FLDS PLACEMENT: CPT | Performed by: RADIOLOGY

## 2023-04-03 PROCEDURE — 77386: CPT | Performed by: RADIOLOGY

## 2023-04-03 PROCEDURE — 77427 RADIATION TX MANAGEMENT X5: CPT | Performed by: RADIOLOGY

## 2023-04-04 ENCOUNTER — HOSPITAL ENCOUNTER (OUTPATIENT)
Dept: RADIATION ONCOLOGY | Facility: HOSPITAL | Age: 78
Discharge: HOME | End: 2023-04-04

## 2023-04-04 VITALS
TEMPERATURE: 97.9 F | BODY MASS INDEX: 27.17 KG/M2 | WEIGHT: 196.43 LBS | HEART RATE: 91 BPM | DIASTOLIC BLOOD PRESSURE: 73 MMHG | OXYGEN SATURATION: 93 % | SYSTOLIC BLOOD PRESSURE: 149 MMHG | RESPIRATION RATE: 16 BRPM

## 2023-04-04 DIAGNOSIS — C67.8 MALIGNANT NEOPLASM OF OVERLAPPING SITES OF BLADDER: Primary | ICD-10-CM

## 2023-04-04 LAB
RAD ONC ARIA COURSE ID: NORMAL
RAD ONC ARIA COURSE INTENT: NORMAL
RAD ONC ARIA COURSE LAST TREATMENT DATE: NORMAL
RAD ONC ARIA COURSE START DATE: NORMAL
RAD ONC ARIA COURSE TREATMENT ELAPSED DAYS: 36
RAD ONC ARIA FIRST TREATMENT DATE: NORMAL
RAD ONC ARIA PLAN FRACTIONS TREATED TO DATE: 27
RAD ONC ARIA PLAN ID: NORMAL
RAD ONC ARIA PLAN PRESCRIBED DOSE PER FRACTION: 1.8 GY
RAD ONC ARIA PLAN PRIMARY REFERENCE POINT: NORMAL
RAD ONC ARIA PLAN TOTAL FRACTIONS PRESCRIBED: 28
RAD ONC ARIA PLAN TOTAL PRESCRIBED DOSE: 5040 CGY
RAD ONC ARIA REFERENCE POINT DOSAGE GIVEN TO DATE: 48.6 GY
RAD ONC ARIA REFERENCE POINT ID: NORMAL
RAD ONC ARIA REFERENCE POINT SESSION DOSAGE GIVEN: 1.8 GY

## 2023-04-04 PROCEDURE — 77014 CHG CT GUIDANCE RADIATION THERAPY FLDS PLACEMENT: CPT | Performed by: RADIOLOGY

## 2023-04-04 PROCEDURE — 77386: CPT | Performed by: RADIOLOGY

## 2023-04-04 NOTE — PROGRESS NOTES
On Treatment Note      Encounter Date: 04/04/2023   Patient Name: Korey Rodriguez  YOB: 1945   Medical Record Number: [id number]       Treatment Site: pelvis  Prescribed dose: 50.4 Gy/28 fractions  Completed dose: 48.6 Gy/27 fractions  Date of First Treatment: 2/27/23        Tolerance: doing well     Radiation related toxicities and management plan: fatigue, loose stool - immodium     He is receiving concurrent chemotherapy: no     Issues raised by patient or treatment team: none    Subjective      Review of Systems: Review of Systems   Constitutional: Positive for appetite change (  not eating as much) and fatigue (6/10). Negative for activity change.   HENT: Positive for tinnitus (chronic). Negative for sore throat and trouble swallowing.    Eyes: Positive for visual disturbance (fuzzy eye sight that has just started, comes and goes).   Respiratory: Negative for cough and shortness of breath.    Gastrointestinal: Positive for diarrhea (6 times a day, educated on imodium.). Negative for constipation, nausea and vomiting.   Genitourinary: Negative for difficulty urinating, dysuria, frequency and urgency.   Musculoskeletal: Negative for back pain and joint swelling.   Skin: Positive for rash (Noted on face, states that he follows up with dermatology.). Negative for color change.   Neurological: Negative for dizziness, light-headedness and headaches.   Psychiatric/Behavioral: Negative for agitation and sleep disturbance.       The following portions of the patient's history were reviewed and updated as appropriate: allergies, current medications, past family history, past medical history, past social history, past surgical history and problem list.    Measures:   Pain: (on a scale of 0-10)   Pain Score    04/04/23 0804   PainSc: 0-No pain       Advanced Care Plan: N Advance Care Planning   ACP discussion was declined by the patient. Patient does not have an advance  directive, declines further assistance.       KPS/Quality of Life: 80 - Restricted Physical Activity  ECOG: (1) Restricted in physically strenuous activity, ambulatory and able to do work of light nature  Depression Screening:  PHQ-9 score of 0 on 2/7/2023.         Objective     Physical Exam:   Vital Signs:   Vitals:    04/04/23 0804   BP: 149/73   Pulse: 91   Resp: 16   Temp: 97.9 °F (36.6 °C)   SpO2: 93%      Body mass index is 27.17 kg/m².   Wt Readings from Last 3 Encounters:   04/04/23 89.1 kg (196 lb 6.9 oz)   03/30/23 90.6 kg (199 lb 11.8 oz)   03/28/23 90.6 kg (199 lb 11.8 oz)       General: NAD, sitting comfortably  Eye: EOMI, anicteric sclerae  Respiratory: Symmetric expansion, nonlabored respiration  Neuro: Alert oriented x3, cranial nerves III through XII are grossly intact.  Psych: Mood and affect appropriate      Assessment / Plan      Plan:   I reviewed technical aspects of the radiation therapy treatment administration including dose delivery and daily treatment parameters to verify that these meet the specifications from my clinical treatment planning note. I reviewed the treatment setup notes. I reviewed and approved images obtained for “image guidance” with setup and treatment.     We will continue radiation therapy as prescribed.        Kimberly Melgoza MD  Radiation Oncology  [facility]    This document has been signed by Kimberly Melgoza MD on April 4, 2023 10:21 EDT

## 2023-04-05 ENCOUNTER — HOSPITAL ENCOUNTER (OUTPATIENT)
Dept: RADIATION ONCOLOGY | Facility: HOSPITAL | Age: 78
Discharge: HOME | End: 2023-04-05

## 2023-04-05 LAB
RAD ONC ARIA COURSE ID: NORMAL
RAD ONC ARIA COURSE INTENT: NORMAL
RAD ONC ARIA COURSE LAST TREATMENT DATE: NORMAL
RAD ONC ARIA COURSE START DATE: NORMAL
RAD ONC ARIA COURSE TREATMENT ELAPSED DAYS: 37
RAD ONC ARIA FIRST TREATMENT DATE: NORMAL
RAD ONC ARIA PLAN FRACTIONS TREATED TO DATE: 28
RAD ONC ARIA PLAN ID: NORMAL
RAD ONC ARIA PLAN PRESCRIBED DOSE PER FRACTION: 1.8 GY
RAD ONC ARIA PLAN PRIMARY REFERENCE POINT: NORMAL
RAD ONC ARIA PLAN TOTAL FRACTIONS PRESCRIBED: 28
RAD ONC ARIA PLAN TOTAL PRESCRIBED DOSE: 5040 CGY
RAD ONC ARIA REFERENCE POINT DOSAGE GIVEN TO DATE: 50.4 GY
RAD ONC ARIA REFERENCE POINT ID: NORMAL
RAD ONC ARIA REFERENCE POINT SESSION DOSAGE GIVEN: 1.8 GY

## 2023-04-05 PROCEDURE — 77336 RADIATION PHYSICS CONSULT: CPT | Performed by: RADIOLOGY

## 2023-04-05 PROCEDURE — 77014 CHG CT GUIDANCE RADIATION THERAPY FLDS PLACEMENT: CPT | Performed by: RADIOLOGY

## 2023-04-05 PROCEDURE — 77386: CPT | Performed by: RADIOLOGY

## 2023-04-07 ENCOUNTER — TELEPHONE (OUTPATIENT)
Dept: ONCOLOGY | Facility: HOSPITAL | Age: 78
End: 2023-04-07
Payer: MEDICARE

## 2023-04-07 ENCOUNTER — HOSPITAL ENCOUNTER (OUTPATIENT)
Dept: RADIATION ONCOLOGY | Facility: HOSPITAL | Age: 78
Discharge: HOME | End: 2023-04-07
Payer: MEDICARE

## 2023-04-13 ENCOUNTER — TELEPHONE (OUTPATIENT)
Dept: ONCOLOGY | Facility: HOSPITAL | Age: 78
End: 2023-04-13
Payer: MEDICARE

## 2023-04-13 NOTE — TELEPHONE ENCOUNTER
LEFT MESSAGE FOR PATIENT, WE HAD TO MOVE INFUSION TO Tuesday DUE TO OVERCROWDING ON Wednesday. ASKED FOR PATIENT TO CALL BACK TO CONFIRM THEY GOT MY MESSAGE.

## 2023-04-14 DIAGNOSIS — C67.2 MALIGNANT NEOPLASM OF LATERAL WALL OF BLADDER: ICD-10-CM

## 2023-04-14 DIAGNOSIS — C67.8 MALIGNANT NEOPLASM OF OVERLAPPING SITES OF BLADDER: Primary | ICD-10-CM

## 2023-04-14 RX ORDER — OLANZAPINE 5 MG/1
5 TABLET ORAL NIGHTLY
Qty: 3 TABLET | Refills: 5 | Status: SHIPPED | OUTPATIENT
Start: 2023-04-14

## 2023-04-14 RX ORDER — ONDANSETRON HYDROCHLORIDE 8 MG/1
8 TABLET, FILM COATED ORAL 3 TIMES DAILY PRN
Qty: 30 TABLET | Refills: 5 | Status: SHIPPED | OUTPATIENT
Start: 2023-04-14

## 2023-04-17 ENCOUNTER — LAB (OUTPATIENT)
Dept: ONCOLOGY | Facility: HOSPITAL | Age: 78
End: 2023-04-17
Payer: MEDICARE

## 2023-04-17 ENCOUNTER — OFFICE VISIT (OUTPATIENT)
Dept: ONCOLOGY | Facility: HOSPITAL | Age: 78
End: 2023-04-17
Payer: MEDICARE

## 2023-04-17 VITALS
HEART RATE: 74 BPM | DIASTOLIC BLOOD PRESSURE: 67 MMHG | OXYGEN SATURATION: 96 % | RESPIRATION RATE: 18 BRPM | SYSTOLIC BLOOD PRESSURE: 143 MMHG | TEMPERATURE: 97.4 F

## 2023-04-17 DIAGNOSIS — C67.8 MALIGNANT NEOPLASM OF OVERLAPPING SITES OF BLADDER: Primary | ICD-10-CM

## 2023-04-17 DIAGNOSIS — C67.2 MALIGNANT NEOPLASM OF LATERAL WALL OF BLADDER: ICD-10-CM

## 2023-04-17 DIAGNOSIS — T45.1X5A ANEMIA ASSOCIATED WITH CHEMOTHERAPY: ICD-10-CM

## 2023-04-17 DIAGNOSIS — D64.81 ANEMIA ASSOCIATED WITH CHEMOTHERAPY: ICD-10-CM

## 2023-04-17 LAB
ALBUMIN SERPL-MCNC: 3.7 G/DL (ref 3.5–5.2)
ALBUMIN/GLOB SERPL: 1.2 G/DL
ALP SERPL-CCNC: 91 U/L (ref 39–117)
ALT SERPL W P-5'-P-CCNC: 9 U/L (ref 1–41)
ANION GAP SERPL CALCULATED.3IONS-SCNC: 9.3 MMOL/L (ref 5–15)
AST SERPL-CCNC: 13 U/L (ref 1–40)
BASOPHILS # BLD AUTO: 0.01 10*3/MM3 (ref 0–0.2)
BASOPHILS NFR BLD AUTO: 0.2 % (ref 0–1.5)
BILIRUB SERPL-MCNC: 0.3 MG/DL (ref 0–1.2)
BUN SERPL-MCNC: 20 MG/DL (ref 8–23)
BUN/CREAT SERPL: 19.4 (ref 7–25)
CALCIUM SPEC-SCNC: 9 MG/DL (ref 8.6–10.5)
CHLORIDE SERPL-SCNC: 107 MMOL/L (ref 98–107)
CO2 SERPL-SCNC: 21.7 MMOL/L (ref 22–29)
CREAT SERPL-MCNC: 1.03 MG/DL (ref 0.76–1.27)
DEPRECATED RDW RBC AUTO: 58.9 FL (ref 37–54)
EGFRCR SERPLBLD CKD-EPI 2021: 73.4 ML/MIN/1.73
EOSINOPHIL # BLD AUTO: 0.41 10*3/MM3 (ref 0–0.4)
EOSINOPHIL NFR BLD AUTO: 9 % (ref 0.3–6.2)
ERYTHROCYTE [DISTWIDTH] IN BLOOD BY AUTOMATED COUNT: 17.6 % (ref 12.3–15.4)
FERRITIN SERPL-MCNC: 286.5 NG/ML (ref 30–400)
GLOBULIN UR ELPH-MCNC: 3 GM/DL
GLUCOSE SERPL-MCNC: 112 MG/DL (ref 65–99)
HCT VFR BLD AUTO: 31.7 % (ref 37.5–51)
HGB BLD-MCNC: 10.2 G/DL (ref 13–17.7)
IMM GRANULOCYTES # BLD AUTO: 0.02 10*3/MM3 (ref 0–0.05)
IMM GRANULOCYTES NFR BLD AUTO: 0.4 % (ref 0–0.5)
IRON 24H UR-MRATE: 65 MCG/DL (ref 59–158)
IRON SATN MFR SERPL: 21 % (ref 20–50)
LYMPHOCYTES # BLD AUTO: 0.29 10*3/MM3 (ref 0.7–3.1)
LYMPHOCYTES NFR BLD AUTO: 6.4 % (ref 19.6–45.3)
MCH RBC QN AUTO: 29.1 PG (ref 26.6–33)
MCHC RBC AUTO-ENTMCNC: 32.2 G/DL (ref 31.5–35.7)
MCV RBC AUTO: 90.6 FL (ref 79–97)
MONOCYTES # BLD AUTO: 0.67 10*3/MM3 (ref 0.1–0.9)
MONOCYTES NFR BLD AUTO: 14.8 % (ref 5–12)
NEUTROPHILS NFR BLD AUTO: 3.14 10*3/MM3 (ref 1.7–7)
NEUTROPHILS NFR BLD AUTO: 69.2 % (ref 42.7–76)
PLATELET # BLD AUTO: 171 10*3/MM3 (ref 140–450)
PMV BLD AUTO: 9.4 FL (ref 6–12)
POTASSIUM SERPL-SCNC: 4.6 MMOL/L (ref 3.5–5.2)
PROT SERPL-MCNC: 6.7 G/DL (ref 6–8.5)
RBC # BLD AUTO: 3.5 10*6/MM3 (ref 4.14–5.8)
SODIUM SERPL-SCNC: 138 MMOL/L (ref 136–145)
TIBC SERPL-MCNC: 304 MCG/DL (ref 298–536)
TRANSFERRIN SERPL-MCNC: 204 MG/DL (ref 200–360)
WBC NRBC COR # BLD: 4.54 10*3/MM3 (ref 3.4–10.8)

## 2023-04-17 PROCEDURE — 84466 ASSAY OF TRANSFERRIN: CPT

## 2023-04-17 PROCEDURE — 85025 COMPLETE CBC W/AUTO DIFF WBC: CPT

## 2023-04-17 PROCEDURE — 83540 ASSAY OF IRON: CPT

## 2023-04-17 PROCEDURE — 82728 ASSAY OF FERRITIN: CPT

## 2023-04-17 PROCEDURE — 80053 COMPREHEN METABOLIC PANEL: CPT

## 2023-04-17 PROCEDURE — 36415 COLL VENOUS BLD VENIPUNCTURE: CPT

## 2023-04-17 RX ORDER — OLANZAPINE 5 MG/1
5 TABLET ORAL ONCE
Status: CANCELLED | OUTPATIENT
Start: 2023-04-18 | End: 2023-04-18

## 2023-04-17 RX ORDER — SODIUM CHLORIDE 9 MG/ML
250 INJECTION, SOLUTION INTRAVENOUS ONCE
OUTPATIENT
Start: 2023-04-25

## 2023-04-17 RX ORDER — SODIUM CHLORIDE 9 MG/ML
250 INJECTION, SOLUTION INTRAVENOUS ONCE
Status: CANCELLED | OUTPATIENT
Start: 2023-04-18

## 2023-04-17 RX ORDER — PALONOSETRON 0.05 MG/ML
0.25 INJECTION, SOLUTION INTRAVENOUS ONCE
Status: CANCELLED | OUTPATIENT
Start: 2023-04-18

## 2023-04-17 RX ORDER — FAMOTIDINE 10 MG/ML
20 INJECTION, SOLUTION INTRAVENOUS AS NEEDED
Status: CANCELLED | OUTPATIENT
Start: 2023-04-18

## 2023-04-17 RX ORDER — DIPHENHYDRAMINE HYDROCHLORIDE 50 MG/ML
50 INJECTION INTRAMUSCULAR; INTRAVENOUS AS NEEDED
Status: CANCELLED | OUTPATIENT
Start: 2023-04-18

## 2023-04-17 NOTE — PROGRESS NOTES
Chief Complaint  Malignant neoplasm of overlapping sites of bladder    Horacio Bansal MD [place], [place], CATA    Subjective          Koreynirmala Rodriguez presents to [facility] for urothelial carcinoma    Mr. Rodriguez is a pleasant 79 yo male with a history of AAA (infrarenal), chronic back pain, HLD, hearing loss who presents for follow up regarding urothelial carcinoma.  Patient had radical cystoprostatectomy on December 12, 2022 at [facility] by Dr. Bansal.  He has received 2 cycles of Cisplatin and Gemcitabine. He has now completed radiation and is due for final 2 cycles of chemotherapy. C3 chemo will be with Carboplatin substituted for Cisplatin due to drug shortage. He feels well overall. He does have more fatigue and describes a brain fog for last several weeks. He remains active and is keeping up with balanced diet.  . He denies any nausea or vomiting.  No fevers, chills, infections.  He denies any bleeding. His chronic tinnitus is stable. He denies any neuropathy.      Oncology/Hematology History Overview Note   10/14/22: CT abdomen/pelvis obtained due to gross hematuria for 6 weeks. This showed right hydroureteronephrosis down to bladder with findings concerning for large bladder tumor. He also had 20 lbs weight loss during this time.     10/19/2022: TURBT performed by Dr. Westfall. Per op reprot large area resected (7 x 4 cm), visualization difficult due to clot burden. 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     11/17/2022: PET CT showed asymmetric thickening along the right lateral and posterior walls of the bladder. No evidence of metastatic disease.     12/12/22: Radical cystoprostectomy, bilateral PLND, ileal conduit by Dr. Bansal ([facility]): Left distal and right distal ureter both positive for carcinoma, 2/3 right external lymph nodes positive for metastatic carcinoma, 2/3 right common external lymph nodes  positive for carcinoma, bladder and prostate with urothelial carcinoma with micropapillary features and invades directly into prostate, extensive CIS noted, 4/22 total lymph nodes positive, [id number].    1/18//23: C1D1 Cisplatin/Gemcitabine. Tolerated well    2/8/23: C2D1 Cisplatin/Gemcitabine.    4/7/23: Last dose of radiation.     4/17/23: C3D1 chemotherapy. Cisplatin switched to Carboplatin due to drug shortage.          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   Musculoskeletal: Positive for back pain and joint swelling (LEft ankle swelling ).   Psychiatric/Behavioral: Negative for sleep disturbance.   All other systems reviewed and are negative.    Current Outpatient Medications on File Prior to Visit   Medication Sig Dispense Refill   • acetaminophen (TYLENOL) 325 MG tablet 3 tablets.     • atorvastatin (LIPITOR) 20 MG tablet Take 20 mg by mouth Every Night.     • Eliquis 5 MG  tablet tablet Take 2 tablets by mouth Every 12 (Twelve) Hours.     • meloxicam (Mobic) 15 MG tablet Take 1 tablet by mouth Daily. 30 tablet 3   • OLANZapine (zyPREXA) 5 MG tablet Take 1 tablet by mouth Every Night. Take on days 2, 3 and 4 after chemotherapy. 3 tablet 5   • ondansetron (Zofran) 4 MG tablet Take 1 tablet by mouth Daily. Take 1 tablet by mouth daily prior to radiation. 30 tablet 3   • ondansetron (ZOFRAN) 8 MG tablet Take 1 tablet by mouth 3 (Three) Times a Day As Needed for Nausea or Vomiting. 30 tablet 5   • sennosides-docusate (PERICOLACE) 8.6-50 MG per tablet Take 1 tablet by mouth Daily.       No current facility-administered medications on file prior to visit.       No Known Allergies  Past Medical History:   Diagnosis Date   • AAA (abdominal aortic aneurysm)     BEING MONITORED NO REPAIR NEEDED YET   • Aneurysm of abdominal aorta branch vessel    • Cancer     BLADDER AND SKIN CANCERS/REMOVED   • History of urostomy     PT CURRENTLY HAS HOME HEALTH TO HELP WITH OSTOMY   • Kidney stone 10/16/2022     Past Surgical History:   Procedure Laterality Date   • COLONOSCOPY  2018   • CYSTECTOMY      2022  W/UROSTOMY   • SKIN CANCER EXCISION Right    • TRANSURETHRAL RESECTION OF BLADDER TUMOR Right 10/19/2022    Procedure: Cystoscopy with transurethral resection of bladder tumor;  Surgeon: Aung Westfall MD;  Location: [facility];  Service: Urology;  Laterality: Right;   • VENOUS ACCESS DEVICE (PORT) INSERTION Right 2023    Procedure: INSERTION VENOUS ACCESS DEVICE;  Surgeon: Richie Edward MD;  Location: [facility];  Service: General;  Laterality: Right;     Social History     Socioeconomic History   • Marital status:    Tobacco Use   • Smoking status: Former     Packs/day: 1.00     Years: 20.00     Pack years: 20.00     Types: Cigarettes     Start date: 1962     Quit date: 1990     Years since quittin.3   • Smokeless tobacco: Never   Vaping Use   • Vaping Use:  Never used   Substance and Sexual Activity   • Alcohol use: Yes     Alcohol/week: 1.0 standard drink     Types: 1 Cans of beer per week     Comment: 1 beer daily   • Drug use: Never   • Sexual activity: Not Currently     Partners: Female     Family History   Problem Relation Age of Onset   • Cancer Mother    • Cancer Father    • Hearing loss Father    • Hypertension Father    • Malig Hyperthermia Neg Hx        Objective   Physical Exam    Well appearing patient in no acute distress on RA  Anicteric sclera, no rash on exposed skin  Respirations non-labored  Awake, alert, and oriented x 4. Speech intact.   Appropriate mood and affect      Vitals:    04/17/23 0948   BP: 143/67   Pulse: 74   Resp: 18   Temp: 97.4 °F (36.3 °C)   TempSrc: Temporal   SpO2: 96%   Weight: Comment: unable to obtain   PainSc: 0-No pain               PHQ-9 Total Score:           Result Review :   The following data was reviewed by: Arvin Coburn MD on 04/17/23:  Lab Results   Component Value Date    HGB 10.2 (L) 04/17/2023    HCT 31.7 (L) 04/17/2023    MCV 90.6 04/17/2023     04/17/2023    WBC 4.54 04/17/2023    NEUTROABS 3.14 04/17/2023    LYMPHSABS 0.29 (L) 04/17/2023    MONOSABS 0.67 04/17/2023    EOSABS 0.41 (H) 04/17/2023    BASOSABS 0.01 04/17/2023     Lab Results   Component Value Date    GLUCOSE 112 (H) 04/17/2023    BUN 20 04/17/2023    CREATININE 1.03 04/17/2023     04/17/2023    K 4.6 04/17/2023     04/17/2023    CO2 21.7 (L) 04/17/2023    CALCIUM 9.0 04/17/2023    PROTEINTOT 6.7 04/17/2023    ALBUMIN 3.7 04/17/2023    BILITOT 0.3 04/17/2023    ALKPHOS 91 04/17/2023    AST 13 04/17/2023    ALT 9 04/17/2023     Lab Results   Component Value Date    MG 2.0 02/07/2023       Labs personally reviewed. Mild anemia.     Surgical Pathology Reviewed: [id number] with left distal margin positive.    [facility] urology documentation and progress notes from hospital personally reviewed.      Assessment and  "Plan    Diagnoses and all orders for this visit:    1. Malignant neoplasm of overlapping sites of bladder (Primary)  -     Cancel: Iron Profile; Future  -     Cancel: Ferritin; Future  -     Iron Profile; Future  -     Ferritin; Future  -     CBC and Differential; Future    2. 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  -     CBC and Differential; Future    Other orders  -     sodium chloride 0.9 % infusion 250 mL  -     OLANZapine (zyPREXA) tablet 5 mg  -     palonosetron (ALOXI) injection 0.25 mg  -     fosaprepitant (EMEND) 150 mg in sodium chloride 0.9 % 100 mL IVPB  -     dexamethasone (DECADRON) 12 mg in sodium chloride 0.9 % IVPB  -     gemcitabine (GEMZAR) 2,100 mg in sodium chloride 0.9 % 305.2 mL chemo IVPB  -     CARBOplatin (PARAPLATIN) 490 mg in sodium chloride 0.9 % 299 mL chemo IVPB  -     Hydrocortisone Sod Suc (PF) (Solu-CORTEF) injection 100 mg  -     diphenhydrAMINE (BENADRYL) injection 50 mg  -     famotidine (PEPCID) injection 20 mg  -     sodium chloride 0.9 % infusion 250 mL  -     dexamethasone (DECADRON) 12 mg in sodium chloride 0.9 % IVPB  -     gemcitabine (GEMZAR) 2,100 mg in sodium chloride 0.9 % 305.2 mL chemo IVPB    Mr. Rodriguez is a 78-year-old male status post radical cystoprostatectomy bilateral PLND which demonstrated stage [id number], stage IIIB urothelial carcinoma. 4/22 lymph nodes positive and right and left and right ureter positive with distal margin of left ureter positive.  Patient with disease that is at high risk of recurrence. With this stage, would favor a recommendation of adjuvant radiation and chemotherapy to hopefully decrease the chance of recurrence. Management has been personally discussed with Dr. Melgoza with radiation oncology and we have decided to do \"sandwich\" therapy with 2 cycles of chemotherapy (platinum with Gemcitabine) followed by radiation followed by 2 additional cycles of chemotherapy.      1/18/23: C1D1 Cisplatin/Gemcitabine. 2/8/23: C2D1 planned " Cisplatin/gemcitabine. Patient then completed radiation with last dose on 4/7/23. He is now due for second component of 2 cycles of chemotherapy.     C3D1 chemo 4/18/23, plan to substitute Carboplatin in place of Cisplatin due to drug shortage. Gemcitabine remains. Labs appropriate to proceed.     PRN anti-emetics prescribed.     This is an acute or chronic illness that poses a threat to life or bodily function. The above treatment plan involves a high risk of complications and/or mortality of patient management. Continue with labs to monitor for severe toxicities.     LLE DVT  DVT found on duplex ordered 2/7/23. Continue Eliquis. Will need to be on this until completion of cancer treatment.      Anemia 2/2 chemotherapy  No intervention or dose adjustment necessary. Will add on iron labs to rule out deficiency. Will continue to trend and provide transfusion support if needed.            I spent 25 minutes caring for Korey on this date of service. This time includes time spent by me in the following activities:preparing for the visit, reviewing tests, obtaining and/or reviewing a separately obtained history, performing a medically appropriate examination and/or evaluation , counseling and educating the patient/family/caregiver, ordering medications, tests, or procedures, referring and communicating with other health care professionals , documenting information in the medical record, independently interpreting results and communicating that information with the patient/family/caregiver and care coordination    Patient Follow Up: Prior to C4 chemo  Patient was given instructions and counseling regarding his condition or for health maintenance advice. Please see specific information pulled into the AVS if appropriate.

## 2023-04-18 ENCOUNTER — HOSPITAL ENCOUNTER (OUTPATIENT)
Dept: ONCOLOGY | Facility: HOSPITAL | Age: 78
Discharge: HOME | End: 2023-04-18
Admitting: INTERNAL MEDICINE
Payer: MEDICARE

## 2023-04-18 VITALS
RESPIRATION RATE: 18 BRPM | SYSTOLIC BLOOD PRESSURE: 146 MMHG | BODY MASS INDEX: 26.86 KG/M2 | WEIGHT: 194.22 LBS | DIASTOLIC BLOOD PRESSURE: 58 MMHG | TEMPERATURE: 98.3 F | OXYGEN SATURATION: 94 % | HEART RATE: 81 BPM

## 2023-04-18 DIAGNOSIS — C67.8 MALIGNANT NEOPLASM OF OVERLAPPING SITES OF BLADDER: Primary | ICD-10-CM

## 2023-04-18 DIAGNOSIS — Z45.2 ENCOUNTER FOR ADJUSTMENT OR MANAGEMENT OF VASCULAR ACCESS DEVICE: ICD-10-CM

## 2023-04-18 DIAGNOSIS — C67.2 MALIGNANT NEOPLASM OF LATERAL WALL OF BLADDER: ICD-10-CM

## 2023-04-18 PROCEDURE — 25010000002 CARBOPLATIN PER 50 MG: Performed by: INTERNAL MEDICINE

## 2023-04-18 PROCEDURE — 96367 TX/PROPH/DG ADDL SEQ IV INF: CPT

## 2023-04-18 PROCEDURE — 63710000001 OLANZAPINE 2.5 MG TABLET: Performed by: INTERNAL MEDICINE

## 2023-04-18 PROCEDURE — 25010000002 GEMCITABINE 1 GM/26.3ML SOLUTION 26.3 ML VIAL: Performed by: INTERNAL MEDICINE

## 2023-04-18 PROCEDURE — A9270 NON-COVERED ITEM OR SERVICE: HCPCS | Performed by: INTERNAL MEDICINE

## 2023-04-18 PROCEDURE — 96413 CHEMO IV INFUSION 1 HR: CPT

## 2023-04-18 PROCEDURE — 25010000002 HEPARIN LOCK FLUSH PER 10 UNITS: Performed by: INTERNAL MEDICINE

## 2023-04-18 PROCEDURE — 96375 TX/PRO/DX INJ NEW DRUG ADDON: CPT

## 2023-04-18 PROCEDURE — 25010000002 PALONOSETRON PER 25 MCG: Performed by: INTERNAL MEDICINE

## 2023-04-18 PROCEDURE — 96417 CHEMO IV INFUS EACH ADDL SEQ: CPT

## 2023-04-18 PROCEDURE — 25010000002 DEXAMETHASONE SODIUM PHOSPHATE 120 MG/30ML SOLUTION: Performed by: INTERNAL MEDICINE

## 2023-04-18 PROCEDURE — 25010000002 FOSAPREPITANT PER 1 MG: Performed by: INTERNAL MEDICINE

## 2023-04-18 RX ORDER — SODIUM CHLORIDE 9 MG/ML
250 INJECTION, SOLUTION INTRAVENOUS ONCE
Status: COMPLETED | OUTPATIENT
Start: 2023-04-18 | End: 2023-04-18

## 2023-04-18 RX ORDER — SODIUM CHLORIDE 0.9 % (FLUSH) 0.9 %
20 SYRINGE (ML) INJECTION AS NEEDED
OUTPATIENT
Start: 2023-04-18

## 2023-04-18 RX ORDER — PALONOSETRON 0.05 MG/ML
0.25 INJECTION, SOLUTION INTRAVENOUS ONCE
Status: COMPLETED | OUTPATIENT
Start: 2023-04-18 | End: 2023-04-18

## 2023-04-18 RX ORDER — HEPARIN SODIUM (PORCINE) LOCK FLUSH IV SOLN 100 UNIT/ML 100 UNIT/ML
500 SOLUTION INTRAVENOUS AS NEEDED
OUTPATIENT
Start: 2023-04-18

## 2023-04-18 RX ORDER — OLANZAPINE 2.5 MG/1
5 TABLET ORAL ONCE
Status: COMPLETED | OUTPATIENT
Start: 2023-04-18 | End: 2023-04-18

## 2023-04-18 RX ORDER — SODIUM CHLORIDE 0.9 % (FLUSH) 0.9 %
20 SYRINGE (ML) INJECTION AS NEEDED
Status: DISCONTINUED | OUTPATIENT
Start: 2023-04-18 | End: 2023-04-19 | Stop reason: HOSPADM

## 2023-04-18 RX ORDER — HEPARIN SODIUM (PORCINE) LOCK FLUSH IV SOLN 100 UNIT/ML 100 UNIT/ML
500 SOLUTION INTRAVENOUS AS NEEDED
Status: DISCONTINUED | OUTPATIENT
Start: 2023-04-18 | End: 2023-04-19 | Stop reason: HOSPADM

## 2023-04-18 RX ADMIN — GEMCITABINE HYDROCHLORIDE 2100 MG: 1 INJECTION, SOLUTION INTRAVENOUS at 09:53

## 2023-04-18 RX ADMIN — Medication 20 ML: at 11:04

## 2023-04-18 RX ADMIN — SODIUM CHLORIDE 250 ML: 9 INJECTION, SOLUTION INTRAVENOUS at 08:55

## 2023-04-18 RX ADMIN — HEPARIN SODIUM (PORCINE) LOCK FLUSH IV SOLN 100 UNIT/ML 500 UNITS: 100 SOLUTION at 11:04

## 2023-04-18 RX ADMIN — PALONOSETRON 0.25 MG: 0.05 INJECTION, SOLUTION INTRAVENOUS at 09:03

## 2023-04-18 RX ADMIN — FOSAPREPITANT 100 ML: 150 INJECTION, POWDER, LYOPHILIZED, FOR SOLUTION INTRAVENOUS at 09:21

## 2023-04-18 RX ADMIN — OLANZAPINE 5 MG: 2.5 TABLET, FILM COATED ORAL at 09:02

## 2023-04-18 RX ADMIN — CARBOPLATIN 482 MG: 10 INJECTION, SOLUTION INTRAVENOUS at 10:29

## 2023-04-18 RX ADMIN — DEXAMETHASONE SODIUM PHOSPHATE 12 MG: 4 INJECTION, SOLUTION INTRA-ARTICULAR; INTRALESIONAL; INTRAMUSCULAR; INTRAVENOUS; SOFT TISSUE at 09:05

## 2023-04-18 NOTE — PROGRESS NOTES
Outpatient Nutrition Oncology Follow Up    Patient Name: Korey Rodriguez  YOB: 1945  MRN: [id number]      Reason for Consult:  Initial infusion of Carboplatin + Gemcitabine  Previous XRT to pelvic region; previous chemotherapies (Gemcitabine, Cisplatin, Mitomycin, 5FU)     Today's Weight:   88.1 kg    Weight Change:  2.8% loss in the past month; wt stable compared to wt from 3 months ago    Nutrition-related concerns: No Nutritional-related concerns    Current PO intake:   3 regular / healthy meals daily, minimal caffeine, tries to drink a lot of water / fluids     Current Nutrition Supplement intake:  Boost Plus shakes 1-2 X day    Consult or Intervention:  Familiar with pt from XRT (s/p 2-3 weeks post-radiation tx).  Wt was maintained fairly well during that time.  Pt experienced occasional diarrhea during XRT.  Today, he is eating a snack and brought a Boost Plus to consume.  Pt denies nutrition-related concerns at this time, including N/V/D or changes in appetite.  Reviewed potential nutrition-impact symptoms of current tx:  N/v, mucositis, diarrhea.  Pt reports he has a good understanding of diet-related changes (MNT) to make to prevent mucositis (good oral care / homemade mouth rinse) and to help ease N/V/D, if occurs.  Pt reports to still have the handouts on these concerns as well, but needed another handout on the homemade mouth rinse recipe (baking soda / water / salt).  Provided additional handout on oral care.    Encouraged proper nutritional intake & hydration during this course of tx.    Nutrition Diagnosis: Increased nutrient needs related to increased nutrient needs due to catabolic disease as evidenced by physiological causes increasing nutrient needs.    Plan: Will follow up per oncology nutrition protocol

## 2023-04-25 ENCOUNTER — DOCUMENTATION (OUTPATIENT)
Dept: ONCOLOGY | Facility: HOSPITAL | Age: 78
End: 2023-04-25
Payer: MEDICARE

## 2023-04-25 ENCOUNTER — HOSPITAL ENCOUNTER (OUTPATIENT)
Dept: ONCOLOGY | Facility: HOSPITAL | Age: 78
Discharge: HOME | End: 2023-04-25
Admitting: INTERNAL MEDICINE
Payer: MEDICARE

## 2023-04-25 VITALS
RESPIRATION RATE: 18 BRPM | WEIGHT: 194.67 LBS | HEIGHT: 71 IN | SYSTOLIC BLOOD PRESSURE: 146 MMHG | OXYGEN SATURATION: 96 % | TEMPERATURE: 97.5 F | HEART RATE: 78 BPM | DIASTOLIC BLOOD PRESSURE: 70 MMHG | BODY MASS INDEX: 27.25 KG/M2

## 2023-04-25 DIAGNOSIS — C67.8 MALIGNANT NEOPLASM OF OVERLAPPING SITES OF BLADDER: ICD-10-CM

## 2023-04-25 DIAGNOSIS — C67.2 MALIGNANT NEOPLASM OF LATERAL WALL OF BLADDER: Primary | ICD-10-CM

## 2023-04-25 DIAGNOSIS — Z45.2 ENCOUNTER FOR ADJUSTMENT OR MANAGEMENT OF VASCULAR ACCESS DEVICE: ICD-10-CM

## 2023-04-25 LAB
BASOPHILS # BLD AUTO: 0.01 10*3/MM3 (ref 0–0.2)
BASOPHILS NFR BLD AUTO: 0.5 % (ref 0–1.5)
DEPRECATED RDW RBC AUTO: 55.5 FL (ref 37–54)
EOSINOPHIL # BLD AUTO: 0.09 10*3/MM3 (ref 0–0.4)
EOSINOPHIL NFR BLD AUTO: 4.7 % (ref 0.3–6.2)
ERYTHROCYTE [DISTWIDTH] IN BLOOD BY AUTOMATED COUNT: 16.3 % (ref 12.3–15.4)
HCT VFR BLD AUTO: 27.5 % (ref 37.5–51)
HGB BLD-MCNC: 8.8 G/DL (ref 13–17.7)
IMM GRANULOCYTES # BLD AUTO: 0 10*3/MM3 (ref 0–0.05)
IMM GRANULOCYTES NFR BLD AUTO: 0 % (ref 0–0.5)
LYMPHOCYTES # BLD AUTO: 0.14 10*3/MM3 (ref 0.7–3.1)
LYMPHOCYTES NFR BLD AUTO: 7.4 % (ref 19.6–45.3)
MCH RBC QN AUTO: 29.3 PG (ref 26.6–33)
MCHC RBC AUTO-ENTMCNC: 32 G/DL (ref 31.5–35.7)
MCV RBC AUTO: 91.7 FL (ref 79–97)
MONOCYTES # BLD AUTO: 0.09 10*3/MM3 (ref 0.1–0.9)
MONOCYTES NFR BLD AUTO: 4.7 % (ref 5–12)
NEUTROPHILS NFR BLD AUTO: 1.57 10*3/MM3 (ref 1.7–7)
NEUTROPHILS NFR BLD AUTO: 82.7 % (ref 42.7–76)
PLATELET # BLD AUTO: 88 10*3/MM3 (ref 140–450)
PMV BLD AUTO: 9.1 FL (ref 6–12)
RBC # BLD AUTO: 3 10*6/MM3 (ref 4.14–5.8)
WBC NRBC COR # BLD: 1.9 10*3/MM3 (ref 3.4–10.8)

## 2023-04-25 PROCEDURE — 25010000002 DEXAMETHASONE SODIUM PHOSPHATE 120 MG/30ML SOLUTION: Performed by: INTERNAL MEDICINE

## 2023-04-25 PROCEDURE — 85025 COMPLETE CBC W/AUTO DIFF WBC: CPT | Performed by: INTERNAL MEDICINE

## 2023-04-25 PROCEDURE — 25010000002 HEPARIN LOCK FLUSH PER 10 UNITS: Performed by: INTERNAL MEDICINE

## 2023-04-25 PROCEDURE — 25010000002 GEMCITABINE 1 GM/26.3ML SOLUTION 26.3 ML VIAL: Performed by: INTERNAL MEDICINE

## 2023-04-25 PROCEDURE — 96375 TX/PRO/DX INJ NEW DRUG ADDON: CPT

## 2023-04-25 PROCEDURE — 96413 CHEMO IV INFUSION 1 HR: CPT

## 2023-04-25 RX ORDER — SODIUM CHLORIDE 0.9 % (FLUSH) 0.9 %
20 SYRINGE (ML) INJECTION AS NEEDED
OUTPATIENT
Start: 2023-04-25

## 2023-04-25 RX ORDER — HEPARIN SODIUM (PORCINE) LOCK FLUSH IV SOLN 100 UNIT/ML 100 UNIT/ML
500 SOLUTION INTRAVENOUS AS NEEDED
OUTPATIENT
Start: 2023-04-25

## 2023-04-25 RX ORDER — HEPARIN SODIUM (PORCINE) LOCK FLUSH IV SOLN 100 UNIT/ML 100 UNIT/ML
500 SOLUTION INTRAVENOUS AS NEEDED
Status: DISCONTINUED | OUTPATIENT
Start: 2023-04-25 | End: 2023-04-26 | Stop reason: HOSPADM

## 2023-04-25 RX ORDER — SODIUM CHLORIDE 0.9 % (FLUSH) 0.9 %
20 SYRINGE (ML) INJECTION AS NEEDED
Status: DISCONTINUED | OUTPATIENT
Start: 2023-04-25 | End: 2023-04-26 | Stop reason: HOSPADM

## 2023-04-25 RX ORDER — SODIUM CHLORIDE 9 MG/ML
250 INJECTION, SOLUTION INTRAVENOUS ONCE
Status: COMPLETED | OUTPATIENT
Start: 2023-04-25 | End: 2023-04-25

## 2023-04-25 RX ADMIN — Medication 20 ML: at 09:58

## 2023-04-25 RX ADMIN — SODIUM CHLORIDE 250 ML: 9 INJECTION, SOLUTION INTRAVENOUS at 08:54

## 2023-04-25 RX ADMIN — DEXAMETHASONE SODIUM PHOSPHATE 12 MG: 4 INJECTION, SOLUTION INTRA-ARTICULAR; INTRALESIONAL; INTRAMUSCULAR; INTRAVENOUS; SOFT TISSUE at 08:55

## 2023-04-25 RX ADMIN — HEPARIN SODIUM (PORCINE) LOCK FLUSH IV SOLN 100 UNIT/ML 500 UNITS: 100 SOLUTION at 09:58

## 2023-04-25 RX ADMIN — GEMCITABINE HYDROCHLORIDE 2100 MG: 1 INJECTION, SOLUTION INTRAVENOUS at 09:20

## 2023-05-08 ENCOUNTER — OFFICE VISIT (OUTPATIENT)
Dept: ONCOLOGY | Facility: HOSPITAL | Age: 78
End: 2023-05-08
Payer: MEDICARE

## 2023-05-08 ENCOUNTER — LAB (OUTPATIENT)
Dept: ONCOLOGY | Facility: HOSPITAL | Age: 78
End: 2023-05-08
Payer: MEDICARE

## 2023-05-08 VITALS
HEART RATE: 89 BPM | BODY MASS INDEX: 27.35 KG/M2 | OXYGEN SATURATION: 98 % | TEMPERATURE: 98.1 F | RESPIRATION RATE: 18 BRPM | WEIGHT: 197.75 LBS

## 2023-05-08 DIAGNOSIS — C67.2 MALIGNANT NEOPLASM OF LATERAL WALL OF BLADDER: ICD-10-CM

## 2023-05-08 DIAGNOSIS — C67.8 MALIGNANT NEOPLASM OF OVERLAPPING SITES OF BLADDER: Primary | ICD-10-CM

## 2023-05-08 DIAGNOSIS — C67.8 MALIGNANT NEOPLASM OF OVERLAPPING SITES OF BLADDER: ICD-10-CM

## 2023-05-08 DIAGNOSIS — Z45.2 ENCOUNTER FOR ADJUSTMENT OR MANAGEMENT OF VASCULAR ACCESS DEVICE: Primary | ICD-10-CM

## 2023-05-08 LAB
ALBUMIN SERPL-MCNC: 3.7 G/DL (ref 3.5–5.2)
ALBUMIN/GLOB SERPL: 1.2 G/DL
ALP SERPL-CCNC: 96 U/L (ref 39–117)
ALT SERPL W P-5'-P-CCNC: 12 U/L (ref 1–41)
ANION GAP SERPL CALCULATED.3IONS-SCNC: 7.8 MMOL/L (ref 5–15)
AST SERPL-CCNC: 13 U/L (ref 1–40)
BASOPHILS # BLD AUTO: 0 10*3/MM3 (ref 0–0.2)
BASOPHILS NFR BLD AUTO: 0 % (ref 0–1.5)
BILIRUB SERPL-MCNC: 0.4 MG/DL (ref 0–1.2)
BUN SERPL-MCNC: 21 MG/DL (ref 8–23)
BUN/CREAT SERPL: 22.6 (ref 7–25)
CALCIUM SPEC-SCNC: 9 MG/DL (ref 8.6–10.5)
CHLORIDE SERPL-SCNC: 106 MMOL/L (ref 98–107)
CO2 SERPL-SCNC: 25.2 MMOL/L (ref 22–29)
CREAT SERPL-MCNC: 0.93 MG/DL (ref 0.76–1.27)
DEPRECATED RDW RBC AUTO: 48.8 FL (ref 37–54)
EGFRCR SERPLBLD CKD-EPI 2021: 83 ML/MIN/1.73
EOSINOPHIL # BLD AUTO: 0.08 10*3/MM3 (ref 0–0.4)
EOSINOPHIL NFR BLD AUTO: 5.6 % (ref 0.3–6.2)
ERYTHROCYTE [DISTWIDTH] IN BLOOD BY AUTOMATED COUNT: 15.9 % (ref 12.3–15.4)
GLOBULIN UR ELPH-MCNC: 3.2 GM/DL
GLUCOSE SERPL-MCNC: 116 MG/DL (ref 65–99)
HCT VFR BLD AUTO: 24.6 % (ref 37.5–51)
HGB BLD-MCNC: 8.1 G/DL (ref 13–17.7)
IMM GRANULOCYTES # BLD AUTO: 0 10*3/MM3 (ref 0–0.05)
IMM GRANULOCYTES NFR BLD AUTO: 0 % (ref 0–0.5)
LYMPHOCYTES # BLD AUTO: 0.11 10*3/MM3 (ref 0.7–3.1)
LYMPHOCYTES NFR BLD AUTO: 7.6 % (ref 19.6–45.3)
MAGNESIUM SERPL-MCNC: 1.8 MG/DL (ref 1.6–2.4)
MCH RBC QN AUTO: 30.2 PG (ref 26.6–33)
MCHC RBC AUTO-ENTMCNC: 32.9 G/DL (ref 31.5–35.7)
MCV RBC AUTO: 91.8 FL (ref 79–97)
MONOCYTES # BLD AUTO: 0.19 10*3/MM3 (ref 0.1–0.9)
MONOCYTES NFR BLD AUTO: 13.2 % (ref 5–12)
NEUTROPHILS NFR BLD AUTO: 1.06 10*3/MM3 (ref 1.7–7)
NEUTROPHILS NFR BLD AUTO: 73.6 % (ref 42.7–76)
PLATELET # BLD AUTO: 57 10*3/MM3 (ref 140–450)
PMV BLD AUTO: 10.4 FL (ref 6–12)
POTASSIUM SERPL-SCNC: 4.7 MMOL/L (ref 3.5–5.2)
PROT SERPL-MCNC: 6.9 G/DL (ref 6–8.5)
RBC # BLD AUTO: 2.68 10*6/MM3 (ref 4.14–5.8)
SODIUM SERPL-SCNC: 139 MMOL/L (ref 136–145)
WBC NRBC COR # BLD: 1.44 10*3/MM3 (ref 3.4–10.8)

## 2023-05-08 PROCEDURE — 36415 COLL VENOUS BLD VENIPUNCTURE: CPT

## 2023-05-08 PROCEDURE — 80053 COMPREHEN METABOLIC PANEL: CPT

## 2023-05-08 PROCEDURE — 83735 ASSAY OF MAGNESIUM: CPT

## 2023-05-08 PROCEDURE — 85025 COMPLETE CBC W/AUTO DIFF WBC: CPT

## 2023-05-08 RX ORDER — SODIUM CHLORIDE 9 MG/ML
250 INJECTION, SOLUTION INTRAVENOUS ONCE
OUTPATIENT
Start: 2023-05-23

## 2023-05-08 RX ORDER — SODIUM CHLORIDE 0.9 % (FLUSH) 0.9 %
20 SYRINGE (ML) INJECTION AS NEEDED
Status: DISCONTINUED | OUTPATIENT
Start: 2023-05-08 | End: 2023-05-08 | Stop reason: HOSPADM

## 2023-05-08 RX ORDER — HEPARIN SODIUM (PORCINE) LOCK FLUSH IV SOLN 100 UNIT/ML 100 UNIT/ML
500 SOLUTION INTRAVENOUS AS NEEDED
OUTPATIENT
Start: 2023-05-08

## 2023-05-08 RX ORDER — SODIUM CHLORIDE 0.9 % (FLUSH) 0.9 %
20 SYRINGE (ML) INJECTION AS NEEDED
OUTPATIENT
Start: 2023-05-08

## 2023-05-08 RX ORDER — PALONOSETRON 0.05 MG/ML
0.25 INJECTION, SOLUTION INTRAVENOUS ONCE
OUTPATIENT
Start: 2023-05-16

## 2023-05-08 RX ORDER — OLANZAPINE 5 MG/1
5 TABLET ORAL ONCE
OUTPATIENT
Start: 2023-05-16 | End: 2023-05-09

## 2023-05-08 RX ORDER — HEPARIN SODIUM (PORCINE) LOCK FLUSH IV SOLN 100 UNIT/ML 100 UNIT/ML
500 SOLUTION INTRAVENOUS AS NEEDED
Status: DISCONTINUED | OUTPATIENT
Start: 2023-05-08 | End: 2023-05-08 | Stop reason: HOSPADM

## 2023-05-08 RX ORDER — SODIUM CHLORIDE 9 MG/ML
250 INJECTION, SOLUTION INTRAVENOUS ONCE
OUTPATIENT
Start: 2023-05-16

## 2023-05-08 NOTE — PROGRESS NOTES
Chief Complaint  Bladder Cancer    Horacio Bansal MD Reinberg, Vita, CATA    Subjective          Korey Jennifer presents to [facility] HEMATOLOGY [facility]OGY for urothelial carcinoma    Mr. Rodriguez is a pleasant 79 yo male with a history of AAA (infrarenal), chronic back pain, HLD, hearing loss who presents for follow up regarding urothelial carcinoma.  Patient had radical cystoprostatectomy on December 12, 2022 at [facility] by Dr. Bansal.  He has received 2 cycles of Cisplatin and Gemcitabine. He had mentation issues with the Carboplatin that he received with Cycle 3. He also had worse fatigue with this as well. He states he had more issues with the D1 chemo than the D8 chemo. He is doing better now but remains fatigued. He does have significant tinnitus. He denies any neuropathy. He denies any nausea, diarrhea, abdominal pain, fevers, chills.     Oncology/Hematology History Overview Note   10/14/22: CT abdomen/pelvis obtained due to gross hematuria for 6 weeks. This showed right hydroureteronephrosis down to bladder with findings concerning for large bladder tumor. He also had 20 lbs weight loss during this time.     10/19/2022: TURBT performed by Dr. Westfall. Per op reprot large area resected (7 x 4 cm), visualization difficult due to clot burden. 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     11/17/2022: PET CT showed asymmetric thickening along the right lateral and posterior walls of the bladder. No evidence of metastatic disease.     12/12/22: Radical cystoprostectomy, bilateral PLND, ileal conduit by Dr. Bansal ([facility]): Left distal and right distal ureter both positive for carcinoma, 2/3 right external lymph nodes positive for metastatic carcinoma, 2/3 right common external lymph nodes positive for carcinoma, bladder and prostate with urothelial carcinoma with micropapillary features and invades directly into prostate, extensive CIS  noted, 4/22 total lymph nodes positive, [id number].    1/18//23: C1D1 Cisplatin/Gemcitabine. Tolerated well    2/8/23: C2D1 Cisplatin/Gemcitabine.    4/7/23: Last dose of radiation.     4/17/23: C3D1 chemotherapy. Cisplatin switched to Carboplatin due to drug shortage.     5/9/23: C4D1 chemotherapy. Cis/[place]         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   10/26/2022 Initial Diagnosis    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 (HCC)     1/13/2023 - 1/13/2023 Chemotherapy    OP BLADDER MitoMYcin / Fluorouracil CIV + XRT     1/18/2023 - 2/15/2023 Chemotherapy    OP BLADDER CISplatin D1 / Gemcitabine D1,D8     4/18/2023 - 4/25/2023 Chemotherapy    OP BLADDER CARBOplatin / Gemcitabine     5/9/2023 -  Chemotherapy    OP BLADDER CISplatin D1 / Gemcitabine D1,D8     Urothelial carcinoma of bladder with invasion of muscle (Resolved)   10/25/2022 Initial Diagnosis    Urothelial carcinoma of bladder with invasion of muscle (HCC)     1/13/2023 - 1/13/2023 Chemotherapy    OP BLADDER MitoMYcin / Fluorouracil CIV + XRT     Malignant neoplasm of overlapping sites of bladder   2/21/2023 Initial Diagnosis    Malignant neoplasm of overlapping sites of bladder (HCC)     3/8/2023 -  Radiation    RADIATION THERAPY Treatment Details (Noted on 2/21/2023)  Site: Bilateral Pelvis  Technique: IMRT  Goal: Curative  Planned Treatment Start Date: 3/8/2023     4/18/2023 - 4/25/2023 Chemotherapy    OP BLADDER CARBOplatin / Gemcitabine     5/9/2023 -  Chemotherapy    OP BLADDER CISplatin D1 / Gemcitabine D1,D8           Review of Systems   Constitutional: Positive for fatigue.   Musculoskeletal: Positive for back pain. Negative for joint swelling.   Psychiatric/Behavioral: Negative for sleep disturbance.   All other systems reviewed and are negative.    Current Outpatient Medications on File Prior to Visit   Medication Sig Dispense Refill   • acetaminophen (TYLENOL) 325 MG tablet 3 tablets.     • atorvastatin (LIPITOR) 20 MG tablet Take 1  tablet by mouth Every Night.     • Eliquis 5 MG tablet tablet Take 2 tablets by mouth Every 12 (Twelve) Hours.     • OLANZapine (zyPREXA) 5 MG tablet Take 1 tablet by mouth Every Night. Take on days 2, 3 and 4 after chemotherapy. 3 tablet 5   • ondansetron (Zofran) 4 MG tablet Take 1 tablet by mouth Daily. Take 1 tablet by mouth daily prior to radiation. 30 tablet 3   • ondansetron (ZOFRAN) 8 MG tablet Take 1 tablet by mouth 3 (Three) Times a Day As Needed for Nausea or Vomiting. 30 tablet 5     No current facility-administered medications on file prior to visit.       No Known Allergies  Past Medical History:   Diagnosis Date   • AAA (abdominal aortic aneurysm)     BEING MONITORED NO REPAIR NEEDED YET   • Aneurysm of abdominal aorta branch vessel    • Cancer     BLADDER AND SKIN CANCERS/REMOVED   • History of urostomy     PT CURRENTLY HAS HOME HEALTH TO HELP WITH OSTOMY   • Kidney stone 10/16/2022     Past Surgical History:   Procedure Laterality Date   • COLONOSCOPY     • CYSTECTOMY      2022  W/UROSTOMY   • SKIN CANCER EXCISION Right    • TRANSURETHRAL RESECTION OF BLADDER TUMOR Right 10/19/2022    Procedure: Cystoscopy with transurethral resection of bladder tumor;  Surgeon: uAng Westfall MD;  Location: [facility] OR;  Service: Urology;  Laterality: Right;   • VENOUS ACCESS DEVICE (PORT) INSERTION Right 2023    Procedure: INSERTION VENOUS ACCESS DEVICE;  Surgeon: Richie Edward MD;  Location: [facility];  Service: General;  Laterality: Right;     Social History     Socioeconomic History   • Marital status:    Tobacco Use   • Smoking status: Former     Packs/day: 1.00     Years: 20.00     Pack years: 20.00     Types: Cigarettes     Start date: 1962     Quit date: 1990     Years since quittin.3   • Smokeless tobacco: Never   Vaping Use   • Vaping Use: Never used   Substance and Sexual Activity   • Alcohol use: Yes     Alcohol/week: 1.0 standard drink     Types: 1 Cans of  beer per week     Comment: 1 beer daily   • Drug use: Never   • Sexual activity: Not Currently     Partners: Female     Family History   Problem Relation Age of Onset   • Cancer Mother    • Cancer Father    • Hearing loss Father    • Hypertension Father    • Malig Hyperthermia Neg Hx        Objective   Physical Exam    Well appearing patient in no acute distress on RA  Anicteric sclera, no rash on exposed skin  Respirations non-labored  Awake, alert, and oriented x 4. Speech intact.   Appropriate mood and affect      Vitals:    05/08/23 1046   Pulse: 89   Resp: 18   Temp: 98.1 °F (36.7 °C)   TempSrc: Temporal   SpO2: 98%   Weight: 89.7 kg (197 lb 12 oz)   PainSc: 0-No pain               PHQ-9 Total Score:           Result Review :   The following data was reviewed by: Arvin Coubrn MD on 05/08/23:  Lab Results   Component Value Date    HGB 8.1 (L) 05/08/2023    HCT 24.6 (L) 05/08/2023    MCV 91.8 05/08/2023    PLT 57 (L) 05/08/2023    WBC 1.44 (L) 05/08/2023    NEUTROABS 1.06 (L) 05/08/2023    LYMPHSABS 0.11 (L) 05/08/2023    MONOSABS 0.19 05/08/2023    EOSABS 0.08 05/08/2023    BASOSABS 0.00 05/08/2023     Lab Results   Component Value Date    GLUCOSE 116 (H) 05/08/2023    BUN 21 05/08/2023    CREATININE 0.93 05/08/2023     05/08/2023    K 4.7 05/08/2023     05/08/2023    CO2 25.2 05/08/2023    CALCIUM 9.0 05/08/2023    PROTEINTOT 6.9 05/08/2023    ALBUMIN 3.7 05/08/2023    BILITOT 0.4 05/08/2023    ALKPHOS 96 05/08/2023    AST 13 05/08/2023    ALT 12 05/08/2023     Lab Results   Component Value Date    MG 2.0 02/07/2023       Labs personally reviewed. Mild anemia.     Surgical Pathology Reviewed: [id number] with left distal margin positive.    [facility] urology documentation and progress notes from hospital personally reviewed.      Assessment and Plan    Diagnoses and all orders for this visit:    1. Malignant neoplasm of overlapping sites of bladder (Primary)  -     CBC &  "Differential; Future  -     Comprehensive Metabolic Panel; Future  -     CT Chest With Contrast Diagnostic; Future  -     CT Abdomen Pelvis With Contrast; Future  -     Ambulatory Referral to Survivorship  -     CBC and Differential; Future    2. 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  -     CBC and Differential; Future    Other orders  -     sodium chloride 0.9 % infusion 250 mL  -     potassium chloride 20 mEq + magnesium sulfate 1000 mg IVPB 1,000 mL  -     OLANZapine (zyPREXA) tablet 5 mg  -     palonosetron (ALOXI) injection 0.25 mg  -     fosaprepitant (EMEND) 150 mg in sodium chloride 0.9 % 100 mL IVPB  -     dexamethasone (DECADRON) 12 mg in sodium chloride 0.9 % IVPB  -     gemcitabine (GEMZAR) 2,100 mg in sodium chloride 0.9 % 305.2 mL chemo IVPB  -     CISplatin (PLATINOL) 132 mg in sodium chloride 0.9 % 632 mL chemo IVPB  -     potassium chloride 20 mEq + magnesium sulfate 1000 mg IVPB 1,000 mL  -     sodium chloride 0.9 % infusion 250 mL  -     dexamethasone (DECADRON) 12 mg in sodium chloride 0.9 % IVPB  -     gemcitabine (GEMZAR) 2,100 mg in sodium chloride 0.9 % 305.2 mL chemo IVPB    Mr. Rodriguez is a 78-year-old male status post radical cystoprostatectomy bilateral PLND which demonstrated stage [id number], stage IIIB urothelial carcinoma. 4/22 lymph nodes positive and right and left and right ureter positive with distal margin of left ureter positive.  Patient with disease that is at high risk of recurrence. With this stage, would favor a recommendation of adjuvant radiation and chemotherapy to hopefully decrease the chance of recurrence. Management has been personally discussed with Dr. Melgoza with radiation oncology and we have decided to do \"sandwich\" therapy with 2 cycles of chemotherapy (platinum with Gemcitabine) followed by radiation followed by 2 additional cycles of chemotherapy.      1/18/23: C1D1 Cisplatin/Gemcitabine. 2/8/23: C2D1 planned Cisplatin/gemcitabine. Patient then completed " radiation with last dose on 4/7/23. C3D1 chemo 4/18/23, with Carbo/[place]. Carboplatin substitued in place of Cisplatin due to drug shortage.       C4D1 chemo due 5/9/23 with Cis/[place] D1 and D8. Will switch back to Cisplatin from Carbo as drug has been acquired and this is curative in intent. Patient with tinnitus but desires highest chance of cure, therefore will keep Cisplatin but dose reduce to 90%. Labs appropriate to proceed. This is final cycle of chemotherapy.     PRN anti-emetics prescribed.     Follow up imaging recommend after completion of therapy. Will order CT CAP in 4 months with labs at that time. Recommend CT imaging every 3 to 6 months for first two years with labs (CBC, CMP) per [facility].    This is an acute or chronic illness that poses a threat to life or bodily function. The above treatment plan involves a high risk of complications and/or mortality of patient management. Continue with labs to monitor for severe toxicities.     LLE DVT  DVT found on duplex ordered 2/7/23. Continue Eliquis. Will need to be on this until completion of cancer treatment. Plan to complete therapy by 6/1/23.    Anemia 2/2 chemotherapy  No intervention or dose adjustment necessary. Iron deficiency ruled out. Will continue to trend and provide transfusion support if needed.           I spent 25 minutes caring for Korey on this date of service. This time includes time spent by me in the following activities:preparing for the visit, reviewing tests, obtaining and/or reviewing a separately obtained history, performing a medically appropriate examination and/or evaluation , counseling and educating the patient/family/caregiver, ordering medications, tests, or procedures, referring and communicating with other health care professionals , documenting information in the medical record, independently interpreting results and communicating that information with the patient/family/caregiver and care coordination    Patient Follow Up:  4 months with scans and labs.   Patient was given instructions and counseling regarding his condition or for health maintenance advice. Please see specific information pulled into the AVS if appropriate.

## 2023-05-09 ENCOUNTER — HOSPITAL ENCOUNTER (OUTPATIENT)
Dept: ONCOLOGY | Facility: HOSPITAL | Age: 78
End: 2023-05-09
Payer: MEDICARE

## 2023-05-09 DIAGNOSIS — C67.8 MALIGNANT NEOPLASM OF OVERLAPPING SITES OF BLADDER: Primary | ICD-10-CM

## 2023-05-09 DIAGNOSIS — C67.2 MALIGNANT NEOPLASM OF LATERAL WALL OF BLADDER: ICD-10-CM

## 2023-05-10 ENCOUNTER — HOSPITAL ENCOUNTER (OUTPATIENT)
Dept: ONCOLOGY | Facility: HOSPITAL | Age: 78
End: 2023-05-10

## 2023-05-10 ENCOUNTER — PATIENT OUTREACH (OUTPATIENT)
Dept: ONCOLOGY | Facility: HOSPITAL | Age: 78
End: 2023-05-10
Payer: MEDICARE

## 2023-05-10 NOTE — SIGNIFICANT NOTE
Received a referral for Survivorship for Mr. Rodriguez.  Called and spoke to patient to discuss Survivorship.  Patient informed that he is not done with chemotherapy yet.  Agreed that we would get back in touch once patient was finished.

## 2023-05-11 ENCOUNTER — OFFICE VISIT (OUTPATIENT)
Dept: RADIATION ONCOLOGY | Facility: HOSPITAL | Age: 78
End: 2023-05-11
Payer: MEDICARE

## 2023-05-11 VITALS
DIASTOLIC BLOOD PRESSURE: 58 MMHG | OXYGEN SATURATION: 96 % | WEIGHT: 197.31 LBS | SYSTOLIC BLOOD PRESSURE: 152 MMHG | RESPIRATION RATE: 16 BRPM | HEART RATE: 89 BPM | TEMPERATURE: 97.9 F | BODY MASS INDEX: 27.29 KG/M2

## 2023-05-11 DIAGNOSIS — C67.8 MALIGNANT NEOPLASM OF OVERLAPPING SITES OF BLADDER: ICD-10-CM

## 2023-05-11 PROCEDURE — G0463 HOSPITAL OUTPT CLINIC VISIT: HCPCS | Performed by: RADIOLOGY

## 2023-05-11 NOTE — PROGRESS NOTES
Follow Up Office Visit      Encounter Date: 05/11/2023   Patient Name: Korye Rodriguez  YOB: 1945   Medical Record Number: [id number]   Primary Diagnosis: No primary diagnosis found.   Cancer Staging: Cancer Staging   No matching staging information was found for the patient.              Cancer Staging   No matching staging information was found for the patient.        Chief Complaint:    Chief Complaint   Patient presents with   • Follow-up     Bladder         Oncologic History: Korey Rodriguez is a 78 y.o. male 77 y.o. male  here for followup  regarding his new diagnosis of bladder cancer. He presented with gross hematuria, prompting an evaluation by Dr. Westfall. He had a TURBT in 10/2022, which identified a large bleeding bladder tumor. 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 PET/CT 11/2022 was negative for metastatic disease. He was seen by Dr. Bansal, who performed a radical cystoprostatectomy with bilateral pelvic LN dissection and formation of an ileal conduit on 12/12/22. An omental pedical flap was also created. 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 Dr. Bansal reported extension of the primary tumor into the left periurethral adventitial fat, which extended to the level of the GAY. 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 This was removed with an R1 resection, with microscopic disease identified at the left distal ureter. Left sided lymph nodes dissected were negative (external iliac 0/5, common iliac 0/4, obturator 0/2, presacral 0/1). Right obturator and presacral lymph nodes were negative. However 2/3 right external iliac nodes were involved and 2/3 right common iliac lymph nodes were involved.     We  talked about a plan for 2 cycles of chemotherapy followed by radiation, followed by additional chemotherapy  He has had 2 cycles of chemo and completed radiation to a dose of 50.4 Gy on 4/5/23. He is continuing on chemotherapy with Dr. Coburn.  Maintaining weight, continues anticoagulant, no issues with urostomy output, BM of normal caliber and consistency, no hematuria, no melena/hematochezia          Subjective      Review of Systems: Review of Systems   Constitutional: Positive for fatigue (5/10). Negative for appetite change and unexpected weight change.   Respiratory: Negative for cough and shortness of breath.    Cardiovascular: Positive for leg swelling (Small amount noted in left calf, ongoing due to blood clot, improving). Negative for chest pain and palpitations.   Gastrointestinal: Negative for abdominal distention, abdominal pain, blood in stool, constipation, diarrhea, nausea and vomiting.   Genitourinary: Negative for hematuria.        Has urostomy with clear, yellow urine.  States that urine output is normal.     Musculoskeletal: Positive for back pain (Ongoing) and joint swelling (Left foot/ankle, ongoing due to blood clot but improving.). Negative for arthralgias.   Skin: Negative for color change and rash.   Neurological: Negative for dizziness, weakness and headaches.   Psychiatric/Behavioral: Negative for sleep disturbance.       The following portions of the patient's history were reviewed and updated as appropriate: allergies, current medications, past family history, past medical history, past social history, past surgical history and problem list.    Measures:   Pain: (on a scale of 0-10)   Pain Score    05/11/23 0847   PainSc: 0-No pain       Advanced Care Plan: N Advance Care Planning   ACP discussion was declined by the patient. Patient does not have an advance directive, declines further assistance.       KPS/Quality of Life: 80 - Restricted Physical Activity  ECOG: (1) Restricted in  physically strenuous activity, ambulatory and able to do work of light nature  Depression Screening:   Patient screened positive for depression based on a PHQ-9 score of 6 on 5/11/2023. Follow-up recommendations include: Elevated PHQ score reflective of acute illness, not depression.        Objective     Physical Exam:   Vital Signs:   Vitals:    05/11/23 0847   BP: 152/58  Comment: MANAUL   Pulse: 89   Resp: 16   Temp: 97.9 °F (36.6 °C)   TempSrc: Temporal   SpO2: 96%   Weight: 89.5 kg (197 lb 5 oz)   PainSc: 0-No pain     Body mass index is 27.29 kg/m².     Constitutional: No acute distress, sitting comfortably  Eye: EOMI, anicteric sclerae  HENT: NC/AT, MMM   Respiratory: Symmetric expansion, nonlabored respiration  MSK: ROM intact in all four extremities, no obvious deformities  Neuro: Alert, oriented x3, CN3-12 grossly intact.   Psych: Appropriate mood and affect.            Assessment / Plan        Assessment/Plan:     Diagnoses and all orders for this visit:    1. Malignant neoplasm of overlapping sites of bladder        Korey Rodriguez is a pleasant 78 y.o. 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 An omental pedical flap was also created. He did not receive neoadjuvant cisplatin based therapy.  His plan of care involved adjuvant sandwich therapy - chemotherapy, then external radiation, then additional chemotherapy. He completed radiation 1 month ago and is recovering well from acute radiation related toxicities. He continues on chemo with Dr. Coburn and has his first set of surveillance scans in Sept 2023. I will see him back upon completion    Follow Up:   No follow-ups on file.        Time:   I spent 35 minutes on this encounter today, 05/11/23. Activities that took place during this time include:   - preparing to  see the patient  - obtaining and reviewing separately obtained history  - performing a medically appropriate examination and evaluation  - counseling and educating the patient  - ordering medications/tests/procedures  - communicating with other healthcare providers  - documenting clinical information in the health record  - coordinating care for this patient.     Sincerely,        Kimberly Melgoza MD  Radiation Oncology  [facility]    This document has been signed by Kimberly Melgoza MD on May 11, 2023 09:55 EDT           NOTICE TO PATIENTS  At [facility], we believe that sharing information builds trust and better relationships. You are receiving this note because you recently visited [facility]. It is possible you will see health information before a provider has talked with you about it. This kind of information can be easy to misunderstand. To help you fully understand what it means for your health, we urge you to discuss this note with your provider.

## 2023-05-15 ENCOUNTER — HOSPITAL ENCOUNTER (OUTPATIENT)
Dept: ONCOLOGY | Facility: HOSPITAL | Age: 78
Discharge: HOME | End: 2023-05-15
Admitting: INTERNAL MEDICINE
Payer: MEDICARE

## 2023-05-15 DIAGNOSIS — C67.2 MALIGNANT NEOPLASM OF LATERAL WALL OF BLADDER: ICD-10-CM

## 2023-05-15 DIAGNOSIS — C67.8 MALIGNANT NEOPLASM OF OVERLAPPING SITES OF BLADDER: ICD-10-CM

## 2023-05-15 LAB
ALBUMIN SERPL-MCNC: 3.6 G/DL (ref 3.5–5.2)
ALBUMIN/GLOB SERPL: 1.1 G/DL
ALP SERPL-CCNC: 97 U/L (ref 39–117)
ALT SERPL W P-5'-P-CCNC: 10 U/L (ref 1–41)
ANION GAP SERPL CALCULATED.3IONS-SCNC: 6.5 MMOL/L (ref 5–15)
AST SERPL-CCNC: 13 U/L (ref 1–40)
BASOPHILS # BLD AUTO: 0.01 10*3/MM3 (ref 0–0.2)
BASOPHILS NFR BLD AUTO: 0.5 % (ref 0–1.5)
BILIRUB SERPL-MCNC: 0.2 MG/DL (ref 0–1.2)
BUN SERPL-MCNC: 21 MG/DL (ref 8–23)
BUN/CREAT SERPL: 22.1 (ref 7–25)
CALCIUM SPEC-SCNC: 8.9 MG/DL (ref 8.6–10.5)
CHLORIDE SERPL-SCNC: 107 MMOL/L (ref 98–107)
CO2 SERPL-SCNC: 24.5 MMOL/L (ref 22–29)
CREAT SERPL-MCNC: 0.95 MG/DL (ref 0.76–1.27)
DEPRECATED RDW RBC AUTO: 58.8 FL (ref 37–54)
EGFRCR SERPLBLD CKD-EPI 2021: 80.9 ML/MIN/1.73
EOSINOPHIL # BLD AUTO: 0.1 10*3/MM3 (ref 0–0.4)
EOSINOPHIL NFR BLD AUTO: 4.7 % (ref 0.3–6.2)
ERYTHROCYTE [DISTWIDTH] IN BLOOD BY AUTOMATED COUNT: 17.5 % (ref 12.3–15.4)
GLOBULIN UR ELPH-MCNC: 3.2 GM/DL
GLUCOSE SERPL-MCNC: 109 MG/DL (ref 65–99)
HCT VFR BLD AUTO: 26 % (ref 37.5–51)
HGB BLD-MCNC: 8.3 G/DL (ref 13–17.7)
IMM GRANULOCYTES # BLD AUTO: 0.04 10*3/MM3 (ref 0–0.05)
IMM GRANULOCYTES NFR BLD AUTO: 1.9 % (ref 0–0.5)
LYMPHOCYTES # BLD AUTO: 0.31 10*3/MM3 (ref 0.7–3.1)
LYMPHOCYTES NFR BLD AUTO: 14.6 % (ref 19.6–45.3)
MAGNESIUM SERPL-MCNC: 2 MG/DL (ref 1.6–2.4)
MCH RBC QN AUTO: 30.1 PG (ref 26.6–33)
MCHC RBC AUTO-ENTMCNC: 31.9 G/DL (ref 31.5–35.7)
MCV RBC AUTO: 94.2 FL (ref 79–97)
MONOCYTES # BLD AUTO: 0.64 10*3/MM3 (ref 0.1–0.9)
MONOCYTES NFR BLD AUTO: 30 % (ref 5–12)
NEUTROPHILS NFR BLD AUTO: 1.03 10*3/MM3 (ref 1.7–7)
NEUTROPHILS NFR BLD AUTO: 48.3 % (ref 42.7–76)
PLATELET # BLD AUTO: 210 10*3/MM3 (ref 140–450)
PMV BLD AUTO: 9.4 FL (ref 6–12)
POTASSIUM SERPL-SCNC: 4.7 MMOL/L (ref 3.5–5.2)
PROT SERPL-MCNC: 6.8 G/DL (ref 6–8.5)
RBC # BLD AUTO: 2.76 10*6/MM3 (ref 4.14–5.8)
SODIUM SERPL-SCNC: 138 MMOL/L (ref 136–145)
WBC NRBC COR # BLD: 2.13 10*3/MM3 (ref 3.4–10.8)

## 2023-05-15 PROCEDURE — 83735 ASSAY OF MAGNESIUM: CPT | Performed by: INTERNAL MEDICINE

## 2023-05-15 PROCEDURE — 85025 COMPLETE CBC W/AUTO DIFF WBC: CPT | Performed by: INTERNAL MEDICINE

## 2023-05-15 PROCEDURE — 80053 COMPREHEN METABOLIC PANEL: CPT | Performed by: INTERNAL MEDICINE

## 2023-05-15 PROCEDURE — 36415 COLL VENOUS BLD VENIPUNCTURE: CPT

## 2023-05-16 ENCOUNTER — HOSPITAL ENCOUNTER (OUTPATIENT)
Dept: ONCOLOGY | Facility: HOSPITAL | Age: 78
Discharge: HOME | End: 2023-05-16
Admitting: INTERNAL MEDICINE
Payer: MEDICARE

## 2023-05-16 VITALS
SYSTOLIC BLOOD PRESSURE: 152 MMHG | BODY MASS INDEX: 27.96 KG/M2 | HEART RATE: 79 BPM | HEIGHT: 71 IN | TEMPERATURE: 97.8 F | RESPIRATION RATE: 18 BRPM | WEIGHT: 199.74 LBS | OXYGEN SATURATION: 96 % | DIASTOLIC BLOOD PRESSURE: 66 MMHG

## 2023-05-16 DIAGNOSIS — C67.2 MALIGNANT NEOPLASM OF LATERAL WALL OF BLADDER: ICD-10-CM

## 2023-05-16 DIAGNOSIS — C67.8 MALIGNANT NEOPLASM OF OVERLAPPING SITES OF BLADDER: Primary | ICD-10-CM

## 2023-05-16 DIAGNOSIS — Z45.2 ENCOUNTER FOR ADJUSTMENT OR MANAGEMENT OF VASCULAR ACCESS DEVICE: ICD-10-CM

## 2023-05-16 PROCEDURE — 25010000002 CISPLATIN PER 50 MG: Performed by: INTERNAL MEDICINE

## 2023-05-16 PROCEDURE — 25010000002 FOSAPREPITANT PER 1 MG: Performed by: INTERNAL MEDICINE

## 2023-05-16 PROCEDURE — 25010000002 MAGNESIUM SULFATE PER 500 MG OF MAGNESIUM: Performed by: INTERNAL MEDICINE

## 2023-05-16 PROCEDURE — 25010000002 GEMCITABINE 1 GM/26.3ML SOLUTION 26.3 ML VIAL: Performed by: INTERNAL MEDICINE

## 2023-05-16 PROCEDURE — 25010000002 HEPARIN LOCK FLUSH PER 10 UNITS: Performed by: INTERNAL MEDICINE

## 2023-05-16 PROCEDURE — A9270 NON-COVERED ITEM OR SERVICE: HCPCS | Performed by: INTERNAL MEDICINE

## 2023-05-16 PROCEDURE — 96375 TX/PRO/DX INJ NEW DRUG ADDON: CPT

## 2023-05-16 PROCEDURE — 96415 CHEMO IV INFUSION ADDL HR: CPT

## 2023-05-16 PROCEDURE — 96413 CHEMO IV INFUSION 1 HR: CPT

## 2023-05-16 PROCEDURE — 96366 THER/PROPH/DIAG IV INF ADDON: CPT

## 2023-05-16 PROCEDURE — 25010000002 DEXAMETHASONE SODIUM PHOSPHATE 120 MG/30ML SOLUTION: Performed by: INTERNAL MEDICINE

## 2023-05-16 PROCEDURE — 63710000001 OLANZAPINE 2.5 MG TABLET: Performed by: INTERNAL MEDICINE

## 2023-05-16 PROCEDURE — 25010000002 POTASSIUM CHLORIDE PER 2 MEQ OF POTASSIUM: Performed by: INTERNAL MEDICINE

## 2023-05-16 PROCEDURE — 96417 CHEMO IV INFUS EACH ADDL SEQ: CPT

## 2023-05-16 PROCEDURE — 96367 TX/PROPH/DG ADDL SEQ IV INF: CPT

## 2023-05-16 PROCEDURE — 25010000002 PALONOSETRON PER 25 MCG: Performed by: INTERNAL MEDICINE

## 2023-05-16 RX ORDER — OLANZAPINE 2.5 MG/1
5 TABLET ORAL ONCE
Status: COMPLETED | OUTPATIENT
Start: 2023-05-16 | End: 2023-05-16

## 2023-05-16 RX ORDER — SODIUM CHLORIDE 0.9 % (FLUSH) 0.9 %
20 SYRINGE (ML) INJECTION AS NEEDED
Status: DISCONTINUED | OUTPATIENT
Start: 2023-05-16 | End: 2023-05-17 | Stop reason: HOSPADM

## 2023-05-16 RX ORDER — PALONOSETRON 0.05 MG/ML
0.25 INJECTION, SOLUTION INTRAVENOUS ONCE
Status: COMPLETED | OUTPATIENT
Start: 2023-05-16 | End: 2023-05-16

## 2023-05-16 RX ORDER — SODIUM CHLORIDE 0.9 % (FLUSH) 0.9 %
20 SYRINGE (ML) INJECTION AS NEEDED
OUTPATIENT
Start: 2023-05-16

## 2023-05-16 RX ORDER — HEPARIN SODIUM (PORCINE) LOCK FLUSH IV SOLN 100 UNIT/ML 100 UNIT/ML
500 SOLUTION INTRAVENOUS AS NEEDED
OUTPATIENT
Start: 2023-05-16

## 2023-05-16 RX ORDER — HEPARIN SODIUM (PORCINE) LOCK FLUSH IV SOLN 100 UNIT/ML 100 UNIT/ML
500 SOLUTION INTRAVENOUS AS NEEDED
Status: DISCONTINUED | OUTPATIENT
Start: 2023-05-16 | End: 2023-05-17 | Stop reason: HOSPADM

## 2023-05-16 RX ORDER — SODIUM CHLORIDE 9 MG/ML
250 INJECTION, SOLUTION INTRAVENOUS ONCE
Status: COMPLETED | OUTPATIENT
Start: 2023-05-16 | End: 2023-05-16

## 2023-05-16 RX ADMIN — PALONOSETRON 0.25 MG: 0.05 INJECTION, SOLUTION INTRAVENOUS at 10:09

## 2023-05-16 RX ADMIN — FOSAPREPITANT 100 ML: 150 INJECTION, POWDER, LYOPHILIZED, FOR SOLUTION INTRAVENOUS at 10:35

## 2023-05-16 RX ADMIN — POTASSIUM CHLORIDE 1000 ML: 2 INJECTION, SOLUTION, CONCENTRATE INTRAVENOUS at 08:07

## 2023-05-16 RX ADMIN — OLANZAPINE 5 MG: 2.5 TABLET, FILM COATED ORAL at 10:08

## 2023-05-16 RX ADMIN — SODIUM CHLORIDE 250 ML: 9 INJECTION, SOLUTION INTRAVENOUS at 10:08

## 2023-05-16 RX ADMIN — GEMCITABINE 2100 MG: 38 INJECTION, SOLUTION INTRAVENOUS at 11:09

## 2023-05-16 RX ADMIN — Medication 20 ML: at 15:37

## 2023-05-16 RX ADMIN — HEPARIN SODIUM (PORCINE) LOCK FLUSH IV SOLN 100 UNIT/ML 500 UNITS: 100 SOLUTION at 15:37

## 2023-05-16 RX ADMIN — POTASSIUM CHLORIDE 1000 ML: 2 INJECTION, SOLUTION, CONCENTRATE INTRAVENOUS at 13:30

## 2023-05-16 RX ADMIN — DEXAMETHASONE SODIUM PHOSPHATE 12 MG: 4 INJECTION, SOLUTION INTRA-ARTICULAR; INTRALESIONAL; INTRAMUSCULAR; INTRAVENOUS; SOFT TISSUE at 10:12

## 2023-05-16 RX ADMIN — CISPLATIN 132 MG: 1 INJECTION, SOLUTION INTRAVENOUS at 11:46

## 2023-05-21 ENCOUNTER — HOSPITAL ENCOUNTER (INPATIENT)
Facility: HOSPITAL | Age: 78
LOS: 2 days | Discharge: HOME | End: 2023-05-24
Attending: EMERGENCY MEDICINE | Admitting: FAMILY MEDICINE
Payer: MEDICARE

## 2023-05-21 ENCOUNTER — APPOINTMENT (OUTPATIENT)
Dept: GENERAL RADIOLOGY | Facility: HOSPITAL | Age: 78
End: 2023-05-21
Payer: MEDICARE

## 2023-05-21 DIAGNOSIS — R50.9 FEBRILE ILLNESS, ACUTE: Primary | ICD-10-CM

## 2023-05-21 DIAGNOSIS — R26.2 DIFFICULTY WALKING: ICD-10-CM

## 2023-05-21 LAB
ALBUMIN SERPL-MCNC: 3.3 G/DL (ref 3.5–5.2)
ALBUMIN SERPL-MCNC: 3.6 G/DL (ref 3.5–5.2)
ALBUMIN/GLOB SERPL: 0.9 G/DL
ALBUMIN/GLOB SERPL: 1 G/DL
ALP SERPL-CCNC: 71 U/L (ref 39–117)
ALP SERPL-CCNC: 80 U/L (ref 39–117)
ALT SERPL W P-5'-P-CCNC: 15 U/L (ref 1–41)
ALT SERPL W P-5'-P-CCNC: 17 U/L (ref 1–41)
ANION GAP SERPL CALCULATED.3IONS-SCNC: 10.2 MMOL/L (ref 5–15)
ANION GAP SERPL CALCULATED.3IONS-SCNC: 10.6 MMOL/L (ref 5–15)
AST SERPL-CCNC: 21 U/L (ref 1–40)
AST SERPL-CCNC: 22 U/L (ref 1–40)
BACTERIA UR QL AUTO: ABNORMAL /HPF
BASOPHILS # BLD AUTO: 0.01 10*3/MM3 (ref 0–0.2)
BASOPHILS # BLD AUTO: 0.02 10*3/MM3 (ref 0–0.2)
BASOPHILS NFR BLD AUTO: 0.2 % (ref 0–1.5)
BASOPHILS NFR BLD AUTO: 0.4 % (ref 0–1.5)
BILIRUB SERPL-MCNC: 0.4 MG/DL (ref 0–1.2)
BILIRUB SERPL-MCNC: 0.4 MG/DL (ref 0–1.2)
BILIRUB UR QL STRIP: NEGATIVE
BUN SERPL-MCNC: 26 MG/DL (ref 8–23)
BUN SERPL-MCNC: 32 MG/DL (ref 8–23)
BUN/CREAT SERPL: 20.3 (ref 7–25)
BUN/CREAT SERPL: 21.2 (ref 7–25)
CALCIUM SPEC-SCNC: 8.2 MG/DL (ref 8.6–10.5)
CALCIUM SPEC-SCNC: 8.6 MG/DL (ref 8.6–10.5)
CHLORIDE SERPL-SCNC: 102 MMOL/L (ref 98–107)
CHLORIDE SERPL-SCNC: 97 MMOL/L (ref 98–107)
CLARITY UR: ABNORMAL
CO2 SERPL-SCNC: 22.4 MMOL/L (ref 22–29)
CO2 SERPL-SCNC: 24.8 MMOL/L (ref 22–29)
COLOR UR: YELLOW
CREAT SERPL-MCNC: 1.28 MG/DL (ref 0.76–1.27)
CREAT SERPL-MCNC: 1.51 MG/DL (ref 0.76–1.27)
D-LACTATE SERPL-SCNC: 0.8 MMOL/L (ref 0.5–2)
D-LACTATE SERPL-SCNC: 1.7 MMOL/L (ref 0.5–2)
DEPRECATED RDW RBC AUTO: 56 FL (ref 37–54)
DEPRECATED RDW RBC AUTO: 56.9 FL (ref 37–54)
EGFRCR SERPLBLD CKD-EPI 2021: 46.4 ML/MIN/1.73
EGFRCR SERPLBLD CKD-EPI 2021: 56.6 ML/MIN/1.73
EOSINOPHIL # BLD AUTO: 0 10*3/MM3 (ref 0–0.4)
EOSINOPHIL # BLD AUTO: 0 10*3/MM3 (ref 0–0.4)
EOSINOPHIL NFR BLD AUTO: 0 % (ref 0.3–6.2)
EOSINOPHIL NFR BLD AUTO: 0 % (ref 0.3–6.2)
ERYTHROCYTE [DISTWIDTH] IN BLOOD BY AUTOMATED COUNT: 17 % (ref 12.3–15.4)
ERYTHROCYTE [DISTWIDTH] IN BLOOD BY AUTOMATED COUNT: 17.2 % (ref 12.3–15.4)
FLUAV AG NPH QL: NEGATIVE
FLUBV AG NPH QL IA: NEGATIVE
GLOBULIN UR ELPH-MCNC: 3.5 GM/DL
GLOBULIN UR ELPH-MCNC: 3.5 GM/DL
GLUCOSE BLDC GLUCOMTR-MCNC: 115 MG/DL (ref 70–99)
GLUCOSE SERPL-MCNC: 116 MG/DL (ref 65–99)
GLUCOSE SERPL-MCNC: 140 MG/DL (ref 65–99)
GLUCOSE UR STRIP-MCNC: NEGATIVE MG/DL
HCT VFR BLD AUTO: 24.7 % (ref 37.5–51)
HCT VFR BLD AUTO: 24.7 % (ref 37.5–51)
HGB BLD-MCNC: 8.1 G/DL (ref 13–17.7)
HGB BLD-MCNC: 8.1 G/DL (ref 13–17.7)
HGB UR QL STRIP.AUTO: ABNORMAL
HOLD SPECIMEN: NORMAL
HOLD SPECIMEN: NORMAL
HYALINE CASTS UR QL AUTO: ABNORMAL /LPF
IMM GRANULOCYTES # BLD AUTO: 0.02 10*3/MM3 (ref 0–0.05)
IMM GRANULOCYTES # BLD AUTO: 0.08 10*3/MM3 (ref 0–0.05)
IMM GRANULOCYTES NFR BLD AUTO: 0.4 % (ref 0–0.5)
IMM GRANULOCYTES NFR BLD AUTO: 1.9 % (ref 0–0.5)
KETONES UR QL STRIP: NEGATIVE
LEUKOCYTE ESTERASE UR QL STRIP.AUTO: ABNORMAL
LYMPHOCYTES # BLD AUTO: 0.13 10*3/MM3 (ref 0.7–3.1)
LYMPHOCYTES # BLD AUTO: 0.15 10*3/MM3 (ref 0.7–3.1)
LYMPHOCYTES NFR BLD AUTO: 3 % (ref 19.6–45.3)
LYMPHOCYTES NFR BLD AUTO: 3.3 % (ref 19.6–45.3)
MAGNESIUM SERPL-MCNC: 1.5 MG/DL (ref 1.6–2.4)
MCH RBC QN AUTO: 29.9 PG (ref 26.6–33)
MCH RBC QN AUTO: 30 PG (ref 26.6–33)
MCHC RBC AUTO-ENTMCNC: 32.8 G/DL (ref 31.5–35.7)
MCHC RBC AUTO-ENTMCNC: 32.8 G/DL (ref 31.5–35.7)
MCV RBC AUTO: 91.1 FL (ref 79–97)
MCV RBC AUTO: 91.5 FL (ref 79–97)
MONOCYTES # BLD AUTO: 0.2 10*3/MM3 (ref 0.1–0.9)
MONOCYTES # BLD AUTO: 0.22 10*3/MM3 (ref 0.1–0.9)
MONOCYTES NFR BLD AUTO: 4.7 % (ref 5–12)
MONOCYTES NFR BLD AUTO: 4.9 % (ref 5–12)
NEUTROPHILS NFR BLD AUTO: 3.88 10*3/MM3 (ref 1.7–7)
NEUTROPHILS NFR BLD AUTO: 4.07 10*3/MM3 (ref 1.7–7)
NEUTROPHILS NFR BLD AUTO: 90.2 % (ref 42.7–76)
NEUTROPHILS NFR BLD AUTO: 91 % (ref 42.7–76)
NITRITE UR QL STRIP: POSITIVE
NRBC BLD AUTO-RTO: 0 /100 WBC (ref 0–0.2)
NRBC BLD AUTO-RTO: 0 /100 WBC (ref 0–0.2)
PH UR STRIP.AUTO: 6.5 [PH] (ref 5–8)
PLATELET # BLD AUTO: 174 10*3/MM3 (ref 140–450)
PLATELET # BLD AUTO: 218 10*3/MM3 (ref 140–450)
PMV BLD AUTO: 8.9 FL (ref 6–12)
PMV BLD AUTO: 9.4 FL (ref 6–12)
POTASSIUM SERPL-SCNC: 4.1 MMOL/L (ref 3.5–5.2)
POTASSIUM SERPL-SCNC: 4.2 MMOL/L (ref 3.5–5.2)
PROT SERPL-MCNC: 6.8 G/DL (ref 6–8.5)
PROT SERPL-MCNC: 7.1 G/DL (ref 6–8.5)
PROT UR QL STRIP: ABNORMAL
RBC # BLD AUTO: 2.7 10*6/MM3 (ref 4.14–5.8)
RBC # BLD AUTO: 2.71 10*6/MM3 (ref 4.14–5.8)
RBC # UR STRIP: ABNORMAL /HPF
REF LAB TEST METHOD: ABNORMAL
SARS-COV-2 RNA RESP QL NAA+PROBE: NOT DETECTED
SODIUM SERPL-SCNC: 132 MMOL/L (ref 136–145)
SODIUM SERPL-SCNC: 135 MMOL/L (ref 136–145)
SP GR UR STRIP: 1.01 (ref 1–1.03)
SQUAMOUS #/AREA URNS HPF: ABNORMAL /HPF
UROBILINOGEN UR QL STRIP: ABNORMAL
WBC # UR STRIP: ABNORMAL /HPF
WBC NRBC COR # BLD: 4.3 10*3/MM3 (ref 3.4–10.8)
WBC NRBC COR # BLD: 4.48 10*3/MM3 (ref 3.4–10.8)
WHOLE BLOOD HOLD COAG: NORMAL
WHOLE BLOOD HOLD SPECIMEN: NORMAL

## 2023-05-21 PROCEDURE — 71045 X-RAY EXAM CHEST 1 VIEW: CPT

## 2023-05-21 PROCEDURE — 82948 REAGENT STRIP/BLOOD GLUCOSE: CPT

## 2023-05-21 PROCEDURE — 83605 ASSAY OF LACTIC ACID: CPT | Performed by: PHYSICIAN ASSISTANT

## 2023-05-21 PROCEDURE — 87040 BLOOD CULTURE FOR BACTERIA: CPT | Performed by: EMERGENCY MEDICINE

## 2023-05-21 PROCEDURE — 99285 EMERGENCY DEPT VISIT HI MDM: CPT

## 2023-05-21 PROCEDURE — 25010000002 THIAMINE PER 100 MG: Performed by: PHYSICIAN ASSISTANT

## 2023-05-21 PROCEDURE — 87086 URINE CULTURE/COLONY COUNT: CPT | Performed by: EMERGENCY MEDICINE

## 2023-05-21 PROCEDURE — 94799 UNLISTED PULMONARY SVC/PX: CPT

## 2023-05-21 PROCEDURE — 87186 SC STD MICRODIL/AGAR DIL: CPT | Performed by: EMERGENCY MEDICINE

## 2023-05-21 PROCEDURE — 36415 COLL VENOUS BLD VENIPUNCTURE: CPT

## 2023-05-21 PROCEDURE — 87804 INFLUENZA ASSAY W/OPTIC: CPT | Performed by: EMERGENCY MEDICINE

## 2023-05-21 PROCEDURE — G0378 HOSPITAL OBSERVATION PER HR: HCPCS

## 2023-05-21 PROCEDURE — 0 MAGNESIUM SULFATE 4 GM/100ML SOLUTION: Performed by: FAMILY MEDICINE

## 2023-05-21 PROCEDURE — 85025 COMPLETE CBC W/AUTO DIFF WBC: CPT

## 2023-05-21 PROCEDURE — 80053 COMPREHEN METABOLIC PANEL: CPT | Performed by: PHYSICIAN ASSISTANT

## 2023-05-21 PROCEDURE — 87040 BLOOD CULTURE FOR BACTERIA: CPT | Performed by: PHYSICIAN ASSISTANT

## 2023-05-21 PROCEDURE — 81001 URINALYSIS AUTO W/SCOPE: CPT | Performed by: EMERGENCY MEDICINE

## 2023-05-21 PROCEDURE — 85025 COMPLETE CBC W/AUTO DIFF WBC: CPT | Performed by: PHYSICIAN ASSISTANT

## 2023-05-21 PROCEDURE — 25010000002 LORAZEPAM PER 2 MG: Performed by: FAMILY MEDICINE

## 2023-05-21 PROCEDURE — 83605 ASSAY OF LACTIC ACID: CPT | Performed by: EMERGENCY MEDICINE

## 2023-05-21 PROCEDURE — 87635 SARS-COV-2 COVID-19 AMP PRB: CPT | Performed by: EMERGENCY MEDICINE

## 2023-05-21 PROCEDURE — 51702 INSERT TEMP BLADDER CATH: CPT

## 2023-05-21 PROCEDURE — 83735 ASSAY OF MAGNESIUM: CPT | Performed by: PHYSICIAN ASSISTANT

## 2023-05-21 PROCEDURE — 99221 1ST HOSP IP/OBS SF/LOW 40: CPT | Performed by: FAMILY MEDICINE

## 2023-05-21 PROCEDURE — 87077 CULTURE AEROBIC IDENTIFY: CPT | Performed by: EMERGENCY MEDICINE

## 2023-05-21 PROCEDURE — 94761 N-INVAS EAR/PLS OXIMETRY MLT: CPT

## 2023-05-21 PROCEDURE — 25010000002 CEFTRIAXONE PER 250 MG: Performed by: EMERGENCY MEDICINE

## 2023-05-21 PROCEDURE — 80053 COMPREHEN METABOLIC PANEL: CPT

## 2023-05-21 PROCEDURE — 25010000002 MAGNESIUM SULFATE IN D5W 1G/100ML (PREMIX) 1-5 GM/100ML-% SOLUTION: Performed by: FAMILY MEDICINE

## 2023-05-21 RX ORDER — HEPARIN SODIUM (PORCINE) LOCK FLUSH IV SOLN 100 UNIT/ML 100 UNIT/ML
5 SOLUTION INTRAVENOUS AS NEEDED
Status: DISCONTINUED | OUTPATIENT
Start: 2023-05-21 | End: 2023-05-24 | Stop reason: HOSPADM

## 2023-05-21 RX ORDER — ACETAMINOPHEN 325 MG/1
325 TABLET ORAL ONCE
Status: COMPLETED | OUTPATIENT
Start: 2023-05-21 | End: 2023-05-21

## 2023-05-21 RX ORDER — SODIUM CHLORIDE 0.9 % (FLUSH) 0.9 %
10 SYRINGE (ML) INJECTION EVERY 12 HOURS SCHEDULED
Status: DISCONTINUED | OUTPATIENT
Start: 2023-05-21 | End: 2023-05-24 | Stop reason: HOSPADM

## 2023-05-21 RX ORDER — SODIUM CHLORIDE 0.9 % (FLUSH) 0.9 %
10 SYRINGE (ML) INJECTION AS NEEDED
Status: DISCONTINUED | OUTPATIENT
Start: 2023-05-21 | End: 2023-05-24 | Stop reason: HOSPADM

## 2023-05-21 RX ORDER — POLYETHYLENE GLYCOL 3350 17 G/17G
17 POWDER, FOR SOLUTION ORAL DAILY PRN
Status: DISCONTINUED | OUTPATIENT
Start: 2023-05-21 | End: 2023-05-24 | Stop reason: HOSPADM

## 2023-05-21 RX ORDER — ATORVASTATIN CALCIUM 20 MG/1
20 TABLET, FILM COATED ORAL NIGHTLY
Status: DISCONTINUED | OUTPATIENT
Start: 2023-05-21 | End: 2023-05-24 | Stop reason: HOSPADM

## 2023-05-21 RX ORDER — CEFTRIAXONE SODIUM 1 G/50ML
1 INJECTION, SOLUTION INTRAVENOUS ONCE
Status: COMPLETED | OUTPATIENT
Start: 2023-05-21 | End: 2023-05-21

## 2023-05-21 RX ORDER — SODIUM CHLORIDE 9 MG/ML
40 INJECTION, SOLUTION INTRAVENOUS AS NEEDED
Status: DISCONTINUED | OUTPATIENT
Start: 2023-05-21 | End: 2023-05-24 | Stop reason: HOSPADM

## 2023-05-21 RX ORDER — THIAMINE HYDROCHLORIDE 100 MG/ML
200 INJECTION, SOLUTION INTRAMUSCULAR; INTRAVENOUS EVERY 8 HOURS SCHEDULED
Status: DISCONTINUED | OUTPATIENT
Start: 2023-05-21 | End: 2023-05-22

## 2023-05-21 RX ORDER — MAGNESIUM SULFATE HEPTAHYDRATE 40 MG/ML
4 INJECTION, SOLUTION INTRAVENOUS ONCE
Status: DISCONTINUED | OUTPATIENT
Start: 2023-05-21 | End: 2023-05-24 | Stop reason: HOSPADM

## 2023-05-21 RX ORDER — HYDROCODONE BITARTRATE AND ACETAMINOPHEN 5; 325 MG/1; MG/1
1 TABLET ORAL EVERY 4 HOURS PRN
Status: DISCONTINUED | OUTPATIENT
Start: 2023-05-21 | End: 2023-05-24 | Stop reason: HOSPADM

## 2023-05-21 RX ORDER — BISACODYL 10 MG
10 SUPPOSITORY, RECTAL RECTAL DAILY PRN
Status: DISCONTINUED | OUTPATIENT
Start: 2023-05-21 | End: 2023-05-24 | Stop reason: HOSPADM

## 2023-05-21 RX ORDER — CEFTRIAXONE SODIUM 1 G/50ML
1 INJECTION, SOLUTION INTRAVENOUS EVERY 24 HOURS
Status: DISCONTINUED | OUTPATIENT
Start: 2023-05-22 | End: 2023-05-24 | Stop reason: HOSPADM

## 2023-05-21 RX ORDER — ACETAMINOPHEN 325 MG/1
650 TABLET ORAL EVERY 4 HOURS PRN
Status: DISCONTINUED | OUTPATIENT
Start: 2023-05-21 | End: 2023-05-24 | Stop reason: HOSPADM

## 2023-05-21 RX ORDER — MELOXICAM 7.5 MG/1
7.5 TABLET ORAL DAILY
COMMUNITY

## 2023-05-21 RX ORDER — LORAZEPAM 2 MG/ML
1 INJECTION INTRAMUSCULAR ONCE
Status: COMPLETED | OUTPATIENT
Start: 2023-05-21 | End: 2023-05-21

## 2023-05-21 RX ORDER — MAGNESIUM SULFATE 1 G/100ML
1 INJECTION INTRAVENOUS
Status: COMPLETED | OUTPATIENT
Start: 2023-05-21 | End: 2023-05-22

## 2023-05-21 RX ORDER — ONDANSETRON 2 MG/ML
4 INJECTION INTRAMUSCULAR; INTRAVENOUS EVERY 6 HOURS PRN
Status: DISCONTINUED | OUTPATIENT
Start: 2023-05-21 | End: 2023-05-24 | Stop reason: HOSPADM

## 2023-05-21 RX ORDER — AMOXICILLIN 250 MG
2 CAPSULE ORAL 2 TIMES DAILY
Status: DISCONTINUED | OUTPATIENT
Start: 2023-05-21 | End: 2023-05-24 | Stop reason: HOSPADM

## 2023-05-21 RX ORDER — BISACODYL 5 MG/1
5 TABLET, DELAYED RELEASE ORAL DAILY PRN
Status: DISCONTINUED | OUTPATIENT
Start: 2023-05-21 | End: 2023-05-24 | Stop reason: HOSPADM

## 2023-05-21 RX ORDER — MAGNESIUM SULFATE HEPTAHYDRATE 40 MG/ML
2 INJECTION, SOLUTION INTRAVENOUS ONCE
Status: DISCONTINUED | OUTPATIENT
Start: 2023-05-21 | End: 2023-05-21

## 2023-05-21 RX ORDER — FOLIC ACID 1 MG/1
1 TABLET ORAL DAILY
Status: DISCONTINUED | OUTPATIENT
Start: 2023-05-21 | End: 2023-05-22

## 2023-05-21 RX ORDER — SODIUM CHLORIDE 9 MG/ML
100 INJECTION, SOLUTION INTRAVENOUS CONTINUOUS
Status: DISCONTINUED | OUTPATIENT
Start: 2023-05-21 | End: 2023-05-23

## 2023-05-21 RX ORDER — SODIUM CHLORIDE 0.9 % (FLUSH) 0.9 %
20 SYRINGE (ML) INJECTION AS NEEDED
Status: DISCONTINUED | OUTPATIENT
Start: 2023-05-21 | End: 2023-05-24 | Stop reason: HOSPADM

## 2023-05-21 RX ADMIN — APIXABAN 5 MG: 5 TABLET, FILM COATED ORAL at 20:38

## 2023-05-21 RX ADMIN — SODIUM CHLORIDE 100 ML/HR: 9 INJECTION, SOLUTION INTRAVENOUS at 16:29

## 2023-05-21 RX ADMIN — Medication 1 MG: at 20:38

## 2023-05-21 RX ADMIN — THIAMINE HYDROCHLORIDE 200 MG: 100 INJECTION, SOLUTION INTRAMUSCULAR; INTRAVENOUS at 22:07

## 2023-05-21 RX ADMIN — ACETAMINOPHEN 325 MG: 325 TABLET ORAL at 19:21

## 2023-05-21 RX ADMIN — Medication 10 ML: at 20:38

## 2023-05-21 RX ADMIN — CEFTRIAXONE SODIUM 1 G: 1 INJECTION, SOLUTION INTRAVENOUS at 13:30

## 2023-05-21 RX ADMIN — MAGNESIUM SULFATE 1 G: 1 INJECTION INTRAVENOUS at 23:06

## 2023-05-21 RX ADMIN — LORAZEPAM 1 MG: 2 INJECTION INTRAMUSCULAR; INTRAVENOUS at 19:21

## 2023-05-21 RX ADMIN — MAGNESIUM SULFATE 1 G: 1 INJECTION INTRAVENOUS at 22:06

## 2023-05-21 RX ADMIN — Medication 10 ML: at 22:06

## 2023-05-21 RX ADMIN — SENNOSIDES AND DOCUSATE SODIUM 2 TABLET: 8.6; 5 TABLET ORAL at 20:38

## 2023-05-21 RX ADMIN — ATORVASTATIN CALCIUM 20 MG: 20 TABLET, FILM COATED ORAL at 20:38

## 2023-05-21 RX ADMIN — ACETAMINOPHEN 650 MG: 325 TABLET ORAL at 16:18

## 2023-05-21 RX ADMIN — SODIUM CHLORIDE 1000 ML: 9 INJECTION, SOLUTION INTRAVENOUS at 11:13

## 2023-05-21 NOTE — H&P
[facility]   HOSPITALIST HISTORY AND PHYSICAL  Date: 2023   Patient Name: Korey Rodriguez  : 1945  MRN: [id number]  Primary Care Physician:  Vita Starks APRN  Date of admission: 2023    Subjective   Subjective     Chief Complaint: Fatigue and fever    HPI:    Korey Rodriguez is a 78 y.o. male with medical history notable for hyperlipidemia, bladder cancer, and left LE DVT.  He underwent TURBT followed by cystectomy with permanent urostomy placement in 2022.  He had a chemo regimen, then radiation and now is getting weekly cisplatin infusions. His next and final treatment is scheduled for May 30th.  He has been doing fairly well with the changes in his health since his cancer diagnosis, but has been feeling quite fatigued for the past several days and his family says he seemed confused all day yesterday.  Today, they noted even worse fatigue and a fever of 100.5 at home, so they brought him to the ED.  On ED evaluation, he has strong evidence for UTI, and has spiked a fever to 102.1. He also has an increase in his creatinine over baseline.  The hospitalist service was asked to admit him for treatment of UTI and ANGEL LUIS.      Personal History     Past Medical History:  Hyperlipidemia  Infrarenal AAA  Bladder cancer- high grade transitional cell CA  History of skin cancer  History of DVT left LE in 2022  Presbycusis  Essential tremor    Past Surgical History:  Colonoscopy  TURBT  10/2022  Robotic Assisted Laparoscopic Radical Cystectomy, Prostatectomy, Bilateral Extended Pelvic Lymphadenectomy, Intracorporeal  Ileal Conduit, Omental Pedicle Flap 22   Portacath placement      Family History:   Both parents with cancer diagnoses    Social History:   Quit smoking in  after a 20 pack-year history    Home Medications:  OLANZapine, apixaban, atorvastatin, meloxicam, and ondansetron    Allergies:  No Known Allergies    Review of Systems   All systems were reviewed and  negative except for: confusion, fatigue and malaise and fever    Objective   Objective     Vitals:   Temp:  [98.3 °F (36.8 °C)-102.1 °F (38.9 °C)] 102.1 °F (38.9 °C)  Heart Rate:  [79-91] 85  Resp:  [18-20] 20  BP: (145-170)/(61-70) 161/69    Physical Exam    Constitutional: Slender elderly male,  Awake, alert, no acute distress   Eyes: Pupils equal, sclerae anicteric, no conjunctival injection   HENT: NCAT, mucous membranes moist   Neck: Supple, no thyromegaly, no lymphadenopathy, trachea midline   Respiratory: Clear to auscultation bilaterally, nonlabored respirations    Cardiovascular: RRR, no murmurs, rubs, or gallops, palpable pedal pulses bilaterally   Gastrointestinal: Positive bowel sounds, soft, nontender, nondistended   :  Urinary drainage from urostomy in lower abdomen.  Cloudy, milky looking urine in bag.   Musculoskeletal: No bilateral ankle edema, no clubbing or cyanosis to extremities   Psychiatric: Appropriate affect, cooperative   Neurologic: Oriented x 3, strength symmetric in all extremities, Cranial Nerves grossly intact to confrontation, speech clear   Skin: No rashes     Result Review    Result Review:  I have personally reviewed the results from the time of this admission to 5/21/2023 13:28 EDT and agree with these findings:  [x]  Laboratory  Not pancytopenic, does have eGFR down to46 from baseline around 70-90  [x]  Microbiology urine culture pending[x]  Radiology Unremarkable chest xray  []  EKG/Telemetry   []  Cardiology/Vascular   []  Pathology  []  Old records  []  Other:      Assessment & Plan   Assessment / Plan     Assessment/Plan:   UTI  -continue Rocephin as started in ED, pending urine culture results    ANGEL LUIS  -hydrate gently and monitor creatinine and electrolytes  -minimize nephrotoxins    Bladder cancer  -has one more chemo treatment to go  -will resume with Dr Coburn after discharge    H/O DVT  -continue Eliquis per home regemen    Infrarenal AAA  -no action needed at this  time    DVT prophylaxis:  Will order SCDs and continue Eliquis, which he is chronically on    CODE STATUS:     Presumptive full code    Admission Status:  I believe this patient meets observation status.    Electronically signed by Alphonse Granado MD, 05/21/23, 1:28 PM EDT.

## 2023-05-21 NOTE — ED PROVIDER NOTES
Time: 10:18 AM EDT  Date of encounter:  5/21/2023  Independent Historian/Clinical History and Information was obtained by:   Patient and Family  Chief Complaint: Fever, generalized weakness    History is limited by: N/A    History of Present Illness:  Patient is a 78 y.o. year old male who presents to the emergency department for evaluation of fever and generalized weakness.  Patient's noticed symptoms since yesterday.  Today had a temperature of 100.5 orally.  Only complaint is fatigue and some mild increase in cough.  Specifically denies chest pain, abdominal pain, any urinary symptoms (patient has a urostomy bag).  No vomiting or diarrhea.  Patient last had chemo Tuesday.    HPI    Patient Care Team  Primary Care Provider: Vita Starks APRN    Past Medical History:     No Known Allergies  Past Medical History:   Diagnosis Date   • AAA (abdominal aortic aneurysm)     BEING MONITORED NO REPAIR NEEDED YET   • Aneurysm of abdominal aorta branch vessel    • Cancer     BLADDER AND SKIN CANCERS/REMOVED   • History of urostomy     PT CURRENTLY HAS HOME HEALTH TO HELP WITH OSTOMY   • Kidney stone 10/16/2022     Past Surgical History:   Procedure Laterality Date   • COLONOSCOPY  2018   • CYSTECTOMY      12/2022  W/UROSTOMY   • SKIN CANCER EXCISION Right    • TRANSURETHRAL RESECTION OF BLADDER TUMOR Right 10/19/2022    Procedure: Cystoscopy with transurethral resection of bladder tumor;  Surgeon: Aung Westfall MD;  Location: [facility] MAIN OR;  Service: Urology;  Laterality: Right;   • VENOUS ACCESS DEVICE (PORT) INSERTION Right 01/09/2023    Procedure: INSERTION VENOUS ACCESS DEVICE;  Surgeon: Richie Edward MD;  Location: [facility] OR [facility];  Service: General;  Laterality: Right;     Family History   Problem Relation Age of Onset   • Cancer Mother    • Cancer Father    • Hearing loss Father    • Hypertension Father    • Malig Hyperthermia Neg Hx        Home Medications:  Prior to Admission medications    Medication Sig  Start Date End Date Taking? Authorizing Provider   acetaminophen (TYLENOL) 325 MG tablet 3 tablets.  Patient not taking: Reported on 22   Kristel Marshall MD   atorvastatin (LIPITOR) 20 MG tablet Take 1 tablet by mouth Every Night. 22   Kristel Marshall MD   Eliquis 5 MG tablet tablet Take 2 tablets by mouth Every 12 (Twelve) Hours. 23   Kristel Marshall MD   OLANZapine (zyPREXA) 5 MG tablet Take 1 tablet by mouth Every Night. Take on days 2, 3 and 4 after chemotherapy.  Patient not taking: Reported on 2023   Arvin Coburn MD   ondansetron (Zofran) 4 MG tablet Take 1 tablet by mouth Daily. Take 1 tablet by mouth daily prior to radiation.  Patient not taking: Reported on 2023   Kimberly Melgoza MD   ondansetron (ZOFRAN) 8 MG tablet Take 1 tablet by mouth 3 (Three) Times a Day As Needed for Nausea or Vomiting.  Patient not taking: Reported on 2023   Arvin Coburn MD        Social History:   Social History     Tobacco Use   • Smoking status: Former     Packs/day: 1.00     Years: 20.00     Pack years: 20.00     Types: Cigarettes     Start date: 1962     Quit date: 1990     Years since quittin.4   • Smokeless tobacco: Never   Vaping Use   • Vaping Use: Never used   Substance Use Topics   • Alcohol use: Yes     Alcohol/week: 1.0 standard drink     Types: 1 Cans of beer per week     Comment: 1 beer daily   • Drug use: Never         Review of Systems:  Review of Systems   Constitutional: Positive for activity change, fatigue and fever. Negative for chills.   HENT: Negative for congestion, rhinorrhea and sore throat.    Eyes: Negative for photophobia.   Respiratory: Positive for cough. Negative for apnea, chest tightness and shortness of breath.    Cardiovascular: Negative for chest pain and palpitations.   Gastrointestinal: Negative for abdominal pain, diarrhea, nausea and vomiting.   Endocrine: Negative.   "  Genitourinary: Negative for difficulty urinating and dysuria.   Musculoskeletal: Negative for back pain, joint swelling and myalgias.   Skin: Negative for color change and wound.   Allergic/Immunologic: Negative.    Neurological: Positive for weakness. Negative for seizures and headaches.   Psychiatric/Behavioral: Negative.    All other systems reviewed and are negative.       Physical Exam:  /69   Pulse 85   Temp (!) 102.1 °F (38.9 °C) (Rectal)   Resp 20   Ht 180.3 cm (71\")   Wt 91.4 kg (201 lb 8 oz)   SpO2 95%   BMI 28.10 kg/m²     Physical Exam  Vitals and nursing note reviewed.   Constitutional:       Appearance: Normal appearance.   HENT:      Head: Normocephalic and atraumatic.      Nose: Nose normal.      Mouth/Throat:      Mouth: Mucous membranes are dry.   Eyes:      Extraocular Movements: Extraocular movements intact.      Pupils: Pupils are equal, round, and reactive to light.   Cardiovascular:      Rate and Rhythm: Normal rate and regular rhythm.      Heart sounds: Normal heart sounds.   Pulmonary:      Effort: Pulmonary effort is normal.      Breath sounds: Normal breath sounds.   Abdominal:      General: Bowel sounds are normal.      Palpations: Abdomen is soft.      Tenderness: There is no abdominal tenderness.   Musculoskeletal:         General: No swelling. Normal range of motion.      Cervical back: Normal range of motion and neck supple.   Skin:     General: Skin is warm and dry.      Coloration: Skin is not jaundiced.   Neurological:      General: No focal deficit present.      Mental Status: He is alert and oriented to person, place, and time. Mental status is at baseline.   Psychiatric:         Mood and Affect: Mood normal.         Behavior: Behavior normal.         Judgment: Judgment normal.                  Procedures:  Procedures      Medical Decision Making:      Comorbidities that affect care:    AAA, urostomy, bladder cancer    External Notes reviewed:    Encounter review: " Office visit 5/8/2023 with oncology, Dr. Coburn.  Description: Malignant neoplasm of bladder.    Lab review: In the past 2 weeks the patient's white blood cell count has trended up from 1.4 4 13 days ago to 2.136 days ago and now 4.3 today.  Hemoglobin is stable at 8.1 from 8.1 13 days ago.  Platelets have improved in the past 13 days from .      The following orders were placed and all results were independently analyzed by me:  Orders Placed This Encounter   Procedures   • Blood Culture - Blood,   • Blood Culture - Blood,   • Influenza Antigen, Rapid - Swab, Nasopharynx   • COVID-19,APTIMA PANTHER(JADYN),[facility] GWENDOLYN/[facility] JE, NP/OP SWAB IN UTM/VTM/SALINE TRANSPORT MEDIA,24 HR TAT - Swab, Nasal Cavity   • Urine Culture - Urine,   • XR Chest 1 View   • [place] Draw   • Comprehensive Metabolic Panel   • CBC Auto Differential   • Lactic Acid, Plasma   • Urinalysis With Culture If Indicated - Urine, Clean Catch   • Urinalysis, Microscopic Only - Urine, Clean Catch   • Repeat temp please  Nursing Communication   • Hospitalist (on-call MD unless specified)   • Insert peripheral IV   • Initiate Observation Status   • CBC & Differential   • Green Top (Gel)   • Lavender Top   • Gold Top - SST   • Light Blue Top       Medications Given in the Emergency Department:  Medications   sodium chloride 0.9 % flush 10 mL (has no administration in time range)   cefTRIAXone (ROCEPHIN) IVPB 1 g (1 g Intravenous New Bag 5/21/23 1330)   sodium chloride 0.9 % bolus 1,000 mL (0 mL Intravenous Stopped 5/21/23 1331)        ED Course:         Labs:    Lab Results (last 24 hours)     Procedure Component Value Units Date/Time    CBC & Differential [[id number]]  (Abnormal) Collected: 05/21/23 0832    Specimen: Blood Updated: 05/21/23 0843    Narrative:      The following orders were created for panel order CBC & Differential.  Procedure                               Abnormality         Status                     ---------                                -----------         ------                     CBC Auto Differential[[id number]]        Abnormal            Final result                 Please view results for these tests on the individual orders.    Comprehensive Metabolic Panel [[id number]]  (Abnormal) Collected: 05/21/23 0832    Specimen: Blood Updated: 05/21/23 0912     Glucose 140 mg/dL      BUN 32 mg/dL      Creatinine 1.51 mg/dL      Sodium 132 mmol/L      Potassium 4.2 mmol/L      Comment: Slight hemolysis detected by analyzer. Results may be affected.        Chloride 97 mmol/L      CO2 24.8 mmol/L      Calcium 8.6 mg/dL      Total Protein 7.1 g/dL      Albumin 3.6 g/dL      ALT (SGPT) 17 U/L      AST (SGOT) 22 U/L      Alkaline Phosphatase 80 U/L      Total Bilirubin 0.4 mg/dL      Globulin 3.5 gm/dL      A/G Ratio 1.0 g/dL      BUN/Creatinine Ratio 21.2     Anion Gap 10.2 mmol/L      eGFR 46.4 mL/min/1.73     Narrative:      GFR Normal >60  Chronic Kidney Disease <60  Kidney Failure <15    The GFR formula is only valid for adults with stable renal function between ages 18 and 70.    CBC Auto Differential [[id number]]  (Abnormal) Collected: 05/21/23 0832    Specimen: Blood Updated: 05/21/23 0843     WBC 4.30 10*3/mm3      RBC 2.70 10*6/mm3      Hemoglobin 8.1 g/dL      Hematocrit 24.7 %      MCV 91.5 fL      MCH 30.0 pg      MCHC 32.8 g/dL      RDW 17.0 %      RDW-SD 56.0 fl      MPV 9.4 fL      Platelets 218 10*3/mm3      Neutrophil % 90.2 %      Lymphocyte % 3.0 %      Monocyte % 4.7 %      Eosinophil % 0.0 %      Basophil % 0.2 %      Immature Grans % 1.9 %      Neutrophils, Absolute 3.88 10*3/mm3      Lymphocytes, Absolute 0.13 10*3/mm3      Monocytes, Absolute 0.20 10*3/mm3      Eosinophils, Absolute 0.00 10*3/mm3      Basophils, Absolute 0.01 10*3/mm3      Immature Grans, Absolute 0.08 10*3/mm3      nRBC 0.0 /100 WBC     Blood Culture - Blood, Arm, Left [[id number]] Collected: 05/21/23 1058    Specimen: Blood from Arm, Left Updated: 05/21/23 1100     Blood Culture - Blood, Arm, Right [[id number]] Collected: 05/21/23 1058    Specimen: Blood from Arm, Right Updated: 05/21/23 1105    Lactic Acid, Plasma [[id number]]  (Normal) Collected: 05/21/23 1058    Specimen: Blood Updated: 05/21/23 1141     Lactate 0.8 mmol/L     Urinalysis With Culture If Indicated - Indwelling Urethral Catheter [[id number]]  (Abnormal) Collected: 05/21/23 1058    Specimen: Urine from Indwelling Urethral Catheter Updated: 05/21/23 1136     Color, UA Yellow     Appearance, UA Cloudy     pH, UA 6.5     Specific Gravity, UA 1.015     Glucose, UA Negative     Ketones, UA Negative     Bilirubin, UA Negative     Blood, UA Large (3+)     Protein,  mg/dL (2+)     Leuk Esterase, UA Large (3+)     Nitrite, UA Positive     Urobilinogen, UA 1.0 E.U./dL    Narrative:      In absence of clinical symptoms, the presence of pyuria, bacteria, and/or nitrites on the urinalysis result does not correlate with infection.    Urinalysis, Microscopic Only - Indwelling Urethral Catheter [[id number]]  (Abnormal) Collected: 05/21/23 1058    Specimen: Urine from Indwelling Urethral Catheter Updated: 05/21/23 1136     RBC, UA 31-50 /HPF      WBC, UA Too Numerous to Count /HPF      Bacteria, UA 2+ /HPF      Squamous Epithelial Cells, UA 0-2 /HPF      Hyaline Casts, UA 0-2 /LPF      Methodology Manual Light Microscopy    Urine Culture - Urine, Indwelling Urethral Catheter [[id number]] Collected: 05/21/23 1058    Specimen: Urine from Indwelling Urethral Catheter Updated: 05/21/23 1120    Influenza Antigen, Rapid - Swab, Nasopharynx [[id number]]  (Normal) Collected: 05/21/23 1059    Specimen: Swab from Nasopharynx Updated: 05/21/23 1131     Influenza A Ag, EIA Negative     Influenza B Ag, EIA Negative    COVID-19,APTIMA PANTHER(JADYN), GWENDOLYN/ JE, NP/OP SWAB IN UTM/VTM/SALINE TRANSPORT MEDIA,24 HR TAT - Swab, Nasal Cavity [[id number]] Collected: 05/21/23 1059    Specimen: Swab from Nasal Cavity Updated: 05/21/23 1109            Imaging:    XR Chest 1 View    Result Date: 5/21/2023  PROCEDURE: XR CHEST 1 VW  COMPAR[facility]: [facility], [facility], XR CHEST 1 VW, 1/09/2023, 10:16.  INDICATIONS: Fever, weakness, dizziness  FINDINGS:   The lungs are well-expanded. The heart and pulmonary vasculature are within normal limits. No pleural effusions are identified. There are no active appearing infiltrates.  Right subclavian Port-A-Cath tips in the mid SVC.  IMPRESSION: No active disease.   JAKY HOPPER MD       Electronically Signed and Approved By: JAKY HOPPER MD on 5/21/2023 at 10:43                 Differential Diagnosis and Discussion:    Fever: Based on the complaint of fever, differential diagnosis includes but is not limited to meningitis, pneumonia, pyelonephritis, acute uti,  systemic immune response syndrome, sepsis, viral syndrome, fungal infection, tick born illness and other bacterial infections.    All labs were reviewed and interpreted by me.  All X-rays impressions were independently interpreted by me.    MDM     Patient Care Considerations:    CT ABDOMEN AND PELVIS: I considered ordering a CT scan of the abdomen and pelvis however Patient denies abdominal pain      Consultants/Shared Management Plan:    Hospitalist: I have discussed the case with Dr. Medina who agrees to accept the patient for admission.    Social Determinants of Health:    Patient is independent, reliable, and has access to care.       Disposition and Care Coordination:    Admit:   Through independent evaluation of the patient's history, physical, and imperical data, the patient meets criteria for observation/admission to the hospital.        Final diagnoses:   Febrile illness, acute        ED Disposition     ED Disposition   Decision to Admit    Condition   --    Comment   Level of Care: Med/Surg [1]   Diagnosis: Febrile illness, acute [996375]   Admitting Physician: BRIANNE MEDINA [[id number]]               This medical record created using voice  recognition software.           Bandar Norton MD  05/21/23 6008

## 2023-05-22 ENCOUNTER — TELEPHONE (OUTPATIENT)
Dept: ONCOLOGY | Facility: HOSPITAL | Age: 78
End: 2023-05-22

## 2023-05-22 LAB
ANION GAP SERPL CALCULATED.3IONS-SCNC: 11.6 MMOL/L (ref 5–15)
ANISOCYTOSIS BLD QL: NORMAL
BASOPHILS # BLD AUTO: 0.01 10*3/MM3 (ref 0–0.2)
BASOPHILS NFR BLD AUTO: 0.3 % (ref 0–1.5)
BUN SERPL-MCNC: 21 MG/DL (ref 8–23)
BUN/CREAT SERPL: 17.5 (ref 7–25)
CALCIUM SPEC-SCNC: 8.1 MG/DL (ref 8.6–10.5)
CHLORIDE SERPL-SCNC: 101 MMOL/L (ref 98–107)
CO2 SERPL-SCNC: 22.4 MMOL/L (ref 22–29)
CREAT SERPL-MCNC: 1.2 MG/DL (ref 0.76–1.27)
DEPRECATED RDW RBC AUTO: 56.9 FL (ref 37–54)
EGFRCR SERPLBLD CKD-EPI 2021: 61.1 ML/MIN/1.73
EOSINOPHIL # BLD AUTO: 0 10*3/MM3 (ref 0–0.4)
EOSINOPHIL NFR BLD AUTO: 0 % (ref 0.3–6.2)
ERYTHROCYTE [DISTWIDTH] IN BLOOD BY AUTOMATED COUNT: 17 % (ref 12.3–15.4)
GLUCOSE SERPL-MCNC: 136 MG/DL (ref 65–99)
HCT VFR BLD AUTO: 21.9 % (ref 37.5–51)
HGB BLD-MCNC: 7.2 G/DL (ref 13–17.7)
IMM GRANULOCYTES # BLD AUTO: 0.04 10*3/MM3 (ref 0–0.05)
IMM GRANULOCYTES NFR BLD AUTO: 1.1 % (ref 0–0.5)
LYMPHOCYTES # BLD AUTO: 0.09 10*3/MM3 (ref 0.7–3.1)
LYMPHOCYTES NFR BLD AUTO: 2.5 % (ref 19.6–45.3)
MAGNESIUM SERPL-MCNC: 2.5 MG/DL (ref 1.6–2.4)
MCH RBC QN AUTO: 30.3 PG (ref 26.6–33)
MCHC RBC AUTO-ENTMCNC: 32.9 G/DL (ref 31.5–35.7)
MCV RBC AUTO: 92 FL (ref 79–97)
MONOCYTES # BLD AUTO: 0.24 10*3/MM3 (ref 0.1–0.9)
MONOCYTES NFR BLD AUTO: 6.7 % (ref 5–12)
NEUTROPHILS NFR BLD AUTO: 3.2 10*3/MM3 (ref 1.7–7)
NEUTROPHILS NFR BLD AUTO: 89.4 % (ref 42.7–76)
NRBC BLD AUTO-RTO: 0 /100 WBC (ref 0–0.2)
OVALOCYTES BLD QL SMEAR: NORMAL
PLATELET # BLD AUTO: 148 10*3/MM3 (ref 140–450)
PMV BLD AUTO: 9.6 FL (ref 6–12)
POTASSIUM SERPL-SCNC: 3.7 MMOL/L (ref 3.5–5.2)
RBC # BLD AUTO: 2.38 10*6/MM3 (ref 4.14–5.8)
SMALL PLATELETS BLD QL SMEAR: ADEQUATE
SODIUM SERPL-SCNC: 135 MMOL/L (ref 136–145)
WBC MORPH BLD: NORMAL
WBC NRBC COR # BLD: 3.58 10*3/MM3 (ref 3.4–10.8)

## 2023-05-22 PROCEDURE — 83735 ASSAY OF MAGNESIUM: CPT | Performed by: FAMILY MEDICINE

## 2023-05-22 PROCEDURE — 94799 UNLISTED PULMONARY SVC/PX: CPT

## 2023-05-22 PROCEDURE — 25010000002 THIAMINE PER 100 MG: Performed by: PHYSICIAN ASSISTANT

## 2023-05-22 PROCEDURE — 80048 BASIC METABOLIC PNL TOTAL CA: CPT | Performed by: FAMILY MEDICINE

## 2023-05-22 PROCEDURE — 94761 N-INVAS EAR/PLS OXIMETRY MLT: CPT

## 2023-05-22 PROCEDURE — 97161 PT EVAL LOW COMPLEX 20 MIN: CPT

## 2023-05-22 PROCEDURE — 85007 BL SMEAR W/DIFF WBC COUNT: CPT | Performed by: FAMILY MEDICINE

## 2023-05-22 PROCEDURE — 85025 COMPLETE CBC W/AUTO DIFF WBC: CPT | Performed by: FAMILY MEDICINE

## 2023-05-22 PROCEDURE — 25010000002 CEFTRIAXONE PER 250 MG: Performed by: FAMILY MEDICINE

## 2023-05-22 PROCEDURE — 99233 SBSQ HOSP IP/OBS HIGH 50: CPT | Performed by: INTERNAL MEDICINE

## 2023-05-22 PROCEDURE — 25010000002 ONDANSETRON PER 1 MG: Performed by: FAMILY MEDICINE

## 2023-05-22 RX ORDER — ONDANSETRON 4 MG/1
8 TABLET, FILM COATED ORAL 3 TIMES DAILY PRN
Status: DISCONTINUED | OUTPATIENT
Start: 2023-05-22 | End: 2023-05-24 | Stop reason: HOSPADM

## 2023-05-22 RX ORDER — FAMOTIDINE 20 MG/1
20 TABLET, FILM COATED ORAL
Status: DISCONTINUED | OUTPATIENT
Start: 2023-05-22 | End: 2023-05-24 | Stop reason: HOSPADM

## 2023-05-22 RX ADMIN — ONDANSETRON 4 MG: 2 INJECTION INTRAMUSCULAR; INTRAVENOUS at 01:59

## 2023-05-22 RX ADMIN — SENNOSIDES AND DOCUSATE SODIUM 2 TABLET: 8.6; 5 TABLET ORAL at 20:18

## 2023-05-22 RX ADMIN — Medication 10 ML: at 20:19

## 2023-05-22 RX ADMIN — ATORVASTATIN CALCIUM 20 MG: 20 TABLET, FILM COATED ORAL at 20:18

## 2023-05-22 RX ADMIN — THIAMINE HYDROCHLORIDE 200 MG: 100 INJECTION, SOLUTION INTRAMUSCULAR; INTRAVENOUS at 14:37

## 2023-05-22 RX ADMIN — Medication 1 MG: at 08:58

## 2023-05-22 RX ADMIN — FAMOTIDINE 20 MG: 20 TABLET ORAL at 17:27

## 2023-05-22 RX ADMIN — SODIUM CHLORIDE 100 ML/HR: 9 INJECTION, SOLUTION INTRAVENOUS at 14:37

## 2023-05-22 RX ADMIN — CEFTRIAXONE SODIUM 1 G: 1 INJECTION, SOLUTION INTRAVENOUS at 14:37

## 2023-05-22 RX ADMIN — Medication 10 ML: at 09:01

## 2023-05-22 RX ADMIN — SENNOSIDES AND DOCUSATE SODIUM 2 TABLET: 8.6; 5 TABLET ORAL at 09:01

## 2023-05-22 RX ADMIN — APIXABAN 5 MG: 5 TABLET, FILM COATED ORAL at 08:58

## 2023-05-22 RX ADMIN — THIAMINE HYDROCHLORIDE 200 MG: 100 INJECTION, SOLUTION INTRAMUSCULAR; INTRAVENOUS at 05:39

## 2023-05-22 RX ADMIN — ACETAMINOPHEN 650 MG: 325 TABLET ORAL at 19:23

## 2023-05-22 RX ADMIN — APIXABAN 5 MG: 5 TABLET, FILM COATED ORAL at 20:18

## 2023-05-22 NOTE — TELEPHONE ENCOUNTER
Mimi called and states that the pt is admitted in [facility] for fever. Mimi is asking if Dr Coburn want's to hold off tx for tomorrow due to the pt's illness. The pt is being tx for fever, UTI, & ANGEL LUIS here at [facility].     The pt needs to be rescheduled for his tx tomorrow.

## 2023-05-22 NOTE — CASE MANAGEMENT/SOCIAL WORK
Discharge Planning Assessment   Alber     Patient Name: Korey Rodriguez  MRN: [id number]  Today's Date: 5/22/2023    Admit Date: 5/21/2023    Plan: Pt admitted due to fever and generalized weakness. MAYANK spoke with pt to assess needs. Pt lives at home with his wife and reports independence in ADLs. Pt is current with [facility] but is unsure of agency name. Pt placed on 02 and does not wear at home. Will need 6MWT if unable to be weaned. Pt denies need for rehab at this time. Pharmacy/PCP confirmed. Will follow.      Discharge Needs Assessment     Row Name 05/22/23 1030       Living Environment    People in Home spouse    Name(s) of People in Home Pt lives in [place] with his wife, Mimi ROWLAND    Current Living Arrangements home    Potentially Unsafe Housing Conditions none    Primary Care Provided by self    Provides Primary Care For no one    Family Caregiver if Needed child(todd), adult    Family Caregiver Names Mimi Rodriguez- Spouse    Quality of Family Relationships supportive;involved;helpful    Able to Return to Prior Arrangements yes       Resource/Environmental Concerns    Resource/Environmental Concerns none       Transition Planning    Patient/Family Anticipates Transition to home with family    Patient/Family Anticipated Services at Transition durable medical equipment    Transportation Anticipated family or friend will provide;car, drives self       Discharge Needs Assessment    Readmission Within the Last 30 Days no previous admission in last 30 days    Concerns to be Addressed denies needs/concerns at this time    Anticipated Changes Related to Illness none    Equipment Needed After Discharge oxygen    Discharge Facility/Level of Care Needs home with home health               Discharge Plan     Row Name 05/22/23 1031       Plan    Plan Pt admitted due to fever and generalized weakness. MAYANK spoke with pt to assess needs. Pt lives at home with his wife and reports independence in ADLs. Pt is current with [facility]  but is unsure of agency name. Pt placed on 02 and does not wear at home. Will need 6MWT if unable to be weaned. Pt denies need for rehab at this time. Pharmacy/PCP confirmed. Will follow.    Patient/Family in Agreement with Plan yes               Demographic Summary     Row Name 05/22/23 1017       General Information    Admission Type observation    Arrived From emergency department    Referral Source admission list    Reason for Consult discharge planning    Preferred Language English       Contact Information    Permission Granted to Share Info With family/designee               Functional Status     Row Name 05/22/23 1017       Functional Status    Usual Activity Tolerance good    Current Activity Tolerance good       Functional Status, IADL    Medications independent    Meal Preparation independent    Housekeeping independent    Laundry independent    Shopping independent       Mental Status    General Appearance WDL WDL       Mental Status Summary    Recent Changes in Mental Status/Cognitive Functioning no changes       Employment/    Employment Status retired    Current or Previous Occupation                Psychosocial     Row Name 05/22/23 1018       Behavior WDL    Behavior WDL WDL       Emotion Mood WDL    Emotion/Mood/Affect WDL WDL       Speech WDL    Speech WDL WDL       Perceptual State WDL    Perceptual State WDL WDL       Thought Process WDL    Thought Process WDL WDL       Intellectual Performance WDL    Intellectual Performance WDL WDL    Level of Consciousness Alert       Coping/Stress    Major Change/Loss/Stressor none    Sources of Support spouse    Techniques to [place] with Loss/Stress/Change not applicable    Reaction to Health Status accepting    Understanding of Condition and Treatment adequate understanding of medical condition       Developmental Stage (Eriksson's)    Developmental Stage Stage 8 (65 years-death/Late Adulthood) Integrity vs. Despair            Micki Luna  MSW

## 2023-05-22 NOTE — NURSING NOTE
Patient presents with severe tremors throughout body during bedside shift report at approximately 19:15 today, 5/21/2023. Assessed vital signs, oral temp 100.2 deg farenheit, monitor pulse 108, apical pulse rate 110, blood pressure 160/134 checked on left arm, 87% on room air. Placed patient on 2L nasal cannula, improved O2 to 96%. Assessed BLE, Right ankle swollen and warm (previous dx and current dx blood clot to LEFT ankle, expected) while RIGHT ankle no swelling and cool. Applied warm blanket per patient request. Notified Hospitalist on call, labs ordered and drawn from right chest port including blood cultures, CXR ordered and performed, 325mg additional tylenol administered by mouth per provider order. Initiated CIWA protocol and administered 1mg ativan IV per provider order at bedside. Provided education throughout. Call light in reach of patient, tremors subsided, no needs voiced, breathing deeply with eyes open.

## 2023-05-22 NOTE — PLAN OF CARE
Goal Outcome Evaluation:              Outcome Evaluation: New admit this shift. AOx4. Urostomy C/D/I. NS@100 per order. Tremors noted in upper extremities. Encouraged to turn and reposition self while in bed to prevent further skin breakdown. Room air.

## 2023-05-22 NOTE — PROGRESS NOTES
[facility]   Hospitalist Progress Note  Date: 2023  Patient Name: Korey Rodriguez  : 1945  MRN: [id number]  Date of admission: 2023      Subjective   Subjective     Chief Complaint: Fatigue, confusion and fever.    Summary:   Korey Rodriguez is a 78 y.o. male with medical history notable for hyperlipidemia, bladder cancer, and left LE DVT.  He underwent TURBT followed by cystectomy with permanent urostomy placement in 2022.  He had a chemo regimen, then radiation and now is getting weekly cisplatin infusions. His next and final treatment is scheduled for May 30th.  He has been doing fairly well with the changes in his health since his cancer diagnosis, but has been feeling quite fatigued for the past several days and his family says he seemed confused all day yesterday.  Today, they noted even worse fatigue and a fever of 100.5 at home, so they brought him to the ED.  On ED evaluation, he has strong evidence for UTI, and has spiked a fever to 102.1. He also has an increase in his creatinine over baseline.  The hospitalist service was asked to admit him for treatment of UTI and ANGEL LUIS.     Interval Followup:   Remains febrile.  Good saturation on room air 98%.  Patient is awake alert oriented x3.  No nausea vomiting or abdominal pain.  Still feels somewhat fatigued.  Good urine output via right lower quadrant urostomy..  Urine clear    Review of Systems   All systems were reviewed and negative except for: Summary and interval follow-up    Objective   Objective     Vitals:   Temp:  [98.1 °F (36.7 °C)-102 °F (38.9 °C)] 98.6 °F (37 °C)  Heart Rate:  [] 73  Resp:  [20-24] 20  BP: (132-160)/() 132/57  Flow (L/min):  [2] 2  Physical Exam   Constitutional: Slender elderly male,  Awake, alert, no acute distress              Eyes: Pupils equal, sclerae anicteric, no conjunctival injection              HENT: NCAT, mucous membranes moist              Neck: Supple, no thyromegaly, no  lymphadenopathy, trachea midline              Respiratory: Clear to auscultation bilaterally, nonlabored respirations               Cardiovascular: RRR, no murmurs, rubs, or gallops, palpable pedal pulses bilaterally              Gastrointestinal: Positive bowel sounds, soft, nontender, nondistended              :  Urinary drainage from urostomy in right lower abdomen.    Clear urine in bag.              Musculoskeletal: No bilateral ankle edema, no clubbing or cyanosis to extremities              Psychiatric: Appropriate affect, cooperative              Neurologic: Oriented x 3, strength symmetric in all extremities, Cranial Nerves grossly intact to confrontation, speech clear              Skin: No rashes        Result Review    Result Review:  I have personally reviewed the results for the past 24 hours and agree with these findings:  [x]  Laboratory  [x]  Microbiology  [x]  Radiology  []  EKG/Telemetry   []  Cardiology/Vascular   []  Pathology  [x]  Old records  [x]  Other:    Assessment & Plan   Assessment / Plan     Assessment:  UTI  Urine bladder cancer s/p cystectomy and ileal conduit with urostomy.  Causing above.  Immunocompromise due to on chemotherapy.  Acute kidney injury.  Improving.  Anemia.  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  Infrarenal abdominal aortic aneurysm.  To monitor.  Hard of hearing     Plan:  Continue IV fluid.  IV Rocephin.  Await culture data.  Discussed with patient oncologist.  Need to postpone his tomorrow's chemotherapy due to UTI and fever.  DC CIWA protocol.  Patient drinks only 1 light beer every night.  Check iron profile.  Flu and COVID-negative.  Chest x-ray negative.  PT OT    Discussed plan with RN.  Return to home when stable after urine culture is back.    DVT prophylaxis:  Medical and mechanical DVT prophylaxis orders are present.    CODE STATUS:   Code Status (Patient has no pulse and is not breathing): CPR (Attempt to  Resuscitate)  Medical Interventions (Patient has pulse or is breathing): Full Support  Release to patient: Routine Release      Part of this note may be an electronic transcription/translation of spoken language to printed text using the Dragon Dictation System.     Electronically signed by True Caputo MD, 05/22/23, 5:51 PM EDT.

## 2023-05-22 NOTE — THERAPY EVALUATION
Acute Care - Physical Therapy Initial Evaluation   [place]     Patient Name: Korey Rodriguez  : 1945  MRN: [id number]  Today's Date: 2023      Visit Dx:     ICD-10-CM ICD-9-CM   1. Febrile illness, acute  R50.9 780.60   2. 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   • Mixed hyperlipidemia   • Squamous cell carcinoma in situ (SCCIS) of skin   • Tinnitus   • UTI (urinary tract infection)   • Aneurysm of iliac artery   • Aortic aneurysm   • Bilateral hearing loss   • Blood in urine   • Squamous cell carcinoma in situ of skin   • Kidney stone   • Mixed hyperlipidemia   • Encounter for adjustment or management of vascular access device   • Malignant neoplasm of overlapping sites of bladder   • Long term (current) use of anticoagulants   • Febrile illness, acute     Past Medical History:   Diagnosis Date   • AAA (abdominal aortic aneurysm)     BEING MONITORED NO REPAIR NEEDED YET   • Aneurysm of abdominal aorta branch vessel    • Cancer     BLADDER AND SKIN CANCERS/REMOVED   • Essential tremor    • History of DVT of lower extremity 2022    on Xarelto   • History of urostomy     PT CURRENTLY HAS HOME HEALTH TO HELP WITH OSTOMY   • Hyperlipidemia    • Kidney stone 10/16/2022     Past Surgical History:   Procedure Laterality Date   • COLONOSCOPY     • CYSTECTOMY      2022  W/UROSTOMY   • SKIN CANCER EXCISION Right    • TRANSURETHRAL RESECTION OF BLADDER TUMOR Right 10/19/2022    Procedure: Cystoscopy with transurethral resection of bladder tumor;  Surgeon: Aung Westfall MD;  Location: [facility];  Service: Urology;  Laterality: Right;    • VENOUS ACCESS DEVICE (PORT) INSERTION Right 01/09/2023    Procedure: INSERTION VENOUS ACCESS DEVICE;  Surgeon: Richie Edward MD;  Location: [facility] OR [facility] [place];  Service: General;  Laterality: Right;     PT Assessment (last 12 hours)     PT Evaluation and Treatment     Row Name 05/22/23 1101          Physical Therapy Time and Intention    Document Type evaluation  -AV     Mode of Treatment individual therapy;physical therapy  -AV     Row Name 05/22/23 1101          General Information    Patient Profile Reviewed yes  -AV     Patient Observations alert;cooperative;agree to therapy  -AV     Prior Level of Function independent:;all household mobility;gait;transfer;ADL's  Ambulated without an assistive device. No home O2.  -AV     Equipment Currently Used at Home none  -AV     Existing Precautions/Restrictions fall  -AV     Row Name 05/22/23 1101          Living Environment    Current Living Arrangements home  -AV     Home Accessibility stairs to enter home;stairs within home  -AV     People in Home spouse  -AV     Row Name 05/22/23 1101          Home Main Entrance    Number of Stairs, Main Entrance two  -AV     Row Name 05/22/23 1101          Stairs Within Home, Primary    Stairs, Within Home, Primary Flight to basement  -AV     Row Name 05/22/23 1101          Cognition    Orientation Status (Cognition) oriented x 3  -AV     Row Name 05/22/23 1101          Range of Motion (ROM)    Range of Motion bilateral lower extremities;ROM is WFL  -AV     Row Name 05/22/23 1101          Strength (Manual Muscle Testing)    Strength (Manual Muscle Testing) bilateral lower extremities;strength is WFL  -AV     Row Name 05/22/23 1101          Bed Mobility    Bed Mobility supine-sit  -AV     Supine-Sit [place] (Bed Mobility) standby assist  -AV     Row Name 05/22/23 1101          Transfers    Transfers sit-stand transfer;stand-sit transfer  -AV     Row Name 05/22/23 1101          Sit-Stand Transfer    Sit-Stand [place]  (Transfers) contact guard  -AV     Assistive Device (Sit-Stand Transfers) walker, front-wheeled  -AV     Row Name 05/22/23 1101          Stand-Sit Transfer    Stand-Sit [place] (Transfers) contact guard  -AV     Assistive Device (Stand-Sit Transfers) walker, front-wheeled  -AV     Row Name 05/22/23 1101          Gait/Stairs (Locomotion)    Gait/Stairs Locomotion gait/ambulation independence;gait/ambulation assistive device;distance ambulated  -AV     [place] Level (Gait) contact guard  -AV     Assistive Device (Gait) walker, front-wheeled  -AV     Distance in Feet (Gait) 100  -AV     Row Name 05/22/23 1101          Safety Issues, Functional Mobility    Impairments Affecting Function (Mobility) balance;endurance/activity tolerance  -AV     Row Name 05/22/23 1101          Balance    Balance Assessment standing dynamic balance  -AV     Dynamic Standing Balance contact guard  -AV     Position/Device Used, Standing Balance supported;walker, front-wheeled  -AV     Row Name 05/22/23 1101          Plan of Care Review    Plan of Care Reviewed With patient  -AV     Progress no change  -AV     Outcome Evaluation Patient presents with deficits in balance, endurance, transfers, and ambulation. Patient will benefit from skilled PT services to address these mobility deficits and decrease risk of falls. Recommend rolling walker and home with home health following hospital discharge.  -AV     Row Name 05/22/23 1101          Vital Signs    Pre SpO2 (%) 94  -AV     O2 Delivery Pre Treatment room air  -AV     Post SpO2 (%) 90  -AV     O2 Delivery Post Treatment room air  -AV     Row Name 05/22/23 1101          Positioning and Restraints    Pre-Treatment Position in bed  -AV     Post Treatment Position chair  -AV     In Chair reclined;call light within reach;encouraged to call for assist;exit alarm on  -AV     Row Name 05/22/23 1101          Therapy Assessment/Plan (PT)    Rehab Potential (PT) good, to achieve stated therapy  goals  -AV     Criteria for Skilled Interventions Met (PT) yes;meets criteria  -AV     Therapy Frequency (PT) daily  -AV     Predicted Duration of Therapy Intervention (PT) 10 days  -AV     Problem List (PT) problems related to;balance;mobility;strength  -AV     Activity Limitations Related to Problem List (PT) unable to transfer safely;unable to ambulate safely  -AV     Row Name 05/22/23 1101          PT Evaluation Complexity    History, PT Evaluation Complexity 1-2 personal factors and/or comorbidities  -AV     Examination of Body Systems (PT Eval Complexity) total of 4 or more elements  -AV     Clinical Presentation (PT Evaluation Complexity) stable  -AV     Clinical Decision Making (PT Evaluation Complexity) low complexity  -AV     Overall Complexity (PT Evaluation Complexity) low complexity  -AV     Row Name 05/22/23 1101          Therapy Plan Review/Discharge Plan (PT)    Therapy Plan Review (PT) evaluation/treatment results reviewed;patient  -AV     Row Name 05/22/23 1101          Physical Therapy Goals    Transfer Goal Selection (PT) transfer, PT goal 1  -AV     Gait Training Goal Selection (PT) gait training, PT goal 1  -AV     Row Name 05/22/23 1101          Transfer Goal 1 (PT)    Activity/Assistive Device (Transfer Goal 1, PT) transfers, all;walker, rolling  -AV     [place] Level/Cues Needed (Transfer Goal 1, PT) modified independence  -AV     Time Frame (Transfer Goal 1, PT) 10 days  -AV     Row Name 05/22/23 1101          Gait Training Goal 1 (PT)    Activity/Assistive Device (Gait Training Goal 1, PT) gait (walking locomotion);assistive device use;walker, rolling  -AV     [place] Level (Gait Training Goal 1, PT) modified independence  -AV     Distance (Gait Training Goal 1, PT) 200  -AV     Time Frame (Gait Training Goal 1, PT) 10 days  -AV           User Key  (r) = Recorded By, (t) = Taken By, (c) = Cosigned By    Initials Name Provider Type    AV Jann Lisa, PT Physical Therapist                 Physical Therapy Education     Title: PT OT SLP Therapies (In Progress)     Topic: Physical Therapy (In Progress)     Point: Mobility training (Done)     Learning Progress Summary           Patient Acceptance, E,TB, VU by AV at 5/22/2023 1139                   Point: Home exercise program (Not Started)     Learner Progress:  Not documented in this visit.          Point: Body mechanics (Done)     Learning Progress Summary           Patient Acceptance, E,TB, VU by AV at 5/22/2023 1139                   Point: Precautions (Done)     Learning Progress Summary           Patient Acceptance, E,TB, VU by AV at 5/22/2023 1139                               User Key     Initials Effective Dates Name Provider Type Discipline    AV 06/11/21 -  Jann Lisa, PT Physical Therapist PT              PT Recommendation and Plan  Anticipated Discharge Disposition (PT): home with home health  Planned Therapy Interventions (PT): balance training, bed mobility training, gait training, neuromuscular re-education, strengthening, transfer training  Therapy Frequency (PT): daily  Plan of Care Reviewed With: patient  Progress: no change  Outcome Evaluation: Patient presents with deficits in balance, endurance, transfers, and ambulation. Patient will benefit from skilled PT services to address these mobility deficits and decrease risk of falls. Recommend rolling walker and home with home health following hospital discharge.   Outcome Measures     Row Name 05/22/23 1138             How much help from another person do you currently need...    Turning from your back to your side while in flat bed without using bedrails? 4  -AV      Moving from lying on back to sitting on the side of a flat bed without bedrails? 4  -AV      Moving to and from a bed to a chair (including a wheelchair)? 3  -AV      Standing up from a chair using your arms (e.g., wheelchair, bedside chair)? 3  -AV      Climbing 3-5 steps with a railing? 3  -AV      To  walk in hospital room? 3  -AV      AM-PAC 6 Clicks Score (PT) 20  -AV         Functional Assessment    Outcome Measure Options AM-PAC 6 Clicks Basic Mobility (PT)  -AV            User Key  (r) = Recorded By, (t) = Taken By, (c) = Cosigned By    Initials Name Provider Type    Jann Marte, PT Physical Therapist                 Time Calculation:    PT Charges     Row Name 05/22/23 1138             Time Calculation    PT Received On 05/22/23  -AV      PT Goal Re-Cert Due Date 05/31/23  -AV         Untimed Charges    PT Eval/Re-eval Minutes 35  -AV         Total Minutes    Untimed Charges Total Minutes 35  -AV       Total Minutes 35  -AV            User Key  (r) = Recorded By, (t) = Taken By, (c) = Cosigned By    Initials Name Provider Type    Jann Marte, PT Physical Therapist              Therapy Charges for Today     Code Description Service Date Service Provider Modifiers Qty    [id number] HC PT EVAL LOW COMPLEXITY 3 5/22/2023 Jann Lisa, PT GP 1          PT G-Codes  Outcome Measure Options: AM-PAC 6 Clicks Basic Mobility (PT)  AM-PAC 6 Clicks Score (PT): 20    Jann Lisa, PT  5/22/2023

## 2023-05-22 NOTE — PLAN OF CARE
Goal Outcome Evaluation:  Plan of Care Reviewed With: patient        Progress: no change  Outcome Evaluation: Patient presents with deficits in balance, endurance, transfers, and ambulation. Patient will benefit from skilled PT services to address these mobility deficits and decrease risk of falls. Recommend rolling walker and home with home health following hospital discharge.      PT Evaluation Complexity  History, PT Evaluation Complexity: 1-2 personal factors and/or comorbidities  Examination of Body Systems (PT Eval Complexity): total of 4 or more elements  Clinical Presentation (PT Evaluation Complexity): stable  Clinical Decision Making (PT Evaluation Complexity): low complexity  Overall Complexity (PT Evaluation Complexity): low complexity

## 2023-05-23 ENCOUNTER — APPOINTMENT (OUTPATIENT)
Dept: GENERAL RADIOLOGY | Facility: HOSPITAL | Age: 78
End: 2023-05-23
Payer: MEDICARE

## 2023-05-23 LAB
ABO GROUP BLD: NORMAL
ABO GROUP BLD: NORMAL
ANION GAP SERPL CALCULATED.3IONS-SCNC: 7.3 MMOL/L (ref 5–15)
BASOPHILS # BLD AUTO: 0.01 10*3/MM3 (ref 0–0.2)
BASOPHILS NFR BLD AUTO: 0.2 % (ref 0–1.5)
BLD GP AB SCN SERPL QL: NEGATIVE
BUN SERPL-MCNC: 26 MG/DL (ref 8–23)
BUN/CREAT SERPL: 22.6 (ref 7–25)
CALCIUM SPEC-SCNC: 8 MG/DL (ref 8.6–10.5)
CHLORIDE SERPL-SCNC: 104 MMOL/L (ref 98–107)
CO2 SERPL-SCNC: 24.7 MMOL/L (ref 22–29)
CREAT SERPL-MCNC: 1.15 MG/DL (ref 0.76–1.27)
DEPRECATED RDW RBC AUTO: 55.2 FL (ref 37–54)
EGFRCR SERPLBLD CKD-EPI 2021: 64.3 ML/MIN/1.73
EOSINOPHIL # BLD AUTO: 0.01 10*3/MM3 (ref 0–0.4)
EOSINOPHIL NFR BLD AUTO: 0.2 % (ref 0.3–6.2)
ERYTHROCYTE [DISTWIDTH] IN BLOOD BY AUTOMATED COUNT: 16.7 % (ref 12.3–15.4)
GLUCOSE SERPL-MCNC: 122 MG/DL (ref 65–99)
HCT VFR BLD AUTO: 21.7 % (ref 37.5–51)
HCT VFR BLD AUTO: 23 % (ref 37.5–51)
HGB BLD-MCNC: 7 G/DL (ref 13–17.7)
HGB BLD-MCNC: 7.7 G/DL (ref 13–17.7)
IMM GRANULOCYTES # BLD AUTO: 0.08 10*3/MM3 (ref 0–0.05)
IMM GRANULOCYTES NFR BLD AUTO: 1.9 % (ref 0–0.5)
IRON 24H UR-MRATE: 11 MCG/DL (ref 59–158)
IRON SATN MFR SERPL: 6 % (ref 20–50)
LYMPHOCYTES # BLD AUTO: 0.07 10*3/MM3 (ref 0.7–3.1)
LYMPHOCYTES NFR BLD AUTO: 1.7 % (ref 19.6–45.3)
MCH RBC QN AUTO: 29.7 PG (ref 26.6–33)
MCHC RBC AUTO-ENTMCNC: 32.3 G/DL (ref 31.5–35.7)
MCV RBC AUTO: 91.9 FL (ref 79–97)
MONOCYTES # BLD AUTO: 0.49 10*3/MM3 (ref 0.1–0.9)
MONOCYTES NFR BLD AUTO: 11.9 % (ref 5–12)
NEUTROPHILS NFR BLD AUTO: 3.46 10*3/MM3 (ref 1.7–7)
NEUTROPHILS NFR BLD AUTO: 84.1 % (ref 42.7–76)
NRBC BLD AUTO-RTO: 0.5 /100 WBC (ref 0–0.2)
PLATELET # BLD AUTO: 108 10*3/MM3 (ref 140–450)
PMV BLD AUTO: 9.3 FL (ref 6–12)
POTASSIUM SERPL-SCNC: 3.9 MMOL/L (ref 3.5–5.2)
QT INTERVAL: 383 MS
RBC # BLD AUTO: 2.36 10*6/MM3 (ref 4.14–5.8)
RH BLD: POSITIVE
RH BLD: POSITIVE
SODIUM SERPL-SCNC: 136 MMOL/L (ref 136–145)
T&S EXPIRATION DATE: NORMAL
TIBC SERPL-MCNC: 188 MCG/DL (ref 298–536)
TRANSFERRIN SERPL-MCNC: 126 MG/DL (ref 200–360)
WBC NRBC COR # BLD: 4.12 10*3/MM3 (ref 3.4–10.8)

## 2023-05-23 PROCEDURE — 85018 HEMOGLOBIN: CPT | Performed by: INTERNAL MEDICINE

## 2023-05-23 PROCEDURE — 97110 THERAPEUTIC EXERCISES: CPT

## 2023-05-23 PROCEDURE — 84466 ASSAY OF TRANSFERRIN: CPT | Performed by: INTERNAL MEDICINE

## 2023-05-23 PROCEDURE — 25010000002 NA FERRIC GLUC CPLX PER 12.5 MG: Performed by: INTERNAL MEDICINE

## 2023-05-23 PROCEDURE — 99233 SBSQ HOSP IP/OBS HIGH 50: CPT | Performed by: INTERNAL MEDICINE

## 2023-05-23 PROCEDURE — 93005 ELECTROCARDIOGRAM TRACING: CPT | Performed by: INTERNAL MEDICINE

## 2023-05-23 PROCEDURE — 97116 GAIT TRAINING THERAPY: CPT

## 2023-05-23 PROCEDURE — 85014 HEMATOCRIT: CPT | Performed by: INTERNAL MEDICINE

## 2023-05-23 PROCEDURE — 83540 ASSAY OF IRON: CPT | Performed by: INTERNAL MEDICINE

## 2023-05-23 PROCEDURE — 71045 X-RAY EXAM CHEST 1 VIEW: CPT

## 2023-05-23 PROCEDURE — 86900 BLOOD TYPING SEROLOGIC ABO: CPT | Performed by: INTERNAL MEDICINE

## 2023-05-23 PROCEDURE — 94761 N-INVAS EAR/PLS OXIMETRY MLT: CPT

## 2023-05-23 PROCEDURE — 85025 COMPLETE CBC W/AUTO DIFF WBC: CPT | Performed by: FAMILY MEDICINE

## 2023-05-23 PROCEDURE — 82948 REAGENT STRIP/BLOOD GLUCOSE: CPT

## 2023-05-23 PROCEDURE — 86901 BLOOD TYPING SEROLOGIC RH(D): CPT | Performed by: INTERNAL MEDICINE

## 2023-05-23 PROCEDURE — 25010000002 CEFTRIAXONE PER 250 MG: Performed by: FAMILY MEDICINE

## 2023-05-23 PROCEDURE — 86850 RBC ANTIBODY SCREEN: CPT | Performed by: INTERNAL MEDICINE

## 2023-05-23 PROCEDURE — 94799 UNLISTED PULMONARY SVC/PX: CPT

## 2023-05-23 PROCEDURE — 80048 BASIC METABOLIC PNL TOTAL CA: CPT | Performed by: FAMILY MEDICINE

## 2023-05-23 RX ORDER — IPRATROPIUM BROMIDE AND ALBUTEROL SULFATE 2.5; .5 MG/3ML; MG/3ML
3 SOLUTION RESPIRATORY (INHALATION) EVERY 4 HOURS PRN
Status: DISCONTINUED | OUTPATIENT
Start: 2023-05-23 | End: 2023-05-24 | Stop reason: HOSPADM

## 2023-05-23 RX ORDER — GUAIFENESIN/DEXTROMETHORPHAN 100-10MG/5
5 SYRUP ORAL EVERY 4 HOURS PRN
Status: DISCONTINUED | OUTPATIENT
Start: 2023-05-23 | End: 2023-05-24 | Stop reason: HOSPADM

## 2023-05-23 RX ADMIN — ATORVASTATIN CALCIUM 20 MG: 20 TABLET, FILM COATED ORAL at 20:55

## 2023-05-23 RX ADMIN — CEFTRIAXONE SODIUM 1 G: 1 INJECTION, SOLUTION INTRAVENOUS at 15:22

## 2023-05-23 RX ADMIN — FAMOTIDINE 20 MG: 20 TABLET ORAL at 17:09

## 2023-05-23 RX ADMIN — APIXABAN 5 MG: 5 TABLET, FILM COATED ORAL at 09:07

## 2023-05-23 RX ADMIN — FAMOTIDINE 20 MG: 20 TABLET ORAL at 06:38

## 2023-05-23 RX ADMIN — SENNOSIDES AND DOCUSATE SODIUM 2 TABLET: 8.6; 5 TABLET ORAL at 09:08

## 2023-05-23 RX ADMIN — SENNOSIDES AND DOCUSATE SODIUM 2 TABLET: 8.6; 5 TABLET ORAL at 20:55

## 2023-05-23 RX ADMIN — Medication 10 ML: at 09:08

## 2023-05-23 RX ADMIN — SODIUM CHLORIDE 250 MG: 9 INJECTION, SOLUTION INTRAVENOUS at 12:19

## 2023-05-23 RX ADMIN — ACETAMINOPHEN 650 MG: 325 TABLET ORAL at 18:45

## 2023-05-23 RX ADMIN — Medication 10 ML: at 20:55

## 2023-05-23 RX ADMIN — APIXABAN 5 MG: 5 TABLET, FILM COATED ORAL at 20:55

## 2023-05-23 NOTE — PROGRESS NOTES
[facility]   Hospitalist Progress Note  Date: 2023  Patient Name: Korey Rodriguez  : 1945  MRN: [id number]  Date of admission: 2023      Subjective   Subjective     Chief Complaint: Fatigue, confusion and fever.    Summary:   Korey Rodriguez is a 78 y.o. male with medical history notable for hyperlipidemia, bladder cancer, and left LE DVT.  He underwent TURBT followed by cystectomy with permanent urostomy placement in 2022.  He had a chemo regimen, then radiation and now is getting weekly cisplatin infusions. His next and final treatment is scheduled for May 30th.  He has been doing fairly well with the changes in his health since his cancer diagnosis, but has been feeling quite fatigued for the past several days and his family says he seemed confused all day yesterday.  Today, they noted even worse fatigue and a fever of 100.5 at home, so they brought him to the ED.  On ED evaluation, he has strong evidence for UTI, and has spiked a fever to 102.1. He also has an increase in his creatinine over baseline.  The hospitalist service was asked to admit him for treatment of UTI and ANGEL LUIS.     Interval Followup:   Remains febrile.  Good saturation on room air 98%.  Patient is awake alert oriented x3.  Does have cough and congestion  No nausea vomiting or abdominal pain.  Still feels somewhat fatigued.  Good urine output via right lower quadrant urostomy..  Urine clear    Review of Systems   All systems were reviewed and negative except for: Summary and interval follow-up    Objective   Objective     Vitals:   Temp:  [97.3 °F (36.3 °C)-98.8 °F (37.1 °C)] 98.2 °F (36.8 °C)  Heart Rate:  [71-82] 73  Resp:  [18-20] 18  BP: (128-180)/(53-78) 154/60  Physical Exam   Constitutional: Slender elderly male,  Awake, alert, no acute distress              Eyes: Pupils equal, sclerae anicteric, no conjunctival injection              HENT: NCAT, mucous membranes moist              Neck: Supple, no  thyromegaly, no lymphadenopathy, trachea midline              Respiratory: Bilateral rhonchi, decreased to auscultation bilaterally, nonlabored respirations               Cardiovascular: RRR, no murmurs, rubs, or gallops, palpable pedal pulses bilaterally              Gastrointestinal: Positive bowel sounds, soft, nontender, nondistended              :  Urinary drainage from urostomy in right lower abdomen.    Clear urine in bag.              Musculoskeletal: No bilateral ankle edema, no clubbing or cyanosis to extremities              Psychiatric: Appropriate affect, cooperative              Neurologic: Oriented x 3, strength symmetric in all extremities, Cranial Nerves grossly intact to confrontation, speech clear              Skin: No rashes        Result Review    Result Review:  I have personally reviewed the results for the past 24 hours and agree with these findings:  [x]  Laboratory  [x]  Microbiology  [x]  Radiology  []  EKG/Telemetry   []  Cardiology/Vascular   []  Pathology  [x]  Old records  [x]  Other:    Assessment & Plan   Assessment / Plan     Assessment:  UTI due to Streptococcus alpha hemolytic.  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  Causing above.  Immunocompromise due to on chemotherapy.  Acute kidney injury.  Resolved.  Anemia likely from chemotherapy.  S/p 1 unit packed RBC .  Thrombocytopenia  Iron deficiency anemia.  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  Infrarenal abdominal aortic aneurysm.  To monitor.  Hard of hearing     Plan:  DC IV fluid.  IV Rocephin.  Await culture data.  Noted and discussed with patient and wife at bedside.  Repeat chest x-ray.  May need CT chest  Respiratory culture  Incentive spirometry and flutter valve  DuoNeb as needed  Bronchodilator and bronchopulmonary hygiene protocol.  Discussed with patient oncologist.   chemotherapy postponed due to UTI and fever.  Flu and  COVID-negative.  Chest x-ray negative.  DC continuous pulse oximetry  PT OT    Discussed plan with RN.  Return to home when stable.  DVT prophylaxis:  Medical and mechanical DVT prophylaxis orders are present.    CODE STATUS:   Code Status (Patient has no pulse and is not breathing): CPR (Attempt to Resuscitate)  Medical Interventions (Patient has pulse or is breathing): Full Support  Release to patient: Routine Release      Part of this note may be an electronic transcription/translation of spoken language to printed text using the Dragon Dictation System.       Electronically signed by True Caputo MD, 05/23/23, 7:07 PM EDT.

## 2023-05-23 NOTE — THERAPY TREATMENT NOTE
Acute Care - Physical Therapy Treatment Note   Alber     Patient Name: Korey Rodriguez  : 1945  MRN: [id number]  Today's Date: 2023      Visit Dx:     ICD-10-CM ICD-9-CM   1. Febrile illness, acute  R50.9 780.60   2. 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   • Mixed hyperlipidemia   • Squamous cell carcinoma in situ (SCCIS) of skin   • Tinnitus   • UTI (urinary tract infection)   • Aneurysm of iliac artery   • Aortic aneurysm   • Bilateral hearing loss   • Blood in urine   • Squamous cell carcinoma in situ of skin   • Kidney stone   • Mixed hyperlipidemia   • Encounter for adjustment or management of vascular access device   • Malignant neoplasm of overlapping sites of bladder   • Long term (current) use of anticoagulants   • Febrile illness, acute     Past Medical History:   Diagnosis Date   • AAA (abdominal aortic aneurysm)     BEING MONITORED NO REPAIR NEEDED YET   • Aneurysm of abdominal aorta branch vessel    • Cancer     BLADDER AND SKIN CANCERS/REMOVED   • Essential tremor    • History of DVT of lower extremity 2022    on Xarelto   • History of urostomy     PT CURRENTLY HAS HOME HEALTH TO HELP WITH OSTOMY   • Hyperlipidemia    • Kidney stone 10/16/2022     Past Surgical History:   Procedure Laterality Date   • COLONOSCOPY     • CYSTECTOMY      2022  W/UROSTOMY   • SKIN CANCER EXCISION Right    • TRANSURETHRAL RESECTION OF BLADDER TUMOR Right 10/19/2022    Procedure: Cystoscopy with transurethral resection of bladder tumor;  Surgeon: Aung Westfall MD;  Location: [facility] (formerly [facility]);  Service: Urology;  Laterality: Right;   •  VENOUS ACCESS DEVICE (PORT) INSERTION Right 01/09/2023    Procedure: INSERTION VENOUS ACCESS DEVICE;  Surgeon: Richie Edward MD;  [facility] [facility] [place]  Service: General;  Laterality: Right;     PT Assessment (last 12 hours)     PT Evaluation and Treatment     Row Name 05/23/23 0904          Physical Therapy Time and Intention    Subjective Information no complaints  -DK     Document Type therapy note (daily note)  -DK     Mode of Treatment individual therapy;physical therapy  -DK     Patient Effort good  -DK     Symptoms Noted During/After Treatment fatigue  -DK     Row Name 05/23/23 0904          Pain    Pretreatment Pain Rating 0/10 - no pain  -DK     Posttreatment Pain Rating 0/10 - no pain  -DK     Row Name 05/23/23 0904          Cognition    Affect/Mental Status (Cognition) WFL;confabulatory  -DK     Orientation Status (Cognition) oriented x 4  -DK     Follows Commands (Cognition) WFL  -DK     Cognitive Function WFL  -DK     Personal Safety Interventions gait belt;nonskid shoes/slippers when out of bed;supervised activity  -DK     Row Name 05/23/23 0904          Bed Mobility    Bed Mobility supine-sit  -DK     Supine-Sit [place] (Bed Mobility) standby assist  -DK     Assistive Device (Bed Mobility) bed rails  -DK     Row Name 05/23/23 0904          Transfers    Transfers sit-stand transfer;stand-sit transfer  -DK     Row Name 05/23/23 0904          Sit-Stand Transfer    Sit-Stand [place] (Transfers) standby assist;contact guard;1 person assist  -DK     Assistive Device (Sit-Stand Transfers) walker, front-wheeled  -DK     Row Name 05/23/23 0904          Stand-Sit Transfer    Stand-Sit [place] (Transfers) standby assist;contact guard;1 person assist  -DK     Assistive Device (Stand-Sit Transfers) walker, front-wheeled  -DK     Row Name 05/23/23 0904          Gait/Stairs (Locomotion)    Gait/Stairs Locomotion gait/ambulation independence;gait/ambulation assistive device;distance  ambulated;gait pattern  -DK     [place] Level (Gait) standby assist;contact guard;1 person assist  -DK     Assistive Device (Gait) walker, front-wheeled  -DK     Distance in Feet (Gait) 180  -DK     Pattern (Gait) step-through  -DK     Deviations/Abnormal Patterns (Gait) festinating/shuffling  -DK     Bilateral Gait Deviations forward flexed posture  -DK     Comment, (Gait/Stairs) Pt ambulated on room air with a rolling walker.  He was left in the recliner on alert post treatment.  -     Row Name 05/23/23 0904          Safety Issues, Functional Mobility    Safety Issues Affecting Function (Mobility) awareness of need for assistance;impulsivity;judgment;safety precaution awareness  -DK     Impairments Affecting Function (Mobility) balance;endurance/activity tolerance;strength  -     Row Name 05/23/23 0904          Balance    Balance Assessment sitting static balance;sitting dynamic balance;standing static balance;standing dynamic balance  -DK     Static Sitting Balance standby assist  -     Dynamic Sitting Balance standby assist  -DK     Position, Sitting Balance unsupported;sitting edge of bed;sitting in chair  -DK     Static Standing Balance standby assist;contact guard;1-person assist  -DK     Dynamic Standing Balance standby assist;contact guard;1-person assist  -DK     Position/Device Used, Standing Balance walker, front-wheeled  -DK     Balance Interventions standing;dynamic;tandem gait  -     Row Name 05/23/23 0904          Motor Skills    Motor Skills --  therapeutic exercises  -     Therapeutic Exercise hip;knee;ankle  -     Row Name 05/23/23 0904          Hip (Therapeutic Exercise)    Hip (Therapeutic Exercise) AAROM (active assistive range of motion)  -     Hip AAROM (Therapeutic Exercise) bilateral;flexion;extension;aBduction;aDduction;supine;10 repetitions;2 sets  -     Row Name 05/23/23 0904          Knee (Therapeutic Exercise)    Knee (Therapeutic Exercise) AAROM (active assistive  range of motion)  -DK     Knee AAROM (Therapeutic Exercise) bilateral;flexion;extension;supine;10 repetitions;2 sets  -     Row Name 05/23/23 0904          Ankle (Therapeutic Exercise)    Ankle (Therapeutic Exercise) AAROM (active assistive range of motion)  -DK     Ankle AAROM (Therapeutic Exercise) bilateral;dorsiflexion;plantarflexion;supine;10 repetitions;2 sets  -     Row Name 05/23/23 0904          Plan of Care Review    Plan of Care Reviewed With patient  -     Progress improving  -     Row Name 05/23/23 0904          Positioning and Restraints    Pre-Treatment Position in bed  -DK     Post Treatment Position chair  -DK     In Chair reclined;call light within reach;encouraged to call for assist;exit alarm on;legs elevated;heels elevated  -     Row Name 05/23/23 0904          Therapy Assessment/Plan (PT)    Rehab Potential (PT) good, to achieve stated therapy goals  -DK     Criteria for Skilled Interventions Met (PT) skilled treatment is necessary  -     Therapy Frequency (PT) daily  -     Problem List (PT) problems related to;balance;mobility;hearing;strength  -     Activity Limitations Related to Problem List (PT) unable to ambulate safely  -     Row Name 05/23/23 0904          Progress Summary (PT)    Progress Toward Functional Goals (PT) progress toward functional goals is good  -           User Key  (r) = Recorded By, (t) = Taken By, (c) = Cosigned By    Initials Name Provider Type    Elysia Randall PTA Physical Therapist Assistant                Physical Therapy Education     Title: PT OT SLP Therapies (In Progress)     Topic: Physical Therapy (In Progress)     Point: Mobility training (Done)     Learning Progress Summary           Patient Acceptance, E,TB, VU by AV at 5/22/2023 2187                   Point: Home exercise program (Not Started)     Learner Progress:  Not documented in this visit.          Point: Body mechanics (Done)     Learning Progress Summary           Patient  Acceptance, E,TB, VU by  at 5/22/2023 1139                   Point: Precautions (Done)     Learning Progress Summary           Patient Acceptance, E,TB, VU by  at 5/22/2023 1139                               User Key     Initials Effective Dates Name Provider Type Discipline     06/11/21 -  Jann Lisa, PT Physical Therapist PT              PT Recommendation and Plan  Planned Therapy Interventions (PT): balance training, bed mobility training, gait training, strengthening, transfer training  Therapy Frequency (PT): daily  Progress Summary (PT)  Progress Toward Functional Goals (PT): progress toward functional goals is good  Plan of Care Reviewed With: patient  Progress: improving   Outcome Measures     Row Name 05/23/23 0903 05/22/23 1138          How much help from another person do you currently need...    Turning from your back to your side while in flat bed without using bedrails? 4  -DK 4  -AV     Moving from lying on back to sitting on the side of a flat bed without bedrails? 4  -DK 4  -AV     Moving to and from a bed to a chair (including a wheelchair)? 3  -DK 3  -AV     Standing up from a chair using your arms (e.g., wheelchair, bedside chair)? 3  -DK 3  -AV     Climbing 3-5 steps with a railing? 3  -DK 3  -AV     To walk in hospital room? 3  -DK 3  -AV     AM-PAC 6 Clicks Score (PT) 20  -DK 20  -AV        Functional Assessment    Outcome Measure Options AM-PAC 6 Clicks Basic Mobility (PT)  -DK AM-PAC 6 Clicks Basic Mobility (PT)  -AV           User Key  (r) = Recorded By, (t) = Taken By, (c) = Cosigned By    Initials Name Provider Type    Elysia Randall, PTA Physical Therapist Assistant     Jann Lisa, PT Physical Therapist                 Time Calculation:    PT Charges     Row Name 05/23/23 0908             Time Calculation    PT Received On 05/23/23  -AIXA      PT Goal Re-Cert Due Date 05/31/23  -AIXA         Timed Charges    89027 - PT Therapeutic Exercise Minutes 14  -DK      98318  - Gait Training Minutes  8  -DK      76642 - PT Therapeutic Activity Minutes 6  -DK         Total Minutes    Timed Charges Total Minutes 28  -DK       Total Minutes 28  -DK            User Key  (r) = Recorded By, (t) = Taken By, (c) = Cosigned By    Initials Name Provider Type    Elysia Randall PTA Physical Therapist Assistant              Therapy Charges for Today     Code Description Service Date Service Provider Modifiers Qty    83764111512 HC PT THER PROC EA 15 MIN 5/23/2023 Elysia Luna, ROBYN GP 1    71170667237 HC GAIT TRAINING EA 15 MIN 5/23/2023 Elysia Luna PTA GP 1          PT G-Codes  Outcome Measure Options: AM-PAC 6 Clicks Basic Mobility (PT)  AM-PAC 6 Clicks Score (PT): 20    Elysia Luna PTA  5/23/2023

## 2023-05-23 NOTE — SIGNIFICANT NOTE
Wound Eval / Progress Noted     Alber     Patient Name: Korey Rodriguez  : 1945  MRN: [id number]  Today's Date: 2023                 Admit Date: 2023    Visit Dx:    ICD-10-CM ICD-9-CM   1. Febrile illness, acute  R50.9 780.60   2. 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   • Mixed hyperlipidemia   • Squamous cell carcinoma in situ (SCCIS) of skin   • Tinnitus   • UTI (urinary tract infection)   • Aneurysm of iliac artery   • Aortic aneurysm   • Bilateral hearing loss   • Blood in urine   • Squamous cell carcinoma in situ of skin   • Kidney stone   • Mixed hyperlipidemia   • Encounter for adjustment or management of vascular access device   • Malignant neoplasm of overlapping sites of bladder   • Long term (current) use of anticoagulants   • Febrile illness, acute        Past Medical History:   Diagnosis Date   • AAA (abdominal aortic aneurysm)     BEING MONITORED NO REPAIR NEEDED YET   • Aneurysm of abdominal aorta branch vessel    • Cancer     BLADDER AND SKIN CANCERS/REMOVED   • Essential tremor    • History of DVT of lower extremity 2022    on Xarelto   • History of urostomy     PT CURRENTLY HAS HOME HEALTH TO HELP WITH OSTOMY   • Hyperlipidemia    • Kidney stone 10/16/2022        Past Surgical History:   Procedure Laterality Date   • COLONOSCOPY     • CYSTECTOMY      2022  W/UROSTOMY   • SKIN CANCER EXCISION Right    • TRANSURETHRAL RESECTION OF BLADDER TUMOR Right 10/19/2022    Procedure: Cystoscopy with transurethral resection of bladder tumor;  Surgeon: Aung Westfall MD;  Location: [facility] MAIN OR;  Service:  Urology;  Laterality: Right;   • VENOUS ACCESS DEVICE (PORT) INSERTION Right 01/09/2023    Procedure: INSERTION VENOUS ACCESS DEVICE;  Surgeon: Richie Edward MD;  [place] [facility] [place] [facility] [place]  Service: General;  Laterality: Right;     Wound Check / Follow-up:  Patient seen today for ostomy check-in. Patient with existing urostomy. Pouching system is clean, dry, and intact with no signs of lifting or leaking. Patient reports no issues managing this ostomy and pouching system at home and he states his wife is his main support person who helps him manage. He expresses no needs or concerns. Instructed him to reach out if any issues or needs arise. He verbalized understanding.      Impression: Existing urostomy.      Short term goals:  Urostomy management.      Ana Tyler RN    5/23/2023    09:40 EDT

## 2023-05-23 NOTE — PLAN OF CARE
Goal Outcome Evaluation:           Progress: no change  Outcome Evaluation: alert and oriented x 4. pt up with x 1 assistance/walker this shift. pt now on room air and tolerating well. pt educated/encouraged to use incentive spirometer. temperature 100.3 with complaints of chills at the end of shift, tylenol given. no other complaints at this time.

## 2023-05-24 VITALS
BODY MASS INDEX: 27.25 KG/M2 | DIASTOLIC BLOOD PRESSURE: 68 MMHG | OXYGEN SATURATION: 95 % | WEIGHT: 194.67 LBS | HEIGHT: 71 IN | HEART RATE: 73 BPM | SYSTOLIC BLOOD PRESSURE: 149 MMHG | RESPIRATION RATE: 18 BRPM | TEMPERATURE: 97.9 F

## 2023-05-24 PROBLEM — R50.9 FEBRILE ILLNESS, ACUTE: Status: RESOLVED | Noted: 2023-05-21 | Resolved: 2023-05-24

## 2023-05-24 LAB
ANION GAP SERPL CALCULATED.3IONS-SCNC: 8.7 MMOL/L (ref 5–15)
BASOPHILS # BLD AUTO: 0.02 10*3/MM3 (ref 0–0.2)
BASOPHILS NFR BLD AUTO: 0.3 % (ref 0–1.5)
BUN SERPL-MCNC: 19 MG/DL (ref 8–23)
BUN/CREAT SERPL: 18.8 (ref 7–25)
CALCIUM SPEC-SCNC: 8.3 MG/DL (ref 8.6–10.5)
CHLORIDE SERPL-SCNC: 101 MMOL/L (ref 98–107)
CO2 SERPL-SCNC: 25.3 MMOL/L (ref 22–29)
CREAT SERPL-MCNC: 1.01 MG/DL (ref 0.76–1.27)
DEPRECATED RDW RBC AUTO: 53 FL (ref 37–54)
EGFRCR SERPLBLD CKD-EPI 2021: 75.2 ML/MIN/1.73
EOSINOPHIL # BLD AUTO: 0.01 10*3/MM3 (ref 0–0.4)
EOSINOPHIL NFR BLD AUTO: 0.2 % (ref 0.3–6.2)
ERYTHROCYTE [DISTWIDTH] IN BLOOD BY AUTOMATED COUNT: 16 % (ref 12.3–15.4)
GLUCOSE BLDC GLUCOMTR-MCNC: 129 MG/DL (ref 70–99)
GLUCOSE SERPL-MCNC: 114 MG/DL (ref 65–99)
HCT VFR BLD AUTO: 23.9 % (ref 37.5–51)
HGB BLD-MCNC: 8 G/DL (ref 13–17.7)
IMM GRANULOCYTES # BLD AUTO: 0.15 10*3/MM3 (ref 0–0.05)
IMM GRANULOCYTES NFR BLD AUTO: 2.5 % (ref 0–0.5)
LYMPHOCYTES # BLD AUTO: 0.13 10*3/MM3 (ref 0.7–3.1)
LYMPHOCYTES NFR BLD AUTO: 2.1 % (ref 19.6–45.3)
MCH RBC QN AUTO: 30.8 PG (ref 26.6–33)
MCHC RBC AUTO-ENTMCNC: 33.5 G/DL (ref 31.5–35.7)
MCV RBC AUTO: 91.9 FL (ref 79–97)
MONOCYTES # BLD AUTO: 1.02 10*3/MM3 (ref 0.1–0.9)
MONOCYTES NFR BLD AUTO: 16.7 % (ref 5–12)
NEUTROPHILS NFR BLD AUTO: 4.79 10*3/MM3 (ref 1.7–7)
NEUTROPHILS NFR BLD AUTO: 78.2 % (ref 42.7–76)
NRBC BLD AUTO-RTO: 0.5 /100 WBC (ref 0–0.2)
PLATELET # BLD AUTO: 85 10*3/MM3 (ref 140–450)
PMV BLD AUTO: 9.4 FL (ref 6–12)
POTASSIUM SERPL-SCNC: 3.4 MMOL/L (ref 3.5–5.2)
RBC # BLD AUTO: 2.6 10*6/MM3 (ref 4.14–5.8)
SODIUM SERPL-SCNC: 135 MMOL/L (ref 136–145)
WBC NRBC COR # BLD: 6.12 10*3/MM3 (ref 3.4–10.8)

## 2023-05-24 PROCEDURE — 99239 HOSP IP/OBS DSCHRG MGMT >30: CPT | Performed by: INTERNAL MEDICINE

## 2023-05-24 PROCEDURE — 80048 BASIC METABOLIC PNL TOTAL CA: CPT | Performed by: FAMILY MEDICINE

## 2023-05-24 PROCEDURE — 25010000002 NA FERRIC GLUC CPLX PER 12.5 MG: Performed by: INTERNAL MEDICINE

## 2023-05-24 PROCEDURE — 25010000002 HEPARIN LOCK FLUSH PER 10 UNITS: Performed by: FAMILY MEDICINE

## 2023-05-24 PROCEDURE — 85025 COMPLETE CBC W/AUTO DIFF WBC: CPT | Performed by: FAMILY MEDICINE

## 2023-05-24 PROCEDURE — 25010000002 CEFTRIAXONE PER 250 MG: Performed by: FAMILY MEDICINE

## 2023-05-24 RX ORDER — CEPHALEXIN 500 MG/1
500 CAPSULE ORAL 2 TIMES DAILY
Qty: 8 CAPSULE | Refills: 0 | Status: SHIPPED | OUTPATIENT
Start: 2023-05-24 | End: 2023-05-28

## 2023-05-24 RX ORDER — POTASSIUM CHLORIDE 750 MG/1
40 CAPSULE, EXTENDED RELEASE ORAL ONCE
Status: COMPLETED | OUTPATIENT
Start: 2023-05-24 | End: 2023-05-24

## 2023-05-24 RX ORDER — FERROUS SULFATE 325(65) MG
325 TABLET ORAL
Qty: 30 TABLET | Refills: 0 | Status: SHIPPED | OUTPATIENT
Start: 2023-05-24 | End: 2023-06-23

## 2023-05-24 RX ORDER — FAMOTIDINE 20 MG/1
20 TABLET, FILM COATED ORAL NIGHTLY
Qty: 30 TABLET | Refills: 0 | Status: SHIPPED | OUTPATIENT
Start: 2023-05-24 | End: 2023-06-23

## 2023-05-24 RX ADMIN — Medication 500 UNITS: at 14:43

## 2023-05-24 RX ADMIN — FAMOTIDINE 20 MG: 20 TABLET ORAL at 06:28

## 2023-05-24 RX ADMIN — POTASSIUM CHLORIDE 40 MEQ: 10 CAPSULE, COATED, EXTENDED RELEASE ORAL at 10:53

## 2023-05-24 RX ADMIN — APIXABAN 5 MG: 5 TABLET, FILM COATED ORAL at 08:32

## 2023-05-24 RX ADMIN — SODIUM CHLORIDE 250 MG: 9 INJECTION, SOLUTION INTRAVENOUS at 08:31

## 2023-05-24 RX ADMIN — Medication 10 ML: at 08:32

## 2023-05-24 RX ADMIN — SENNOSIDES AND DOCUSATE SODIUM 2 TABLET: 8.6; 5 TABLET ORAL at 08:31

## 2023-05-24 RX ADMIN — CEFTRIAXONE SODIUM 1 G: 1 INJECTION, SOLUTION INTRAVENOUS at 12:54

## 2023-05-24 NOTE — DISCHARGE SUMMARY
[facility]        HOSPITALIST  DISCHARGE SUMMARY    Patient Name: Korey Rodriguez  : 1945  MRN: [id number]    Date of Admission: 2023  Date of Discharge:  2023  Primary Care Physician: Vita Starks APRN    Consults     Date and Time Order Name Status Description    2023 12:57 PM Hospitalist (on-call MD unless specified)            Active and Resolved Hospital Problems:  Active Hospital Problems   No active problems to display.      Resolved Hospital Problems    Diagnosis POA   • **Febrile illness, acute [R50.9] Yes   UTI due to Streptococcus alpha hemolytic.  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  Causing above.  Immunocompromise due to on chemotherapy.  Acute kidney injury.  Resolved.  Anemia likely from chemotherapy.  S/p 1 unit packed RBC .  Thrombocytopenia  Iron deficiency anemia.  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  Infrarenal abdominal aortic aneurysm.  To monitor.  Hard of hearing    Hospital Course     Hospital Course:  Korey Rodriguez is a 78 y.o. male with medical history notable for hyperlipidemia, bladder cancer, and left LE DVT.  He underwent TURBT followed by cystectomy with permanent urostomy placement in 2022.  He had a chemo regimen, then radiation and now is getting weekly cisplatin infusions. His next and final treatment is scheduled for May 30th.  He has been doing fairly well with the changes in his health since his cancer diagnosis, but has been feeling quite fatigued for the past several days and his family says he seemed confused all day yesterday.  Today, they noted even worse fatigue and a fever of 100.5 at home, so they brought him to the ED.  On ED evaluation, he has strong evidence for UTI, and has spiked a fever to 102.1. He also has an increase in his creatinine over baseline.  The hospitalist service was asked to admit him for treatment of  UTI and ANGEL LUIS.  Urine culture grew strep alphahemolytic .  Acute kidney injury resolved.  IV fluid DC'd.  Patient given a unit of blood for hemoglobin of 7     Interval Followup:   No more fever  Good saturation on room air 98%.  Patient is awake alert oriented x3.  Does have cough and congestion.  Not able to bring up mucus  No nausea vomiting or abdominal pain.  Feels much better  Good urine output via right lower quadrant urostomy..  Urine clear.  Hemoglobin stable after unit of blood transfusion.           DISCHARGE Follow Up Recommendations for labs and diagnostics: PCP and oncology.  Have CBC checked in 2 days.  Finish p.o. antibiotics at home.      Day of Discharge     Vital Signs:  Temp:  [97.4 °F (36.3 °C)-98.6 °F (37 °C)] 97.9 °F (36.6 °C)  Heart Rate:  [71-77] 73  Resp:  [18-20] 18  BP: (131-159)/(51-77) 149/68    Physical Exam:   Constitutional: Slender elderly male,  Awake, alert, no acute distress              Eyes: Pupils equal, sclerae anicteric, no conjunctival injection              HENT: NCAT, mucous membranes moist              Neck: Supple, no thyromegaly, no lymphadenopathy, trachea midline              Respiratory: Bilateral rhonchi, decreased to auscultation bilaterally, nonlabored respirations               Cardiovascular: RRR, no murmurs, rubs, or gallops, palpable pedal pulses bilaterally              Gastrointestinal: Positive bowel sounds, soft, nontender, nondistended              :  Urinary drainage from urostomy in right lower abdomen.    Clear urine in bag.              Musculoskeletal: No bilateral ankle edema, no clubbing or cyanosis to extremities              Psychiatric: Appropriate affect, cooperative              Neurologic: Oriented x 3, strength symmetric in all extremities, Cranial Nerves grossly intact to confrontation, speech clear              Skin: No rashes                    Discharge Details        Discharge Medications      New Medications      Instructions Start  Date   cephalexin 500 MG capsule  Commonly known as: KEFLEX   500 mg, Oral, 2 Times Daily      famotidine 20 MG tablet  Commonly known as: Pepcid   20 mg, Oral, Nightly      ferrous sulfate 325 (65 FE) MG tablet   325 mg, Oral, Daily With Breakfast         Continue These Medications      Instructions Start Date   atorvastatin 20 MG tablet  Commonly known as: LIPITOR   20 mg, Oral, Nightly      Eliquis 5 MG tablet tablet  Generic drug: apixaban   2 tablets, Oral, Every 12 Hours      meloxicam 7.5 MG tablet  Commonly known as: MOBIC   7.5 mg, Oral, Daily      OLANZapine 5 MG tablet  Commonly known as: zyPREXA   5 mg, Oral, Nightly, Take on days 2, 3 and 4 after chemotherapy.      ondansetron 8 MG tablet  Commonly known as: ZOFRAN   8 mg, Oral, 3 Times Daily PRN             No Known Allergies    Discharge Disposition:  Home or Self Care.  Home in private vehicle with wife    Diet:  Diet Instructions     Diet: Regular/House Diet; Regular Texture (IDDSI 7); Thin (IDDSI 0)      Discharge Diet: Regular/House Diet    Texture: Regular Texture (IDDSI 7)    Fluid Consistency: Thin (IDDSI 0)          Discharge Activity:   Activity Instructions     Activity as Tolerated            CODE STATUS:  Code Status and Medical Interventions:   Ordered at: 05/22/23 1248     Code Status (Patient has no pulse and is not breathing):    CPR (Attempt to Resuscitate)     Medical Interventions (Patient has pulse or is breathing):    Full Support     Release to patient:    Routine Release         Future Appointments   Date Time Provider Department Center   5/31/2023  1:15 PM CHAIR 22 [facility]  [facility]   7/5/2023  2:00 PM INJ ROOM 01 [facility]   9/15/2023  8:45 AM [facility] 1 [facility] ETWCT [facility]   9/18/2023  9:00 AM INJ ROOM 01 [facility]   9/19/2023  1:00 PM Arvin Coburn MD [facility] ONC [id number] [facility]   9/20/2023  9:30 AM Kimberly Melgoza MD [facility]       Additional Instructions for the Follow-ups  that You Need to Schedule     Discharge Follow-up with PCP   As directed       Currently Documented PCP:    Vita Starks APRN    PCP Phone Number:    [phone]     Follow Up Details: 2 weeks         Discharge Follow-up with Specified Provider: Dr. Coburn; 1 Week   As directed      To: Dr. Coburn    Follow Up: 1 Week    Follow Up Details: Chemotherapy               Pertinent  and/or Most Recent Results     PROCEDURES:   * Cannot find OR case *     LAB RESULTS:      Lab 05/24/23  0628 05/23/23  2106 05/23/23  0559 05/22/23  0624 05/21/23 1929 05/21/23  1058 05/21/23  0832   WBC 6.12  --  4.12 3.58 4.48  --  4.30   HEMOGLOBIN 8.0* 7.7* 7.0* 7.2* 8.1*  --  8.1*   HEMATOCRIT 23.9* 23.0* 21.7* 21.9* 24.7*  --  24.7*   PLATELETS 85*  --  108* 148 174  --  218   NEUTROS ABS 4.79  --  3.46 3.20 4.07  --  3.88   IMMATURE GRANS (ABS) 0.15*  --  0.08* 0.04 0.02  --  0.08*   LYMPHS ABS 0.13*  --  0.07* 0.09* 0.15*  --  0.13*   MONOS ABS 1.02*  --  0.49 0.24 0.22  --  0.20   EOS ABS 0.01  --  0.01 0.00 0.00  --  0.00   MCV 91.9  --  91.9 92.0 91.1  --  91.5   LACTATE  --   --   --   --  1.7 0.8  --          Lab 05/24/23  0628 05/23/23  0559 05/22/23  0454 05/21/23 1929 05/21/23  0832   SODIUM 135* 136 135* 135* 132*   POTASSIUM 3.4* 3.9 3.7 4.1 4.2   CHLORIDE 101 104 101 102 97*   CO2 25.3 24.7 22.4 22.4 24.8   ANION GAP 8.7 7.3 11.6 10.6 10.2   BUN 19 26* 21 26* 32*   CREATININE 1.01 1.15 1.20 1.28* 1.51*   EGFR 75.2 64.3 61.1 56.6* 46.4*   GLUCOSE 114* 122* 136* 116* 140*   CALCIUM 8.3* 8.0* 8.1* 8.2* 8.6   MAGNESIUM  --   --  2.5* 1.5*  --          Lab 05/21/23  1929 05/21/23  0832   TOTAL PROTEIN 6.8 7.1   ALBUMIN 3.3* 3.6   GLOBULIN 3.5 3.5   ALT (SGPT) 15 17   AST (SGOT) 21 22   BILIRUBIN 0.4 0.4   ALK PHOS 71 80                 Lab 05/23/23  1258 05/23/23  0559   IRON  --  11*   IRON SATURATION  --  6*   TIBC  --  188*   TRANSFERRIN  --  126*   ABO TYPING O O   RH TYPING Positive Positive   ANTIBODY SCREEN  Negative  --          Brief Urine Lab Results  (Last result in the past 365 days)      Color   Clarity   Blood   Leuk Est   Nitrite   Protein   CREAT   Urine HCG        05/21/23 1058 Yellow   Cloudy   Large (3+)   Large (3+)   Positive   100 mg/dL (2+)               Microbiology Results (last 10 days)     Procedure Component Value - Date/Time    Blood Culture - Blood, Blood, Central Line [[id number]]  (Normal) Collected: 05/21/23 1929    Lab Status: Preliminary result Specimen: Blood, Central Line Updated: 05/23/23 1932     Blood Culture No growth at 2 days    Influenza Antigen, Rapid - Swab, Nasopharynx [[id number]]  (Normal) Collected: 05/21/23 1059    Lab Status: Final result Specimen: Swab from Nasopharynx Updated: 05/21/23 1131     Influenza A Ag, EIA Negative     Influenza B Ag, EIA Negative    COVID-19,APTIMA PANTHER(JADYN),[facility], NP/OP SWAB IN UTM/VTM/[facility]AL[facility] TRANSPORT MEDIA,24 HR TAT - Swab, Nasal Cavity [[id number]]  (Normal) Collected: 05/21/23 1059    Lab Status: Final result Specimen: Swab from Nasal Cavity Updated: 05/21/23 1842     COVID19 Not Detected    Narrative:      Fact sheet for providers: https://www.fda.gov/media/649235/download     Fact sheet for patients: https://www.fda.gov/media/558279/download    Test performed by RT PCR.    Blood Culture - Blood, Arm, Left [[id number]]  (Normal) Collected: 05/21/23 1058    Lab Status: Preliminary result Specimen: Blood from Arm, Left Updated: 05/24/23 1115     Blood Culture No growth at 3 days    Blood Culture - Blood, Arm, Right [[id number]]  (Normal) Collected: 05/21/23 1058    Lab Status: Preliminary result Specimen: Blood from Arm, Right Updated: 05/24/23 1115     Blood Culture No growth at 3 days    Urine Culture - Urine, Indwelling Urethral Catheter [[id number]]  (Abnormal) Collected: 05/21/23 1058    Lab Status: Preliminary result Specimen: Urine from Indwelling Urethral Catheter Updated: 05/24/23 1240     Urine Culture >100,000 CFU/mL  Staphylococcus, coagulase negative      >100,000 CFU/mL Streptococcus, Alpha Hemolytic     Comment:   Based on laboratory diagnosis criteria, these organisms are common urogenital commensal kika and have not been associated with urinary tract infections; clinical correlation is recommended.         <10,000 CFU/mL Gram Negative Bacilli      50,000 CFU/mL Gram Positive Cocci    Narrative:      Colonization of the urinary tract without infection is common. Treatment is discouraged unless the patient is symptomatic, pregnant, or undergoing an invasive urologic procedure.  By laboratory criteria, additional testing is not indicated and unlikely to produce clinically relevant information. Coagulase negative staphylococci are common skin contaminants, members of the oral kika, and of low virulence; requires clinical correlation.          XR Chest 1 View    Result Date: 5/23/2023  Impression:  No active disease.       SHONNA VERAS MD       Electronically Signed and Approved By: SHONNA VERAS MD on 5/23/2023 at 19:54             XR Chest 1 View    Result Date: 5/21/2023  Impression:   No acute infiltrate is appreciated.     Please note that portions of this note were completed with a voice recognition program.  ABIODUN ULRICH JR, MD       Electronically Signed and Approved By: ABIODUN ULRICH JR, MD on 5/21/2023 at 20:37                Results for orders placed during the hospital encounter of 02/07/23    Duplex Venous Lower Extremity - Left CAR    Interpretation Summary  •  Acute left lower extremity deep vein thrombosis noted in the popliteal, posterial tibial and peroneal.  •  All other left sided veins appeared normal.      Results for orders placed during the hospital encounter of 02/07/23    Duplex Venous Lower Extremity - Left CAR    Interpretation Summary  •  Acute left lower extremity deep vein thrombosis noted in the popliteal, posterial tibial and peroneal.  •  All other left sided veins appeared  normal.          Imaging Results (All)     Procedure Component Value Units Date/Time    XR Chest 1 View [[id number]] Collected: 05/23/23 1954     Updated: 05/23/23 1957    Narrative:      PROCEDURE: XR CHEST 1 VW     COMPARISON: [facility], CR, XR CHEST 1 VW, 5/21/2023, 19:14.     INDICATIONS: Cough and fever     FINDINGS:   The heart size is normal.  The pulmonary vascular markings are normal.  The lungs and pleural   spaces are clear of active disease.  There is a right-sided port in place.  There are mild chronic   age-related changes involving the bony thorax and thoracic aorta.       Impression:       No active disease.                  SHONNA VERAS MD         Electronically Signed and Approved By: SHONNA VERAS MD on 5/23/2023 at 19:54                     XR Chest 1 View [[id number]] Collected: 05/21/23 2038     Updated: 05/21/23 2041    Narrative:      PROCEDURE: XR CHEST 1 VW     COMPARISON: 5/21/2023, 1042 hours.     INDICATIONS: Hypoxia, fever.     FINDINGS:  A single AP (or PA) upright portable chest radiograph was performed.  No cardiac   enlargement is seen.  No acute infiltrate is appreciated.  No pleural effusion or pneumothorax is   identified.  There is a right-sided central venous line in place with its distal tip in the   expected mid superior vena cava, similar to the prior study.  The thoracic aorta is prominent,   atherosclerotic, and ectatic.  Chronic calcified granulomatous disease involves the chest.  There   is improvement in aeration of the lung bases with less subsegmental atelectasis.  Otherwise, no   significant interval change is seen since the prior study (or studies).       Impression:        No acute infiltrate is appreciated.              Please note that portions of this note were completed with a voice recognition program.     ABIODUN ULRICH JR, MD         Electronically Signed and Approved By: ABIODUN ULRICH JR, MD on 5/21/2023 at 20:37                        XR Chest 1  View [[id number]] Collected: 05/21/23 1043     Updated: 05/21/23 1046    Narrative:      PROCEDURE: XR CHEST 1 VW     COMPARISON: [facility], CR, XR CHEST 1 VW, 1/09/2023, 10:16.     INDICATIONS: Fever, weakness, dizziness     FINDINGS:      The lungs are well-expanded. The heart and pulmonary vasculature are within normal limits. No   pleural effusions are identified. There are no active appearing infiltrates.  Right subclavian   Port-A-Cath tips in the mid SVC.     IMPRESSION: No active disease.      JAKY HOPPER MD         Electronically Signed and Approved By: JAKY HOPPER MD on 5/21/2023 at 10:43                            Labs Pending at Discharge:  Pending Labs     Order Current Status    Blood Culture - Blood, Arm, Left Preliminary result    Blood Culture - Blood, Arm, Right Preliminary result    Blood Culture - Blood, Blood, Central Line Preliminary result    Urine Culture - Urine, Indwelling Urethral Catheter Preliminary result              Time spent on Discharge including face to face service: 31 minutes  Part of this note may be an electronic transcription/translation of spoken language to printed text using the Dragon Dictation System.     TElectronically signed by True Caputo MD, 05/24/23, 12:58 PM EDT.

## 2023-05-24 NOTE — PROGRESS NOTES
Respiratory Therapist Broncho-Pulmonary Hygiene Progress Note      Patient Name:  Korey Rodriguez  YOB: 1945    Korey Rodriguez meets the qualification for Level 1 of the Bronco-Pulmonary Hygiene Protocol. This was based on my daily patient assessment and includes review of chest x-ray results, cough ability and quality, oxygenation, secretions or risk for secretion development and patient mobility.     Broncho-Pulmonary Hygiene Assessment:    Level of Movement: Actively changing positions without assistance  Alert/ oriented/ cooperative    Breath Sounds: Clear to slightly diminished    Cough: Strong, effective and/or frequent    Chest X-Ray: Possible signs of consolidation and/or atelectasis or clear.     Sputum Productions: None or small amount of thin or watery secretions with effective cough    History and Physical: None    SpO2 to Oxygen Need: greater than 92% on room air or  less than 3L nasal canula    Current SpO2 is: 96 on room air    Based on this information, I have completed the following interventions: Aerobika with bronchodialtor medication or TID      Electronically signed by Onesimo Dunn RRT, 05/24/23, 3:44 AM EDT.

## 2023-05-24 NOTE — PLAN OF CARE
Goal Outcome Evaluation:           Progress: no change  Outcome Evaluation: alert and oriented x 4. up with x 1 assistance/walker. pt received one unit of packed RBCs this shift. no complaints at this time.

## 2023-05-25 ENCOUNTER — READMISSION MANAGEMENT (OUTPATIENT)
Dept: CALL CENTER | Facility: HOSPITAL | Age: 78
End: 2023-05-25
Payer: MEDICARE

## 2023-05-25 LAB
BH BB BLOOD EXPIRATION DATE: NORMAL
BH BB BLOOD TYPE BARCODE: 5100
BH BB DISPENSE STATUS: NORMAL
BH BB PRODUCT CODE: NORMAL
BH BB UNIT NUMBER: NORMAL
CROSSMATCH INTERPRETATION: NORMAL
UNIT  ABO: NORMAL
UNIT  RH: NORMAL

## 2023-05-25 NOTE — OUTREACH NOTE
Prep Survey      Flowsheet Row Responses   [facility] facility patient discharged from? [facility]   Is LACE score < 7 ? No   Eligibility Readm Mgmt   Discharge diagnosis *Febrile illness, acute   Does the patient have one of the following disease processes/diagnoses(primary or secondary)? Other   Does the patient have Home health ordered? Yes   What is the Home health agency?  [facility]   Is there a DME ordered? No   Prep survey completed? Yes            [place] ACOSTA - Registered Nurse

## 2023-05-26 ENCOUNTER — TELEPHONE (OUTPATIENT)
Dept: INTERNAL MEDICINE | Facility: CLINIC | Age: 78
End: 2023-05-26
Payer: MEDICARE

## 2023-05-26 LAB
BACTERIA SPEC AEROBE CULT: NORMAL

## 2023-05-26 NOTE — TELEPHONE ENCOUNTER
I have let pt's wife know that we can refer him but it will not be until the day of his appt, due to him being a new patient.   
Mimi called for her  Omar to get a referral. She asked for Dr. Hargrove to place the referral for a [facility] vascular physician, but he patient has never seen Dr. Hargrove and doesn't see him until June 27 th. Stated her  had seen a [facility] physician for his aortic aneurysm, but hey would prefer a [facility] physician instead.    
Numbness, color, or temperature change to extremity/Fever greater than 101/Pain not relieved by Medications

## 2023-05-28 LAB
BACTERIA SPEC AEROBE CULT: ABNORMAL

## 2023-05-30 ENCOUNTER — TELEPHONE (OUTPATIENT)
Dept: ONCOLOGY | Facility: HOSPITAL | Age: 78
End: 2023-05-30

## 2023-05-30 NOTE — TELEPHONE ENCOUNTER
Caller: TRA GARNER    Relationship: Emergency Contact    Best call back number:[phone]      What was the call regarding: PATIENT SPOUSE CALLED TO SPEAK TO THE NURSE

## 2023-06-01 ENCOUNTER — HOSPITAL ENCOUNTER (OUTPATIENT)
Dept: ONCOLOGY | Facility: HOSPITAL | Age: 78
Discharge: HOME | End: 2023-06-01

## 2023-06-01 ENCOUNTER — OFFICE VISIT (OUTPATIENT)
Dept: ONCOLOGY | Facility: HOSPITAL | Age: 78
End: 2023-06-01

## 2023-06-01 VITALS
DIASTOLIC BLOOD PRESSURE: 65 MMHG | SYSTOLIC BLOOD PRESSURE: 165 MMHG | RESPIRATION RATE: 20 BRPM | TEMPERATURE: 97.4 F | BODY MASS INDEX: 30.14 KG/M2 | OXYGEN SATURATION: 97 % | WEIGHT: 192.68 LBS | HEART RATE: 80 BPM

## 2023-06-01 VITALS
DIASTOLIC BLOOD PRESSURE: 65 MMHG | HEART RATE: 80 BPM | TEMPERATURE: 97.4 F | WEIGHT: 192.68 LBS | SYSTOLIC BLOOD PRESSURE: 165 MMHG | OXYGEN SATURATION: 97 % | BODY MASS INDEX: 30.24 KG/M2 | HEIGHT: 67 IN | RESPIRATION RATE: 20 BRPM

## 2023-06-01 DIAGNOSIS — C67.2 MALIGNANT NEOPLASM OF LATERAL WALL OF BLADDER: Primary | ICD-10-CM

## 2023-06-01 DIAGNOSIS — Z45.2 ENCOUNTER FOR ADJUSTMENT OR MANAGEMENT OF VASCULAR ACCESS DEVICE: ICD-10-CM

## 2023-06-01 DIAGNOSIS — C67.8 MALIGNANT NEOPLASM OF OVERLAPPING SITES OF BLADDER: Primary | ICD-10-CM

## 2023-06-01 DIAGNOSIS — C67.8 MALIGNANT NEOPLASM OF OVERLAPPING SITES OF BLADDER: ICD-10-CM

## 2023-06-01 LAB
BASOPHILS # BLD AUTO: 0.02 10*3/MM3 (ref 0–0.2)
BASOPHILS NFR BLD AUTO: 0.5 % (ref 0–1.5)
DEPRECATED RDW RBC AUTO: 59.4 FL (ref 37–54)
EOSINOPHIL # BLD AUTO: 0.03 10*3/MM3 (ref 0–0.4)
EOSINOPHIL NFR BLD AUTO: 0.7 % (ref 0.3–6.2)
ERYTHROCYTE [DISTWIDTH] IN BLOOD BY AUTOMATED COUNT: 16.8 % (ref 12.3–15.4)
HCT VFR BLD AUTO: 26.6 % (ref 37.5–51)
HGB BLD-MCNC: 8.3 G/DL (ref 13–17.7)
IMM GRANULOCYTES # BLD AUTO: 0.17 10*3/MM3 (ref 0–0.05)
IMM GRANULOCYTES NFR BLD AUTO: 4 % (ref 0–0.5)
LYMPHOCYTES # BLD AUTO: 0.29 10*3/MM3 (ref 0.7–3.1)
LYMPHOCYTES NFR BLD AUTO: 6.9 % (ref 19.6–45.3)
MCH RBC QN AUTO: 30.4 PG (ref 26.6–33)
MCHC RBC AUTO-ENTMCNC: 31.2 G/DL (ref 31.5–35.7)
MCV RBC AUTO: 97.4 FL (ref 79–97)
MONOCYTES # BLD AUTO: 0.73 10*3/MM3 (ref 0.1–0.9)
MONOCYTES NFR BLD AUTO: 17.4 % (ref 5–12)
NEUTROPHILS NFR BLD AUTO: 2.96 10*3/MM3 (ref 1.7–7)
NEUTROPHILS NFR BLD AUTO: 70.5 % (ref 42.7–76)
NRBC BLD AUTO-RTO: 0 /100 WBC (ref 0–0.2)
PLATELET # BLD AUTO: 243 10*3/MM3 (ref 140–450)
PMV BLD AUTO: 10 FL (ref 6–12)
RBC # BLD AUTO: 2.73 10*6/MM3 (ref 4.14–5.8)
WBC NRBC COR # BLD: 4.2 10*3/MM3 (ref 3.4–10.8)

## 2023-06-01 PROCEDURE — 36591 DRAW BLOOD OFF VENOUS DEVICE: CPT

## 2023-06-01 PROCEDURE — 96523 IRRIG DRUG DELIVERY DEVICE: CPT

## 2023-06-01 PROCEDURE — 85025 COMPLETE CBC W/AUTO DIFF WBC: CPT | Performed by: INTERNAL MEDICINE

## 2023-06-01 PROCEDURE — 25010000002 HEPARIN LOCK FLUSH PER 10 UNITS: Performed by: INTERNAL MEDICINE

## 2023-06-01 RX ORDER — HEPARIN SODIUM (PORCINE) LOCK FLUSH IV SOLN 100 UNIT/ML 100 UNIT/ML
500 SOLUTION INTRAVENOUS AS NEEDED
Status: DISCONTINUED | OUTPATIENT
Start: 2023-06-01 | End: 2023-06-02 | Stop reason: HOSPADM

## 2023-06-01 RX ORDER — SODIUM CHLORIDE 0.9 % (FLUSH) 0.9 %
20 SYRINGE (ML) INJECTION AS NEEDED
Status: DISCONTINUED | OUTPATIENT
Start: 2023-06-01 | End: 2023-06-02 | Stop reason: HOSPADM

## 2023-06-01 RX ORDER — SODIUM CHLORIDE 0.9 % (FLUSH) 0.9 %
20 SYRINGE (ML) INJECTION AS NEEDED
OUTPATIENT
Start: 2023-06-01

## 2023-06-01 RX ORDER — HEPARIN SODIUM (PORCINE) LOCK FLUSH IV SOLN 100 UNIT/ML 100 UNIT/ML
500 SOLUTION INTRAVENOUS AS NEEDED
OUTPATIENT
Start: 2023-06-01

## 2023-06-01 RX ADMIN — Medication 20 ML: at 13:46

## 2023-06-01 RX ADMIN — HEPARIN SODIUM (PORCINE) LOCK FLUSH IV SOLN 100 UNIT/ML 500 UNITS: 100 SOLUTION at 13:46

## 2023-06-01 NOTE — PROGRESS NOTES
Chief Complaint  Malignant neoplasm of overlapping sites of bladder    No ref. provider found  [facility], [facility]    Subjective          Koreyabram Rodriguez presents to [facility] for urothelial carcinoma    Mr. Rodriguez is a pleasant 79 yo male with a history of AAA (infrarenal), chronic back pain, HLD, hearing loss who presents for follow up regarding urothelial carcinoma.  Patient had radical cystoprostatectomy on December 12, 2022 at [facility] by Dr. Bansal.  He got C4D1 of chemotherapy delayed one week on 5/16 due to thrombocytopenia. Chemo was complicated by hospitalization due to fever and UTI. Urine culture with coag negative staph as well as alpha hemolytic strep. He was in hospital for 4 days and transitioned to PO antibiotics with Keflex at discharge. He received transfusions in the hospital for anemia. He reports feeling better today in clinic. He feels that he is on the mend. He is worried about getting another cycle of chemotherapy. He reports he had a lot of fatigue and weakness with the last dose. He also had taste change. No neuropathy reported. Tinnitus present but at baseline.     Oncology/Hematology History Overview Note   10/14/22: CT abdomen/pelvis obtained due to gross hematuria for 6 weeks. This showed right hydroureteronephrosis down to bladder with findings concerning for large bladder tumor. He also had 20 lbs weight loss during this time.     10/19/2022: TURBT performed by Dr. Westfall. Per op reprot large area resected (7 x 4 cm), visualization difficult due to clot burden. 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     11/17/2022: PET CT showed asymmetric thickening along the right lateral and posterior walls of the bladder. No evidence of metastatic disease.     12/12/22: Radical cystoprostectomy, bilateral PLND, ileal conduit by Dr. Bansal ([facility]): Left distal and right distal ureter both positive  for carcinoma, 2/3 right external lymph nodes positive for metastatic carcinoma, 2/3 right common external lymph nodes positive for carcinoma, bladder and prostate with urothelial carcinoma with micropapillary features and invades directly into prostate, extensive CIS noted, 4/22 total lymph nodes positive, [id number].    1/18//23: C1D1 Cisplatin/Gemcitabine. Tolerated well    2/8/23: C2D1 Cisplatin/Gemcitabine.    4/7/23: Last dose of radiation.     4/17/23: C3D1 chemotherapy. Cisplatin switched to Carboplatin due to drug shortage.     5/9/23: C4D1 chemotherapy. 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 (HCC)     1/13/2023 - 1/13/2023 Chemotherapy    OP BLADDER MitoMYcin / Fluorouracil CIV + XRT     Malignant neoplasm of overlapping sites of bladder   2/21/2023 Initial Diagnosis    Malignant neoplasm of overlapping sites of bladder (HCC)     3/8/2023 -  Radiation    RADIATION THERAPY Treatment Details (Noted on 2/21/2023)  Site: Bilateral Pelvis  Technique: IMRT  Goal: Curative  Planned Treatment Start Date: 3/8/2023     4/18/2023 - 4/25/2023 Chemotherapy    OP BLADDER CARBOplatin / Gemcitabine     5/16/2023 -  Chemotherapy    OP BLADDER CISplatin D1 / Gemcitabine D1,D8           Review of Systems   Constitutional: Positive for fatigue.   Musculoskeletal: Positive for back pain. Negative for joint swelling.   Neurological: Positive  for weakness (Last week PT was in hospital with UTI ).   Psychiatric/Behavioral: Negative for sleep disturbance.   All other systems reviewed and are negative.    Current Outpatient Medications on File Prior to Visit   Medication Sig Dispense Refill   • atorvastatin (LIPITOR) 20 MG tablet Take 1 tablet by mouth Every Night.     • Eliquis 5 MG tablet tablet Take 2 tablets by mouth Every 12 (Twelve) Hours.     • famotidine (Pepcid) 20 MG tablet Take 1 tablet by mouth Every Night for 30 days. 30 tablet 0   • ferrous sulfate 325 (65 FE) MG tablet Take 1 tablet by mouth Daily With Breakfast for 30 days. 30 tablet 0   • meloxicam (MOBIC) 7.5 MG tablet Take 1 tablet by mouth Daily.     • OLANZapine (zyPREXA) 5 MG tablet Take 1 tablet by mouth Every Night. Take on days 2, 3 and 4 after chemotherapy. (Patient not taking: Reported on 5/21/2023) 3 tablet 5   • ondansetron (ZOFRAN) 8 MG tablet Take 1 tablet by mouth 3 (Three) Times a Day As Needed for Nausea or Vomiting. (Patient not taking: Reported on 5/21/2023) 30 tablet 5     No current facility-administered medications on file prior to visit.       No Known Allergies  Past Medical History:   Diagnosis Date   • AAA (abdominal aortic aneurysm)     BEING MONITORED NO REPAIR NEEDED YET   • Aneurysm of abdominal aorta branch vessel    • Cancer     BLADDER AND SKIN CANCERS/REMOVED   • Essential tremor    • History of DVT of lower extremity 12/2022    on Xarelto   • History of urostomy     PT CURRENTLY HAS HOME HEALTH TO HELP WITH OSTOMY   • Hyperlipidemia    • Kidney stone 10/16/2022     Past Surgical History:   Procedure Laterality Date   • COLONOSCOPY  2018   • CYSTECTOMY      12/2022  W/UROSTOMY   • SKIN CANCER EXCISION Right    • TRANSURETHRAL RESECTION OF BLADDER TUMOR Right 10/19/2022    Procedure: Cystoscopy with transurethral resection of bladder tumor;  Surgeon: Aung Westfall MD;  Location: [facility] MAIN OR;  Service: Urology;  Laterality: Right;   • VENOUS ACCESS  DEVICE (PORT) INSERTION Right 2023    Procedure: INSERTION VENOUS ACCESS DEVICE;  Surgeon: Richie Edward MD;  [facility] [facility] [place] [facility];  Service: General;  Laterality: Right;     Social History     Socioeconomic History   • Marital status:    Tobacco Use   • Smoking status: Former     Packs/day: 1.00     Years: 20.00     Pack years: 20.00     Types: Cigarettes     Start date: 1962     Quit date: 1990     Years since quittin.4   • Smokeless tobacco: Never   Vaping Use   • Vaping Use: Never used   Substance and Sexual Activity   • Alcohol use: Yes     Alcohol/week: 1.0 standard drink     Types: 1 Cans of beer per week     Comment: 1 beer daily   • Drug use: Never   • Sexual activity: Not Currently     Partners: Female     Family History   Problem Relation Age of Onset   • Cancer Mother    • Cancer Father    • Hearing loss Father    • Hypertension Father    • Malig Hyperthermia Neg Hx        Objective   Physical Exam    Well appearing patient in no acute distress on RA  Anicteric sclera, no rash on exposed skin  Respirations non-labored  Awake, alert, and oriented x 4. Speech intact.   Appropriate mood and affect      There were no vitals filed for this visit.            PHQ-9 Total Score:           Result Review :   The following data was reviewed by: Arvin Coburn MD on 23:  Lab Results   Component Value Date    HGB 8.3 (L) 2023    HCT 26.6 (L) 2023    MCV 97.4 (H) 2023     2023    WBC 4.20 2023    NEUTROABS 2.96 2023    LYMPHSABS 0.29 (L) 2023    MONOSABS 0.73 2023    EOSABS 0.03 2023    BASOSABS 0.02 2023     Lab Results   Component Value Date    GLUCOSE 114 (H) 2023    BUN 19 2023    CREATININE 1.01 2023     (L) 2023    K 3.4 (L) 2023     2023    CO2 25.3 2023    CALCIUM 8.3 (L) 2023    PROTEINTOT 6.8 2023    ALBUMIN 3.3 (L) 2023     "BILITOT 0.4 05/21/2023    ALKPHOS 71 05/21/2023    AST 21 05/21/2023    ALT 15 05/21/2023     Lab Results   Component Value Date    MG 2.5 (H) 05/22/2023       Labs personally reviewed. Anemia improving     Surgical Pathology Reviewed: [id number] with left distal margin positive.    [facility] urology documentation and progress notes from hospital personally reviewed.     Discharge summary 5/24/2023 personally reviewed       Assessment and Plan    Diagnoses and all orders for this visit:    1. Malignant neoplasm of overlapping sites of bladder (Primary)  -     CBC & Differential; Future  -     Comprehensive Metabolic Panel; Future    Mr. Rodriguez is a 78-year-old male status post radical cystoprostatectomy bilateral PLND which demonstrated stage [id number], stage IIIB urothelial carcinoma. 4/22 lymph nodes positive and right and left and right ureter positive with distal margin of left ureter positive.  Due to high risk of recurrence, patient treated with adjuvant radiation and chemotherapy with \"sandwich\" therapy consisting of 2 cycles of chemotherapy (platinum with Gemcitabine) followed by radiation followed by 2 additional cycles of chemotherapy.  1/18/23: C1D1 Cisplatin/Gemcitabine. 2/8/23: C2D1 planned Cisplatin/gemcitabine. Patient then completed radiation with last dose on 4/7/23. C3D1 chemo 4/18/23, with Carbo/[place]. Carboplatin substitued in place of Cisplatin due to drug shortage.   C4D1 chemo given 1 week delay on 5/16/23 with Cis/[place] due to thrombocytopenia. Complicated by significant anemia, weakness and hospitalization from complicated UTI.     Due to toxicities associated with chemotherapy and likely low added benefit of last dose of chemotherapy that is already a week delayed, will hold further chemotherapy. Patient in agreement.         Follow up imaging recommend after completion of therapy. CT CAP planned for 09/2023. Will follow up then. Recommend CT imaging every 3 to 6 months for first two " years with labs (CBC, CMP) per [facility].    This is an acute or chronic illness that poses a threat to life or bodily function. The above treatment plan involves a high risk of complications and/or mortality of patient management.     LLE DVT  DVT found on duplex ordered 2/7/23. Continue Eliquis. Will need to be on this until completion of cancer treatment. Plan to complete therapy by 6/1/23.    Anemia 2/2 chemotherapy  No intervention or dose adjustment necessary. Iron deficiency ruled out. Should improve now that chemotherapy has completed.         I spent 20 minutes caring for Korey on this date of service. This time includes time spent by me in the following activities:preparing for the visit, reviewing tests, obtaining and/or reviewing a separately obtained history, performing a medically appropriate examination and/or evaluation , counseling and educating the patient/family/caregiver, ordering medications, tests, or procedures, referring and communicating with other health care professionals , documenting information in the medical record, independently interpreting results and communicating that information with the patient/family/caregiver and care coordination    Patient Follow Up: 9/2023 with scans  Patient was given instructions and counseling regarding his condition or for health maintenance advice. Please see specific information pulled into the AVS if appropriate.

## 2023-06-05 LAB
BACTERIA SPEC AEROBE CULT: ABNORMAL

## 2023-06-07 LAB — QT INTERVAL: 383 MS

## 2023-06-12 ENCOUNTER — READMISSION MANAGEMENT (OUTPATIENT)
Dept: CALL CENTER | Facility: HOSPITAL | Age: 78
End: 2023-06-12
Payer: MEDICARE

## 2023-06-12 NOTE — OUTREACH NOTE
Medical Week 3 Survey      Flowsheet Row Responses   [facility] patient discharged from? [facility]   Does the patient have one of the following disease processes/diagnoses(primary or secondary)? Other   Week 3 attempt successful? Yes   Call start time 1606   Call end time 1610   Discharge diagnosis *Febrile illness, acute   Meds reviewed with patient/caregiver? Yes   Is the patient having any side effects they believe may be caused by any medication additions or changes? No   Is the patient taking all medications as directed (includes completed medication regime)? Yes   Does the patient have a primary care provider?  Yes   Does the patient have an appointment with their PCP within 7 days of discharge? Yes   Has the patient kept scheduled appointments due by today? Yes   What is the Home health agency?  [facility]   Has home health visited the patient within 72 hours of discharge? Yes   Psychosocial issues? No   What is the patient's perception of their health status since discharge? Improving   Is the patient/caregiver able to teach back the hierarchy of who to call/visit for symptoms/problems? PCP, Specialist, Home health nurse, Urgent Care, ED, 911 Yes   Week 3 Call Completed? Yes   Graduated Yes   Is the patient interested in additional calls from an ambulatory ?  NOTE:  applies to high risk patients requiring additional follow-up. No            Anneliese COTE - Registered Nurse

## 2023-06-26 ENCOUNTER — TELEPHONE (OUTPATIENT)
Dept: ONCOLOGY | Facility: HOSPITAL | Age: 78
End: 2023-06-26

## 2023-06-26 PROBLEM — R31.0 GROSS HEMATURIA: Status: RESOLVED | Noted: 2022-10-16 | Resolved: 2023-06-26

## 2023-06-26 PROBLEM — R31.9 BLOOD IN URINE: Status: RESOLVED | Noted: 2022-10-18 | Resolved: 2023-06-26

## 2023-06-26 PROBLEM — N39.0 UTI (URINARY TRACT INFECTION): Status: RESOLVED | Noted: 2023-01-10 | Resolved: 2023-06-26

## 2023-06-26 PROBLEM — H91.90 HEARING LOSS: Status: RESOLVED | Noted: 2022-02-09 | Resolved: 2023-06-26

## 2023-06-26 PROBLEM — H93.19 TINNITUS: Status: RESOLVED | Noted: 2022-02-09 | Resolved: 2023-06-26

## 2023-06-26 PROBLEM — H91.93 BILATERAL HEARING LOSS: Status: RESOLVED | Noted: 2022-02-09 | Resolved: 2023-06-26

## 2023-06-26 PROBLEM — M15.9 PRIMARY OSTEOARTHRITIS INVOLVING MULTIPLE JOINTS: Status: ACTIVE | Noted: 2023-06-26

## 2023-06-26 PROBLEM — R31.9 HEMATURIA: Status: RESOLVED | Noted: 2022-10-18 | Resolved: 2023-06-26

## 2023-06-26 NOTE — TELEPHONE ENCOUNTER
Caller: TRA GARNER    Relationship: Emergency Contact    Best call back number: [phone] LVM IF NO ANSWER    Who are you requesting to speak with (clinical staff, provider,  specific staff member): CLINICAL    What was the call regarding: CALLING WITH QUESTIONS REGARDING FERROUS SULPHATE RX AS PATIENT RX IS ALMOST COMPLETE

## 2023-06-26 NOTE — TELEPHONE ENCOUNTER
Message left for patient stating that Dr. Coburn states they cn stop taking the iron pills, so we will not be sending in a refill. Left number to return call with any questions.

## 2023-06-27 PROBLEM — M54.50 CHRONIC RIGHT-SIDED LOW BACK PAIN WITHOUT SCIATICA: Status: ACTIVE | Noted: 2022-02-09

## 2023-06-27 PROBLEM — Z79.01 LONG TERM (CURRENT) USE OF ANTICOAGULANTS: Status: RESOLVED | Noted: 2023-02-07 | Resolved: 2023-06-27

## 2023-06-27 PROBLEM — I82.542 CHRONIC DEEP VEIN THROMBOSIS (DVT) OF TIBIAL VEIN OF LEFT LOWER EXTREMITY: Status: ACTIVE | Noted: 2023-06-27

## 2023-06-27 PROBLEM — D50.9 IRON DEFICIENCY ANEMIA: Status: ACTIVE | Noted: 2023-06-27

## 2023-06-27 PROBLEM — Z00.00 WELL ADULT EXAM: Status: ACTIVE | Noted: 2023-06-27

## 2023-07-11 PROBLEM — Z00.00 MEDICARE ANNUAL WELLNESS VISIT, SUBSEQUENT: Status: ACTIVE | Noted: 2023-07-11

## 2023-07-11 PROBLEM — N18.31 STAGE 3A CHRONIC KIDNEY DISEASE: Status: ACTIVE | Noted: 2023-07-11

## 2023-08-08 ENCOUNTER — TELEPHONE (OUTPATIENT)
Dept: INTERNAL MEDICINE | Facility: CLINIC | Age: 78
End: 2023-08-08
Payer: MEDICARE

## 2023-08-08 ENCOUNTER — HOSPITAL ENCOUNTER (OUTPATIENT)
Dept: CT IMAGING | Facility: HOSPITAL | Age: 78
Discharge: HOME | End: 2023-08-08
Admitting: INTERNAL MEDICINE
Payer: MEDICARE

## 2023-08-08 DIAGNOSIS — C67.8 MALIGNANT NEOPLASM OF OVERLAPPING SITES OF BLADDER: ICD-10-CM

## 2023-08-08 LAB
CREAT BLDA-MCNC: 1.3 MG/DL
EGFRCR SERPLBLD CKD-EPI 2021: 55.5 ML/MIN/1.73

## 2023-08-08 PROCEDURE — 25510000001 IOPAMIDOL PER 1 ML: Performed by: INTERNAL MEDICINE

## 2023-08-08 PROCEDURE — 74177 CT ABD & PELVIS W/CONTRAST: CPT

## 2023-08-08 PROCEDURE — 71260 CT THORAX DX C+: CPT

## 2023-08-08 PROCEDURE — 82565 ASSAY OF CREATININE: CPT

## 2023-08-08 RX ADMIN — IOPAMIDOL 100 ML: 755 INJECTION, SOLUTION INTRAVENOUS at 11:16

## 2023-08-08 NOTE — TELEPHONE ENCOUNTER
Caller: TRA GARNER     Best call back number: [phone]    What is your medical concern? PATIENT IS HAVING FATIGUE, WEAKNESS, AND OTHER THINGS RATHER SPEAK WITH NURSE ABOUT    How long has this issue been going on? FEW DAYS    Is your provider already aware of this issue? NO    Have you been treated for this issue? NO

## 2023-08-09 ENCOUNTER — OFFICE VISIT (OUTPATIENT)
Dept: VASCULAR SURGERY | Facility: HOSPITAL | Age: 78
End: 2023-08-09
Payer: MEDICARE

## 2023-08-09 VITALS
HEART RATE: 60 BPM | TEMPERATURE: 97.8 F | SYSTOLIC BLOOD PRESSURE: 120 MMHG | DIASTOLIC BLOOD PRESSURE: 60 MMHG | RESPIRATION RATE: 18 BRPM | OXYGEN SATURATION: 97 %

## 2023-08-09 DIAGNOSIS — I71.43 INFRARENAL ABDOMINAL AORTIC ANEURYSM (AAA) WITHOUT RUPTURE: Primary | ICD-10-CM

## 2023-08-09 PROCEDURE — G0463 HOSPITAL OUTPT CLINIC VISIT: HCPCS | Performed by: SURGERY

## 2023-08-09 NOTE — PROGRESS NOTES
[facility]   Follow up Office    Patient Name: Korey Rodriguez  : 1945  MRN: [id number]  Primary Care Physician:  Dimitri Hargrove MD      Subjective   Subjective     HPI:    Korey Rodriguez is a 78 y.o. male here for follow-up to discuss the results of a CT scan obtained by his primary care provider 2023.  He also had a recent carotid duplex on 2023.      Objective     Vitals:   Temp:  [97.8 øF (36.6 øC)] 97.8 øF (36.6 øC)  Heart Rate:  [60] 60  Resp:  [18] 18  BP: (120)/(60) 120/60    Physical Exam      General: Alert, no acute distress.  HEENT: PERRLA  Abdomen: Benign  Extremities: Symmetric.  Neuro: No gross deficits    Diagnostic studies: A CT of the abdomen and pelvis dated 2023 demonstrates stable findings when compared to previous CT scans with a maximum diameter of the aneurysm at 4.8 cm.  The carotid duplex demonstrates no significant bilateral carotid stenosis.    Assessment & Plan   Assessment / Plan     Diagnoses and all orders for this visit:    1. Infrarenal abdominal aortic aneurysm (AAA) without rupture (Primary)    2. Stenosis of carotid artery, unspecified laterality       Assessment/Plan:   Stable infrarenal abdominal aortic aneurysm.  No significant carotid stenosis.  Follow-up with a repeat CT scan of the abdomen and pelvis with IV contrast in 1 year        Electronically signed by Mike Jarrell MD, 23, 10:49 AM EDT.

## 2023-08-11 ENCOUNTER — HOSPITAL ENCOUNTER (OUTPATIENT)
Dept: ONCOLOGY | Facility: HOSPITAL | Age: 78
Discharge: HOME | End: 2023-08-11
Payer: MEDICARE

## 2023-08-11 DIAGNOSIS — Z45.2 ENCOUNTER FOR ADJUSTMENT OR MANAGEMENT OF VASCULAR ACCESS DEVICE: Primary | ICD-10-CM

## 2023-08-11 PROCEDURE — 96523 IRRIG DRUG DELIVERY DEVICE: CPT

## 2023-08-11 PROCEDURE — 25010000002 HEPARIN LOCK FLUSH PER 10 UNITS: Performed by: INTERNAL MEDICINE

## 2023-08-11 RX ORDER — HEPARIN SODIUM (PORCINE) LOCK FLUSH IV SOLN 100 UNIT/ML 100 UNIT/ML
500 SOLUTION INTRAVENOUS AS NEEDED
OUTPATIENT
Start: 2023-08-11

## 2023-08-11 RX ORDER — HEPARIN SODIUM (PORCINE) LOCK FLUSH IV SOLN 100 UNIT/ML 100 UNIT/ML
500 SOLUTION INTRAVENOUS AS NEEDED
Status: DISCONTINUED | OUTPATIENT
Start: 2023-08-11 | End: 2023-08-12 | Stop reason: HOSPADM

## 2023-08-11 RX ORDER — SODIUM CHLORIDE 0.9 % (FLUSH) 0.9 %
20 SYRINGE (ML) INJECTION AS NEEDED
Status: DISCONTINUED | OUTPATIENT
Start: 2023-08-11 | End: 2023-08-12 | Stop reason: HOSPADM

## 2023-08-11 RX ORDER — SODIUM CHLORIDE 0.9 % (FLUSH) 0.9 %
20 SYRINGE (ML) INJECTION AS NEEDED
OUTPATIENT
Start: 2023-08-11

## 2023-08-11 RX ADMIN — HEPARIN SODIUM (PORCINE) LOCK FLUSH IV SOLN 100 UNIT/ML 500 UNITS: 100 SOLUTION at 11:20

## 2023-08-11 RX ADMIN — Medication 20 ML: at 11:20

## 2023-09-18 ENCOUNTER — HOSPITAL ENCOUNTER (OUTPATIENT)
Dept: ONCOLOGY | Facility: HOSPITAL | Age: 78
Discharge: HOME | End: 2023-09-18
Admitting: INTERNAL MEDICINE
Payer: MEDICARE

## 2023-09-18 DIAGNOSIS — C67.8 MALIGNANT NEOPLASM OF OVERLAPPING SITES OF BLADDER: ICD-10-CM

## 2023-09-18 DIAGNOSIS — Z45.2 ENCOUNTER FOR ADJUSTMENT OR MANAGEMENT OF VASCULAR ACCESS DEVICE: Primary | ICD-10-CM

## 2023-09-18 LAB
ALBUMIN SERPL-MCNC: 3.7 G/DL (ref 3.5–5.2)
ALBUMIN/GLOB SERPL: 1.4 G/DL
ALP SERPL-CCNC: 91 U/L (ref 39–117)
ALT SERPL W P-5'-P-CCNC: 10 U/L (ref 1–41)
ANION GAP SERPL CALCULATED.3IONS-SCNC: 6.4 MMOL/L (ref 5–15)
AST SERPL-CCNC: 13 U/L (ref 1–40)
BASOPHILS # BLD AUTO: 0.02 10*3/MM3 (ref 0–0.2)
BASOPHILS NFR BLD AUTO: 0.5 % (ref 0–1.5)
BILIRUB SERPL-MCNC: 0.3 MG/DL (ref 0–1.2)
BUN SERPL-MCNC: 26 MG/DL (ref 8–23)
BUN/CREAT SERPL: 16.7 (ref 7–25)
CALCIUM SPEC-SCNC: 8.5 MG/DL (ref 8.6–10.5)
CHLORIDE SERPL-SCNC: 107 MMOL/L (ref 98–107)
CO2 SERPL-SCNC: 25.6 MMOL/L (ref 22–29)
CREAT SERPL-MCNC: 1.56 MG/DL (ref 0.76–1.27)
DEPRECATED RDW RBC AUTO: 44.1 FL (ref 37–54)
EGFRCR SERPLBLD CKD-EPI 2021: 44.6 ML/MIN/1.73
EOSINOPHIL # BLD AUTO: 0.16 10*3/MM3 (ref 0–0.4)
EOSINOPHIL NFR BLD AUTO: 3.6 % (ref 0.3–6.2)
ERYTHROCYTE [DISTWIDTH] IN BLOOD BY AUTOMATED COUNT: 12.8 % (ref 12.3–15.4)
GLOBULIN UR ELPH-MCNC: 2.7 GM/DL
GLUCOSE SERPL-MCNC: 106 MG/DL (ref 65–99)
HCT VFR BLD AUTO: 34.6 % (ref 37.5–51)
HGB BLD-MCNC: 10.8 G/DL (ref 13–17.7)
IMM GRANULOCYTES # BLD AUTO: 0 10*3/MM3 (ref 0–0.05)
IMM GRANULOCYTES NFR BLD AUTO: 0 % (ref 0–0.5)
LYMPHOCYTES # BLD AUTO: 0.35 10*3/MM3 (ref 0.7–3.1)
LYMPHOCYTES NFR BLD AUTO: 7.9 % (ref 19.6–45.3)
MCH RBC QN AUTO: 28.9 PG (ref 26.6–33)
MCHC RBC AUTO-ENTMCNC: 31.2 G/DL (ref 31.5–35.7)
MCV RBC AUTO: 92.5 FL (ref 79–97)
MONOCYTES # BLD AUTO: 0.52 10*3/MM3 (ref 0.1–0.9)
MONOCYTES NFR BLD AUTO: 11.7 % (ref 5–12)
NEUTROPHILS NFR BLD AUTO: 3.38 10*3/MM3 (ref 1.7–7)
NEUTROPHILS NFR BLD AUTO: 76.3 % (ref 42.7–76)
PLATELET # BLD AUTO: 158 10*3/MM3 (ref 140–450)
PMV BLD AUTO: 9 FL (ref 6–12)
POTASSIUM SERPL-SCNC: 4.5 MMOL/L (ref 3.5–5.2)
PROT SERPL-MCNC: 6.4 G/DL (ref 6–8.5)
RBC # BLD AUTO: 3.74 10*6/MM3 (ref 4.14–5.8)
SODIUM SERPL-SCNC: 139 MMOL/L (ref 136–145)
WBC NRBC COR # BLD: 4.43 10*3/MM3 (ref 3.4–10.8)

## 2023-09-18 PROCEDURE — 85025 COMPLETE CBC W/AUTO DIFF WBC: CPT | Performed by: INTERNAL MEDICINE

## 2023-09-18 PROCEDURE — 25010000002 HEPARIN LOCK FLUSH PER 10 UNITS: Performed by: INTERNAL MEDICINE

## 2023-09-18 PROCEDURE — 36591 DRAW BLOOD OFF VENOUS DEVICE: CPT

## 2023-09-18 PROCEDURE — 80053 COMPREHEN METABOLIC PANEL: CPT | Performed by: INTERNAL MEDICINE

## 2023-09-18 RX ORDER — HEPARIN SODIUM (PORCINE) LOCK FLUSH IV SOLN 100 UNIT/ML 100 UNIT/ML
500 SOLUTION INTRAVENOUS AS NEEDED
Status: DISCONTINUED | OUTPATIENT
Start: 2023-09-18 | End: 2023-09-19 | Stop reason: HOSPADM

## 2023-09-18 RX ORDER — HEPARIN SODIUM (PORCINE) LOCK FLUSH IV SOLN 100 UNIT/ML 100 UNIT/ML
500 SOLUTION INTRAVENOUS AS NEEDED
OUTPATIENT
Start: 2023-09-18

## 2023-09-18 RX ORDER — SODIUM CHLORIDE 0.9 % (FLUSH) 0.9 %
20 SYRINGE (ML) INJECTION AS NEEDED
Status: DISCONTINUED | OUTPATIENT
Start: 2023-09-18 | End: 2023-09-19 | Stop reason: HOSPADM

## 2023-09-18 RX ORDER — SODIUM CHLORIDE 0.9 % (FLUSH) 0.9 %
20 SYRINGE (ML) INJECTION AS NEEDED
OUTPATIENT
Start: 2023-09-18

## 2023-09-18 RX ADMIN — Medication 20 ML: at 09:01

## 2023-09-18 RX ADMIN — HEPARIN SODIUM (PORCINE) LOCK FLUSH IV SOLN 100 UNIT/ML 500 UNITS: 100 SOLUTION at 09:02

## 2023-09-19 ENCOUNTER — OFFICE VISIT (OUTPATIENT)
Dept: ONCOLOGY | Facility: HOSPITAL | Age: 78
End: 2023-09-19
Payer: MEDICARE

## 2023-09-19 VITALS
OXYGEN SATURATION: 96 % | HEART RATE: 88 BPM | SYSTOLIC BLOOD PRESSURE: 113 MMHG | DIASTOLIC BLOOD PRESSURE: 78 MMHG | BODY MASS INDEX: 30.48 KG/M2 | WEIGHT: 194.22 LBS | HEIGHT: 67 IN | RESPIRATION RATE: 18 BRPM | TEMPERATURE: 97.6 F

## 2023-09-19 DIAGNOSIS — C67.2 MALIGNANT NEOPLASM OF LATERAL WALL OF BLADDER: ICD-10-CM

## 2023-09-19 DIAGNOSIS — C67.9 UROTHELIAL CARCINOMA OF BLADDER WITH INVASION OF MUSCLE: Primary | ICD-10-CM

## 2023-09-19 PROCEDURE — G0463 HOSPITAL OUTPT CLINIC VISIT: HCPCS | Performed by: INTERNAL MEDICINE

## 2023-09-19 NOTE — PROGRESS NOTES
Chief Complaint  Malignant neoplasm of overlapping sites of bladder    Horacio Bansal MD Eklund, MD Dimitri    Subjective          Korey Rodriguez presents to [facility]EMAT[facility]Y [facility]Y for urothelial carcinoma    Mr. Rodriguez is a pleasant 77 yo male with a history of AAA (infrarenal), chronic back pain, HLD, hearing loss who presents for follow up regarding urothelial carcinoma.  Patient had radical cystoprostatectomy on December 12, 2022 at [facility] by Dr. Bansal.  He has completed adjuvant chemo and radiation. He returns today for CT scan review and follow up with labs. He is feeling better. He is continuing to recover. Not back to pre-surgery baseline. Anemia is improved. Mild creatinine elevation today. Does have some swelling in left ankle but not worse and not tender. No bleeding. Urinary output stable.     Oncology/Hematology History Overview Note   10/14/22: CT abdomen/pelvis obtained due to gross hematuria for 6 weeks. This showed right hydroureteronephrosis down to bladder with findings concerning for large bladder tumor. He also had 20 lbs weight loss during this time.     10/19/2022: TURBT performed by Dr. Westfall. Per op reprot large area resected (7 x 4 cm), visualization difficult due to clot burden. 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     11/17/2022: PET CT showed asymmetric thickening along the right lateral and posterior walls of the bladder. No evidence of metastatic disease.     12/12/22: Radical cystoprostectomy, bilateral PLND, ileal conduit by Dr. Bansal ([facility]): Left distal and right distal ureter both positive for carcinoma, 2/3 right external lymph nodes positive for metastatic carcinoma, 2/3 right common external lymph nodes positive for carcinoma, bladder and prostate with urothelial carcinoma with micropapillary features and invades directly into prostate, extensive CIS noted, 4/22 total lymph nodes  positive, [id number].    1/18//23: C1D1 Cisplatin/Gemcitabine. Tolerated well    2/8/23: C2D1 Cisplatin/Gemcitabine.    4/7/23: Last dose of radiation.     4/17/23: C3D1 chemotherapy. Cisplatin switched to Carboplatin due to drug shortage.     5/16/23: C4D1 chemotherapy. Cis/[place]. Delayed 1 week due to thrombocytoepnia. Complicated by hospitalization for staph and alpha hemolytic strep UTI. C4D8 chemotherapy held.          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 (HCC)     1/13/2023 - 1/13/2023 Chemotherapy    OP BLADDER MitoMYcin / Fluorouracil CIV + XRT       Malignant neoplasm of overlapping sites of bladder   2/21/2023 Initial Diagnosis    Malignant neoplasm of overlapping sites of bladder (HCC)     3/8/2023 -  Radiation    RADIATION THERAPY Treatment Details (Noted on 2/21/2023)  Site: Bilateral Pelvis  Technique: IMRT  Goal: Curative  Planned Treatment Start Date: 3/8/2023     4/18/2023 - 4/25/2023 Chemotherapy    OP BLADDER CARBOplatin / Gemcitabine       5/16/2023 -  Chemotherapy    OP BLADDER CISplatin D1 / Gemcitabine D1,D8             Review of Systems   Constitutional:  Positive for fatigue.   Musculoskeletal:  Positive for back pain. Negative for joint swelling.   Neurological:  Positive for weakness.   Psychiatric/Behavioral:  Negative for sleep disturbance.    All other systems reviewed and are negative.  Current Outpatient  Medications on File Prior to Visit   Medication Sig Dispense Refill    atorvastatin (LIPITOR) 20 MG tablet Take 1 tablet by mouth Every Night.      Eliquis 5 MG tablet tablet Take 2 tablets by mouth Every 12 (Twelve) Hours.      meloxicam (MOBIC) 7.5 MG tablet Take 1 tablet by mouth Daily.       Current Facility-Administered Medications on File Prior to Visit   Medication Dose Route Frequency Provider Last Rate Last Admin    [DISCONTINUED] heparin injection 500 Units  500 Units Intravenous PRN Arvin Coburn MD   500 Units at 09/18/23 0902    [DISCONTINUED] sodium chloride 0.9 % flush 20 mL  20 mL Intravenous PRN Arvin Coburn MD   20 mL at 09/18/23 0901       No Known Allergies  Past Medical History:   Diagnosis Date    AAA (abdominal aortic aneurysm)     BEING MONITORED NO REPAIR NEEDED YET    Aneurysm of abdominal aorta branch vessel     Cancer     BLADDER AND SKIN CANCERS/REMOVED    Chronic deep vein thrombosis (DVT) of tibial vein of left lower extremity 6/27/2023    Essential tremor     History of DVT of lower extremity 12/2022    on Xarelto    History of urostomy     PT CURRENTLY HAS HOME HEALTH TO HELP WITH OSTOMY    Hyperlipidemia      Past Surgical History:   Procedure Laterality Date    COLONOSCOPY  2018    CYSTECTOMY      12/2022  W/UROSTOMY    SKIN CANCER EXCISION Right     TRANSURETHRAL RESECTION OF BLADDER TUMOR Right 10/19/2022    Procedure: Cystoscopy with transurethral resection of bladder tumor;  Surgeon: Aung Westfall MD;  Location: [facility];  Service: Urology;  Laterality: Right;    VENOUS ACCESS DEVICE (PORT) INSERTION Right 01/09/2023    Procedure: INSERTION VENOUS ACCESS DEVICE;  Surgeon: Richie Edward MD;  Location: [facility];  Service: General;  Laterality: Right;     Social History     Socioeconomic History    Marital status:    Tobacco Use    Smoking status: Former     Packs/day: 1.00     Years: 20.00     Pack years: 20.00     Types: Cigarettes  "    Start date: 1962     Quit date: 1990     Years since quittin.7    Smokeless tobacco: Never   Vaping Use    Vaping Use: Never used   Substance and Sexual Activity    Alcohol use: Yes     Alcohol/week: 1.0 standard drink     Types: 1 Cans of beer per week     Comment: 1 beer daily    Drug use: Never    Sexual activity: Not Currently     Partners: Female     Family History   Problem Relation Age of Onset    Cancer Mother     Cancer Father     Hearing loss Father     Hypertension Father     Malig Hyperthermia Neg Hx        Objective   Physical Exam  Well appearing patient in no acute distress on RA  Anicteric sclera, no rash on exposed skin  Respirations non-labored  Awake, alert, and oriented x 4. Speech intact.   Appropriate mood and affect  Mild L ankle edema    Vitals:    23 1016   BP: 113/78   Pulse: 88   Resp: 18   Temp: 97.6 °F (36.4 °C)   SpO2: 96%   Weight: 88.1 kg (194 lb 3.6 oz)   Height: 170.3 cm (67.05\")   PainSc: 0-No pain               PHQ-9 Total Score:           Result Review :   The following data was reviewed by: Arvin Coburn MD on 23:  Lab Results   Component Value Date    HGB 10.8 (L) 2023    HCT 34.6 (L) 2023    MCV 92.5 2023     2023    WBC 4.43 2023    NEUTROABS 3.38 2023    LYMPHSABS 0.35 (L) 2023    MONOSABS 0.52 2023    EOSABS 0.16 2023    BASOSABS 0.02 2023     Lab Results   Component Value Date    GLUCOSE 106 (H) 2023    BUN 26 (H) 2023    CREATININE 1.56 (H) 2023     2023    K 4.5 2023     2023    CO2 25.6 2023    CALCIUM 8.5 (L) 2023    PROTEINTOT 6.4 2023    ALBUMIN 3.7 2023    BILITOT 0.3 2023    ALKPHOS 91 2023    AST 13 2023    ALT 10 2023     Lab Results   Component Value Date    MG 2.5 (H) 2023    FREET4 1.07 2023    TSH 2.040 2023       Labs personally reviewed. Anemia " improving. Mild creatinine elevation.      Surgical Pathology Reviewed: [id number] with left distal margin positive.    [facility] urology documentation and progress notes from hospital personally reviewed.     Discharge summary 5/24/2023 personally reviewed    CT Chest With Contrast Diagnostic    Result Date: 8/8/2023   1. There is evidence of cystectomy with diverting loop ileostomy in the right lower quadrant.  There is mild hydroureteronephrosis bilaterally likely related to vesicoureteral reflux given the diverting ileostomy.  There is also a 0.9 cm exophytic mass in the lower pole of the right kidney which is not definitively a cyst, it is a change from prior studies, neoplastic disease is not excluded.  Correlation with the urology workup is recommended.  If feasible, MRI the abdomen with without contrast would be better able to evaluate this lesion. Otherwise, no convincing CT signs of metastatic disease in the chest abdomen pelvis or visualized skeletal structures. 2. There are bilateral nonobstructing stones in each kidney not significantly changed. 3. Stable 4.8 cm fusiform infrarenal abdominal aortic aneurysm and a 1.8 cm right common iliac artery aneurysm.  4. Other incidental ancillary findings are detailed.     BRIANNE ANTUNEZ DO       Electronically Signed and Approved By: BRIANNE ANTUNEZ DO on 8/08/2023 at 14:33             CT Abdomen Pelvis With Contrast    Result Date: 8/8/2023   1. There is evidence of cystectomy with diverting loop ileostomy in the right lower quadrant.  There is mild hydroureteronephrosis bilaterally likely related to vesicoureteral reflux given the diverting ileostomy.  There is also a 0.9 cm exophytic mass in the lower pole of the right kidney which is not definitively a cyst, it is a change from prior studies, neoplastic disease is not excluded.  Correlation with the urology workup is recommended.  If feasible, MRI the abdomen with without contrast would be better  "able to evaluate this lesion. Otherwise, no convincing CT signs of metastatic disease in the chest abdomen pelvis or visualized skeletal structures. 2. There are bilateral nonobstructing stones in each kidney not significantly changed. 3. Stable 4.8 cm fusiform infrarenal abdominal aortic aneurysm and a 1.8 cm right common iliac artery aneurysm.  4. Other incidental ancillary findings are detailed.     BRIANNE ANTUNEZ DO       Electronically Signed and Approved By: BRIANNE ANTUNEZ DO on 8/08/2023 at 14:33                   Assessment and Plan    Diagnoses and all orders for this visit:    1. 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    Mr. Rodriguez is a 78-year-old male status post radical cystoprostatectomy bilateral PLND which demonstrated stage [id number], stage IIIB urothelial carcinoma. 4/22 lymph nodes positive and right ureter positive with distal margin of left ureter positive. Due to high risk of recurrence, patient treated with adjuvant radiation and chemotherapy with \"sandwich\" therapy consisting of 2 cycles of chemotherapy (platinum with Gemcitabine) followed by radiation followed by 2 additional cycles of chemotherapy.  1/18/23: C1D1 Cisplatin/Gemcitabine. 2/8/23: C2D1 planned Cisplatin/gemcitabine. Patient then completed radiation with last dose on 4/7/23. C3D1 chemo 4/18/23, with Carbo/[place]. Carboplatin substitued in place of Cisplatin due to drug shortage. C4D1 chemo given 1 week delay on 5/16/23 with Cis/[place] due to thrombocytopenia. Complicated by significant anemia, weakness and hospitalization from complicated UTI. Last dose of chemo therapy held.       Follow up imaging recommend after completion of therapy. CT CAP 8/8/2023 with no definitive metastatic disease. There is a " 0.9 cm exophytic mass in right lower kidney seen, will follow up with repeat CT in 2 months. If worse in 2 months, will get MRI or biopsy. Recommend CT imaging every 3 to 6 months for first two years with labs (CBC, CMP) per [facility].      LLE DVT  DVT found on duplex ordered 2/7/23. Patient completed treatment 7/2023.    Anemia 2/2 chemotherapy  Anemia improving now that chemotherapy has completed.     ANGEL LUIS  Encourage hydration. BP is controlled. Follow up with PCP.     I spent 35 minutes caring for Korey on this date of service. This time includes time spent by me in the following activities:preparing for the visit, reviewing tests, obtaining and/or reviewing a separately obtained history, performing a medically appropriate examination and/or evaluation , counseling and educating the patient/family/caregiver, ordering medications, tests, or procedures, referring and communicating with other health care professionals , documenting information in the medical record, independently interpreting results and communicating that information with the patient/family/caregiver and care coordination    Patient Follow Up: 11/2023 with scans and labs   Patient was given instructions and counseling regarding his condition or for health maintenance advice. Please see specific information pulled into the AVS if appropriate.

## 2023-09-21 ENCOUNTER — OFFICE VISIT (OUTPATIENT)
Dept: RADIATION ONCOLOGY | Facility: HOSPITAL | Age: 78
End: 2023-09-21
Payer: MEDICARE

## 2023-09-21 VITALS
RESPIRATION RATE: 16 BRPM | HEART RATE: 72 BPM | SYSTOLIC BLOOD PRESSURE: 139 MMHG | OXYGEN SATURATION: 99 % | DIASTOLIC BLOOD PRESSURE: 75 MMHG | BODY MASS INDEX: 30.38 KG/M2 | TEMPERATURE: 98.2 F | WEIGHT: 194.22 LBS

## 2023-09-21 DIAGNOSIS — C67.8 MALIGNANT NEOPLASM OF OVERLAPPING SITES OF BLADDER: Primary | ICD-10-CM

## 2023-09-21 PROCEDURE — G0463 HOSPITAL OUTPT CLINIC VISIT: HCPCS | Performed by: RADIOLOGY

## 2023-09-21 NOTE — PROGRESS NOTES
Follow Up Office Visit      Encounter Date: 09/21/2023   Patient Name: Korey Rodriguez  YOB: 1945   Medical Record Number: [id number]   Primary Diagnosis: Malignant neoplasm of overlapping sites of bladder [C67.8]     Completion Date:  4/5/2023    Chief Complaint:    Chief Complaint   Patient presents with    Follow-up       History of Present Illness: Korey Rodriguez returns for routine scheduled follow-up.  He reports feeling well overall with absolutely no complaints.  He reports normal output via his urostomy.    Subjective      Review of Systems: Review of Systems   Constitutional:  Positive for fatigue (5/10). Negative for appetite change and unexpected weight change.   HENT:  Negative for sore throat and trouble swallowing.    Eyes:  Negative for visual disturbance.   Respiratory:  Negative for cough and shortness of breath.    Cardiovascular:  Positive for leg swelling (Small amount noted in left calf, ongoing due to blood clot, improving). Negative for chest pain and palpitations.   Gastrointestinal:  Negative for abdominal distention, abdominal pain, blood in stool, constipation, diarrhea, nausea and vomiting.   Genitourinary:  Negative for hematuria.        Has urostomy with clear, yellow urine.  States that urine output is normal.     Musculoskeletal:  Positive for back pain (Ongoing) and joint swelling (Left foot/ankle, ongoing due to blood clot but improving.). Negative for arthralgias.   Skin:  Negative for color change and rash.        History of skin cancer, states that he is due for treatment for this in October     Neurological:  Positive for weakness (occasionally). Negative for dizziness and headaches.   Hematological:  Bruises/bleeds easily.   Psychiatric/Behavioral:  Negative for sleep disturbance.      The following portions of the patient's history were reviewed and updated as appropriate: allergies, current medications, past family history, past medical history, past  social history, past surgical history and problem list.    Medications:     Current Outpatient Medications:     atorvastatin (LIPITOR) 20 MG tablet, Take 1 tablet by mouth Every Night., Disp: , Rfl:     meloxicam (MOBIC) 7.5 MG tablet, Take 1 tablet by mouth Daily., Disp: , Rfl:     Allergies:   No Known Allergies    ECOG: (0) Fully active, able to carry on all predisease performance without restriction  Quality of Life: 100 - Full Activity     Objective     Physical Exam:   Vital Signs:   Vitals:    09/21/23 0846   BP: 139/75   Pulse: 72   Resp: 16   Temp: 98.2 °F (36.8 °C)   TempSrc: Temporal   SpO2: 99%   Weight: 88.1 kg (194 lb 3.6 oz)   PainSc: 0-No pain     Body mass index is 30.38 kg/m².     Physical Exam  Constitutional:       General: He is not in acute distress.     Appearance: He is normal weight. He is not toxic-appearing.   HENT:      Head: Normocephalic and atraumatic.   Pulmonary:      Effort: Pulmonary effort is normal. No respiratory distress.   Musculoskeletal:         General: No swelling or deformity.   Skin:     General: Skin is warm and dry.      Coloration: Skin is not jaundiced.      Findings: No lesion.   Neurological:      General: No focal deficit present.      Mental Status: He is alert and oriented to person, place, and time.      Cranial Nerves: No cranial nerve deficit.      Gait: Gait normal.   Psychiatric:         Mood and Affect: Mood normal.         Judgment: Judgment normal.       Radiographs: CT Chest With Contrast Diagnostic    Result Date: 8/8/2023   1. There is evidence of cystectomy with diverting loop ileostomy in the right lower quadrant.  There is mild hydroureteronephrosis bilaterally likely related to vesicoureteral reflux given the diverting ileostomy.  There is also a 0.9 cm exophytic mass in the lower pole of the right kidney which is not definitively a cyst, it is a change from prior studies, neoplastic disease is not excluded.  Correlation with the urology workup is  recommended.  If feasible, MRI the abdomen with without contrast would be better able to evaluate this lesion. Otherwise, no convincing CT signs of metastatic disease in the chest abdomen pelvis or visualized skeletal structures. 2. There are bilateral nonobstructing stones in each kidney not significantly changed. 3. Stable 4.8 cm fusiform infrarenal abdominal aortic aneurysm and a 1.8 cm right common iliac artery aneurysm.  4. Other incidental ancillary findings are detailed.     BRIANNE ANTUNEZ DO       Electronically Signed and Approved By: BRIANNE ANTUNEZ DO on 8/08/2023 at 14:33             CT Abdomen Pelvis With Contrast    Result Date: 8/8/2023   1. There is evidence of cystectomy with diverting loop ileostomy in the right lower quadrant.  There is mild hydroureteronephrosis bilaterally likely related to vesicoureteral reflux given the diverting ileostomy.  There is also a 0.9 cm exophytic mass in the lower pole of the right kidney which is not definitively a cyst, it is a change from prior studies, neoplastic disease is not excluded.  Correlation with the urology workup is recommended.  If feasible, MRI the abdomen with without contrast would be better able to evaluate this lesion. Otherwise, no convincing CT signs of metastatic disease in the chest abdomen pelvis or visualized skeletal structures. 2. There are bilateral nonobstructing stones in each kidney not significantly changed. 3. Stable 4.8 cm fusiform infrarenal abdominal aortic aneurysm and a 1.8 cm right common iliac artery aneurysm.  4. Other incidental ancillary findings are detailed.     BRIANNE ANTUNEZ DO       Electronically Signed and Approved By: BRIANNE ANTUNEZ DO on 8/08/2023 at 14:33             I personally reviewed the CT scan of the abdomen and pelvis performed on 8/8/2023.  The pertinent findings are as above.    Assessment / Plan      Assessment/Plan:   Korey Rodriguez is an 78-year-old gentleman with pT4a pN3 cM0 urothelial carcinoma  status post cystoprostatectomy with adjuvant chemotherapy and radiotherapy.  He is doing well overall but does have a new radiographic finding within the right kidney.    We discussed the findings of the recent CT scan of the abdomen and pelvis.  I explained that the soft tissue mass Mr. Rodriguez will undergo repeat CT scan of the abdomen and pelvis as ordered by Dr. Coburn.  I will see Mr. Rodriguez in follow-up in 3 months.  He was encouraged to contact me in the interim with any questions or concerns regarding his care.        Jackson Gary MD  Radiation Oncology  [facility]    This document has been signed by Jackson Gary MD on September 21, 2023 11:27 EDT

## 2023-09-22 ENCOUNTER — TELEPHONE (OUTPATIENT)
Dept: ONCOLOGY | Facility: HOSPITAL | Age: 78
End: 2023-09-22
Payer: MEDICARE

## 2023-09-22 NOTE — TELEPHONE ENCOUNTER
Caller: TRA GARNER    Relationship: Emergency Contact    Best call back number: [phone]    What was the call regarding: TRA WAS RETURNING A CALL. SHE IS NOT SURE WHO CALLED OR WHAT IT WAS REGARDING.

## 2023-09-23 ENCOUNTER — DOCUMENTATION (OUTPATIENT)
Dept: ONCOLOGY | Facility: HOSPITAL | Age: 78
End: 2023-09-23
Payer: MEDICARE

## 2023-09-23 RX ORDER — LEVOFLOXACIN 750 MG/1
750 TABLET ORAL DAILY
Qty: 7 TABLET | Refills: 0 | Status: SHIPPED | OUTPATIENT
Start: 2023-09-23

## 2023-09-28 ENCOUNTER — TELEPHONE (OUTPATIENT)
Dept: ONCOLOGY | Facility: HOSPITAL | Age: 78
End: 2023-09-28

## 2023-09-28 NOTE — TELEPHONE ENCOUNTER
Caller: TRA GARNER    Relationship: Emergency Contact    Best call back number: [phone]    What is the best time to reach you: ANYTIME    Who are you requesting to speak with (clinical staff, provider,  specific staff member): DR NY OR NURSE        What was the call regarding:     DR NY HAD GIVEN CHIQUI ANTIBIOTIC FOR INFECTION HE HAS    AND JUST CONCERNED BECAUSE HE IS VERY WEAK AND HAS BEEN GOING ON SINCE LAST SATURDAY AND HE HAS NO ENERGY AT ALL     NOT SURE IF THIS IS FROM THE INFECTION OR THE MEDICATION .

## 2023-10-02 ENCOUNTER — TELEPHONE (OUTPATIENT)
Dept: INTERNAL MEDICINE | Facility: CLINIC | Age: 78
End: 2023-10-02
Payer: MEDICARE

## 2023-10-02 DIAGNOSIS — R53.81 MALAISE AND FATIGUE: Primary | ICD-10-CM

## 2023-10-02 DIAGNOSIS — R53.83 MALAISE AND FATIGUE: Primary | ICD-10-CM

## 2023-10-02 NOTE — TELEPHONE ENCOUNTER
Call pt lv to call us back    Lab orders placed and urinalysis, patient can call for results    Ok for hub to RELAY.

## 2023-10-02 NOTE — TELEPHONE ENCOUNTER
Caller: TRA GARNER (NOT ON  VERBAL)    Relationship: Emergency Contact    Best call back number:     [phone]       What orders are you requesting (i.e. lab or imaging): LABS    In what timeframe would the patient need to come in: ASAP    Where will you receive your lab/imaging services:     Additional notes: PATIENT NEEDING BLOOD WORK AND URINALYSIS TO CHECK FOR UTI REQUEST PER DR. NAVARRO    PLEASE CALL AND ADVISE WHEN ORDERS HAVE BEEN PLACED

## 2023-10-02 NOTE — TELEPHONE ENCOUNTER
I placed orders for routine blood test as well as urinalysis, they can do them nonfasting at any time, call for results.

## 2023-10-02 NOTE — TELEPHONE ENCOUNTER
Spoke with wife who states that the patient is no longer having chills, shakes or a fever.But he is still weak and easily fatigues and is not doing as well as she thinks he should. She wanted to know if he needs to have more testing done. Advised patient that she can take the patient to the the urgent care for an evaluation, or call her PCP.   They can obtain a urine sample and have labs done. Wife verbalized understanding.

## 2023-10-04 ENCOUNTER — LAB (OUTPATIENT)
Dept: LAB | Facility: HOSPITAL | Age: 78
End: 2023-10-04
Payer: MEDICARE

## 2023-10-04 DIAGNOSIS — C67.8 MALIGNANT NEOPLASM OF OVERLAPPING SITES OF BLADDER: ICD-10-CM

## 2023-10-04 DIAGNOSIS — R53.81 MALAISE AND FATIGUE: ICD-10-CM

## 2023-10-04 DIAGNOSIS — R53.83 MALAISE AND FATIGUE: ICD-10-CM

## 2023-10-04 LAB
ALBUMIN SERPL-MCNC: 3.8 G/DL (ref 3.5–5.2)
ALBUMIN/GLOB SERPL: 1.2 G/DL
ALP SERPL-CCNC: 87 U/L (ref 39–117)
ALT SERPL W P-5'-P-CCNC: 12 U/L (ref 1–41)
ANION GAP SERPL CALCULATED.3IONS-SCNC: 8.4 MMOL/L (ref 5–15)
AST SERPL-CCNC: 17 U/L (ref 1–40)
BACTERIA UR QL AUTO: ABNORMAL /HPF
BASOPHILS # BLD AUTO: 0.05 10*3/MM3 (ref 0–0.2)
BASOPHILS NFR BLD AUTO: 0.9 % (ref 0–1.5)
BILIRUB SERPL-MCNC: 0.3 MG/DL (ref 0–1.2)
BILIRUB UR QL STRIP: NEGATIVE
BUN SERPL-MCNC: 31 MG/DL (ref 8–23)
BUN/CREAT SERPL: 24 (ref 7–25)
CALCIUM SPEC-SCNC: 9.3 MG/DL (ref 8.6–10.5)
CHLORIDE SERPL-SCNC: 104 MMOL/L (ref 98–107)
CLARITY UR: ABNORMAL
CO2 SERPL-SCNC: 27.6 MMOL/L (ref 22–29)
COLOR UR: YELLOW
CREAT SERPL-MCNC: 1.29 MG/DL (ref 0.76–1.27)
DEPRECATED RDW RBC AUTO: 42.2 FL (ref 37–54)
EGFRCR SERPLBLD CKD-EPI 2021: 56.1 ML/MIN/1.73
EOSINOPHIL # BLD AUTO: 0.1 10*3/MM3 (ref 0–0.4)
EOSINOPHIL NFR BLD AUTO: 1.8 % (ref 0.3–6.2)
ERYTHROCYTE [DISTWIDTH] IN BLOOD BY AUTOMATED COUNT: 12.8 % (ref 12.3–15.4)
GLOBULIN UR ELPH-MCNC: 3.2 GM/DL
GLUCOSE SERPL-MCNC: 105 MG/DL (ref 65–99)
GLUCOSE UR STRIP-MCNC: NEGATIVE MG/DL
HCT VFR BLD AUTO: 35.5 % (ref 37.5–51)
HGB BLD-MCNC: 11.1 G/DL (ref 13–17.7)
HGB UR QL STRIP.AUTO: ABNORMAL
HOLD SPECIMEN: NORMAL
HYALINE CASTS UR QL AUTO: ABNORMAL /LPF
IMM GRANULOCYTES # BLD AUTO: 0.05 10*3/MM3 (ref 0–0.05)
IMM GRANULOCYTES NFR BLD AUTO: 0.9 % (ref 0–0.5)
KETONES UR QL STRIP: NEGATIVE
LEUKOCYTE ESTERASE UR QL STRIP.AUTO: ABNORMAL
LYMPHOCYTES # BLD AUTO: 0.5 10*3/MM3 (ref 0.7–3.1)
LYMPHOCYTES NFR BLD AUTO: 8.8 % (ref 19.6–45.3)
MCH RBC QN AUTO: 28.1 PG (ref 26.6–33)
MCHC RBC AUTO-ENTMCNC: 31.3 G/DL (ref 31.5–35.7)
MCV RBC AUTO: 89.9 FL (ref 79–97)
MONOCYTES # BLD AUTO: 0.58 10*3/MM3 (ref 0.1–0.9)
MONOCYTES NFR BLD AUTO: 10.2 % (ref 5–12)
NEUTROPHILS NFR BLD AUTO: 4.41 10*3/MM3 (ref 1.7–7)
NEUTROPHILS NFR BLD AUTO: 77.4 % (ref 42.7–76)
NITRITE UR QL STRIP: NEGATIVE
NRBC BLD AUTO-RTO: 0 /100 WBC (ref 0–0.2)
PH UR STRIP.AUTO: 6.5 [PH] (ref 5–8)
PLATELET # BLD AUTO: 233 10*3/MM3 (ref 140–450)
PMV BLD AUTO: 9.3 FL (ref 6–12)
POTASSIUM SERPL-SCNC: 4.4 MMOL/L (ref 3.5–5.2)
PROT SERPL-MCNC: 7 G/DL (ref 6–8.5)
PROT UR QL STRIP: ABNORMAL
RBC # BLD AUTO: 3.95 10*6/MM3 (ref 4.14–5.8)
RBC # UR STRIP: ABNORMAL /HPF
REF LAB TEST METHOD: ABNORMAL
SODIUM SERPL-SCNC: 140 MMOL/L (ref 136–145)
SP GR UR STRIP: 1.01 (ref 1–1.03)
SQUAMOUS #/AREA URNS HPF: ABNORMAL /HPF
UROBILINOGEN UR QL STRIP: ABNORMAL
WBC # UR STRIP: ABNORMAL /HPF
WBC NRBC COR # BLD: 5.69 10*3/MM3 (ref 3.4–10.8)

## 2023-10-04 PROCEDURE — 87086 URINE CULTURE/COLONY COUNT: CPT

## 2023-10-04 PROCEDURE — 85025 COMPLETE CBC W/AUTO DIFF WBC: CPT

## 2023-10-04 PROCEDURE — 36415 COLL VENOUS BLD VENIPUNCTURE: CPT

## 2023-10-04 PROCEDURE — 80053 COMPREHEN METABOLIC PANEL: CPT

## 2023-10-04 PROCEDURE — 81001 URINALYSIS AUTO W/SCOPE: CPT

## 2023-10-05 LAB — BACTERIA SPEC AEROBE CULT: NO GROWTH

## 2023-10-16 PROCEDURE — 87086 URINE CULTURE/COLONY COUNT: CPT

## 2023-10-20 ENCOUNTER — OFFICE VISIT (OUTPATIENT)
Dept: INTERNAL MEDICINE | Facility: CLINIC | Age: 78
End: 2023-10-20
Payer: MEDICARE

## 2023-10-20 VITALS
SYSTOLIC BLOOD PRESSURE: 150 MMHG | HEART RATE: 75 BPM | HEIGHT: 75 IN | OXYGEN SATURATION: 96 % | TEMPERATURE: 98 F | BODY MASS INDEX: 24.74 KG/M2 | DIASTOLIC BLOOD PRESSURE: 78 MMHG | WEIGHT: 199 LBS

## 2023-10-20 DIAGNOSIS — R53.83 OTHER FATIGUE: ICD-10-CM

## 2023-10-20 DIAGNOSIS — D50.8 OTHER IRON DEFICIENCY ANEMIA: ICD-10-CM

## 2023-10-20 DIAGNOSIS — Z23 NEED FOR VACCINATION: ICD-10-CM

## 2023-10-20 DIAGNOSIS — N18.31 STAGE 3A CHRONIC KIDNEY DISEASE: Primary | ICD-10-CM

## 2023-10-20 DIAGNOSIS — N39.0 URINARY TRACT INFECTION ASSOCIATED WITH CYSTOSTOMY CATHETER, SUBSEQUENT ENCOUNTER: ICD-10-CM

## 2023-10-20 DIAGNOSIS — T83.510D URINARY TRACT INFECTION ASSOCIATED WITH CYSTOSTOMY CATHETER, SUBSEQUENT ENCOUNTER: ICD-10-CM

## 2023-10-20 PROBLEM — N30.01 ACUTE CYSTITIS WITH HEMATURIA: Status: ACTIVE | Noted: 2023-10-20

## 2023-10-20 PROBLEM — T83.510A URINARY TRACT INFECTION ASSOCIATED WITH CYSTOSTOMY CATHETER: Status: ACTIVE | Noted: 2023-01-10

## 2023-10-20 NOTE — ASSESSMENT & PLAN NOTE
Hemoglobin gradually trending up, he was 11.1 earlier this month.  His prior baseline was between 13-1/2 and 14, so hopefully will continue to recover.  He has labs scheduled within the month to evaluate this.

## 2023-10-20 NOTE — ASSESSMENT & PLAN NOTE
Patient had a urinalysis early October as a screen, he had a small amount of blood present, as well as trace leukocytes.  Urine was nitrite negative, and his culture was negative.      When seen in urgent care recently, he still had a small amount of blood present, but had large leukocyte and was nitrite positive.  Due to this and his symptoms, he was started on aggressive antibiotics, and culture was sent.  Culture was with greater 100,000 mixed organisms.    Patient's urine was unchanged in appearance.  This was discovered mainly due to symptoms, he was febrile to 101 and had an increase in his chronic low back pain..  He has been tolerating the Levaquin, about prison through his course of therapy, certainly recommend continuing as such given his increased risk of pyelonephritis.  Of note his fevers have resolved, and his back pain has settled down to his normal level.

## 2023-10-20 NOTE — ASSESSMENT & PLAN NOTE
GFR was very stable at 56 as per his 10/23 labs.  These were performed due to to request by Dr. Coburn.  Has routine labs scheduled next month with his appointment, hopefully stable then as well.

## 2023-10-20 NOTE — PROGRESS NOTES
Chief Complaint  Urinary Tract Infection (Pt states that he went to urgent care and he was dx with UTI. /He states that he is shaking and having trouble sleeping. He didn't know if it could be from the Levaquin. )    Subjective      Korey Rodriguez presents to [facility]    History of Present Illness  Patient is a very pleasant 78-year-old male with extensive past medical history to include abdominal aortic aneurysm, hyperlipidemia, chronic back pain, and unfortunately most recently urothelial carcinoma requiring total cystectomy and XRT/chemo, seen 6/23 as a New Patient, who is coming in now 7/23 for routine 3-month follow-up.  We will go over his med list, review his extensive history together in the office, go over recent labs, and make further recommendations at that time.    ---> Patient being seen in 10/23 for urgent care follow-up:  Patient has fever of 101 yesterday and today  Patient has hx of UTI but has a stoma and unable to describe any urinary symptoms.  Low suspicion for acute pyelonephritis but is febrile but has no CVA or flank tenderness.  Low suspicion for kidney stone or infected stone.  Patient has a history of bladder cancer with urostomy.  Urostomy beefy red with good urine output.     Review of Systems   Constitutional:  Negative for appetite change, fatigue and fever.   HENT:  Negative for congestion and ear pain.    Eyes:  Negative for blurred vision.   Respiratory:  Negative for cough, chest tightness, shortness of breath and wheezing.    Cardiovascular:  Negative for chest pain, palpitations and leg swelling.   Gastrointestinal:  Negative for abdominal pain.   Genitourinary:  Negative for difficulty urinating, dysuria and hematuria.   Musculoskeletal:  Negative for arthralgias and gait problem.   Skin:  Negative for skin lesions.   Neurological:  Negative for syncope, memory problem and confusion.   Psychiatric/Behavioral:  Negative for self-injury and  "depressed mood.        Objective   Vital Signs:   /78   Pulse 75   Temp 98 °F (36.7 °C) (Skin)   Ht 190.5 cm (75\")   Wt 90.3 kg (199 lb)   SpO2 96%   BMI 24.87 kg/m²           Physical Exam  Vitals and nursing note reviewed.   Constitutional:       General: He is not in acute distress.     Appearance: Normal appearance. He is not toxic-appearing.   HENT:      Head: Atraumatic.      Right Ear: External ear normal.      Left Ear: External ear normal.      Nose: Nose normal.      Mouth/Throat:      Mouth: Mucous membranes are moist.   Eyes:      General:         Right eye: No discharge.         Left eye: No discharge.      Extraocular Movements: Extraocular movements intact.      Pupils: Pupils are equal, round, and reactive to light.   Neck:      Comments: Soft bilateral carotid bruits with negative carotid Dopplers 7/23.  Cardiovascular:      Rate and Rhythm: Normal rate and regular rhythm.      Pulses: Normal pulses.      Heart sounds: Normal heart sounds. No murmur heard.     No gallop.      Comments: Heart tones normal, no concerning ectopy, no S3.  Pulmonary:      Effort: Pulmonary effort is normal. No respiratory distress.      Breath sounds: No wheezing, rhonchi or rales.      Comments: Lung fields clear bilaterally.  Abdominal:      General: There is no distension.      Palpations: Abdomen is soft. There is no mass.      Tenderness: There is no abdominal tenderness. There is no guarding.   Musculoskeletal:         General: No swelling or tenderness.      Cervical back: No tenderness.      Right lower leg: No edema.      Left lower leg: No edema.      Comments: No CVA tenderness.  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   Skin:     General: Skin is warm and dry.      Findings: No rash.   Neurological:      General: No focal deficit present.      Mental Status: He is alert and oriented to person, place, and time. Mental status is at baseline.      Motor: No weakness.      " Gait: Gait normal.   Psychiatric:         Mood and Affect: Mood normal.         Thought Content: Thought content normal.          Result Review   The following data was reviewed by: Dimitri Hargrove MD on 06/27/2023:  [x] Laboratory  [] Microbiology  [] Radiology  [] EKG/telemetry  [] Cardiology/Vascular  [] Pathology  [x] Old records             Assessment and Plan   Diagnoses and all orders for this visit:    1. Stage 3a chronic kidney disease (Primary)  Assessment & Plan:  GFR was very stable at 56 as per his 10/23 labs.  These were performed due to to request by Dr. Coburn.  Has routine labs scheduled next month with his appointment, hopefully stable then as well.      2. Urinary tract infection associated with cystostomy catheter, subsequent encounter  Assessment & Plan:  Patient had a urinalysis early October as a screen, he had a small amount of blood present, as well as trace leukocytes.  Urine was nitrite negative, and his culture was negative.      When seen in urgent care recently, he still had a small amount of blood present, but had large leukocyte and was nitrite positive.  Due to this and his symptoms, he was started on aggressive antibiotics, and culture was sent.  Culture was with greater 100,000 mixed organisms.    Patient's urine was unchanged in appearance.  This was discovered mainly due to symptoms, he was febrile to 101 and had an increase in his chronic low back pain..  He has been tolerating the Levaquin, about [facility] through his course of therapy, certainly recommend continuing as such given his increased risk of pyelonephritis.  Of note his fevers have resolved, and his back pain has settled down to his normal level.      3. Other fatigue  -     Vitamin B12 anemia; Future  -     Folate anemia; Future    4. Other iron deficiency anemia  Assessment & Plan:  Hemoglobin gradually trending up, he was 11.1 earlier this month.  His prior baseline was between 13-1/2 and 14, so hopefully will  continue to recover.  He has labs scheduled within the month to evaluate this.          Follow Up   Return for Next scheduled follow up.  Patient was given instructions and counseling regarding his condition or for health maintenance advice. Please see specific information pulled into the AVS if appropriate.     Total Time Spent:  minutes     This time includes time spent by me in the following activities: preparing for the visit, reviewing extensive past medical history and tests, performing a medically appropriate examination and/or evaluation, counseling and educating the patient and/or caregivers, ordering medications, tests, or procedures, referring and/or communicating with other health care professionals and documenting information in the medical record all on this date of service.

## 2023-11-01 ENCOUNTER — TELEPHONE (OUTPATIENT)
Dept: INTERNAL MEDICINE | Facility: CLINIC | Age: 78
End: 2023-11-01
Payer: MEDICARE

## 2023-11-01 NOTE — TELEPHONE ENCOUNTER
Pt is scheduled with Dr. Hargrove on 11/10/2023 @ 1:00pm I need to reschedule this to another day or another time on the same day.

## 2023-11-02 ENCOUNTER — TELEPHONE (OUTPATIENT)
Dept: INTERNAL MEDICINE | Facility: CLINIC | Age: 78
End: 2023-11-02

## 2023-11-02 NOTE — TELEPHONE ENCOUNTER
Caller: TRA GARNER    Relationship: Emergency Contact    Best call back number: [phone]    What is the best time to reach you: ANY    Who are you requesting to speak with (clinical staff, provider,  specific staff member): MAL JONES      What was the call regarding: PATIENT'S WIFE STATED HE IS REALLY BACKED UP AND NOT DOING WELL TODAY. SHE WANTS TO SPEAK WITH MAL FOR ADVICE.

## 2023-11-03 ENCOUNTER — HOSPITAL ENCOUNTER (OUTPATIENT)
Dept: ONCOLOGY | Facility: HOSPITAL | Age: 78
Discharge: HOME | End: 2023-11-03
Payer: MEDICARE

## 2023-11-03 DIAGNOSIS — Z45.2 ENCOUNTER FOR ADJUSTMENT OR MANAGEMENT OF VASCULAR ACCESS DEVICE: Primary | ICD-10-CM

## 2023-11-03 PROCEDURE — 96523 IRRIG DRUG DELIVERY DEVICE: CPT

## 2023-11-03 PROCEDURE — 25010000002 HEPARIN LOCK FLUSH PER 10 UNITS: Performed by: INTERNAL MEDICINE

## 2023-11-03 RX ORDER — SODIUM CHLORIDE 0.9 % (FLUSH) 0.9 %
20 SYRINGE (ML) INJECTION AS NEEDED
OUTPATIENT
Start: 2023-11-03

## 2023-11-03 RX ORDER — SODIUM CHLORIDE 0.9 % (FLUSH) 0.9 %
20 SYRINGE (ML) INJECTION AS NEEDED
Status: DISCONTINUED | OUTPATIENT
Start: 2023-11-03 | End: 2023-11-04 | Stop reason: HOSPADM

## 2023-11-03 RX ORDER — HEPARIN SODIUM (PORCINE) LOCK FLUSH IV SOLN 100 UNIT/ML 100 UNIT/ML
500 SOLUTION INTRAVENOUS AS NEEDED
Status: DISCONTINUED | OUTPATIENT
Start: 2023-11-03 | End: 2023-11-04 | Stop reason: HOSPADM

## 2023-11-03 RX ORDER — HEPARIN SODIUM (PORCINE) LOCK FLUSH IV SOLN 100 UNIT/ML 100 UNIT/ML
500 SOLUTION INTRAVENOUS AS NEEDED
OUTPATIENT
Start: 2023-11-03

## 2023-11-03 RX ADMIN — HEPARIN SODIUM (PORCINE) LOCK FLUSH IV SOLN 100 UNIT/ML 500 UNITS: 100 SOLUTION at 14:38

## 2023-11-03 RX ADMIN — Medication 20 ML: at 14:38

## 2023-11-03 NOTE — TELEPHONE ENCOUNTER
I have called and spoke to pt and I let him know that he can take Miralx tid until he has a bowel movement and then take it daily.

## 2023-11-07 ENCOUNTER — APPOINTMENT (OUTPATIENT)
Dept: CT IMAGING | Facility: HOSPITAL | Age: 78
DRG: 683 | End: 2023-11-07
Payer: MEDICARE

## 2023-11-07 ENCOUNTER — TELEPHONE (OUTPATIENT)
Dept: INTERNAL MEDICINE | Facility: CLINIC | Age: 78
End: 2023-11-07

## 2023-11-07 ENCOUNTER — LAB (OUTPATIENT)
Dept: LAB | Facility: HOSPITAL | Age: 78
End: 2023-11-07
Payer: MEDICARE

## 2023-11-07 ENCOUNTER — HOSPITAL ENCOUNTER (INPATIENT)
Facility: HOSPITAL | Age: 78
LOS: 3 days | Discharge: HOSPICE | DRG: 683 | End: 2023-11-10
Attending: EMERGENCY MEDICINE | Admitting: FAMILY MEDICINE
Payer: MEDICARE

## 2023-11-07 ENCOUNTER — APPOINTMENT (OUTPATIENT)
Dept: GENERAL RADIOLOGY | Facility: HOSPITAL | Age: 78
DRG: 683 | End: 2023-11-07
Payer: MEDICARE

## 2023-11-07 ENCOUNTER — OFFICE VISIT (OUTPATIENT)
Dept: INTERNAL MEDICINE | Facility: CLINIC | Age: 78
End: 2023-11-07
Payer: MEDICARE

## 2023-11-07 VITALS
HEIGHT: 75 IN | TEMPERATURE: 97.8 F | BODY MASS INDEX: 25.36 KG/M2 | OXYGEN SATURATION: 94 % | HEART RATE: 78 BPM | WEIGHT: 204 LBS | SYSTOLIC BLOOD PRESSURE: 155 MMHG | DIASTOLIC BLOOD PRESSURE: 70 MMHG

## 2023-11-07 DIAGNOSIS — N17.9 ACUTE KIDNEY INJURY: Primary | ICD-10-CM

## 2023-11-07 DIAGNOSIS — D50.9 IRON DEFICIENCY ANEMIA, UNSPECIFIED IRON DEFICIENCY ANEMIA TYPE: ICD-10-CM

## 2023-11-07 DIAGNOSIS — R10.84 GENERALIZED ABDOMINAL PAIN: ICD-10-CM

## 2023-11-07 DIAGNOSIS — R53.1 GENERALIZED WEAKNESS: ICD-10-CM

## 2023-11-07 DIAGNOSIS — Z85.51 HISTORY OF BLADDER CANCER: ICD-10-CM

## 2023-11-07 DIAGNOSIS — Z93.6 PRESENCE OF UROSTOMY: ICD-10-CM

## 2023-11-07 DIAGNOSIS — R34 DECREASED URINE OUTPUT: Primary | ICD-10-CM

## 2023-11-07 DIAGNOSIS — R63.0 ANOREXIA: ICD-10-CM

## 2023-11-07 DIAGNOSIS — M15.9 PRIMARY OSTEOARTHRITIS INVOLVING MULTIPLE JOINTS: ICD-10-CM

## 2023-11-07 DIAGNOSIS — R06.09 DYSPNEA ON EXERTION: ICD-10-CM

## 2023-11-07 DIAGNOSIS — N17.9 ACUTE RENAL FAILURE, UNSPECIFIED ACUTE RENAL FAILURE TYPE: ICD-10-CM

## 2023-11-07 DIAGNOSIS — E87.5 HYPERKALEMIA: ICD-10-CM

## 2023-11-07 DIAGNOSIS — R34 DECREASED URINE OUTPUT: ICD-10-CM

## 2023-11-07 DIAGNOSIS — I82.542 CHRONIC DEEP VEIN THROMBOSIS (DVT) OF TIBIAL VEIN OF LEFT LOWER EXTREMITY: ICD-10-CM

## 2023-11-07 DIAGNOSIS — R03.0 ELEVATED BLOOD PRESSURE READING IN OFFICE WITHOUT DIAGNOSIS OF HYPERTENSION: ICD-10-CM

## 2023-11-07 LAB
ALBUMIN SERPL-MCNC: 3.3 G/DL (ref 3.5–5.2)
ALBUMIN SERPL-MCNC: 3.3 G/DL (ref 3.5–5.2)
ALBUMIN/GLOB SERPL: 1 G/DL
ALBUMIN/GLOB SERPL: 1 G/DL
ALP SERPL-CCNC: 105 U/L (ref 39–117)
ALP SERPL-CCNC: 107 U/L (ref 39–117)
ALT SERPL W P-5'-P-CCNC: 12 U/L (ref 1–41)
ALT SERPL W P-5'-P-CCNC: 13 U/L (ref 1–41)
AMYLASE SERPL-CCNC: 71 U/L (ref 28–100)
ANION GAP SERPL CALCULATED.3IONS-SCNC: 14.4 MMOL/L (ref 5–15)
ANION GAP SERPL CALCULATED.3IONS-SCNC: 14.7 MMOL/L (ref 5–15)
AST SERPL-CCNC: 20 U/L (ref 1–40)
AST SERPL-CCNC: 20 U/L (ref 1–40)
BACTERIA UR QL AUTO: ABNORMAL /HPF
BASE EXCESS BLDV CALC-SCNC: -8.3 MMOL/L (ref -2–2)
BASOPHILS # BLD AUTO: 0.03 10*3/MM3 (ref 0–0.2)
BASOPHILS NFR BLD AUTO: 0.5 % (ref 0–1.5)
BDY SITE: ABNORMAL
BILIRUB SERPL-MCNC: 0.3 MG/DL (ref 0–1.2)
BILIRUB SERPL-MCNC: 0.3 MG/DL (ref 0–1.2)
BILIRUB UR QL STRIP: NEGATIVE
BUN SERPL-MCNC: 110 MG/DL (ref 8–23)
BUN SERPL-MCNC: 114 MG/DL (ref 8–23)
BUN/CREAT SERPL: 12.3 (ref 7–25)
BUN/CREAT SERPL: 12.5 (ref 7–25)
CA-I BLDA-SCNC: 1.16 MMOL/L (ref 1.13–1.32)
CALCIUM SPEC-SCNC: 8.6 MG/DL (ref 8.6–10.5)
CALCIUM SPEC-SCNC: 8.9 MG/DL (ref 8.6–10.5)
CHLORIDE BLDV-SCNC: 106 MMOL/L (ref 98–106)
CHLORIDE SERPL-SCNC: 102 MMOL/L (ref 98–107)
CHLORIDE SERPL-SCNC: 102 MMOL/L (ref 98–107)
CLARITY UR: ABNORMAL
CO2 SERPL-SCNC: 18.6 MMOL/L (ref 22–29)
CO2 SERPL-SCNC: 19.3 MMOL/L (ref 22–29)
COHGB MFR BLD: 1.4 % (ref 0–1.5)
COLOR UR: YELLOW
CREAT SERPL-MCNC: 8.79 MG/DL (ref 0.76–1.27)
CREAT SERPL-MCNC: 9.27 MG/DL (ref 0.76–1.27)
D DIMER PPP FEU-MCNC: 13.29 MCGFEU/ML (ref 0–0.8)
DEPRECATED RDW RBC AUTO: 45.1 FL (ref 37–54)
EGFRCR SERPLBLD CKD-EPI 2021: 5.3 ML/MIN/1.73
EGFRCR SERPLBLD CKD-EPI 2021: 5.6 ML/MIN/1.73
EOSINOPHIL # BLD AUTO: 0.1 10*3/MM3 (ref 0–0.4)
EOSINOPHIL NFR BLD AUTO: 1.8 % (ref 0.3–6.2)
ERYTHROCYTE [DISTWIDTH] IN BLOOD BY AUTOMATED COUNT: 14.3 % (ref 12.3–15.4)
FERRITIN SERPL-MCNC: 680.5 NG/ML (ref 30–400)
FHHB: 61.3 % (ref 0–5)
FOLATE SERPL-MCNC: 14.4 NG/ML (ref 4.78–24.2)
GAS FLOW AIRWAY: ABNORMAL L/MIN
GLOBULIN UR ELPH-MCNC: 3.3 GM/DL
GLOBULIN UR ELPH-MCNC: 3.4 GM/DL
GLUCOSE BLDA-MCNC: 67 MG/DL (ref 70–99)
GLUCOSE SERPL-MCNC: 94 MG/DL (ref 65–99)
GLUCOSE SERPL-MCNC: 95 MG/DL (ref 65–99)
GLUCOSE UR STRIP-MCNC: NEGATIVE MG/DL
HCO3 BLDV-SCNC: 17.5 MMOL/L (ref 22–26)
HCT VFR BLD AUTO: 29.6 % (ref 37.5–51)
HGB BLD-MCNC: 9.3 G/DL (ref 13–17.7)
HGB BLDA-MCNC: 8.9 G/DL (ref 13.8–16.4)
HGB UR QL STRIP.AUTO: ABNORMAL
HOLD SPECIMEN: NORMAL
HOLD SPECIMEN: NORMAL
HYALINE CASTS UR QL AUTO: ABNORMAL /LPF
IMM GRANULOCYTES # BLD AUTO: 0.02 10*3/MM3 (ref 0–0.05)
IMM GRANULOCYTES NFR BLD AUTO: 0.4 % (ref 0–0.5)
INHALED O2 CONCENTRATION: ABNORMAL %
IRON 24H UR-MRATE: 45 MCG/DL (ref 59–158)
IRON SATN MFR SERPL: 18 % (ref 20–50)
KETONES UR QL STRIP: NEGATIVE
LACTATE BLDA-SCNC: 1.02 MMOL/L (ref 0.5–2)
LEUKOCYTE ESTERASE UR QL STRIP.AUTO: ABNORMAL
LIPASE SERPL-CCNC: 29 U/L (ref 13–60)
LYMPHOCYTES # BLD AUTO: 0.41 10*3/MM3 (ref 0.7–3.1)
LYMPHOCYTES NFR BLD AUTO: 7.2 % (ref 19.6–45.3)
MAGNESIUM SERPL-MCNC: 2.2 MG/DL (ref 1.6–2.4)
MCH RBC QN AUTO: 27.1 PG (ref 26.6–33)
MCHC RBC AUTO-ENTMCNC: 31.4 G/DL (ref 31.5–35.7)
MCV RBC AUTO: 86.3 FL (ref 79–97)
METHGB BLD QL: 0.6 % (ref 0–1.5)
MODALITY: ABNORMAL
MONOCYTES # BLD AUTO: 0.73 10*3/MM3 (ref 0.1–0.9)
MONOCYTES NFR BLD AUTO: 12.9 % (ref 5–12)
NEUTROPHILS NFR BLD AUTO: 4.38 10*3/MM3 (ref 1.7–7)
NEUTROPHILS NFR BLD AUTO: 77.2 % (ref 42.7–76)
NITRITE UR QL STRIP: POSITIVE
NOTE: ABNORMAL
NRBC BLD AUTO-RTO: 0 /100 WBC (ref 0–0.2)
NT-PROBNP SERPL-MCNC: 4081 PG/ML (ref 0–1800)
OXYHGB MFR BLDV: 36.7 % (ref 94–99)
PCO2 BLDV: 37 MM HG (ref 41–51)
PH BLDV: 7.29 PH UNITS (ref 7.31–7.41)
PH UR STRIP.AUTO: 7.5 [PH] (ref 5–8)
PLATELET # BLD AUTO: 197 10*3/MM3 (ref 140–450)
PMV BLD AUTO: 9.1 FL (ref 6–12)
PO2 BLDV: 24.9 MM HG (ref 35–42)
POTASSIUM BLDV-SCNC: 6.1 MMOL/L (ref 3.5–5)
POTASSIUM SERPL-SCNC: 6.6 MMOL/L (ref 3.5–5.2)
POTASSIUM SERPL-SCNC: 7 MMOL/L (ref 3.5–5.2)
PROT SERPL-MCNC: 6.6 G/DL (ref 6–8.5)
PROT SERPL-MCNC: 6.7 G/DL (ref 6–8.5)
PROT UR QL STRIP: ABNORMAL
RBC # BLD AUTO: 3.43 10*6/MM3 (ref 4.14–5.8)
RBC # UR STRIP: ABNORMAL /HPF
REF LAB TEST METHOD: ABNORMAL
SAO2 % BLDCOV: 37.4 % (ref 45–75)
SODIUM BLDV-SCNC: 135 MMOL/L (ref 136–146)
SODIUM SERPL-SCNC: 135 MMOL/L (ref 136–145)
SODIUM SERPL-SCNC: 136 MMOL/L (ref 136–145)
SP GR UR STRIP: 1.01 (ref 1–1.03)
SQUAMOUS #/AREA URNS HPF: ABNORMAL /HPF
TIBC SERPL-MCNC: 249 MCG/DL (ref 298–536)
TRANSFERRIN SERPL-MCNC: 167 MG/DL (ref 200–360)
TROPONIN T SERPL HS-MCNC: 47 NG/L
UROBILINOGEN UR QL STRIP: ABNORMAL
VIT B12 BLD-MCNC: 542 PG/ML (ref 211–946)
WBC # UR STRIP: ABNORMAL /HPF
WBC NRBC COR # BLD: 5.67 10*3/MM3 (ref 3.4–10.8)
WHOLE BLOOD HOLD COAG: NORMAL
WHOLE BLOOD HOLD SPECIMEN: NORMAL

## 2023-11-07 PROCEDURE — 85025 COMPLETE CBC W/AUTO DIFF WBC: CPT

## 2023-11-07 PROCEDURE — 99285 EMERGENCY DEPT VISIT HI MDM: CPT

## 2023-11-07 PROCEDURE — 83735 ASSAY OF MAGNESIUM: CPT

## 2023-11-07 PROCEDURE — 63710000001 INSULIN REGULAR HUMAN PER 5 UNITS

## 2023-11-07 PROCEDURE — 74176 CT ABD & PELVIS W/O CONTRAST: CPT

## 2023-11-07 PROCEDURE — 83540 ASSAY OF IRON: CPT | Performed by: INTERNAL MEDICINE

## 2023-11-07 PROCEDURE — 82746 ASSAY OF FOLIC ACID SERUM: CPT | Performed by: INTERNAL MEDICINE

## 2023-11-07 PROCEDURE — 82820 HEMOGLOBIN-OXYGEN AFFINITY: CPT | Performed by: EMERGENCY MEDICINE

## 2023-11-07 PROCEDURE — 93005 ELECTROCARDIOGRAM TRACING: CPT | Performed by: EMERGENCY MEDICINE

## 2023-11-07 PROCEDURE — 1160F RVW MEDS BY RX/DR IN RCRD: CPT | Performed by: INTERNAL MEDICINE

## 2023-11-07 PROCEDURE — 83880 ASSAY OF NATRIURETIC PEPTIDE: CPT | Performed by: INTERNAL MEDICINE

## 2023-11-07 PROCEDURE — 82607 VITAMIN B-12: CPT | Performed by: INTERNAL MEDICINE

## 2023-11-07 PROCEDURE — 93010 ELECTROCARDIOGRAM REPORT: CPT | Performed by: SPECIALIST

## 2023-11-07 PROCEDURE — 82150 ASSAY OF AMYLASE: CPT | Performed by: INTERNAL MEDICINE

## 2023-11-07 PROCEDURE — 36415 COLL VENOUS BLD VENIPUNCTURE: CPT

## 2023-11-07 PROCEDURE — 25810000003 SODIUM CHLORIDE 0.9 % SOLUTION: Performed by: EMERGENCY MEDICINE

## 2023-11-07 PROCEDURE — 83690 ASSAY OF LIPASE: CPT | Performed by: INTERNAL MEDICINE

## 2023-11-07 PROCEDURE — 82105 ALPHA-FETOPROTEIN SERUM: CPT | Performed by: INTERNAL MEDICINE

## 2023-11-07 PROCEDURE — 1159F MED LIST DOCD IN RCRD: CPT | Performed by: INTERNAL MEDICINE

## 2023-11-07 PROCEDURE — 25010000002 CALCIUM GLUCONATE-NACL 1-0.675 GM/50ML-% SOLUTION

## 2023-11-07 PROCEDURE — 80053 COMPREHEN METABOLIC PANEL: CPT | Performed by: INTERNAL MEDICINE

## 2023-11-07 PROCEDURE — 71045 X-RAY EXAM CHEST 1 VIEW: CPT

## 2023-11-07 PROCEDURE — 85379 FIBRIN DEGRADATION QUANT: CPT | Performed by: INTERNAL MEDICINE

## 2023-11-07 PROCEDURE — 80053 COMPREHEN METABOLIC PANEL: CPT

## 2023-11-07 PROCEDURE — 82728 ASSAY OF FERRITIN: CPT | Performed by: INTERNAL MEDICINE

## 2023-11-07 PROCEDURE — 93005 ELECTROCARDIOGRAM TRACING: CPT

## 2023-11-07 PROCEDURE — 84466 ASSAY OF TRANSFERRIN: CPT | Performed by: INTERNAL MEDICINE

## 2023-11-07 PROCEDURE — 81001 URINALYSIS AUTO W/SCOPE: CPT

## 2023-11-07 PROCEDURE — 84484 ASSAY OF TROPONIN QUANT: CPT

## 2023-11-07 PROCEDURE — 82805 BLOOD GASES W/O2 SATURATION: CPT | Performed by: EMERGENCY MEDICINE

## 2023-11-07 RX ORDER — SODIUM CHLORIDE 0.9 % (FLUSH) 0.9 %
10 SYRINGE (ML) INJECTION AS NEEDED
Status: DISCONTINUED | OUTPATIENT
Start: 2023-11-07 | End: 2023-11-11 | Stop reason: HOSPADM

## 2023-11-07 RX ORDER — MORPHINE SULFATE 2 MG/ML
2 INJECTION, SOLUTION INTRAMUSCULAR; INTRAVENOUS ONCE
Status: COMPLETED | OUTPATIENT
Start: 2023-11-08 | End: 2023-11-08

## 2023-11-07 RX ORDER — DEXTROSE MONOHYDRATE 25 G/50ML
25 INJECTION, SOLUTION INTRAVENOUS ONCE
Status: COMPLETED | OUTPATIENT
Start: 2023-11-07 | End: 2023-11-07

## 2023-11-07 RX ORDER — HYDROCODONE BITARTRATE AND ACETAMINOPHEN 7.5; 325 MG/1; MG/1
TABLET ORAL
Qty: 30 TABLET | Refills: 0 | Status: SHIPPED | OUTPATIENT
Start: 2023-11-07

## 2023-11-07 RX ORDER — CALCIUM GLUCONATE 20 MG/ML
1000 INJECTION, SOLUTION INTRAVENOUS ONCE
Status: COMPLETED | OUTPATIENT
Start: 2023-11-07 | End: 2023-11-07

## 2023-11-07 RX ADMIN — SODIUM ZIRCONIUM CYCLOSILICATE 10 G: 10 POWDER, FOR SUSPENSION ORAL at 19:56

## 2023-11-07 RX ADMIN — SODIUM CHLORIDE 500 ML: 9 INJECTION, SOLUTION INTRAVENOUS at 22:15

## 2023-11-07 RX ADMIN — CALCIUM GLUCONATE 1000 MG: 20 INJECTION, SOLUTION INTRAVENOUS at 19:49

## 2023-11-07 RX ADMIN — DEXTROSE MONOHYDRATE 25 G: 25 INJECTION, SOLUTION INTRAVENOUS at 19:45

## 2023-11-07 RX ADMIN — INSULIN HUMAN 5 UNITS: 100 INJECTION, SOLUTION PARENTERAL at 19:53

## 2023-11-07 RX ADMIN — SODIUM BICARBONATE 50 MEQ: 84 INJECTION INTRAVENOUS at 19:54

## 2023-11-07 NOTE — ASSESSMENT & PLAN NOTE
Most likely just related to acute illness.  Going on the past 3 or 4 days.  We will see what the labs show.

## 2023-11-07 NOTE — Clinical Note
Patient had stat labs drawn in the office today, please look for these later in the day and let me know when they are back.

## 2023-11-07 NOTE — ED PROVIDER NOTES
Time: 5:04 PM EST  Date of encounter:  11/7/2023  Independent Historian/Clinical History and Information was obtained by:   Patient    History is limited by: N/A    Chief Complaint   Patient presents with    Abnormal Lab         History of Present Illness:  Patient is a 78 y.o. year old male who presents to the emergency department for evaluation of altered kidney function and hyperkalemia.  The patient and wife state they went and saw their primary care provider today because the patient was complaining of abdominal pain, weakness and no urinary output from his urostomy for 3 days.  Notes that he began having left-sided abdominal pain several days ago radiates to the back.  Patient's had nausea with 1 bout of emesis.  Patient also notes and wife also notes that the patient's had no output from his urostomy.  They do note that the patient was diagnosed with bladder cancer last December.  He had resection of the bladder by Dr. Bansal and has a urostomy.  Patient had chemo up until June of this year.  Bowel movements are normal no hematochezia or melena.  The patient's had no fever, rigors or myalgias.  The patient's had no shortness of breath.  The wife does note that the patient's had increasing edema in his legs for 3 days.  The patient has had normal renal function up until now.      Patient Care Team  Primary Care Provider: Dimitri Hargrove MD    Past Medical History:     No Known Allergies  Past Medical History:   Diagnosis Date    AAA (abdominal aortic aneurysm)     BEING MONITORED NO REPAIR NEEDED YET    Aneurysm of abdominal aorta branch vessel     Cancer     BLADDER AND SKIN CANCERS/REMOVED    Chronic deep vein thrombosis (DVT) of tibial vein of left lower extremity 6/27/2023    Essential tremor     History of DVT of lower extremity 12/2022    on Xarelto    History of urostomy     PT CURRENTLY HAS HOME HEALTH TO HELP WITH OSTOMY    Hyperlipidemia      Past Surgical History:   Procedure Laterality Date     COLONOSCOPY  2018    CYSTECTOMY      2022  W/UROSTOMY    SKIN CANCER EXCISION Right     TRANSURETHRAL RESECTION OF BLADDER TUMOR Right 10/19/2022    Procedure: Cystoscopy with transurethral resection of bladder tumor;  Surgeon: Aung Westfall MD;  [facility]: [facility];  Service: Urology;  Laterality: Right;    VENOUS ACCESS DEVICE (PORT) INSERTION Right 2023    Procedure: INSERTION VENOUS ACCESS DEVICE;  Surgeon: Richie Edward MD;  Location: [facility] OR [facility];  Service: General;  Laterality: Right;     Family History   Problem Relation Age of Onset    Cancer Mother     Cancer Father     Hearing loss Father     Hypertension Father     Malig Hyperthermia Neg Hx        Home Medications:  Prior to Admission medications    Medication Sig Start Date End Date Taking? Authorizing Provider   atorvastatin (LIPITOR) 20 MG tablet Take 1 tablet by mouth Every Night. 22   Kristel Marshall MD   HYDROcodone-acetaminophen (Norco) 7.5-325 MG per tablet 1-2 tablets up to 3 times a day as needed for abdominal pain. 23   Dimitri Hargrove MD   meloxicam (MOBIC) 7.5 MG tablet Take 1 tablet by mouth Daily.    Kristel Marshall MD        Social History:   Social History     Tobacco Use    Smoking status: Former     Packs/day: 1.00     Years: 20.00     Additional pack years: 0.00     Total pack years: 20.00     Types: Cigarettes     Start date: 1962     Quit date: 1990     Years since quittin.8    Smokeless tobacco: Never   Vaping Use    Vaping Use: Never used   Substance Use Topics    Alcohol use: Yes     Alcohol/week: 1.0 standard drink of alcohol     Types: 1 Cans of beer per week     Comment: 1 beer daily    Drug use: Never         Review of Systems:  Review of Systems   Constitutional:  Negative for chills, diaphoresis and fever.   HENT:  Negative for congestion, postnasal drip, rhinorrhea and sore throat.    Eyes:  Negative for photophobia.   Respiratory:  Negative for cough, chest  "tightness and shortness of breath.    Cardiovascular:  Negative for chest pain, palpitations and leg swelling.   Gastrointestinal:  Positive for abdominal pain, nausea and vomiting. Negative for diarrhea.   Genitourinary:  Positive for enuresis and flank pain. Negative for difficulty urinating, dysuria, frequency, hematuria and urgency.   Musculoskeletal:  Negative for neck pain and neck stiffness.   Skin:  Negative for pallor and rash.   Neurological:  Positive for tremors and weakness. Negative for dizziness, syncope, numbness and headaches.        The wife states the patient has chronic essential tremors   Hematological:  Negative for adenopathy. Does not bruise/bleed easily.   Psychiatric/Behavioral: Negative.          Physical Exam:  /84   Pulse 78   Temp 97.7 °F (36.5 °C) (Oral)   Resp 16   Ht 190.5 cm (75\")   Wt 92.4 kg (203 lb 11.3 oz)   SpO2 97%   BMI 25.46 kg/m²         Physical Exam  Vitals and nursing note reviewed.   Constitutional:       General: He is not in acute distress.     Appearance: Normal appearance. He is not ill-appearing, toxic-appearing or diaphoretic.   HENT:      Head: Normocephalic and atraumatic.      Mouth/Throat:      Mouth: Mucous membranes are moist.   Eyes:      Extraocular Movements: Extraocular movements intact.      Conjunctiva/sclera: Conjunctivae normal.      Pupils: Pupils are equal, round, and reactive to light.   Cardiovascular:      Rate and Rhythm: Normal rate and regular rhythm.      Pulses: Normal pulses.           Carotid pulses are 2+ on the right side and 2+ on the left side.       Radial pulses are 2+ on the right side and 2+ on the left side.        Femoral pulses are 2+ on the right side and 2+ on the left side.       Popliteal pulses are 2+ on the right side and 2+ on the left side.        Dorsalis pedis pulses are 2+ on the right side and 2+ on the left side.        Posterior tibial pulses are 2+ on the right side and 2+ on the left side.      " Heart sounds: Normal heart sounds. No murmur heard.  Pulmonary:      Effort: Pulmonary effort is normal. No accessory muscle usage, respiratory distress or retractions.      Breath sounds: Normal breath sounds. No wheezing, rhonchi or rales.   Abdominal:      General: Abdomen is flat. There is no distension.      Palpations: Abdomen is soft. There is no mass or pulsatile mass.      Tenderness: There is no abdominal tenderness. There is left CVA tenderness. There is no right CVA tenderness, guarding or rebound.      Comments: No rigidity    The patient has a urostomy located in the right mid abdomen.  There is very little urine output from that.   Musculoskeletal:         General: No swelling, tenderness or deformity.      Cervical back: Neck supple. No tenderness.      Right lower leg: No edema.      Left lower leg: No edema.   Skin:     General: Skin is warm and dry.      Capillary Refill: Capillary refill takes less than 2 seconds.      Coloration: Skin is not cyanotic, jaundiced or pale.      Findings: No erythema.   Neurological:      General: No focal deficit present.      Mental Status: He is alert and oriented to person, place, and time. Mental status is at baseline.      Cranial Nerves: Cranial nerves 2-12 are intact. No cranial nerve deficit.      Sensory: Sensation is intact. No sensory deficit.      Motor: Motor function is intact. No weakness or pronator drift.      Coordination: Coordination is intact. Coordination normal.   Psychiatric:         Attention and Perception: Attention and perception normal.         Mood and Affect: Mood normal.         Behavior: Behavior normal.                Procedures:  Procedures      Medical Decision Making:      Comorbidities that affect care:    Hyperlipidemia, essential tremor, abdominal aortic aneurysm, bladder cancer, DVT    External Notes reviewed:    None      The following orders were placed and all results were independently analyzed by me:  Orders Placed This  Encounter   Procedures    Insert Central Line At Bedside    Blood Culture - Blood,    Blood Culture - Blood,    Urine Culture - Urine,    Body Fluid Culture - Body Fluid, Kidney    Body Fluid Culture - Body Fluid, Kidney, Right    XR Chest 1 View    CT Abdomen Pelvis Without Contrast    CT Guided Nephrostomy Tube Placement    CT Guided Nephrostomy Tube Placement    [place] Draw    Comprehensive Metabolic Panel    Single High Sensitivity Troponin T    Magnesium    CBC Auto Differential    VBG with Co-Ox and Electrolytes    Lactic Acid, Plasma    AFP Tumor Marker    Urinalysis With Culture If Indicated - Straight Cath    Protime-INR    Urinalysis, Microscopic Only - Urine, Clean Catch    Hepatitis B Surface Antigen    Hepatitis B Surface Antibody    CBC Auto Differential    Basic Metabolic Panel    CBC Auto Differential    Undress & Gown    Vital Signs    Orthostatic Blood Pressure    Place Sequential Compression Device    Inpatient Nephrology Consult    Inpatient Pulmonology Consult    POC Glucose Once    POC Glucose Q1H    POC Glucose Once    POC Glucose Q1H    POC Glucose Once    POC Glucose Once    POC Glucose Once    ECG 12 Lead ED Triage Standing Order; Weak / Dizzy / AMS    ECG 12 Lead Electrolyte Imbalance    Hemodialysis Inpatient    Hemodialysis Inpatient    Inpatient Admission    Discharge patient    CBC & Differential    Green Top (Gel)    Lavender Top    Gold Top - SST    Light Blue Top       Medications Given in the Emergency Department:  Medications   sodium chloride 0.45 % infusion (0 mL/hr Intravenous Stopped 11/9/23 0814)   sodium bicarbonate injection 8.4% 50 mEq (50 mEq Intravenous Given 11/7/23 1954)   calcium gluconate 1000 Mg/50ml 0.675% NaCl IV SOLN (0 mg Intravenous Stopped 11/7/23 2144)   sodium zirconium cyclosilicate (LOKELMA) pack 10 g (10 g Oral Given 11/7/23 1956)   insulin regular (humuLIN R,novoLIN R) injection 5 Units (5 Units Intravenous Given 11/7/23 1953)   dextrose (D50W) (25 g/50  mL) IV injection 25 g (25 g Intravenous Given 11/7/23 1945)   sodium chloride 0.9 % bolus 500 mL (0 mL Intravenous Stopped 11/7/23 2319)   morphine injection 2 mg (2 mg Intravenous Given 11/8/23 0000)   calcium gluconate 1000 Mg/50ml 0.675% NaCl IV SOLN (1,000 mg Intravenous New Bag 11/8/23 0623)   insulin regular (humuLIN R,novoLIN R) injection 10 Units (10 Units Intravenous Given 11/8/23 0602)   dextrose (D50W) (25 g/50 mL) IV injection 50 mL (50 mL Intravenous Given 11/8/23 0603)   sodium bicarbonate injection 8.4% 50 mEq (50 mEq Intravenous Given 11/8/23 0620)   albuterol (PROVENTIL) nebulizer solution 0.083% 2.5 mg/3mL (2.5 mg Nebulization Given 11/8/23 0822)   sodium polystyrene (KAYEXALATE) 15 GM/60ML suspension 30 g (30 g Oral Given 11/8/23 0618)   labetalol (NORMODYNE,TRANDATE) injection 5 mg (5 mg Intravenous Given 11/9/23 0014)   labetalol (NORMODYNE,TRANDATE) injection 10 mg (10 mg Intravenous Given 11/9/23 0400)   lidocaine (XYLOCAINE) 2% injection 20 mL (10 mL Injection Given 11/9/23 1219)   Midazolam HCl (PF) (VERSED) injection (2 mg Intravenous Given 11/9/23 1145)   fentaNYL citrate (PF) (SUBLIMAZE) injection (50 mcg Intravenous Given 11/9/23 1148)        ED Course:    The patient was initially evaluated in the triage area where orders were placed. The patient was later dispositioned by Juan Gilbert DO.      The patient was advised to stay for completion of workup which includes but is not limited to communication of labs and radiological results, reassessment and plan. The patient was advised that leaving prior to disposition by a provider could result in critical findings that are not communicated to the patient.     ED Course as of 11/11/23 2138 Tue Nov 07, 2023 1704 --- PROVIDER IN TRIAGE NOTE ---    The patient was evaluated by Bessy cuellar in triage. Orders were placed and the patient is currently awaiting disposition.    [AJ]   3950 EKG:    Rhythm: Sinus rhythm  Rate:  65  Intervals: Normal MN and QT interval  T-wave: Peaked T waves V3, V4, V5, V6, II, III, aVF  ST Segment: J-point elevation II and aVF, no pathological ST elevation or reciprocal ST depression to suggest STEMI    EKG Comparison: No change in the QRS and ST morphology from EKG performed May 23, 2023    Interpreted by me   [SD]      ED Course User Index  [AJ] Bessy Cano PA-C  [SD] Juan Gilbert DO       Labs:    Lab Results (last 24 hours)       ** No results found for the last 24 hours. **             Imaging:    No Radiology Exams Resulted Within Past 24 Hours      Differential Diagnosis and Discussion:      Metabolic: Differential diagnosis includes but is not limited to hypertension, hyperglycemia, hyperkalemia, hypocalcemia, metabolic acidosis, hypokalemia, hypoglycemia, malnutrition, hypothyroidism, hyperthyroidism, and adrenal insufficiency.     All labs were reviewed and interpreted by me.  EKG was interpreted by me.  CT scan radiology impression was interpreted by me.    MDM  Number of Diagnoses or Management Options  Acute kidney injury  History of bladder cancer  Hyperkalemia  Presence of urostomy  Diagnosis management comments: The patient's vital signs were stable on the emergency room.    The patient had a normal magnesium level 2.2    The patient's CMP demonstrates acute renal failure with a BUN of 114, creatinine of 9.27, potassium 6.6 and a bicarb of 18.6    The patient's renal failure and hyperkalemia was treated with IV fluids, calcium gluconate, Lokelma, sodium bicarb, an amp of D50 and 5 units of regular insulin    After the patient's hyperkalemia was repeated, a VBG was ordered to recheck the patient's potassium    The patient's VBG demonstrates a pH of 7.92, CO2 37, and an improved potassium level of 6.1    The patient's CBC was reviewed and shows no abnormalities of critical concern.  Of note, there is no anemia requiring a blood transfusion and the platelet count is  acceptable    Patient's EKG demonstrates a sinus rhythm of 65.  There is no evidence of STEMI or dysrhythmia..  The patient did appear to have peaked T waves on the EKG.  However this was not significantly changed from prior EKG    CT scan of the abdomen pelvis demonstrates abnormal appearance of the liver which could be concerning for metastatic disease.  The patient had bibasilar effusions..  There is some evidence of bibasilar effusions.  There is no obvious nonobstructing intrarenal calculi.  There is no reported hydronephrosis or hydroureter       Amount and/or Complexity of Data Reviewed  Clinical lab tests: reviewed  Tests in the radiology section of CPT®: reviewed  Decide to obtain previous medical records or to obtain history from someone other than the patient: yes  Discuss the patient with other providers: yes (I discussed the case with Dr. De, on-call for urology.  We reviewed the patient's CT finding and renal function.  Although there is no obvious overt hydronephrosis she does feel that the patient would benefit from being transferred to where he had surgery.  She is requesting that I discussed the case with Dr. Bansal at the [facility]    Discussed case with , call for [facility] urology.  Have discussed the patient's laboratory findings and CT findings.  He reviewed the CT himself.  He does feel the patient would benefit from transfer to their facility due to the patient's acute change in kidney function.    23:07 EST  Discussed the case with Dr. Yue Garland, hospitalist at [facility].  She is excepted the patient in transfer.  However, there is no beds at this time.  She feels the patient will be able to be transferred in the morning.  They will accept the patient as soon as a bed opens.)    Critical Care  Total time providing critical care: 35 minutes (Total critical care time of.  Total critical care time documented does not include time spent on  separately billed procedures for services of nurses or physician assistants.  I personally saw and examined the patient.  I have reviewed all diagnostic interpretations and treatment plans as written.  I was present for the key portions of any procedures performed and the inclusive time noted in any critical care statement.  Critical care time includes patient management by me, time spent at the patient bedside, time to review labs/ ABG's, imaging results, discussing patient care, documentation in the medical record and time spent with family or caregiver)         Social Determinants of Health:    Patient is independent, reliable, and has access to care.       Disposition and Care Coordination:    Transferred: Through independent evaluation of the patient's history, physical, and imperical data, the patient meets criteria to be transferred to another hospital for evaluation/admission.        Final diagnoses:   Acute kidney injury   Hyperkalemia   Presence of urostomy   History of bladder cancer        ED Disposition       ED Disposition   Decision to Admit    Condition   --    Comment   Level of Care: Telemetry [5]   Diagnosis: Acute renal failure [585284]   Admitting Physician: YISEL LAN [[id number]]   Certification: I Certify That Inpatient Hospital Services Are Medically Necessary For Greater Than 2 Midnights                 This medical record created using voice recognition software.             Juan Gilbert DO  11/11/23 6707

## 2023-11-07 NOTE — ASSESSMENT & PLAN NOTE
Likely just related to acute illness, we will certainly keep an eye on this going forward, but no treatment at this time.

## 2023-11-07 NOTE — ASSESSMENT & PLAN NOTE
Just with patient as of 11/23 to discontinue meloxicam for the time being given his decreased urine output.

## 2023-11-07 NOTE — PROGRESS NOTES
Chief Complaint  not producing urine (Pt states that he hasn't produce urine Sat or Sun, a little bit yesterday, he has a stoma. He states that his back is hurting feels that it could be his kidneys. The pain is effecting his sleep. ), Weakness - Generalized (Pt's wife states that he is weak and has no energy along with no appetite./This has been going on since Friday. ), Hypertension (Pt's BP today was 180/80, he states that he doesn't check this at home.), and Edema (Bilateral ankle swelling)    Subjective      Korey Rodriguez presents to [facility]    Weakness - Generalized  Associated symptoms include abdominal pain, fatigue, nausea and weakness. Pertinent negatives include no arthralgias, chest pain, congestion, coughing or fever.   Hypertension  Pertinent negatives include no blurred vision, chest pain, palpitations or shortness of breath.     History of present illness:  Patient is a very pleasant 78-year-old male with extensive past medical history to include abdominal aortic aneurysm, hyperlipidemia, chronic back pain, and unfortunately most recently urothelial carcinoma requiring total cystectomy and XRT/chemo, seen 6/23 as a New Patient, who is coming in now 7/23 for routine 3-month follow-up.  We will go over his med list, review his extensive history together in the office, go over recent labs, and make further recommendations at that time.  ---> Patient here 11/23 for urgent issue per chief complaint above.    ---> seen in 10/23 for urgent care follow-up:  Patient has fever of 101 yesterday and today  Patient has hx of UTI but has a stoma and unable to describe any urinary symptoms.  Low suspicion for acute pyelonephritis but is febrile but has no CVA or flank tenderness.  Low suspicion for kidney stone or infected stone.  Patient has a history of bladder cancer with urostomy.  Urostomy beefy red with good urine output.     Review of Systems   Constitutional:   "Positive for fatigue. Negative for appetite change and fever.   HENT:  Negative for congestion and ear pain.    Eyes:  Negative for blurred vision.   Respiratory:  Negative for cough, chest tightness, shortness of breath and wheezing.    Cardiovascular:  Negative for chest pain, palpitations and leg swelling.   Gastrointestinal:  Positive for abdominal pain and nausea.   Genitourinary:  Positive for difficulty urinating. Negative for dysuria and hematuria.   Musculoskeletal:  Negative for arthralgias and gait problem.   Skin:  Negative for skin lesions.   Neurological:  Positive for weakness. Negative for syncope, memory problem and confusion.   Psychiatric/Behavioral:  Negative for self-injury and depressed mood.        Objective   Vital Signs:   /70   Pulse 78   Temp 97.8 °F (36.6 °C) (Skin)   Ht 190.5 cm (75\")   Wt 92.5 kg (204 lb)   SpO2 94%   BMI 25.50 kg/m²           Physical Exam  Vitals and nursing note reviewed.   Constitutional:       General: He is not in acute distress.     Appearance: Normal appearance. He is not toxic-appearing.   HENT:      Head: Atraumatic.      Right Ear: External ear normal.      Left Ear: External ear normal.      Nose: Nose normal.      Mouth/Throat:      Mouth: Mucous membranes are moist.   Eyes:      General:         Right eye: No discharge.         Left eye: No discharge.      Extraocular Movements: Extraocular movements intact.      Pupils: Pupils are equal, round, and reactive to light.   Neck:      Comments: Soft bilateral carotid bruits with negative carotid Dopplers 7/23.  Cardiovascular:      Rate and Rhythm: Normal rate and regular rhythm.      Pulses: Normal pulses.      Heart sounds: Normal heart sounds. No murmur heard.     No gallop.      Comments: Heart tones normal, no concerning ectopy, no S3.  Pulmonary:      Effort: Pulmonary effort is normal. No respiratory distress.      Breath sounds: No wheezing, rhonchi or rales.      Comments: Lung fields " clear bilaterally.  Abdominal:      General: There is no distension.      Palpations: Abdomen is soft. There is no mass.      Tenderness: There is no abdominal tenderness. There is no guarding.      Comments: Mild diffuse tenderness, no rebound.   Musculoskeletal:         General: No swelling or tenderness.      Cervical back: No tenderness.      Right lower leg: No edema.      Left lower leg: No edema.      Comments: No CVA tenderness.  He has chronic left lower extremity swelling secondary to prior DVT but this is increased as of his 11/23 office visit, 2-3+ left ankle, 1-2+ right.   Skin:     General: Skin is warm and dry.      Findings: No rash.   Neurological:      General: No focal deficit present.      Mental Status: He is alert and oriented to person, place, and time. Mental status is at baseline.      Motor: No weakness.      Gait: Gait normal.   Psychiatric:         Mood and Affect: Mood normal.         Thought Content: Thought content normal.          Result Review   The following data was reviewed by: Dimitri Hargrove MD on 06/27/2023:  [x] Laboratory  [] Microbiology  [] Radiology  [] EKG/telemetry  [] Cardiology/Vascular  [] Pathology  [x] Old records             Assessment and Plan   Diagnoses and all orders for this visit:    1. Decreased urine output (Primary)  Assessment & Plan:  This is the main indication for his visit as described in the chief complaint.  Obviously very concerning given he is status post cystectomy.  Hopefully he is just dry, but certainly concern for associated obstruction.  We will get urinalysis and labs today, may need imaging studies as well.  Of note he is scheduled for CTs of the chest abdomen and pelvis next week.  Discussed with patient to push fluids.    Orders:  -     CBC & Differential; Future  -     Comprehensive Metabolic Panel; Future  -     Urinalysis With Culture If Indicated - Urine, Catheter; Future  -     Comprehensive Metabolic Panel    2. Generalized  weakness  Assessment & Plan:  Most likely just related to acute illness.  Going on the past 3 or 4 days.  We will see what the labs show.    Orders:  -     Vitamin B12 anemia; Future  -     Folate anemia; Future  -     Vitamin B12 anemia  -     Folate anemia    3. Elevated blood pressure reading in office without diagnosis of hypertension  Assessment & Plan:  Likely just related to acute illness, we will certainly keep an eye on this going forward, but no treatment at this time.      4. Dyspnea on exertion  -     BNP; Future  -     BNP    5. Anorexia  -     Amylase; Future  -     Lipase; Future  -     Amylase  -     Lipase    6. Primary osteoarthritis involving multiple joints  Assessment & Plan:  Just with patient as of 11/23 to discontinue meloxicam for the time being given his decreased urine output.      7. Chronic deep vein thrombosis (DVT) of tibial vein of left lower extremity  -     D-dimer, Quantitative; Future  -     D-dimer, Quantitative    8. Iron deficiency anemia, unspecified iron deficiency anemia type  -     Ferritin; Future  -     Iron Profile; Future  -     Ferritin  -     Iron Profile    9. Generalized abdominal pain  -     HYDROcodone-acetaminophen (Norco) 7.5-325 MG per tablet; 1-2 tablets up to 3 times a day as needed for abdominal pain.  Dispense: 30 tablet; Refill: 0          Follow Up   Return in about 2 weeks (around 11/21/2023).  Patient was given instructions and counseling regarding his condition or for health maintenance advice. Please see specific information pulled into the AVS if appropriate.     Total Time Spent:  minutes     This time includes time spent by me in the following activities: preparing for the visit, reviewing extensive past medical history and tests, performing a medically appropriate examination and/or evaluation, counseling and educating the patient and/or caregivers, ordering medications, tests, or procedures, referring and/or communicating with other health care  professionals and documenting information in the medical record all on this date of service.

## 2023-11-07 NOTE — ASSESSMENT & PLAN NOTE
This is the main indication for his visit as described in the chief complaint.  Obviously very concerning given he is status post cystectomy.  Hopefully he is just dry, but certainly concern for associated obstruction.  We will get urinalysis and labs today, may need imaging studies as well.  Of note he is scheduled for CTs of the chest abdomen and pelvis next week.  Discussed with patient to push fluids.

## 2023-11-08 PROBLEM — E87.5 HYPERKALEMIA: Status: ACTIVE | Noted: 2023-11-08

## 2023-11-08 PROBLEM — N13.30 BILATERAL HYDRONEPHROSIS: Status: ACTIVE | Noted: 2023-11-08

## 2023-11-08 LAB
ALBUMIN SERPL-MCNC: 2.8 G/DL (ref 3.5–5.2)
ALBUMIN/GLOB SERPL: 0.8 G/DL
ALP SERPL-CCNC: 100 U/L (ref 39–117)
ALPHA-FETOPROTEIN: 3.53 NG/ML (ref 0–8.3)
ALT SERPL W P-5'-P-CCNC: 12 U/L (ref 1–41)
ANION GAP SERPL CALCULATED.3IONS-SCNC: 10.9 MMOL/L (ref 5–15)
ANION GAP SERPL CALCULATED.3IONS-SCNC: 11.3 MMOL/L (ref 5–15)
ANION GAP SERPL CALCULATED.3IONS-SCNC: 14.9 MMOL/L (ref 5–15)
ANION GAP SERPL CALCULATED.3IONS-SCNC: 16.5 MMOL/L (ref 5–15)
AST SERPL-CCNC: 19 U/L (ref 1–40)
BACTERIA UR QL AUTO: ABNORMAL /HPF
BILIRUB SERPL-MCNC: 0.3 MG/DL (ref 0–1.2)
BILIRUB UR QL STRIP: NEGATIVE
BUN SERPL-MCNC: 105 MG/DL (ref 8–23)
BUN SERPL-MCNC: 115 MG/DL (ref 8–23)
BUN SERPL-MCNC: 67 MG/DL (ref 8–23)
BUN SERPL-MCNC: 69 MG/DL (ref 8–23)
BUN/CREAT SERPL: 11.3 (ref 7–25)
BUN/CREAT SERPL: 11.8 (ref 7–25)
BUN/CREAT SERPL: 12.3 (ref 7–25)
BUN/CREAT SERPL: 9.9 (ref 7–25)
CALCIUM SPEC-SCNC: 8.1 MG/DL (ref 8.6–10.5)
CALCIUM SPEC-SCNC: 8.2 MG/DL (ref 8.6–10.5)
CALCIUM SPEC-SCNC: 8.3 MG/DL (ref 8.6–10.5)
CALCIUM SPEC-SCNC: 8.5 MG/DL (ref 8.6–10.5)
CHLORIDE SERPL-SCNC: 102 MMOL/L (ref 98–107)
CHLORIDE SERPL-SCNC: 103 MMOL/L (ref 98–107)
CHLORIDE SERPL-SCNC: 106 MMOL/L (ref 98–107)
CHLORIDE SERPL-SCNC: 106 MMOL/L (ref 98–107)
CLARITY UR: CLEAR
CO2 SERPL-SCNC: 16.5 MMOL/L (ref 22–29)
CO2 SERPL-SCNC: 20.1 MMOL/L (ref 22–29)
CO2 SERPL-SCNC: 20.1 MMOL/L (ref 22–29)
CO2 SERPL-SCNC: 22.7 MMOL/L (ref 22–29)
COLOR UR: YELLOW
CREAT SERPL-MCNC: 10.15 MG/DL (ref 0.76–1.27)
CREAT SERPL-MCNC: 5.83 MG/DL (ref 0.76–1.27)
CREAT SERPL-MCNC: 6.75 MG/DL (ref 0.76–1.27)
CREAT SERPL-MCNC: 8.53 MG/DL (ref 0.76–1.27)
D-LACTATE SERPL-SCNC: 0.9 MMOL/L (ref 0.5–2)
DEPRECATED RDW RBC AUTO: 45.1 FL (ref 37–54)
EGFRCR SERPLBLD CKD-EPI 2021: 4.7 ML/MIN/1.73
EGFRCR SERPLBLD CKD-EPI 2021: 5.8 ML/MIN/1.73
EGFRCR SERPLBLD CKD-EPI 2021: 7.7 ML/MIN/1.73
EGFRCR SERPLBLD CKD-EPI 2021: 9.2 ML/MIN/1.73
ERYTHROCYTE [DISTWIDTH] IN BLOOD BY AUTOMATED COUNT: 14.2 % (ref 12.3–15.4)
GLOBULIN UR ELPH-MCNC: 3.3 GM/DL
GLUCOSE BLDC GLUCOMTR-MCNC: 114 MG/DL (ref 70–99)
GLUCOSE BLDC GLUCOMTR-MCNC: 120 MG/DL (ref 70–99)
GLUCOSE BLDC GLUCOMTR-MCNC: 126 MG/DL (ref 70–99)
GLUCOSE BLDC GLUCOMTR-MCNC: 84 MG/DL (ref 70–99)
GLUCOSE SERPL-MCNC: 137 MG/DL (ref 65–99)
GLUCOSE SERPL-MCNC: 137 MG/DL (ref 65–99)
GLUCOSE SERPL-MCNC: 142 MG/DL (ref 65–99)
GLUCOSE SERPL-MCNC: 144 MG/DL (ref 65–99)
GLUCOSE UR STRIP-MCNC: NEGATIVE MG/DL
HBV SURFACE AB SER RIA-ACNC: NORMAL
HBV SURFACE AG SERPL QL IA: NORMAL
HCT VFR BLD AUTO: 30 % (ref 37.5–51)
HGB BLD-MCNC: 9.2 G/DL (ref 13–17.7)
HGB UR QL STRIP.AUTO: NEGATIVE
INR PPP: 1.24 (ref 0.86–1.15)
KETONES UR QL STRIP: NEGATIVE
LEUKOCYTE ESTERASE UR QL STRIP.AUTO: ABNORMAL
MCH RBC QN AUTO: 26.9 PG (ref 26.6–33)
MCHC RBC AUTO-ENTMCNC: 30.7 G/DL (ref 31.5–35.7)
MCV RBC AUTO: 87.7 FL (ref 79–97)
NITRITE UR QL STRIP: NEGATIVE
PH UR STRIP.AUTO: 6.5 [PH] (ref 5–8)
PLATELET # BLD AUTO: 197 10*3/MM3 (ref 140–450)
PMV BLD AUTO: 9.7 FL (ref 6–12)
POTASSIUM SERPL-SCNC: 4.9 MMOL/L (ref 3.5–5.2)
POTASSIUM SERPL-SCNC: 5.1 MMOL/L (ref 3.5–5.2)
POTASSIUM SERPL-SCNC: 6 MMOL/L (ref 3.5–5.2)
POTASSIUM SERPL-SCNC: 7.2 MMOL/L (ref 3.5–5.2)
PROT SERPL-MCNC: 6.1 G/DL (ref 6–8.5)
PROT UR QL STRIP: NEGATIVE
PROTHROMBIN TIME: 15.6 SECONDS (ref 11.8–14.9)
QT INTERVAL: 393 MS
QT INTERVAL: 420 MS
QTC INTERVAL: 437 MS
QTC INTERVAL: 467 MS
RBC # BLD AUTO: 3.42 10*6/MM3 (ref 4.14–5.8)
RBC # UR STRIP: ABNORMAL /HPF
REF LAB TEST METHOD: ABNORMAL
SODIUM SERPL-SCNC: 135 MMOL/L (ref 136–145)
SODIUM SERPL-SCNC: 137 MMOL/L (ref 136–145)
SODIUM SERPL-SCNC: 138 MMOL/L (ref 136–145)
SODIUM SERPL-SCNC: 140 MMOL/L (ref 136–145)
SP GR UR STRIP: 1.01 (ref 1–1.03)
SQUAMOUS #/AREA URNS HPF: ABNORMAL /HPF
UROBILINOGEN UR QL STRIP: ABNORMAL
WBC # UR STRIP: ABNORMAL /HPF
WBC NRBC COR # BLD: 5.9 10*3/MM3 (ref 3.4–10.8)

## 2023-11-08 PROCEDURE — 99222 1ST HOSP IP/OBS MODERATE 55: CPT | Performed by: INTERNAL MEDICINE

## 2023-11-08 PROCEDURE — 25810000003 LACTATED RINGERS PER 1000 ML: Performed by: FAMILY MEDICINE

## 2023-11-08 PROCEDURE — 63710000001 INSULIN REGULAR HUMAN PER 5 UNITS: Performed by: PHYSICIAN ASSISTANT

## 2023-11-08 PROCEDURE — 99222 1ST HOSP IP/OBS MODERATE 55: CPT | Performed by: UROLOGY

## 2023-11-08 PROCEDURE — 87186 SC STD MICRODIL/AGAR DIL: CPT

## 2023-11-08 PROCEDURE — 87340 HEPATITIS B SURFACE AG IA: CPT | Performed by: STUDENT IN AN ORGANIZED HEALTH CARE EDUCATION/TRAINING PROGRAM

## 2023-11-08 PROCEDURE — 80053 COMPREHEN METABOLIC PANEL: CPT | Performed by: FAMILY MEDICINE

## 2023-11-08 PROCEDURE — 94640 AIRWAY INHALATION TREATMENT: CPT

## 2023-11-08 PROCEDURE — 99291 CRITICAL CARE FIRST HOUR: CPT | Performed by: INTERNAL MEDICINE

## 2023-11-08 PROCEDURE — 87077 CULTURE AEROBIC IDENTIFY: CPT | Performed by: INTERNAL MEDICINE

## 2023-11-08 PROCEDURE — 87077 CULTURE AEROBIC IDENTIFY: CPT

## 2023-11-08 PROCEDURE — 25010000002 ONDANSETRON PER 1 MG: Performed by: FAMILY MEDICINE

## 2023-11-08 PROCEDURE — 85027 COMPLETE CBC AUTOMATED: CPT | Performed by: FAMILY MEDICINE

## 2023-11-08 PROCEDURE — 87186 SC STD MICRODIL/AGAR DIL: CPT | Performed by: INTERNAL MEDICINE

## 2023-11-08 PROCEDURE — 25010000002 MORPHINE PER 10 MG: Performed by: EMERGENCY MEDICINE

## 2023-11-08 PROCEDURE — 87086 URINE CULTURE/COLONY COUNT: CPT | Performed by: INTERNAL MEDICINE

## 2023-11-08 PROCEDURE — 36556 INSERT NON-TUNNEL CV CATH: CPT | Performed by: INTERNAL MEDICINE

## 2023-11-08 PROCEDURE — 83605 ASSAY OF LACTIC ACID: CPT | Performed by: FAMILY MEDICINE

## 2023-11-08 PROCEDURE — 02HV33Z INSERTION OF INFUSION DEVICE INTO SUPERIOR VENA CAVA, PERCUTANEOUS APPROACH: ICD-10-PCS | Performed by: INTERNAL MEDICINE

## 2023-11-08 PROCEDURE — 81001 URINALYSIS AUTO W/SCOPE: CPT | Performed by: INTERNAL MEDICINE

## 2023-11-08 PROCEDURE — 25010000002 HYDRALAZINE PER 20 MG: Performed by: FAMILY MEDICINE

## 2023-11-08 PROCEDURE — 99223 1ST HOSP IP/OBS HIGH 75: CPT | Performed by: FAMILY MEDICINE

## 2023-11-08 PROCEDURE — 25010000002 CALCIUM GLUCONATE-NACL 1-0.675 GM/50ML-% SOLUTION: Performed by: PHYSICIAN ASSISTANT

## 2023-11-08 PROCEDURE — 87040 BLOOD CULTURE FOR BACTERIA: CPT | Performed by: FAMILY MEDICINE

## 2023-11-08 PROCEDURE — 82948 REAGENT STRIP/BLOOD GLUCOSE: CPT

## 2023-11-08 PROCEDURE — 85610 PROTHROMBIN TIME: CPT | Performed by: INTERNAL MEDICINE

## 2023-11-08 PROCEDURE — 94799 UNLISTED PULMONARY SVC/PX: CPT

## 2023-11-08 PROCEDURE — 76937 US GUIDE VASCULAR ACCESS: CPT | Performed by: INTERNAL MEDICINE

## 2023-11-08 PROCEDURE — 94761 N-INVAS EAR/PLS OXIMETRY MLT: CPT

## 2023-11-08 PROCEDURE — 25010000002 CEFTRIAXONE PER 250 MG: Performed by: FAMILY MEDICINE

## 2023-11-08 PROCEDURE — 86706 HEP B SURFACE ANTIBODY: CPT | Performed by: STUDENT IN AN ORGANIZED HEALTH CARE EDUCATION/TRAINING PROGRAM

## 2023-11-08 PROCEDURE — 93010 ELECTROCARDIOGRAM REPORT: CPT | Performed by: SPECIALIST

## 2023-11-08 PROCEDURE — 93005 ELECTROCARDIOGRAM TRACING: CPT | Performed by: PHYSICIAN ASSISTANT

## 2023-11-08 RX ORDER — SODIUM CHLORIDE 0.9 % (FLUSH) 0.9 %
10 SYRINGE (ML) INJECTION AS NEEDED
Status: DISCONTINUED | OUTPATIENT
Start: 2023-11-08 | End: 2023-11-11 | Stop reason: HOSPADM

## 2023-11-08 RX ORDER — POLYETHYLENE GLYCOL 3350 17 G/17G
17 POWDER, FOR SOLUTION ORAL DAILY
Status: DISCONTINUED | OUTPATIENT
Start: 2023-11-08 | End: 2023-11-11 | Stop reason: HOSPADM

## 2023-11-08 RX ORDER — SODIUM CHLORIDE 9 MG/ML
40 INJECTION, SOLUTION INTRAVENOUS AS NEEDED
Status: DISCONTINUED | OUTPATIENT
Start: 2023-11-08 | End: 2023-11-11 | Stop reason: HOSPADM

## 2023-11-08 RX ORDER — HEPARIN SODIUM 5000 [USP'U]/ML
5000 INJECTION, SOLUTION INTRAVENOUS; SUBCUTANEOUS EVERY 12 HOURS SCHEDULED
Status: DISCONTINUED | OUTPATIENT
Start: 2023-11-08 | End: 2023-11-08

## 2023-11-08 RX ORDER — CALCIUM GLUCONATE 20 MG/ML
1000 INJECTION, SOLUTION INTRAVENOUS ONCE
Status: COMPLETED | OUTPATIENT
Start: 2023-11-08 | End: 2023-11-08

## 2023-11-08 RX ORDER — SODIUM CHLORIDE 0.9 % (FLUSH) 0.9 %
10 SYRINGE (ML) INJECTION EVERY 12 HOURS SCHEDULED
Status: DISCONTINUED | OUTPATIENT
Start: 2023-11-08 | End: 2023-11-11 | Stop reason: HOSPADM

## 2023-11-08 RX ORDER — FAMOTIDINE 20 MG/1
40 TABLET, FILM COATED ORAL DAILY
Status: DISCONTINUED | OUTPATIENT
Start: 2023-11-08 | End: 2023-11-11 | Stop reason: HOSPADM

## 2023-11-08 RX ORDER — BISACODYL 10 MG
10 SUPPOSITORY, RECTAL RECTAL DAILY PRN
Status: DISCONTINUED | OUTPATIENT
Start: 2023-11-08 | End: 2023-11-11 | Stop reason: HOSPADM

## 2023-11-08 RX ORDER — NITROGLYCERIN 0.4 MG/1
0.4 TABLET SUBLINGUAL
Status: DISCONTINUED | OUTPATIENT
Start: 2023-11-08 | End: 2023-11-11 | Stop reason: HOSPADM

## 2023-11-08 RX ORDER — DEXTROSE MONOHYDRATE 25 G/50ML
50 INJECTION, SOLUTION INTRAVENOUS ONCE
Status: COMPLETED | OUTPATIENT
Start: 2023-11-08 | End: 2023-11-08

## 2023-11-08 RX ORDER — LACTULOSE 10 G/15ML
20 SOLUTION ORAL 3 TIMES DAILY
Status: DISCONTINUED | OUTPATIENT
Start: 2023-11-08 | End: 2023-11-10

## 2023-11-08 RX ORDER — SODIUM POLYSTYRENE SULFONATE 15 G/60ML
30 SUSPENSION ORAL; RECTAL ONCE
Status: COMPLETED | OUTPATIENT
Start: 2023-11-08 | End: 2023-11-08

## 2023-11-08 RX ORDER — ONDANSETRON 2 MG/ML
4 INJECTION INTRAMUSCULAR; INTRAVENOUS EVERY 6 HOURS PRN
Status: DISCONTINUED | OUTPATIENT
Start: 2023-11-08 | End: 2023-11-11 | Stop reason: HOSPADM

## 2023-11-08 RX ORDER — HYDROCODONE BITARTRATE AND ACETAMINOPHEN 7.5; 325 MG/1; MG/1
1 TABLET ORAL EVERY 8 HOURS PRN
Status: DISCONTINUED | OUTPATIENT
Start: 2023-11-08 | End: 2023-11-11 | Stop reason: HOSPADM

## 2023-11-08 RX ORDER — ALBUTEROL SULFATE 2.5 MG/3ML
2.5 SOLUTION RESPIRATORY (INHALATION) ONCE
Status: COMPLETED | OUTPATIENT
Start: 2023-11-08 | End: 2023-11-08

## 2023-11-08 RX ORDER — HYDRALAZINE HYDROCHLORIDE 20 MG/ML
10 INJECTION INTRAMUSCULAR; INTRAVENOUS EVERY 6 HOURS PRN
Status: DISCONTINUED | OUTPATIENT
Start: 2023-11-08 | End: 2023-11-11 | Stop reason: HOSPADM

## 2023-11-08 RX ORDER — AMOXICILLIN 250 MG
2 CAPSULE ORAL 2 TIMES DAILY
Status: DISCONTINUED | OUTPATIENT
Start: 2023-11-08 | End: 2023-11-11 | Stop reason: HOSPADM

## 2023-11-08 RX ORDER — SODIUM CHLORIDE 450 MG/100ML
100 INJECTION, SOLUTION INTRAVENOUS CONTINUOUS
Status: ACTIVE | OUTPATIENT
Start: 2023-11-08 | End: 2023-11-08

## 2023-11-08 RX ORDER — SODIUM CHLORIDE, SODIUM LACTATE, POTASSIUM CHLORIDE, CALCIUM CHLORIDE 600; 310; 30; 20 MG/100ML; MG/100ML; MG/100ML; MG/100ML
75 INJECTION, SOLUTION INTRAVENOUS CONTINUOUS
Status: DISCONTINUED | OUTPATIENT
Start: 2023-11-08 | End: 2023-11-08

## 2023-11-08 RX ORDER — POLYETHYLENE GLYCOL 3350 17 G/17G
17 POWDER, FOR SOLUTION ORAL DAILY PRN
Status: DISCONTINUED | OUTPATIENT
Start: 2023-11-08 | End: 2023-11-11 | Stop reason: HOSPADM

## 2023-11-08 RX ORDER — BISACODYL 5 MG/1
5 TABLET, DELAYED RELEASE ORAL DAILY PRN
Status: DISCONTINUED | OUTPATIENT
Start: 2023-11-08 | End: 2023-11-11 | Stop reason: HOSPADM

## 2023-11-08 RX ADMIN — ONDANSETRON 4 MG: 2 INJECTION INTRAMUSCULAR; INTRAVENOUS at 12:04

## 2023-11-08 RX ADMIN — SODIUM BICARBONATE 50 MEQ: 84 INJECTION INTRAVENOUS at 06:20

## 2023-11-08 RX ADMIN — DEXTROSE MONOHYDRATE 50 ML: 25 INJECTION, SOLUTION INTRAVENOUS at 06:03

## 2023-11-08 RX ADMIN — HYDRALAZINE HYDROCHLORIDE 10 MG: 20 INJECTION, SOLUTION INTRAMUSCULAR; INTRAVENOUS at 20:15

## 2023-11-08 RX ADMIN — HYDRALAZINE HYDROCHLORIDE 10 MG: 20 INJECTION, SOLUTION INTRAMUSCULAR; INTRAVENOUS at 03:10

## 2023-11-08 RX ADMIN — DOCUSATE SODIUM 50MG AND SENNOSIDES 8.6MG 2 TABLET: 8.6; 5 TABLET, FILM COATED ORAL at 20:15

## 2023-11-08 RX ADMIN — CEFTRIAXONE SODIUM 2000 MG: 2 INJECTION, POWDER, FOR SOLUTION INTRAMUSCULAR; INTRAVENOUS at 04:35

## 2023-11-08 RX ADMIN — INSULIN HUMAN 10 UNITS: 100 INJECTION, SOLUTION PARENTERAL at 06:02

## 2023-11-08 RX ADMIN — Medication 10 ML: at 20:15

## 2023-11-08 RX ADMIN — SODIUM POLYSTYRENE SULFONATE 30 G: 15 SUSPENSION ORAL; RECTAL at 06:18

## 2023-11-08 RX ADMIN — LACTULOSE 20 G: 20 SOLUTION ORAL at 20:15

## 2023-11-08 RX ADMIN — ALBUTEROL SULFATE 2.5 MG: 2.5 SOLUTION RESPIRATORY (INHALATION) at 08:22

## 2023-11-08 RX ADMIN — SODIUM CHLORIDE 100 ML/HR: 4.5 INJECTION, SOLUTION INTRAVENOUS at 06:09

## 2023-11-08 RX ADMIN — CALCIUM GLUCONATE 1000 MG: 20 INJECTION, SOLUTION INTRAVENOUS at 06:23

## 2023-11-08 RX ADMIN — MORPHINE SULFATE 2 MG: 2 INJECTION, SOLUTION INTRAMUSCULAR; INTRAVENOUS at 00:00

## 2023-11-08 RX ADMIN — SODIUM CHLORIDE, POTASSIUM CHLORIDE, SODIUM LACTATE AND CALCIUM CHLORIDE 125 ML/HR: 600; 310; 30; 20 INJECTION, SOLUTION INTRAVENOUS at 03:00

## 2023-11-08 RX ADMIN — FAMOTIDINE 40 MG: 20 TABLET, FILM COATED ORAL at 10:48

## 2023-11-08 NOTE — CONSULTS
[facility]   Consult Note    Patient Name: Korey Rodriguez  : 1945  MRN: [id number]  Primary Care Physician:  Dimitri Hargrove MD  Referring Physician: No ref. provider found  Date of admission: 2023    Subjective   Subjective     Reason for Consult/ Chief Complaint: ANGEL LUIS    HPI:  Korey Rodriguez is a 78 y.o. male 78-year-old male with past medical history of bladder cancer status post cystectomy with urostomy diversion, status post chemo and radiation, AAA, good, hyperlipidemia who states that he has had minimal urine output from the urostomy for the last 3 days.  In the last 2 months he has had 2 episodes of UTI which were associated with high-grade fevers.  It appears he also had an elevation in creatinine and likely had developed CKD with creatinine in the 1.2-1.5 range.  He states in the last few days he has been having some nausea and vomiting and was also on meloxicam for pain.  He went to see his PCP and on routine labs noted to be in renal failure for which he was told to go to the ER    Since his admission in the ER he was noted to be hypertensive.  He has some mild edema in the lower extremities.  His creatinine was noted to be elevated to 10 with elevated BUN and potassium of 7.5.  CT scan did not show any overt obstruction but he does have some mild hydronephrosis possibly secondary to changes from the surgery.  Patient was admitted for further management of his ANGEL LUIS.  Nephrology has been consulted for hyperkalemia and ANGEL LUIS    Review of Systems  Constitutional:        Weakness tiredness fatigue  Eyes:                       No blurry vision, eye discharge, eye irritation, eye pain  HEENT:                   No acute hair loss, earache and discharge, nasal congestion or discharge, sore throat, postnasal drip  Respiratory:           No shortness of breath coughing sputum production wheezing hemoptysis pleuritic chest pain  Cardiovascular:     No chest pain, orthopnea, PND, dizziness,  palpitation, lower extremity edema  Gastrointestinal:   No nausea vomiting diarrhea abdominal pain constipation  Genitourinary:       No urinary incontinence, hesitancy, frequency, urgency, dysuria  Neurological:        No confusion, headache, focal weakness, numbness, dysphasia  Hematologic:         No bruising, bleeding, pallor, lymphadenopathy  Endocrine:            No coldness, hot flashes, polyuria, abnormal hair growth  Musculoskeletal:  No body pains, aches, arthritic pains, muscle pain ,muscle wasting  Psychiatric:          No low or high mood, anxiety, hallucinations, delusions  Skin.                      No rash, ulcers, bruising, itching    Personal History     Past Medical History:   Diagnosis Date    AAA (abdominal aortic aneurysm)     BEING MONITORED NO REPAIR NEEDED YET    Aneurysm of abdominal aorta branch vessel     Cancer     BLADDER AND SKIN CANCERS/REMOVED    Chronic deep vein thrombosis (DVT) of tibial vein of left lower extremity 6/27/2023    Essential tremor     History of DVT of lower extremity 12/2022    on Xarelto    History of urostomy     PT CURRENTLY HAS HOME HEALTH TO HELP WITH OSTOMY    Hyperlipidemia        Past Surgical History:   Procedure Laterality Date    COLONOSCOPY  2018    CYSTECTOMY      12/2022  W/UROSTOMY    SKIN CANCER EXCISION Right     TRANSURETHRAL RESECTION OF BLADDER TUMOR Right 10/19/2022    Procedure: Cystoscopy with transurethral resection of bladder tumor;  Surgeon: Aung Westfall MD;  Location: [facility] MAIN OR;  Service: Urology;  Laterality: Right;    VENOUS ACCESS DEVICE (PORT) INSERTION Right 01/09/2023    Procedure: INSERTION VENOUS ACCESS DEVICE;  Surgeon: Richie Edward MD;  [place]: [facility] [place] [facility];  Service: General;  Laterality: Right;       Family History: family history includes Cancer in his father and mother; Hearing loss in his father; Hypertension in his father. Otherwise pertinent FHx was reviewed and not pertinent to current issue.    Social  History:  reports that he quit smoking about 33 years ago. His smoking use included cigarettes. He started smoking about 61 years ago. He has a 20.00 pack-year smoking history. He has never used smokeless tobacco. He reports current alcohol use of about 1.0 standard drink of alcohol per week. He reports that he does not use drugs.    Home Medications:  HYDROcodone-acetaminophen, atorvastatin, and meloxicam    Allergies:  No Known Allergies    Objective    Objective     Vitals:   Temp:  [97.8 °F (36.6 °C)-98.1 °F (36.7 °C)] 98.1 °F (36.7 °C)  Heart Rate:  [54-84] 77  Resp:  [16-22] 18  BP: (155-194)/(70-94) 177/73    Physical Exam:             Constitutional:         Awake, alert responsive, conversant, no obvious distress   Eyes:                       PERRLA, sclerae anicteric, no conjunctival injection   HEENT:                   Moist mucous membranes, no nasal or eye discharge, no throat congestion   Neck:                      Supple, no thyromegaly, no lymphadenopathy, trachea midline, no elevated JVD   Respiratory:           Clear to auscultation bilaterally, nonlabored respirations    Cardiovascular:     RRR, no murmurs, rubs, or gallops, palpable pedal pulses bilaterally, No bilateral ankle edema   Gastrointestinal:   Positive bowel sounds, soft, nontender, non-distended, no organomegaly   Musculoskeletal:  No clubbing or cyanosis to extremities, muscle wasting, joint swelling, muscle weakness   Psychiatric:              Appropriate affect, cooperative   Neurologic:            Awake alert, oriented x 3, strength symmetric in all extremities, Cranial Nerves grossly intact to confrontation, speech clear   Skin:                      No rashes, bruising, skin ulcers, petechiae or ecchymosis    Result Review    Result Review:  I have personally reviewed the results from the time of this admission to 11/8/2023 08:53 EST and agree with these findings:  []  Laboratory  []  Microbiology  []  Radiology  []   EKG/Telemetry   []  Cardiology/Vascular   []  Pathology  []  Old records  []  Other:      Assessment & Plan   Assessment / Plan     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  CT scan did not show any overt obstruction but hard to assess with prior urological interventions.  Patient currently uremic and ANGEL LUIS may have been related to patient recently being on NSAIDs compounded with nausea and vomiting which may also have been in the setting of uremia    Plan:   Dr. Mayberry will help with placement of dialysis catheter.  We will dialyze the patient with a slow session of 2 and half hours on 2K bath with blood flow 250/500  We will have another session tomorrow on a similar prescription  Noted urology has been consulted and plan for possible urostomy tubes to ensure no obstructive pathology  Continue with IV fluids as is and DC when order expires  At this time right now recommend as needed antihypertensives for systolics greater than 180  Anticipate improvement in his renal dysfunction as he had normal renal function up to 3 months ago    Thank you for involving me in the care of the patient.  We will continue to follow along    Electronically signed by Carmencita Lyn MD, 11/08/23, 8:53 AM EST.

## 2023-11-08 NOTE — SIGNIFICANT NOTE
Wound Eval / Progress Noted    MEAGHAN Campo     Patient Name: Korey Rodriguez  : 1945  MRN: [id number]  Today's Date: 2023                 Admit Date: 2023    Visit Dx:    ICD-10-CM ICD-9-CM   1. Acute kidney injury  N17.9 584.9   2. Hyperkalemia  E87.5 276.7   3. Presence of urostomy  Z93.6 V44.6   4. 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    Acute UTI (urinary tract infection)    Aneurysm of iliac artery    Iron deficiency anemia    Stage 3a chronic kidney disease    Decreased urine output    Hyperkalemia    Bilateral hydronephrosis        Past Medical History:   Diagnosis Date    AAA (abdominal aortic aneurysm)     BEING MONITORED NO REPAIR NEEDED YET    Aneurysm of abdominal aorta branch vessel     Cancer     BLADDER AND SKIN CANCERS/REMOVED    Chronic deep vein thrombosis (DVT) of tibial vein of left lower extremity 2023    Essential tremor     History of DVT of lower extremity 2022    on Xarelto    History of urostomy     PT CURRENTLY HAS HOME HEALTH TO HELP WITH OSTOMY    Hyperlipidemia         Past Surgical History:   Procedure Laterality Date    COLONOSCOPY  2018    CYSTECTOMY      2022  W/UROSTOMY    SKIN CANCER EXCISION Right     TRANSURETHRAL RESECTION OF BLADDER TUMOR Right 10/19/2022    Procedure: Cystoscopy with transurethral resection of bladder tumor;  Surgeon: Aung Westfall MD;  Location: [facility]  Service: Urology;  Laterality: Right;    VENOUS ACCESS DEVICE (PORT) INSERTION Right 2023    Procedure: INSERTION VENOUS ACCESS DEVICE;  Surgeon: Richie Edward MD;  [facility]: [facility];  Service: General;  Laterality: Right;     Wound Check / Follow-up: Patient seen today for ostomy check-in. Patient's spouse is present at bedside. Patient is awake and alert at time of visit. Existing urostomy is in place to right abdomen. Patient reports he has had this ostomy since  December of 2022. He reports that he manages ostomy including pouching changes with assistance from his spouse when needed. Pouching system is clean, dry, and intact with no signs of lifting or leaking at this time. Patient denies any needs or concerns regarding ostomy at this time. Encouraged him to reach out if any arise. Patient and spouse both verbalized understanding.       Impression: Existing urostomy.      Short term goals: Urostomy management.        Ana Tyler RN    11/8/2023    10:08 EST

## 2023-11-08 NOTE — PROCEDURES
"Insert Central Line At Bedside    Date/Time: 11/8/2023 2:24 PM    Performed by: Jackson Mayberry DO  Authorized by: Jackson Mayberry DO  Consent: Verbal consent obtained. Written consent obtained.  Risks and benefits: risks, benefits and alternatives were discussed  Consent given by: patient  Patient consent: the patient's understanding of the procedure matches consent given  Procedure consent: procedure consent matches procedure scheduled  Relevant documents: relevant documents present and verified  Test results: test results available and properly labeled  Site marked: the operative site was marked  Imaging studies: imaging studies available  Required items: required blood products, implants, devices, and special equipment available  Patient identity confirmed: verbally with patient  Time out: Immediately prior to procedure a \"time out\" was called to verify the correct patient, procedure, equipment, support staff and site/side marked as required.  Indications: vascular access    Anesthesia:  Local Anesthetic: lidocaine 1% without epinephrine  Anesthetic total: 5 mL    Sedation:  Patient sedated: no    Preparation: skin prepped with ChloraPrep  Skin prep agent dried: skin prep agent completely dried prior to procedure  Sterile barriers: all five maximum sterile barriers used - cap, mask, sterile gown, sterile gloves, and large sterile sheet  Hand hygiene: hand hygiene performed prior to central venous catheter insertion  Location details: left femoral  Patient position: flat  Catheter type: triple lumen  Pre-procedure: landmarks identified  Ultrasound guidance: yes  Sterile ultrasound techniques: sterile gel and sterile probe covers were used  Number of attempts: 1  Successful placement: yes  Post-procedure: line sutured  Assessment: blood return through all ports and free fluid flow  Patient tolerance: patient tolerated the procedure well with no immediate complications        "

## 2023-11-08 NOTE — PLAN OF CARE
Goal Outcome Evaluation:      New admission this shift. Patient alert and oriented x 4, bed in lowest setting, call light in reach. Room air w/ RR even and unlabored. NSR. HTN w/ sys in 180s. Providers aware. Abx infusing. New IV placed. No acute events in the few hours patient has been on the unit.

## 2023-11-08 NOTE — H&P
[facility]   HISTORY AND PHYSICAL    Patient Name: Korey Rodriguez  : 1945  MRN: [id number]  Primary Care Physician:  Dimitri Hargrove MD  Date of admission: 2023    Subjective   Subjective     Chief Complaint: Decreased urine output    HPI:    Korey Rodriguez is a 78 y.o. male with past medical history of bladder cancer status post cystectomy with urostomy diversion, AAA,, and GERD presented to the ED after noticing that he has had minimal urine output from his urostomy for the last 3 days.  Patient states that for the last 3 days he has noticed minimal to no urine output from his urostomy as well as new onset right-sided flank pain.  Patient denies any fevers or chills but due to concerns he went to his PCP where labs were drawn and was found to be in renal failure so he was instructed to come to the ED immediately for further evaluation.  In the ED patient was hypertensive on arrival with remaining vitals being within normal limits.  Labs showed that he he has significantly reduced renal function with creatinine of 9.67 EGFR +3 with severe hyperkalemia of 6.6.  Patient was also found to have UTI and elevated proBNP and D-dimer.  CT of the abdomen showed abnormal appearing liver concerning for metastatic disease with some ascites and moderate stool burden along with known postsurgical changes.  Urology [facility] was contacted and agreed to transfer the patient over but no beds were available so they recommended admission until bed opens up.  When asked she denied any recent fevers, headaches, focal weakness, chest pain, palpitation, shortness of breath, cough, abdominal pain, nausea, vomiting, diarrhea, constipation, hematuria, hematochezia, melena, or anxiety.  Patient was admitted for further evaluation and treatment.            Review of Systems   All systems were reviewed and negative except for: As stated in HPI    Personal History     Past Medical History:   Diagnosis Date     AAA (abdominal aortic aneurysm)     BEING MONITORED NO REPAIR NEEDED YET    Aneurysm of abdominal aorta branch vessel     Cancer     BLADDER AND SKIN CANCERS/REMOVED    Chronic deep vein thrombosis (DVT) of tibial vein of left lower extremity 6/27/2023    Essential tremor     History of DVT of lower extremity 12/2022    on Xarelto    History of urostomy     PT CURRENTLY HAS HOME HEALTH TO HELP WITH OSTOMY    Hyperlipidemia        Past Surgical History:   Procedure Laterality Date    COLONOSCOPY  2018    CYSTECTOMY      12/2022  W/UROSTOMY    SKIN CANCER EXCISION Right     TRANSURETHRAL RESECTION OF BLADDER TUMOR Right 10/19/2022    Procedure: Cystoscopy with transurethral resection of bladder tumor;  Surgeon: Aung Westfall MD;  Location: [facility] MA[facility] OR;  Service: Urology;  Laterality: Right;    VENOUS ACCESS DEVICE (PORT) INSERTION Right 01/09/2023    Procedure: INSERTION VENOUS ACCESS DEVICE;  Surgeon: Richie Edward MD;  Location: [facility];  Service: General;  Laterality: Right;       Family History: family history includes Cancer in his father and mother; Hearing loss in his father; Hypertension in his father. Otherwise pertinent FHx was reviewed and not pertinent to current issue.    Social History:  reports that he quit smoking about 33 years ago. His smoking use included cigarettes. He started smoking about 61 years ago. He has a 20.00 pack-year smoking history. He has never used smokeless tobacco. He reports current alcohol use of about 1.0 standard drink of alcohol per week. He reports that he does not use drugs.    Home Medications:  HYDROcodone-acetaminophen, atorvastatin, and meloxicam      Allergies:  No Known Allergies    Objective   Objective     Vitals:   Temp:  [97.8 °F (36.6 °C)-98.1 °F (36.7 °C)] 98.1 °F (36.7 °C)  Heart Rate:  [54-82] 79  Resp:  [18] 18  BP: (155-194)/(70-94) 183/75  Physical Exam    Constitutional: Awake, alert   Eyes: PERRLA, sclerae anicteric, no conjunctival  injection   HENT: NCAT, mucous membranes moist   Neck: Supple, no thyromegaly, no lymphadenopathy, trachea midline   Respiratory: Clear to auscultation bilaterally, nonlabored respirations    Cardiovascular: RRR, no murmurs, rubs, or gallops, palpable pedal pulses bilaterally   Gastrointestinal: Positive bowel sounds, soft, urostomy   Musculoskeletal: No bilateral ankle edema, no clubbing or cyanosis to extremities   Psychiatric: Appropriate affect, cooperative   Neurologic: Oriented x 3, strength symmetric in all extremities, Cranial Nerves grossly intact to confrontation, speech clear   Skin: No rashes     Result Review    Result Review:  I have personally reviewed the results from the time of this admission to 11/8/2023 02:53 EST and agree with these findings:  [x]  Laboratory list / accordion  []  Microbiology  [x]  Radiology  [x]  EKG/Telemetry   []  Cardiology/Vascular   []  Pathology  []  Old records  []  Other:  Most notable findings include: Acute renal failure, UTI, hyperkalemia, AAA, metastatic disease of the liver      Assessment & Plan   Assessment / Plan     Brief Patient Summary:  Korey Rodriguez is a 78 y.o. male with past medical history of bladder cancer status post cystectomy with urostomy diversion, AAA,, and GERD presented to the ED after noticing that he has had minimal urine output from his urostomy for the last 3 days.       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     Aneurysm of iliac artery      Plan:     Acute renal failure  -Admitted telemetry  -Patient with malfunctioning urostomy  -Hyperkalemia noted  -Urology at [facility] excepted patient for transfer  -Awaiting bed  -[facility]  -Nephrology consulted  -Avoid nephrotoxic drugs  -We  will follow labs  -Supportive care    Hypokalemia  -Secondary to above  -Lokelma given  -D50 with insulin  -Follow labs  -Nephrology consulted    UTI  -UA reviewed  -Empiric antibiotics  -Urine, blood cultures  -IVF  -Supportive care    Malfunction urostomy  -We will transfer to [facility]    Elevated blood pressures  -Denied history  -Hydralazine as needed    History of bladder cancer  -Status post cystectomy with urostomy  -CT with possible metastatic disease to the liver  -Consult oncology if warranted    Hx AAA      DVT prophylaxis:  Medical DVT prophylaxis orders are present.    CODE STATUS: Full code     Admission Status:  I believe this patient meets observation status.      Electronically signed by Ted Fernandez MD, 11/08/23, 2:53 AM EST.

## 2023-11-08 NOTE — CONSULTS
[facility]   Consult Note    Patient Name: Korey Rodriguez  : 1945  MRN: [id number]  Primary Care Physician:  Dmiitri Hargrove MD  Referring Physician: No ref. provider found  Date of admission: 2023    Inpatient Pulmonology Consult  Consult performed by: Jackson Mayberry DO  Consult ordered by: Itz Knox MD        Subjective   Subjective     Reason for Consult/ Chief Complaint: Reason for consultation    History of Present Illness  Korey Rodriguez is a 78 y.o. male presented to the hospital with acute renal failure  Was complaining of decreased urine output  Found to have acute renal injury  Found to have hyperkalemia  I have been consulted for placement of dialysis catheter    Review of Systems   Decreased urine output    Personal reason for consultation history     Past Medical History:   Diagnosis Date    AAA (abdominal aortic aneurysm)     BEING MONITORED NO REPAIR NEEDED YET    Aneurysm of abdominal aorta branch vessel     Cancer     BLADDER AND SKIN CANCERS/REMOVED    Chronic deep vein thrombosis (DVT) of tibial vein of left lower extremity 2023    Essential tremor     History of DVT of lower extremity 2022    on Xarelto    History of urostomy     PT CURRENTLY HAS HOME HEALTH TO HELP WITH OSTOMY    Hyperlipidemia        Past Surgical History:   Procedure Laterality Date    COLONOSCOPY  2018    CYSTECTOMY      2022  W/UROSTOMY    SKIN CANCER EXCISION Right     TRANSURETHRAL RESECTION OF BLADDER TUMOR Right 10/19/2022    Procedure: Cystoscopy with transurethral resection of bladder tumor;  Surgeon: Aung Westfall MD;  [facility]];  Service: Urology;  Laterality: Right;    VENOUS ACCESS DEVICE (PORT) INSERTION Right 2023    Procedure: INSERTION VENOUS ACCESS DEVICE;  Surgeon: Richie Edward MD;  Location: [facility];  Service: General;  Laterality: Right;       Family History: family history includes Cancer in his father and mother; Hearing  loss in his father; Hypertension in his father. Otherwise pertinent FHx was reviewed and not pertinent to current issue.    Social History:  reports that he quit smoking about 33 years ago. His smoking use included cigarettes. He started smoking about 61 years ago. He has a 20.00 pack-year smoking history. He has never used smokeless tobacco. He reports current alcohol use of about 1.0 standard drink of alcohol per week. He reports that he does not use drugs.    Home Medications:   HYDROcodone-acetaminophen, atorvastatin, and meloxicam    Allergies:  No Known Allergies    Objective    Objective     Vitals:  Temp:  [97.3 °F (36.3 °C)-98.1 °F (36.7 °C)] 97.3 °F (36.3 °C)  Heart Rate:  [54-84] 82  Resp:  [16-22] 18  BP: (168-194)/(70-94) 185/84    Physical Exam  Vital Signs Reviewed  General WDWN, Alert, NAD.    Chest:  good aeration, clear to auscultation bilaterally, tympanic to percussion bilaterally, no work of breathing noted  CV: RRR, no MGR, .  EXT:  no clubbing, no cyanosis, no edema, no joint tenderness  Neuro:  A&Ox3, CN grossly intact, no focal deficits.  Skin: No rashes or lesions noted  Result Review    Result Review:  I have personally reviewed the results from the time of this admission to 11/8/2023 14:27 EST and agree with these findings:  [x]  Laboratory  [x]  Microbiology  []  Radiology  []  EKG/Telemetry   []  Cardiology/Vascular   []  Pathology  []  Old records  []  Other:    Most notable findings include: No panel showing hyperkalemia    Assessment & Plan   Assessment / Plan     Brief Patient Summary:  Korey Rodriguez is a 78 y.o. male who mated to the hospital found to have acute renal failure and hyperkalemia    Active Hospital Problems:  Active Hospital Problems    Diagnosis     **Acute renal failure     Hyperkalemia     Bilateral hydronephrosis     Decreased urine output     Stage 3a chronic kidney disease     Iron deficiency anemia     Acute UTI (urinary tract infection)     Malignant  neoplasm of lateral wall of bladder     Aneurysm of iliac artery        Plan:   Contacted by nephrology service as well as hospitalist for placement of dialysis catheter  Dialysis catheter placed in the left femoral vein  Chose not to use the right IJ as patient does have a right IJ port  Discussed risk versus benefits with the patient  Risk including infection, bleeding, damage to the artery blood vessel  Patient voiced understanding wish to proceed with procedure  Dialysis catheter placed in the left femoral vein without complication patient tolerated procedure well with no immediate complications    I personally reviewed all imaging, laboratory data, and I spoke with respiratory therapy, and nursing regarding the patient's care, have also spoken with the patient's primary admitting physician regarding his plan of care.      Electronically signed by Jackson Mayberry DO, 11/08/23, 2:27 PM EST.

## 2023-11-08 NOTE — PROGRESS NOTES
" [facility]   Hospitalist Progress Note    Date of admission: 11/7/2023  Patient Name: Korey Rodriguez  1945  Date: 11/8/2023      Subjective     Chief Complaint   Patient presents with    Abnormal Lab       Interval Followup: No current abdominal pain, no fevers chills shortness of air chest pain or palpitations.  Discussed renal failure and hyperkalemia patient is willing for initiation of hemodialysis.  He also understands need for transfer to [facility] for urology evaluation when a bed is available.    Patient notes he does have meloxicam at home but he only took 3 doses earlier this week but it \"tore him up \"and he had some nausea and vomiting with that and stopped taking any further doses been using Tylenol instead.  He has not had any output from his urostomy for the past 3 days essentially.  He has been drinking a lot of fluids despite all of this.      Objective     Vitals:   Temp:  [97.8 °F (36.6 °C)-98.1 °F (36.7 °C)] 97.9 °F (36.6 °C)  Heart Rate:  [54-82] 82  Resp:  [16-22] 22  BP: (155-194)/(70-94) 187/71    Physical Exam  Gen: awake, resting in bed, conversant  HENT: NCAT, mmm  Resp: CTAB, normal respiratory effort  CV: RRR, no LE pitting edema  GI: Abdomen soft, NT, ND, no guarding, +BS, pink/perfused urostomy with only a few drops of yellow urine output in ostomy bag  Psych: appropriate mood and affect, aox3  Skin: warm, dry      Result Review:  Vital signs, labs and recent relevant imaging reviewed.        senna-docusate sodium **AND** polyethylene glycol **AND** bisacodyl **AND** bisacodyl    hydrALAZINE    HYDROcodone-acetaminophen    nitroglycerin    ondansetron    sodium chloride    sodium chloride    sodium chloride    albuterol, 2.5 mg, Nebulization, Once  cefTRIAXone, 2,000 mg, Intravenous, Q24H  famotidine, 40 mg, Oral, Daily  heparin (porcine), 5,000 Units, Subcutaneous, Q12H  lactulose, 20 g, Oral, TID  polyethylene glycol, 17 g, Oral, Daily  senna-docusate sodium, 2 tablet, Oral, " BID  sodium chloride, 10 mL, Intravenous, Q12H        CT Abdomen Pelvis Without Contrast    Result Date: 11/7/2023    1. Abnormal appearance to the liver which could is concerning for metastatic disease which has developed. 2. Bibasilar effusions and bibasilar atelectasis. 3. Evidence for some underlying ascites. 4. Pelvocaliectasis bilaterally with nonobstructing intrarenal calculi.  Findings could relate to cystectomy and diverting ileostomy.  There was pelvocaliectasis on prior exam. 5. Moderate stool burden which could relate to constipation. 6. Infrarenal abdominal aortic aneurysm which has been noted. 7. Scoliosis lumbar spine with multilevel degenerative change.     SHUBHAM BELL MD       Electronically Signed and Approved By: SHUBHAM BELL MD on 11/07/2023 at 19:52             XR Chest 1 View    Result Date: 11/7/2023    1. Cardiomegaly.       SHUBHAM BELL MD       Electronically Signed and Approved By: SHUBHAM BELL MD on 11/07/2023 at 18:12              Assessment / Plan     Summary: 78-year-old male with history of urothelial carcinoma and prior cystectomy and urostomy diversion with adjuvant chemotherapy and radiotherapy who presents with abdominal pain and decreased urostomy output and found to have cute renal failure and severe hyperkalemia.  Nephrology urology consulted patient awaiting transfer to [facility] where he had his prior surgery for closer monitoring.  Patient requiring stat hemodialysis    Assessment/Plan (clinically significant if listed here)  Acute renal failure on CKD ~2/3 recent creatinine 1.3-1.5   Severe hyperkalemia  Bilateral pelvocaliectasis, possible worsening/obstructive causing ARF  Anion gap metabolic acidosis  Hypertension  Ascites  Concern for metastatic liver disease  Concern for malfunctioning urostomy  History of urothelial carcinoma with prior cystectomy and urostomy diversion and adjuvant chemotherapy and radiotherapy  AAA previously 4.7 x 5 cm slightly enlarged 5.1 x 5.6  cm  Constipation  Bibasilar effusions and atelectasis    10/16 had 10 days of Levaquin 750 for UTI, urine culture mixed kika at that time  6/7/2023 added refill of 90 days of meloxicam 15 mg    5/8 CT abdomen pelvis with contrast and possible right lower pole of the kidney exophytic mass MRI follow-up recommended does not appear to be obtained no obvious liver masses noted at that time    Nephrology consult, case discussed, plan for stat hemodialysis and hemodialysis catheter placement.  Patient is in agreement with initiation  D/w urology, possible worsening of hydronephrosis contributing, working on nephrostomy placement, checking coags preop, holding heparin  Continue Lokelma, IV fluids, albuterol, insulin and acute hyperkalemia treatment initiated, trend BMP  Obtain straight cath from urostomy with reflex, received one-time dose ceftriaxone in the ED defer additional doses for now as white count within normal limits and denies suspect acute UTI  Follow-up blood pressure, does not appear to be on any home meds as needed hydralazine with parameters for now.  Monitor blood pressure after hemodialysis and will allow higher blood pressure for now  Given aaa will likely use coreg if needed  Check AFP, LFTs normal, ideally need contrast/dedicated study avoiding currently given renal dysfunction.  If does not transfer soon/have a bed available consider abdominal ultrasound  BNP elevated, no overt edema on exam, no prior echo for comparison, on room air, cxr reviewed no obvious effusion on portable film, does appear to have some moderate cardiomegaly, will consider echo evaluation if no bed available   Monitor oxygenation/resp hygiene  Discussed goals of care discussion with the patient wishing for DNR/DNI but is otherwise okay with hemodialysis and other measures short of chest compressions/intubation  PT/OT  Check a.m. CBC, BMP and close potassium trend/monitoring, magnesium, phosphorus,monitor potassium, EKG changes    Continue hospital monitoring and treatment at current level of care - does not need upgraded at this time.      DVT prophylaxis:  Medical DVT prophylaxis orders are present.       Patient is critically ill due to acute renal failure and severe acute hyperkalemia requiring initiation of hemodialysis and multiple issues noted above.  I have spent 32 minutes of critical care time reviewing documentation, pertinent labs, imaging studies, examining the patient, modifying care plan, and discussing patient's condition and care plan with the patient, nursing and pulmcrit and nephrology.      CBC          10/4/2023    07:52 11/7/2023    17:18 11/8/2023    04:33   CBC   WBC 5.69  5.67  5.90    RBC 3.95  3.43  3.42    Hemoglobin 11.1  9.3  9.2    Hematocrit 35.5  29.6  30.0    MCV 89.9  86.3  87.7    MCH 28.1  27.1  26.9    MCHC 31.3  31.4  30.7    RDW 12.8  14.3  14.2    Platelets 233  197  197        CMP          10/4/2023    07:52 11/7/2023    10:51 11/7/2023    17:18 11/7/2023    20:31 11/8/2023    06:17   CMP   Glucose 105  95  94  67  144    BUN 31  110  114   115    Creatinine 1.29  8.79  9.27   10.15    EGFR 56.1  5.6  5.3   4.7    Sodium 140  136  135  135.0  135    Potassium 4.4  7.0  6.6  6.1  7.2    Chloride 104  102  102  106  102    Calcium 9.3  8.9  8.6   8.3    Total Protein 7.0  6.7  6.6   6.1    Albumin 3.8  3.3  3.3   2.8    Globulin 3.2  3.4  3.3   3.3    Total Bilirubin 0.3  0.3  0.3   0.3    Alkaline Phosphatase 87  107  105   100    AST (SGOT) 17  20  20   19    ALT (SGPT) 12  13  12   12    Albumin/Globulin Ratio 1.2  1.0  1.0   0.8    BUN/Creatinine Ratio 24.0  12.5  12.3   11.3    Anion Gap 8.4  14.7  14.4   16.5

## 2023-11-09 ENCOUNTER — APPOINTMENT (OUTPATIENT)
Dept: CT IMAGING | Facility: HOSPITAL | Age: 78
DRG: 683 | End: 2023-11-09
Payer: MEDICARE

## 2023-11-09 LAB
ANION GAP SERPL CALCULATED.3IONS-SCNC: 10.1 MMOL/L (ref 5–15)
ANION GAP SERPL CALCULATED.3IONS-SCNC: 10.9 MMOL/L (ref 5–15)
ANION GAP SERPL CALCULATED.3IONS-SCNC: 11.5 MMOL/L (ref 5–15)
BASOPHILS # BLD AUTO: 0.03 10*3/MM3 (ref 0–0.2)
BASOPHILS NFR BLD AUTO: 0.6 % (ref 0–1.5)
BUN SERPL-MCNC: 66 MG/DL (ref 8–23)
BUN SERPL-MCNC: 72 MG/DL (ref 8–23)
BUN SERPL-MCNC: 72 MG/DL (ref 8–23)
BUN/CREAT SERPL: 10.1 (ref 7–25)
BUN/CREAT SERPL: 9.6 (ref 7–25)
BUN/CREAT SERPL: 9.9 (ref 7–25)
CALCIUM SPEC-SCNC: 7.9 MG/DL (ref 8.6–10.5)
CALCIUM SPEC-SCNC: 8.1 MG/DL (ref 8.6–10.5)
CALCIUM SPEC-SCNC: 8.2 MG/DL (ref 8.6–10.5)
CHLORIDE SERPL-SCNC: 105 MMOL/L (ref 98–107)
CHLORIDE SERPL-SCNC: 105 MMOL/L (ref 98–107)
CHLORIDE SERPL-SCNC: 107 MMOL/L (ref 98–107)
CO2 SERPL-SCNC: 20.5 MMOL/L (ref 22–29)
CO2 SERPL-SCNC: 21.1 MMOL/L (ref 22–29)
CO2 SERPL-SCNC: 21.9 MMOL/L (ref 22–29)
CREAT SERPL-MCNC: 6.54 MG/DL (ref 0.76–1.27)
CREAT SERPL-MCNC: 7.26 MG/DL (ref 0.76–1.27)
CREAT SERPL-MCNC: 7.47 MG/DL (ref 0.76–1.27)
DEPRECATED RDW RBC AUTO: 46 FL (ref 37–54)
EGFRCR SERPLBLD CKD-EPI 2021: 6.8 ML/MIN/1.73
EGFRCR SERPLBLD CKD-EPI 2021: 7 ML/MIN/1.73
EGFRCR SERPLBLD CKD-EPI 2021: 8 ML/MIN/1.73
EOSINOPHIL # BLD AUTO: 0.13 10*3/MM3 (ref 0–0.4)
EOSINOPHIL NFR BLD AUTO: 2.4 % (ref 0.3–6.2)
ERYTHROCYTE [DISTWIDTH] IN BLOOD BY AUTOMATED COUNT: 14.7 % (ref 12.3–15.4)
GLUCOSE SERPL-MCNC: 101 MG/DL (ref 65–99)
GLUCOSE SERPL-MCNC: 101 MG/DL (ref 65–99)
GLUCOSE SERPL-MCNC: 120 MG/DL (ref 65–99)
HCT VFR BLD AUTO: 26.9 % (ref 37.5–51)
HGB BLD-MCNC: 8.5 G/DL (ref 13–17.7)
IMM GRANULOCYTES # BLD AUTO: 0.02 10*3/MM3 (ref 0–0.05)
IMM GRANULOCYTES NFR BLD AUTO: 0.4 % (ref 0–0.5)
LYMPHOCYTES # BLD AUTO: 0.3 10*3/MM3 (ref 0.7–3.1)
LYMPHOCYTES NFR BLD AUTO: 5.6 % (ref 19.6–45.3)
MAGNESIUM SERPL-MCNC: 2.1 MG/DL (ref 1.6–2.4)
MCH RBC QN AUTO: 27.3 PG (ref 26.6–33)
MCHC RBC AUTO-ENTMCNC: 31.6 G/DL (ref 31.5–35.7)
MCV RBC AUTO: 86.5 FL (ref 79–97)
MONOCYTES # BLD AUTO: 0.71 10*3/MM3 (ref 0.1–0.9)
MONOCYTES NFR BLD AUTO: 13.1 % (ref 5–12)
NEUTROPHILS NFR BLD AUTO: 4.21 10*3/MM3 (ref 1.7–7)
NEUTROPHILS NFR BLD AUTO: 77.9 % (ref 42.7–76)
NRBC BLD AUTO-RTO: 0 /100 WBC (ref 0–0.2)
PHOSPHATE SERPL-MCNC: 6.5 MG/DL (ref 2.5–4.5)
PLATELET # BLD AUTO: 129 10*3/MM3 (ref 140–450)
PMV BLD AUTO: 9.7 FL (ref 6–12)
POTASSIUM SERPL-SCNC: 5.3 MMOL/L (ref 3.5–5.2)
POTASSIUM SERPL-SCNC: 5.6 MMOL/L (ref 3.5–5.2)
POTASSIUM SERPL-SCNC: 5.6 MMOL/L (ref 3.5–5.2)
RBC # BLD AUTO: 3.11 10*6/MM3 (ref 4.14–5.8)
SODIUM SERPL-SCNC: 137 MMOL/L (ref 136–145)
SODIUM SERPL-SCNC: 137 MMOL/L (ref 136–145)
SODIUM SERPL-SCNC: 139 MMOL/L (ref 136–145)
WBC NRBC COR # BLD: 5.4 10*3/MM3 (ref 3.4–10.8)

## 2023-11-09 PROCEDURE — C1729 CATH, DRAINAGE: HCPCS

## 2023-11-09 PROCEDURE — 83735 ASSAY OF MAGNESIUM: CPT | Performed by: INTERNAL MEDICINE

## 2023-11-09 PROCEDURE — 80048 BASIC METABOLIC PNL TOTAL CA: CPT | Performed by: INTERNAL MEDICINE

## 2023-11-09 PROCEDURE — 94761 N-INVAS EAR/PLS OXIMETRY MLT: CPT

## 2023-11-09 PROCEDURE — 0T9430Z DRAINAGE OF LEFT KIDNEY PELVIS WITH DRAINAGE DEVICE, PERCUTANEOUS APPROACH: ICD-10-PCS | Performed by: RADIOLOGY

## 2023-11-09 PROCEDURE — 99239 HOSP IP/OBS DSCHRG MGMT >30: CPT | Performed by: INTERNAL MEDICINE

## 2023-11-09 PROCEDURE — 87070 CULTURE OTHR SPECIMN AEROBIC: CPT | Performed by: UROLOGY

## 2023-11-09 PROCEDURE — 87205 SMEAR GRAM STAIN: CPT | Performed by: UROLOGY

## 2023-11-09 PROCEDURE — 84100 ASSAY OF PHOSPHORUS: CPT | Performed by: INTERNAL MEDICINE

## 2023-11-09 PROCEDURE — 87070 CULTURE OTHR SPECIMN AEROBIC: CPT | Performed by: INTERNAL MEDICINE

## 2023-11-09 PROCEDURE — 25010000002 HYDRALAZINE PER 20 MG: Performed by: FAMILY MEDICINE

## 2023-11-09 PROCEDURE — 85025 COMPLETE CBC W/AUTO DIFF WBC: CPT | Performed by: INTERNAL MEDICINE

## 2023-11-09 PROCEDURE — 87205 SMEAR GRAM STAIN: CPT | Performed by: INTERNAL MEDICINE

## 2023-11-09 PROCEDURE — 25010000002 LABETALOL 5 MG/ML SOLUTION

## 2023-11-09 PROCEDURE — 0T9330Z DRAINAGE OF RIGHT KIDNEY PELVIS WITH DRAINAGE DEVICE, PERCUTANEOUS APPROACH: ICD-10-PCS | Performed by: RADIOLOGY

## 2023-11-09 PROCEDURE — 99232 SBSQ HOSP IP/OBS MODERATE 35: CPT | Performed by: UROLOGY

## 2023-11-09 PROCEDURE — 25010000002 FENTANYL CITRATE (PF) 50 MCG/ML SOLUTION: Performed by: RADIOLOGY

## 2023-11-09 PROCEDURE — 94799 UNLISTED PULMONARY SVC/PX: CPT

## 2023-11-09 PROCEDURE — 25010000002 MIDAZOLAM PER 1MG: Performed by: RADIOLOGY

## 2023-11-09 RX ORDER — POLYETHYLENE GLYCOL 3350 17 G/17G
17 POWDER, FOR SOLUTION ORAL 2 TIMES DAILY PRN
Qty: 20 EACH | Refills: 0 | Status: SHIPPED | OUTPATIENT
Start: 2023-11-09

## 2023-11-09 RX ORDER — ATORVASTATIN CALCIUM 20 MG/1
20 TABLET, FILM COATED ORAL NIGHTLY
Status: DISCONTINUED | OUTPATIENT
Start: 2023-11-09 | End: 2023-11-11 | Stop reason: HOSPADM

## 2023-11-09 RX ORDER — FENTANYL CITRATE 50 UG/ML
INJECTION, SOLUTION INTRAMUSCULAR; INTRAVENOUS AS NEEDED
Status: COMPLETED | OUTPATIENT
Start: 2023-11-09 | End: 2023-11-09

## 2023-11-09 RX ORDER — LIDOCAINE HYDROCHLORIDE 20 MG/ML
20 INJECTION, SOLUTION INFILTRATION; PERINEURAL ONCE
Status: COMPLETED | OUTPATIENT
Start: 2023-11-09 | End: 2023-11-09

## 2023-11-09 RX ORDER — CARVEDILOL 6.25 MG/1
6.25 TABLET ORAL 2 TIMES DAILY WITH MEALS
Start: 2023-11-09

## 2023-11-09 RX ORDER — LABETALOL HYDROCHLORIDE 5 MG/ML
5 INJECTION, SOLUTION INTRAVENOUS ONCE
Status: COMPLETED | OUTPATIENT
Start: 2023-11-09 | End: 2023-11-09

## 2023-11-09 RX ORDER — LABETALOL HYDROCHLORIDE 5 MG/ML
10 INJECTION, SOLUTION INTRAVENOUS ONCE
Status: COMPLETED | OUTPATIENT
Start: 2023-11-09 | End: 2023-11-09

## 2023-11-09 RX ORDER — AMLODIPINE BESYLATE 5 MG/1
5 TABLET ORAL
Status: DISCONTINUED | OUTPATIENT
Start: 2023-11-09 | End: 2023-11-10

## 2023-11-09 RX ORDER — MIDAZOLAM HYDROCHLORIDE 2 MG/2ML
INJECTION, SOLUTION INTRAMUSCULAR; INTRAVENOUS AS NEEDED
Status: COMPLETED | OUTPATIENT
Start: 2023-11-09 | End: 2023-11-09

## 2023-11-09 RX ORDER — LIDOCAINE HYDROCHLORIDE 20 MG/ML
INJECTION, SOLUTION INFILTRATION; PERINEURAL
Status: DISCONTINUED
Start: 2023-11-09 | End: 2023-11-09 | Stop reason: WASHOUT

## 2023-11-09 RX ORDER — AMLODIPINE BESYLATE 5 MG/1
5 TABLET ORAL DAILY
Start: 2023-11-09 | End: 2023-11-09 | Stop reason: HOSPADM

## 2023-11-09 RX ADMIN — MIDAZOLAM HYDROCHLORIDE 2 MG: 1 INJECTION, SOLUTION INTRAMUSCULAR; INTRAVENOUS at 11:45

## 2023-11-09 RX ADMIN — ATORVASTATIN CALCIUM 20 MG: 20 TABLET, FILM COATED ORAL at 20:24

## 2023-11-09 RX ADMIN — HYDRALAZINE HYDROCHLORIDE 10 MG: 20 INJECTION, SOLUTION INTRAMUSCULAR; INTRAVENOUS at 20:30

## 2023-11-09 RX ADMIN — LABETALOL HYDROCHLORIDE 5 MG: 5 INJECTION, SOLUTION INTRAVENOUS at 00:14

## 2023-11-09 RX ADMIN — AMLODIPINE BESYLATE 5 MG: 5 TABLET ORAL at 20:24

## 2023-11-09 RX ADMIN — Medication 10 ML: at 08:14

## 2023-11-09 RX ADMIN — HYDRALAZINE HYDROCHLORIDE 10 MG: 20 INJECTION, SOLUTION INTRAMUSCULAR; INTRAVENOUS at 03:16

## 2023-11-09 RX ADMIN — LABETALOL HYDROCHLORIDE 10 MG: 5 INJECTION, SOLUTION INTRAVENOUS at 04:00

## 2023-11-09 RX ADMIN — LIDOCAINE HYDROCHLORIDE 10 ML: 20 INJECTION, SOLUTION INFILTRATION; PERINEURAL at 12:07

## 2023-11-09 RX ADMIN — LIDOCAINE HYDROCHLORIDE 10 ML: 20 INJECTION, SOLUTION INFILTRATION; PERINEURAL at 12:19

## 2023-11-09 RX ADMIN — FENTANYL CITRATE 50 MCG: 50 INJECTION, SOLUTION INTRAMUSCULAR; INTRAVENOUS at 11:48

## 2023-11-09 RX ADMIN — Medication 10 ML: at 20:24

## 2023-11-09 RX ADMIN — LACTULOSE 20 G: 20 SOLUTION ORAL at 20:23

## 2023-11-09 NOTE — PROGRESS NOTES
[facility]     Progress Note    Patient Name: Korey Rodriguez  : 1945  MRN: [id number]  Primary Care Physician:  Dimitri Hargrove MD  Date of admission: 2023    Subjective   Patient had full session of dialysis  Plan for another session today  Has some urine output  Creatinine continues to increase in between sessions  Plan for nephrostomy tubes possibly today    Scheduled Meds:famotidine, 40 mg, Oral, Daily  lactulose, 20 g, Oral, TID  polyethylene glycol, 17 g, Oral, Daily  senna-docusate sodium, 2 tablet, Oral, BID  sodium chloride, 10 mL, Intravenous, Q12H      Continuous Infusions:   PRN Meds:.  senna-docusate sodium **AND** polyethylene glycol **AND** bisacodyl **AND** bisacodyl    hydrALAZINE    HYDROcodone-acetaminophen    nitroglycerin    ondansetron    sodium chloride    sodium chloride    sodium chloride       Review of Systems  Constitutional:        Weakness tiredness fatigue  Eyes:                       No blurry vision, eye discharge, eye irritation, eye pain  HEENT:                   No acute hair loss, earache and discharge, nasal congestion or discharge, sore throat, postnasal drip  Respiratory:           No shortness of breath coughing sputum production wheezing hemoptysis pleuritic chest pain  Cardiovascular:     No chest pain, orthopnea, PND, dizziness, palpitation, lower extremity edema  Gastrointestinal:   No nausea vomiting diarrhea abdominal pain constipation  Genitourinary:       No urinary incontinence, hesitancy, frequency, urgency, dysuria  Hematologic:         No bruising, bleeding, pallor, lymphadenopathy  Endocrine:            No coldness, hot flashes, polyuria, abnormal hair growth  Musculoskeletal:    No body pains, aches, arthritic pains, muscle pain ,muscle wasting  Psychiatric:          No low or high mood, anxiety, hallucinations, delusions  Skin.                      No rash, ulcers, bruising, itching  Neurological:        No confusion, headache, focal  weakness, numbness, dysphasia    Objective   Objective     Vitals:   Temp:  [97.3 °F (36.3 °C)-99.3 °F (37.4 °C)] 99.3 °F (37.4 °C)  Heart Rate:  [77-86] 81  Resp:  [18-20] 18  BP: (156-185)/() 169/78  Physical Exam    Constitutional: Awake, alert responsive, conversant, no obvious distress              Psychiatric:  Appropriate affect, cooperative   Neurologic:  Awake alert ,oriented x 3, strength symmetric in all extremities, Cranial Nerves grossly intact to confrontation, speech clear   Eyes:   PERRLA, sclerae anicteric, no conjunctival injection   HEENT:  Moist mucous membranes, no nasal or eye discharge, no throat congestion   Neck:   Supple, no thyromegaly, no lymphadenopathy, trachea midline, no elevated JVD   Respiratory:  Clear to auscultation bilaterally, nonlabored respirations    Cardiovascular: RRR, no murmurs, rubs, or gallops, palpable pedal pulses bilaterally, No bilateral ankle edema   Gastrointestinal: Positive bowel sounds, soft, nontender, nondistended, no organomegaly   Musculoskeletal:  No clubbing or cyanosis to extremities,muscle wasting, joint swelling, muscle weakness             Skin:                      No rashes, bruising, skin ulcers, petechiae or ecchymosis    Result Review    Result Review:  I have personally reviewed the results from the time of this admission to 11/9/2023 08:19 EST and agree with these findings:  []  Laboratory  []  Microbiology  []  Radiology  []  EKG/Telemetry   []  Cardiology/Vascular   []  Pathology  []  Old records  []  Other:    Assessment & Plan   Assessment / Plan       Active Hospital Problems:  Active Hospital Problems    Diagnosis     **Acute renal failure     Hyperkalemia     Bilateral hydronephrosis     Decreased urine output     Stage 3a chronic kidney disease     Iron deficiency anemia     Acute UTI (urinary tract infection)     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  CT scan did not show any overt obstruction but hard to assess with prior urological interventions.  Patient currently uremic and ANGEL LUIS may have been related to patient recently being on NSAIDs compounded with nausea and vomiting which may also have been in the setting of uremia.  Patient had temporary dialysis catheter on 11/8.  Urine analysis was bland     Plan:   We will dialyze the patient with a slow session of 2 and half hours on 2K bath with blood flow 250/500  We will have a full session of dialysis tomorrow  Noted urology has been consulted and plan for possible urostomy tubes to ensure no obstructive pathology  At this time right now recommend as needed antihypertensives for systolics greater than 180  Anticipate improvement in his renal dysfunction as he had normal renal function up to 3 months ago     Thank you for involving me in the care of the patient.  We will continue to follow along    Electronically signed by Carmencita Lyn MD, 11/09/23, 8:19 AM EST.

## 2023-11-09 NOTE — PROGRESS NOTES
[facility]   Urology Progress Note    Patient Name: Korey Rodriguez  : 1945  MRN: [id number]  Primary Care Physician:  Dimitri Hargrove MD  Date of admission: 2023    Subjective   Subjective       Patient with less pain but still having some  back pain.    30 mL out of urostomy overnight.  Minimal output            Objective   Objective     Vitals:   Temp:  [97.3 °F (36.3 °C)-98.6 °F (37 °C)] 97.9 °F (36.6 °C)  Heart Rate:  [77-86] 86  Resp:  [18-20] 20  BP: (156-185)/() 162/69  Physical Exam     Alert and oriented x3  No acute distress  Unlabored respirations  Nontender/nondistended       Stoma is pink and viable urostomy       Result Review    Result Review:  I have personally reviewed the results from the time of this admission to 2023 06:45 EST and agree with these findings:  []  Laboratory  []  Microbiology  []  Radiology  []  EKG/Telemetry   []  Cardiology/Vascular   []  Pathology  []  Old records  []  Other:      Assessment & Plan   Assessment / Plan     Brief Patient Summary:  Korey Rodriguez is a 78 y.o. 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  Otherwise he would go home with bilateral nephrostomy tubes for several weeks.  If they were transferred to [facility] could l hopefully could expedite his work-up and care for the etiology of his renal failure/hydronephrosis.    If he did need internalization of his nephrostomy tubes  we do not have a interventional radiologist here at this time he could do that.      Patient is going to discuss with his wife.  If patient stays here once more stable to go home with nephrostomy tubes and follow-up with Dr. Bansal as an outpatient           Electronically signed by Aung Westfall MD, 11/09/23, 6:45 AM EST.

## 2023-11-09 NOTE — CASE MANAGEMENT/SOCIAL WORK
Discharge Planning Assessment   Alber     Patient Name: Korey Rodriguez  MRN: [id number]  Today's Date: 11/9/2023    Admit Date: 11/7/2023    Plan: Pt lives with wife. Pt is usually indpedent at home. PCP: JOSE Hargrove, Pharm: TAMIKO. Pt plans to return home at discharge. Pt and wife deny any Fin. stressors at this time. Pt has had [facility] in the past and may want HHC again at discharge. SW will continue to follow for needs.   Discharge Needs Assessment       Row Name 11/09/23 1030       Living Environment    People in Home spouse    Current Living Arrangements home    Potentially Unsafe Housing Conditions none    In the past 12 months has the electric, gas, oil, or water company threatened to shut off services in your home? No    Primary Care Provided by self    Provides Primary Care For no one    Family Caregiver if Needed spouse    Quality of Family Relationships helpful;involved;supportive    Able to Return to Prior Arrangements yes       Resource/Environmental Concerns    Resource/Environmental Concerns none    Transportation Concerns none       Food Insecurity    Within the past 12 months, you worried that your food would run out before you got the money to buy more. Never true    Within the past 12 months, the food you bought just didn't last and you didn't have money to get more. Never true       Transition Planning    Patient/Family Anticipates Transition to home;home with family    Patient/Family Anticipated Services at Transition none    Transportation Anticipated family or friend will provide       Discharge Needs Assessment    Readmission Within the Last 30 Days no previous admission in last 30 days    Equipment Currently Used at Home none    Concerns to be Addressed discharge planning    Equipment Needed After Discharge none    Discharge Coordination/Progress Pt lives with wife. Pt is usually indpedent at home. PCP: JOSE Hargrove, Pharm: TAMIKO. Pt plans to return home at discharge. Pt and wife deny any  Fin. stressors at this time. Pt has had [facility] in the past and may want HHC again at discharge. SW will continue to follow for needs.                   Discharge Plan       Row Name 11/09/23 1032       Plan    Plan Pt lives with wife. Pt is usually indpedent at home. PCP: JOSE Hargrove, Pharm: TAMIKO. Pt plans to return home at discharge. Pt and wife deny any Fin. stressors at this time. Pt has had [facility] in the past and may want HHC again at discharge. SW will continue to follow for needs.                  Continued Care and Services - Admitted Since 11/7/2023    Coordination has not been started for this encounter.          Demographic Summary       Row Name 11/09/23 1023       General Information    Admission Type inpatient    Arrived From emergency department    Referral Source admission list    Reason for Consult discharge planning    Preferred Language English       Contact Information    Permission Granted to Share Info With lay caregiver    Contact Information Obtained for lay caregiver       Lay Caregiver Information    Name, Lay Caregiver Mimi Rodriguez    Phone, Lay Caregiver [phone]                   Functional Status       Row Name 11/09/23 1024       Functional Status    Usual Activity Tolerance good    Current Activity Tolerance good       Physical Activity    On average, how many days per week do you engage in moderate to strenuous exercise (like a brisk walk)? 5 days    On average, how many minutes do you engage in exercise at this level? 60 min    Number of minutes of exercise per week 300       Assessment of Health Literacy    How often do you have someone help you read hospital materials? Never    How often do you have problems learning about your medical condition because of difficulty understanding written information? Never    How often do you have a problem understanding what is told to you about your medical condition? Never    How confident are you filling out medical forms by  yourself? Quite a bit    Health Literacy Good       Functional Status, IADL    Medications independent    Meal Preparation independent    Housekeeping independent    Laundry independent    Shopping independent       Mental Status    General Appearance WDL WDL       Mental Status Summary    Recent Changes in Mental Status/Cognitive Functioning no changes       Employment/    Employment Status retired    Current or Previous Occupation not applicable                   Psychosocial    No documentation.                  Abuse/Neglect    No documentation.                  Legal       Row Name 11/09/23 1026       Financial Resource Strain    How hard is it for you to pay for the very basics like food, housing, medical care, and heating? Not hard       Financial/Legal    Source of Income social security    Application for Public Assistance not applied       Legal    Criminal Activity/Legal Involvement none                   Substance Abuse    No documentation.                  Patient Forms    No documentation.                     Rocio Guerrero

## 2023-11-09 NOTE — PLAN OF CARE
Goal Outcome Evaluation:         Patient resting comfortably in bed w/ intermittent sleep through the night shift. RN asked patient multiple times about pain and patient has repeatedly denied pain. Patient is on room air w/ RR even and unlabored. NSR on the monitor. Patient has had intractable hypertension throughout shift. Multiple doses of IV hydralazine and Lopressor without significant improvement. Patient NPO since midnight for bilateral nephrostomy tube placement. Patient alert and oriented x 4. Bed exit armed. Standby assist. No acute events over night shift.

## 2023-11-10 VITALS
DIASTOLIC BLOOD PRESSURE: 84 MMHG | SYSTOLIC BLOOD PRESSURE: 142 MMHG | WEIGHT: 203.71 LBS | HEART RATE: 78 BPM | HEIGHT: 75 IN | OXYGEN SATURATION: 97 % | BODY MASS INDEX: 25.33 KG/M2 | RESPIRATION RATE: 16 BRPM | TEMPERATURE: 97.7 F

## 2023-11-10 LAB
ANION GAP SERPL CALCULATED.3IONS-SCNC: 12 MMOL/L (ref 5–15)
BASOPHILS # BLD AUTO: 0.02 10*3/MM3 (ref 0–0.2)
BASOPHILS NFR BLD AUTO: 0.4 % (ref 0–1.5)
BUN SERPL-MCNC: 46 MG/DL (ref 8–23)
BUN/CREAT SERPL: 10.3 (ref 7–25)
CALCIUM SPEC-SCNC: 8.3 MG/DL (ref 8.6–10.5)
CHLORIDE SERPL-SCNC: 106 MMOL/L (ref 98–107)
CO2 SERPL-SCNC: 22 MMOL/L (ref 22–29)
CREAT SERPL-MCNC: 4.47 MG/DL (ref 0.76–1.27)
DEPRECATED RDW RBC AUTO: 45.4 FL (ref 37–54)
EGFRCR SERPLBLD CKD-EPI 2021: 12.6 ML/MIN/1.73
EOSINOPHIL # BLD AUTO: 0.11 10*3/MM3 (ref 0–0.4)
EOSINOPHIL NFR BLD AUTO: 2.3 % (ref 0.3–6.2)
ERYTHROCYTE [DISTWIDTH] IN BLOOD BY AUTOMATED COUNT: 14.7 % (ref 12.3–15.4)
GLUCOSE SERPL-MCNC: 118 MG/DL (ref 65–99)
HCT VFR BLD AUTO: 28.5 % (ref 37.5–51)
HGB BLD-MCNC: 9.1 G/DL (ref 13–17.7)
IMM GRANULOCYTES # BLD AUTO: 0.03 10*3/MM3 (ref 0–0.05)
IMM GRANULOCYTES NFR BLD AUTO: 0.6 % (ref 0–0.5)
LYMPHOCYTES # BLD AUTO: 0.33 10*3/MM3 (ref 0.7–3.1)
LYMPHOCYTES NFR BLD AUTO: 6.8 % (ref 19.6–45.3)
MAGNESIUM SERPL-MCNC: 1.9 MG/DL (ref 1.6–2.4)
MCH RBC QN AUTO: 27.3 PG (ref 26.6–33)
MCHC RBC AUTO-ENTMCNC: 31.9 G/DL (ref 31.5–35.7)
MCV RBC AUTO: 85.6 FL (ref 79–97)
MONOCYTES # BLD AUTO: 0.64 10*3/MM3 (ref 0.1–0.9)
MONOCYTES NFR BLD AUTO: 13.2 % (ref 5–12)
NEUTROPHILS NFR BLD AUTO: 3.71 10*3/MM3 (ref 1.7–7)
NEUTROPHILS NFR BLD AUTO: 76.7 % (ref 42.7–76)
NRBC BLD AUTO-RTO: 0 /100 WBC (ref 0–0.2)
PHOSPHATE SERPL-MCNC: 4.8 MG/DL (ref 2.5–4.5)
PLATELET # BLD AUTO: 74 10*3/MM3 (ref 140–450)
PMV BLD AUTO: 10.1 FL (ref 6–12)
POTASSIUM SERPL-SCNC: 4.6 MMOL/L (ref 3.5–5.2)
RBC # BLD AUTO: 3.33 10*6/MM3 (ref 4.14–5.8)
SODIUM SERPL-SCNC: 140 MMOL/L (ref 136–145)
WBC NRBC COR # BLD: 4.84 10*3/MM3 (ref 3.4–10.8)

## 2023-11-10 PROCEDURE — 84100 ASSAY OF PHOSPHORUS: CPT | Performed by: INTERNAL MEDICINE

## 2023-11-10 PROCEDURE — 25010000002 HYDRALAZINE PER 20 MG: Performed by: FAMILY MEDICINE

## 2023-11-10 PROCEDURE — 94799 UNLISTED PULMONARY SVC/PX: CPT

## 2023-11-10 PROCEDURE — 94761 N-INVAS EAR/PLS OXIMETRY MLT: CPT

## 2023-11-10 PROCEDURE — 83735 ASSAY OF MAGNESIUM: CPT | Performed by: INTERNAL MEDICINE

## 2023-11-10 PROCEDURE — 80048 BASIC METABOLIC PNL TOTAL CA: CPT | Performed by: INTERNAL MEDICINE

## 2023-11-10 PROCEDURE — 25010000002 CEFTRIAXONE PER 250 MG: Performed by: INTERNAL MEDICINE

## 2023-11-10 PROCEDURE — 85025 COMPLETE CBC W/AUTO DIFF WBC: CPT | Performed by: INTERNAL MEDICINE

## 2023-11-10 PROCEDURE — 99233 SBSQ HOSP IP/OBS HIGH 50: CPT | Performed by: INTERNAL MEDICINE

## 2023-11-10 RX ORDER — CEFTRIAXONE SODIUM 1 G/50ML
1000 INJECTION, SOLUTION INTRAVENOUS EVERY 24 HOURS
Status: DISCONTINUED | OUTPATIENT
Start: 2023-11-10 | End: 2023-11-11 | Stop reason: HOSPADM

## 2023-11-10 RX ORDER — CEFTRIAXONE SODIUM 1 G/50ML
1000 INJECTION, SOLUTION INTRAVENOUS EVERY 24 HOURS
Start: 2023-11-10 | End: 2023-11-14

## 2023-11-10 RX ORDER — CARVEDILOL 3.12 MG/1
3.12 TABLET ORAL 2 TIMES DAILY WITH MEALS
Status: DISCONTINUED | OUTPATIENT
Start: 2023-11-10 | End: 2023-11-11 | Stop reason: HOSPADM

## 2023-11-10 RX ADMIN — Medication 10 ML: at 20:54

## 2023-11-10 RX ADMIN — FAMOTIDINE 40 MG: 20 TABLET, FILM COATED ORAL at 08:54

## 2023-11-10 RX ADMIN — HYDRALAZINE HYDROCHLORIDE 10 MG: 20 INJECTION, SOLUTION INTRAMUSCULAR; INTRAVENOUS at 20:52

## 2023-11-10 RX ADMIN — Medication 10 ML: at 08:54

## 2023-11-10 RX ADMIN — CARVEDILOL 3.12 MG: 3.12 TABLET, FILM COATED ORAL at 17:37

## 2023-11-10 RX ADMIN — CEFTRIAXONE SODIUM 1000 MG: 1 INJECTION, SOLUTION INTRAVENOUS at 18:12

## 2023-11-10 RX ADMIN — ATORVASTATIN CALCIUM 20 MG: 20 TABLET, FILM COATED ORAL at 20:52

## 2023-11-10 RX ADMIN — CARVEDILOL 3.12 MG: 3.12 TABLET, FILM COATED ORAL at 08:53

## 2023-11-10 NOTE — NURSING NOTE
HD completed.  No fluid removal per MD order.  Line positional at times.  Pt clotted ECC during tx, restarted.  Dressing CDI, last changed 11/8

## 2023-11-10 NOTE — PLAN OF CARE
Goal Outcome Evaluation:  Plan of Care Reviewed With: patient        Progress: no change          DC to Cem Torres, report called to FERMIN Bone on 8 towers. No acute changes this shift.

## 2023-11-10 NOTE — PROGRESS NOTES
[facility]   Hospitalist Progress Note    Date of admission: 11/7/2023  Patient Name: Korey Rodriguez  1945  Date: 11/10/2023      Subjective     Chief Complaint   Patient presents with    Abnormal Lab       Interval Followup: pt doing well, tolerating nephrostomy tubes, had some minor oozing around them last night, doing better today.  No soa/fever/chills.  Pending transfer still.        Objective     Vitals:   Temp:  [97.6 °F (36.4 °C)-98.3 °F (36.8 °C)] 97.7 °F (36.5 °C)  Heart Rate:  [] 80  Resp:  [16-19] 16  BP: (134-172)/(58-87) 151/58    Physical Exam  Gen: awake, resting in bed, conversant  HENT: NCAT, mmm  Resp: CTAB, normal respiratory effort  CV: RRR, no LE pitting edema  GI: Abdomen soft, NT, ND, no guarding, b/l nephrostomy tubes with yellow uop, minimal red tinge in bag  Psych: appropriate mood and affect, aox3  Skin: warm, dry      Result Review:  Vital signs, labs and recent relevant imaging reviewed.        senna-docusate sodium **AND** polyethylene glycol **AND** bisacodyl **AND** bisacodyl    hydrALAZINE    HYDROcodone-acetaminophen    nitroglycerin    ondansetron    sodium chloride    sodium chloride    sodium chloride    atorvastatin, 20 mg, Oral, Nightly  carvedilol, 3.125 mg, Oral, BID With Meals  famotidine, 40 mg, Oral, Daily  polyethylene glycol, 17 g, Oral, Daily  senna-docusate sodium, 2 tablet, Oral, BID  sodium chloride, 10 mL, Intravenous, Q12H        CT Guided Nephrostomy Tube Placement    Result Date: 11/9/2023    1. Technically successful placement of bilateral 8 Burmese nephrostomy tubes without evidence of complication      Alvaro Christian M.D.       Electronically Signed and Approved By: Alvaro Christian M.D. on 11/09/2023 at 14:39             CT Guided Nephrostomy Tube Placement    Result Date: 11/9/2023    1. Technically successful placement of bilateral 8 Burmese nephrostomy tubes without evidence of complication      Alvaro Christian M.D.       Electronically Signed  and Approved By: Alvaro Christian M.D. on 11/09/2023 at 14:08             CT Abdomen Pelvis Without Contrast    Result Date: 11/7/2023    1. Abnormal appearance to the liver which could is concerning for metastatic disease which has developed. 2. Bibasilar effusions and bibasilar atelectasis. 3. Evidence for some underlying ascites. 4. Pelvocaliectasis bilaterally with nonobstructing intrarenal calculi.  Findings could relate to cystectomy and diverting ileostomy.  There was pelvocaliectasis on prior exam. 5. Moderate stool burden which could relate to constipation. 6. Infrarenal abdominal aortic aneurysm which has been noted. 7. Scoliosis lumbar spine with multilevel degenerative change.     SHUBHAM BELL MD       Electronically Signed and Approved By: SHUBHAM BELL MD on 11/07/2023 at 19:52             XR Chest 1 View    Result Date: 11/7/2023    1. Cardiomegaly.       SHUBHAM BELL MD       Electronically Signed and Approved By: SHUBHAM BELL MD on 11/07/2023 at 18:12              Assessment / Plan     Summary: 78-year-old male with history of urothelial carcinoma and prior cystectomy and urostomy diversion with adjuvant chemotherapy and radiotherapy who presents with abdominal pain and decreased urostomy output and found to have cute renal failure and severe hyperkalemia.  Nephrology urology consulted patient awaiting transfer [facility] where he had his prior surgery for closer monitoring.  Patient requiring stat hemodialysis    Assessment/Plan (clinically significant if listed here)  Acute renal failure on CKD ~2/3 recent creatinine 1.3-1.5   Severe hyperkalemia  Bilateral pelvocaliectasis/hydronephrosis suspected obstructive component of acute renal failure   Anion gap metabolic acidosis  Hypertension, not on antihypertensive medications  Ascites  Concern for metastatic liver disease  Concern for malfunctioning urostomy  History of urothelial carcinoma with prior cystectomy and urostomy diversion and adjuvant  chemotherapy and radiotherapy  AAA previously 4.7 x 5 cm slightly enlarged 5.1 x 5.6 cm  Constipation  Bibasilar effusions and atelectasis     D/w nephrology, continue hd prn, holding today, possibly transferring, bed not yet available, bun and creatinine improving but did have hd yesterday, no hyperkalemia today, having good uop from urostomy cont to monitor closely  Wbc wnl, no fevers, straight catheter urine culture did show greater than 100,000 CFU GNB, given urological procedure and impeding operation will continue additional dosing with ceftriaxone, f/u speciation and sensivities  Ct nephro placement reviewed, tubes appear in appropriate location on review   D/w urology, still agree/recommending transfer to definitive management with internalization of stents  Add coreg for htn and AAA  Afp pending, will follow up after transfer for further evaluation of possible metastatic disease  Prn pain medication, bowel regimen, constipation initially resolved, will titrate down regimen  BNP elevated, no overt edema on exam, no prior echo for comparison, on room air, cxr reviewed no obvious effusion on portable film, does appear to have some moderate cardiomegaly, defer echo upon transfer   Monitor oxygenation/resp hygiene  Discussed goals of care discussion with the patient wishing for DNR/DNI but is otherwise okay with hemodialysis and other measures short of chest compressions/intubation  PT/OT  Check a.m. CBC, BMP mg/phos  Continue hospital monitoring and treatment at current level of care - does not need upgraded at this time.  Cont treatment and monitoring as above until time of transfer       DVT prophylaxis:  Mechanical DVT prophylaxis orders are present.    Medical Intervention Limits: NO intubation (DNI); NO cardioversion  Level Of Support Discussed With: Patient  Code Status (Patient has no pulse and is not breathing): No CPR (Do Not Attempt to Resuscitate)  Medical Interventions (Patient has pulse or is  breathing): Limited Support      CBC          11/8/2023    04:33 11/9/2023    04:03 11/10/2023    02:33   CBC   WBC 5.90  5.40  4.84    RBC 3.42  3.11  3.33    Hemoglobin 9.2  8.5  9.1    Hematocrit 30.0  26.9  28.5    MCV 87.7  86.5  85.6    MCH 26.9  27.3  27.3    MCHC 30.7  31.6  31.9    RDW 14.2  14.7  14.7    Platelets 197  129  74        CMP          11/8/2023    06:17 11/8/2023    14:24 11/8/2023    15:58 11/8/2023    20:06 11/9/2023    00:02 11/9/2023    04:03 11/9/2023    15:53 11/10/2023    02:33   CMP   Glucose 144  137  137  142  120  101  101  118      105  69  67  72  72  66  46    Creatinine 10.15  8.53  5.83  6.75  7.26  7.47  6.54  4.47    EGFR 4.7  5.8  9.2  7.7  7.0  6.8  8.0  12.6    Sodium 135  138  140  137  139  137  137  140    Potassium 7.2  6.0  4.9  5.1  5.6  5.6  5.3  4.6    Chloride 102  103  106  106  107  105  105  106    Calcium 8.3  8.5  8.2  8.1  8.1  7.9  8.2  8.3    Total Protein 6.1           Albumin 2.8           Globulin 3.3           Total Bilirubin 0.3           Alkaline Phosphatase 100           AST (SGOT) 19           ALT (SGPT) 12           Albumin/Globulin Ratio 0.8           BUN/Creatinine Ratio 11.3  12.3  11.8  9.9  9.9  9.6  10.1  10.3    Anion Gap 16.5  14.9  11.3  10.9  10.9  11.5  10.1  12.0

## 2023-11-10 NOTE — PROGRESS NOTES
[facility]     Progress Note    Patient Name: Korey Rodriguez  : 1945  MRN: [id number]  Primary Care Physician:  Dimitri Hargrove MD  Date of admission: 2023    Subjective   Subjective     Chief Complaint: No drainage from ileal conduit.    Abnormal Lab      Patient Reports Pt. 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  He had been doing well and is f/u by oncology here. The surgery was performed in [place] by Dr. Bansal.  He was doing well until 3 days ago he stopped producing any urine from urostomy.  He was admitted to hospital and CT scan revealed obstruction of ureters with bilateral hydronephrosis.  Bilateral nephrostomy tubes were placed and good urine output was seen through nephrostomy tubes. He is doing well today and has good appetite.      Review of Systems  10 point review of systems performed and it is negative other than what is mentioned in HPI.  Objective   Objective     Vitals:   Temp:  [97.6 °F (36.4 °C)-98.3 °F (36.8 °C)] 98.2 °F (36.8 °C)  Heart Rate:  [] 88  Resp:  [16-19] 18  BP: (134-178)/(60-87) 157/60    Physical Exam     Result Review    Result Review:  I have personally reviewed the results from the time of this admission to 11/10/2023 13:34 EST and agree with these findings:  [x]  Laboratory list / accordion  []  Microbiology  [x]  Radiology  []  EKG/Telemetry   []  Cardiology/Vascular   []  Pathology  []  Old records  []  Other:  Most notable findings include: Bilateral hydronephrosis.      Assessment & Plan   Assessment / Plan     Brief Patient Summary:  Korey Rodriguez is a 78 y.o. male who Pt. Has what appears to be obstruction of the ureters as both nephrostomy tubes are producing good urine.    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 Is waiting to be transferred to [facility] either [facility] or [facility] were the original surgery was performed.   At this point he is stable and producing good amount of urine.    DVT prophylaxis:  Mechanical DVT prophylaxis orders are present.    CODE STATUS:    Medical Intervention Limits: NO intubation (DNI); NO cardioversion  Level Of Support Discussed With: Patient  Code Status (Patient has no pulse and is not breathing): No CPR (Do Not Attempt to Resuscitate)  Medical Interventions (Patient has pulse or is breathing): Limited Support    Disposition:  I expect patient to be discharged Transfer to [facility]..    Marichuy Laguna MD

## 2023-11-12 LAB
BACTERIA FLD CULT: NORMAL
BACTERIA FLD CULT: NORMAL
GRAM STN SPEC: NORMAL
GRAM STN SPEC: NORMAL

## 2023-11-13 LAB
BACTERIA SPEC AEROBE CULT: NORMAL
BACTERIA SPEC AEROBE CULT: NORMAL

## 2023-11-14 PROBLEM — Z09 HOSPITAL DISCHARGE FOLLOW-UP: Status: ACTIVE | Noted: 2023-11-14

## 2023-11-15 NOTE — TELEPHONE ENCOUNTER
Caller: TRA GARNER    Relationship to patient: Emergency Contact    Best call back number: [phone]    Patient is needing: PATIENTS WIFE CALLED REQUESTING TO SPEAK WITH MAL. WIFE STATES PATIENT HAS BEEN IN THE HOSPITAL FOR OVER A WEEK AND WAS RELEASED LAST NIGHT. WIFE IS WANTING TO SPEAK REGARDING HIS HEALTH AND WHAT TO DO NOW THAT HE IS HOME TAKING CARE OF HIM.

## 2023-11-16 ENCOUNTER — TELEPHONE (OUTPATIENT)
Dept: INTERNAL MEDICINE | Facility: CLINIC | Age: 78
End: 2023-11-16
Payer: MEDICARE

## 2023-11-16 ENCOUNTER — TRANSCRIBE ORDERS (OUTPATIENT)
Dept: ADMINISTRATIVE | Facility: HOSPITAL | Age: 78
End: 2023-11-16
Payer: MEDICARE

## 2023-11-16 ENCOUNTER — TELEPHONE (OUTPATIENT)
Dept: ONCOLOGY | Facility: HOSPITAL | Age: 78
End: 2023-11-16
Payer: MEDICARE

## 2023-11-16 DIAGNOSIS — N13.39 OTHER HYDRONEPHROSIS: Primary | ICD-10-CM

## 2023-11-16 NOTE — TELEPHONE ENCOUNTER
Spoke to wife and advised that Dr. Panchal would like them to come in to the office to discuss the plan.  Wife states they do not wish to have scans at this time due to patients kidney issues. Informed her that was their choice and we will see them Dec1, hopefully he will feel stronger by then, unless they felt he needed to be seen sooner. Mrs. Rodriguez states that the Dec appt is fine.

## 2023-11-16 NOTE — TELEPHONE ENCOUNTER
Pt's wife states that they didn't get clear instructions of what the next steps are. They are set up to do a procedure with Dr. Bansal on 12/18/2023.   She doesn't know if he is to be on a renal diet, if he can take pain  medication due to he is in a lot of pain.   She states that there experience at [facility] was very horrible. She didn't know if you could shed any light on the situation.

## 2023-11-16 NOTE — TELEPHONE ENCOUNTER
Caller: ARCELIA    Relationship to patient: [facility]    Best call back number: [phone]    Patient is needing: MEDANNAMARIAIST STATED PATIENT'S WIFE WOULD LIKE TO KNOW IF PATIENT SHOULD START COREG, SHE WANTS MD ALCARAZ APPROVAL BEFORE GIVING IT TO HER . THEY WOULD ALSO LIKE TO GET SOME LABS ORDERED FOR HIM A CBC AND CMP.     MARIETTA ALSO ASKED IF WE COULD CALL PATIENT'S WIFE TO SCHEDULE AN APPOINTMENT  NEXT WEEK WITH MD LEUNG.

## 2023-11-16 NOTE — TELEPHONE ENCOUNTER
Caller: TRA GARNER    Relationship: Self    Best call back number: [phone]    What is the best time to reach you: ANY    Who are you requesting to speak with (clinical staff, provider,  specific staff member): CLINICAL        What was the call regarding: PATIENT'S WIFE TRA CALLED TO GET CLARIFICATION IF CHIQUI NEEDS TO KEEP HIS FUTURE APPTS FOR CAT SCAN AND FOLLOW UP WITH DR NY.. CHIQUI HAS MADE MULTIPLE HOSPITAL TRIPS RECENTLY, HE'S VERY WEAK, AND HIS KIDNEYS ARE SHUT DOWN.  TRA CALLED TO LET DR NY KNOW ABOUT 'S CURRENT CONDITION AND WANTED TO KNOW WHAT DR NY THINKS  CHIQUI AND TRA SHOULD DO MOVING FORWARD.    Is it okay if the provider responds through MyChart: NO

## 2023-11-16 NOTE — TELEPHONE ENCOUNTER
He needs home health ASAP in regards to care of his nephrostomy tubes and routine urostomy care as well.  Fall risk, generalized weakness.    Yes, his kidney function was back to normal, he can utilize the hydrocodone that he has.    I glanced through the discharge summary, I do not see reference in regards to the urologist he is going to follow-up with, so he probably needs to be plugged in with our urologist in regards to care of the nephrostomy tubes going forward.  Looks like he saw Dr. Westfall when he was in the hospital.    Needs hospital follow-up visit next week, thanks.

## 2023-11-17 NOTE — TELEPHONE ENCOUNTER
Hub staff attempted to follow warm transfer process and was unsuccessful     Caller: TRA GARNER    Relationship to patient: Emergency Contact    Best call back number: [phone]     Patient is needing: CALLER RETURNING TONYADAVON'S CALL IN REGARDS TO APPOINTMENT. CALLER ADVISED THAT THERE WAS AN APPOINTMENT ON 11.21.2023 AT 9 AM AVAILABLE. CALLER STATES THAT SHE WOULD LIKE TO HAVE THAT APPOINTMENT IF AVAILABLE. ATTEMPTED TO SCHEDULE BUT IT WAS NO LONGER AVAILABLE. PLEASE ADVISE.

## 2023-11-17 NOTE — TELEPHONE ENCOUNTER
Patient is currently been seen by dwight, patient has an appt with urology at [facility] in 12/28, and I will keep trying to get a hold of pt wife to scheduled him for follow up

## 2023-11-17 NOTE — TELEPHONE ENCOUNTER
This was sent to Fiordaliza yesterday:    He needs home health ASAP in regards to care of his nephrostomy tubes and routine urostomy care as well.  Fall risk, generalized weakness.     Yes, his kidney function was back to normal, he can utilize the hydrocodone that he has.     I glanced through the discharge summary, I do not see reference in regards to the urologist he is going to follow-up with, so he probably needs to be plugged in with our urologist in regards to care of the nephrostomy tubes going forward.  Looks like he saw Dr. Westfall when he was in the hospital.     Needs hospital follow-up visit next week, thanks.       SO YES, HE NEEDS APPT AS NOTED ABOVE.  ALSO, OK TO HAVE HH DRAW A CBC/CMP.  AND AS LONG AS HIS SBP IS OVER 110 AND HR IS OVER 60 HE CAN TAKE COREG.

## 2023-11-18 NOTE — PROGRESS NOTES
Chief Complaint  Hospital Follow Up Visit (Acute renal failure. Had two treatments of dialysis locally. )  Subjective    History of Present Illness  Korey Rodriguez is a 78 y.o. male  presents to [facility] for follow-up hospitalization.   Patient with history of urothelial carcinoma and prior cystectomy and urostomy diversion with adjuvant chemotherapy and radiotherapy who presented to the ED with abdominal pain and decreased urostomy output and found to have acute renal failure and severe hyperkalemia. Nephrology and urology consulted patient awaiting transfer to [facility] where he had his prior surgery for closer monitoring.  Patient requiring stat hemodialysis for hyperkalemia initially with improving hyperkalemia.     Patient underwent bilateral nephrostomy tube placement given concern for obstructive component of his renal failure and following initial placement had notable output/drainage following nephrostomy placement suggestive of obstructive cause of his renal failure.  Patient also had been taking a course of meloxicam outpatient (outpt pharm fill hx shows >90 day supply but pt states had not taken consistently / only had taken about 3-4 doses the week prior to admission).  Had initial vomiting and decreased p.o. intake prior to hospitalization which may have also contributed to initial renal failure.  He has temporary dialysis access currently which he is tolerating hopefully of recovery in the future.     Straight cath urine sample with greater than 100,000 CFU's gram-negative bacilli. Received initial dose ceftriaxone initially, second dose given 11/10 prior to discharge. Given additional pending urological intervention would likely continue treatment.  Final speciation and sensitivity pending at time of discharge.  Patient's white count normal.     Transfer being arranged given discussion with urology/nephrology to [facility] so he can have full  internalization of his nephrostomy stents for long-term definitive management.     AFP level also pending at time of transfer - pt did have initial imaging concerning for metastatic liver disease.  Ideally needs contrasted abdominal imaging for better evaluation this was deferred in setting of his acute renal failure.  Consider oncology evaluation/repeat imaging depending on continued renal function trend.     Patient blood pressure remained elevated even following hemodialysis.  Given his AAA seen on imaging started on low-dose of Coreg 3.125 twice a day as well.     Patient transferred to [facility] on 11/9 for higher level of care evaluation. He was seen by Dr. Bansal and discharged 11/15. To follow-up with Dr. Sanchez tomorrow.     Past Medical History:   Diagnosis Date    AAA (abdominal aortic aneurysm)     BEING MONITORED NO REPAIR NEEDED YET    Aneurysm of abdominal aorta branch vessel     Cancer     BLADDER AND SKIN CANCERS/REMOVED    Chronic deep vein thrombosis (DVT) of tibial vein of left lower extremity 6/27/2023    Essential tremor     History of DVT of lower extremity 12/2022    on Xarelto    History of urostomy     PT CURRENTLY HAS HOME HEALTH TO HELP WITH OSTOMY    Hyperlipidemia         Past Surgical History:   Procedure Laterality Date    COLONOSCOPY  2018    CYSTECTOMY      12/2022  W/UROSTOMY    SKIN CANCER EXCISION Right     TRANSURETHRAL RESECTION OF BLADDER TUMOR Right 10/19/2022    Procedure: Cystoscopy with transurethral resection of bladder tumor;  Surgeon: Aung Westfall MD;  Location: [facility] MAIN OR;  Service: Urology;  Laterality: Right;    VENOUS ACCESS DEVICE (PORT) INSERTION Right 01/09/2023    Procedure: INSERTION VENOUS ACCESS DEVICE;  Surgeon: Richie Edward MD;  Location[place] [facility] [place] [facility];  Service: General;  Laterality: Right;        No Known Allergies       Current Outpatient Medications:     atorvastatin (LIPITOR) 20 MG tablet, Take 1 tablet by mouth Every Night., Disp: ,  "Rfl:     HYDROcodone-acetaminophen (Norco) 7.5-325 MG per tablet, 1-2 tablets up to 3 times a day as needed for abdominal pain., Disp: 30 tablet, Rfl: 0    polyethylene glycol (MIRALAX) 17 g packet, Take 17 g by mouth 2 (Two) Times a Day As Needed (constipation)., Disp: 20 each, Rfl: 0    Objective   /70 (BP Location: Right arm, Patient Position: Sitting, Cuff Size: Large Adult)   Pulse 56   Temp 97.2 °F (36.2 °C) (Temporal)   Resp 16   Ht 190.5 cm (75\")   Wt 83.6 kg (184 lb 3.2 oz)   SpO2 99%   BMI 23.02 kg/m²    Estimated body mass index is 23.02 kg/m² as calculated from the following:    Height as of this encounter: 190.5 cm (75\").    Weight as of this encounter: 83.6 kg (184 lb 3.2 oz).   Physical Exam  Vitals reviewed.   Constitutional:       General: He is not in acute distress.  HENT:      Head: Normocephalic and atraumatic.   Cardiovascular:      Rate and Rhythm: Normal rate and regular rhythm.      Heart sounds: Normal heart sounds.   Pulmonary:      Effort: Pulmonary effort is normal.      Breath sounds: Normal breath sounds. No wheezing, rhonchi or rales.   Musculoskeletal:      Right lower leg: No edema.      Left lower leg: No edema.   Neurological:      General: No focal deficit present.      Mental Status: He is alert.   Psychiatric:         Thought Content: Thought content normal.        Result Review :  The following data was reviewed by: CATA Mcbride on 11/20/2023:  CMP          11/8/2023    06:17 11/8/2023    14:24 11/8/2023    15:58 11/8/2023    20:06 11/9/2023    00:02 11/9/2023    04:03 11/9/2023    15:53 11/10/2023    02:33   CMP   Glucose 144  137  137  142  120  101  101  118      105  69  67  72  72  66  46    Creatinine 10.15  8.53  5.83  6.75  7.26  7.47  6.54  4.47    EGFR 4.7  5.8  9.2  7.7  7.0  6.8  8.0  12.6    Sodium 135  138  140  137  139  137  137  140    Potassium 7.2  6.0  4.9  5.1  5.6  5.6  5.3  4.6    Chloride 102  103  106  106  107  105  105  " 106    Calcium 8.3  8.5  8.2  8.1  8.1  7.9  8.2  8.3    Total Protein 6.1           Albumin 2.8           Globulin 3.3           Total Bilirubin 0.3           Alkaline Phosphatase 100           AST (SGOT) 19           ALT (SGPT) 12           Albumin/Globulin Ratio 0.8           BUN/Creatinine Ratio 11.3  12.3  11.8  9.9  9.9  9.6  10.1  10.3    Anion Gap 16.5  14.9  11.3  10.9  10.9  11.5  10.1  12.0      CBC w/diff          11/8/2023    04:33 11/9/2023    04:03 11/10/2023    02:33   CBC w/Diff   WBC 5.90  5.40  4.84    RBC 3.42  3.11  3.33    Hemoglobin 9.2  8.5  9.1    Hematocrit 30.0  26.9  28.5    MCV 87.7  86.5  85.6    MCH 26.9  27.3  27.3    MCHC 30.7  31.6  31.9    RDW 14.2  14.7  14.7    Platelets 197  129  74    Neutrophil Rel %  77.9  76.7    Immature Granulocyte Rel %  0.4  0.6    Lymphocyte Rel %  5.6  6.8    Monocyte Rel %  13.1  13.2    Eosinophil Rel %  2.4  2.3    Basophil Rel %  0.6  0.4      ED Provider Notes by Juan Gilbert DO (11/07/2023 17:04)      Discharge Summary by Itz Knox MD (11/09/2023 17:31)        Assessment and Plan   Diagnoses and all orders for this visit:    1. Hospital discharge follow-up (Primary)  Comments:  Doing well post discharge from [facility].  Orders:  -     Ambulatory Referral to Urology  -     CBC & Differential  -     Comprehensive Metabolic Panel  -     Phosphorus  -     Magnesium    2. Malignant neoplasm of overlapping sites of bladder  -     Ambulatory Referral to Urology  -     CBC & Differential  -     Comprehensive Metabolic Panel  -     Phosphorus  -     Magnesium    3. Iron deficiency anemia, unspecified iron deficiency anemia type  -     CBC & Differential  -     Iron Profile  -     Ferritin           Patient was given instructions and counseling regarding his condition or for health maintenance advice. Please see specific information pulled into the AVS if appropriate.     Follow Up   Return in about 3 weeks (around 12/11/2023) for Recheck with   Beena.    Dictated Utilizing Dragon Dictation.  Please note that portions of this note were completed with a voice recognition program.  Part of this note may be an electronic transcription/translation of spoken language to printed text using the Dragon Dictation System.    CATA Mcbride

## 2023-11-18 NOTE — PROGRESS NOTES
Answers submitted by the patient for this visit:  Primary Reason for Visit (Submitted on 11/17/2023)  What is the primary reason for your visit?: Other  Other (Submitted on 11/17/2023)  Please describe your symptoms.: Hospital Stay follow up, dietery questions and home care concerns.  Have you had these symptoms before?: Yes  How long have you been having these symptoms?: 1-2 weeks  Please list any medications you are currently taking for this condition.: None  Please describe any probable cause for these symptoms. : Hospitilization and kidney problems

## 2023-11-20 ENCOUNTER — OFFICE VISIT (OUTPATIENT)
Dept: INTERNAL MEDICINE | Facility: CLINIC | Age: 78
End: 2023-11-20
Payer: MEDICARE

## 2023-11-20 VITALS
OXYGEN SATURATION: 99 % | TEMPERATURE: 97.2 F | HEART RATE: 56 BPM | BODY MASS INDEX: 22.9 KG/M2 | DIASTOLIC BLOOD PRESSURE: 70 MMHG | SYSTOLIC BLOOD PRESSURE: 122 MMHG | WEIGHT: 184.2 LBS | HEIGHT: 75 IN | RESPIRATION RATE: 16 BRPM

## 2023-11-20 DIAGNOSIS — D50.9 IRON DEFICIENCY ANEMIA, UNSPECIFIED IRON DEFICIENCY ANEMIA TYPE: ICD-10-CM

## 2023-11-20 DIAGNOSIS — C67.8 MALIGNANT NEOPLASM OF OVERLAPPING SITES OF BLADDER: ICD-10-CM

## 2023-11-20 DIAGNOSIS — Z09 HOSPITAL DISCHARGE FOLLOW-UP: Primary | ICD-10-CM

## 2023-11-20 LAB
ALBUMIN SERPL-MCNC: 3.8 G/DL (ref 3.5–5.2)
ALBUMIN/GLOB SERPL: 1.3 G/DL
ALP SERPL-CCNC: 220 U/L (ref 39–117)
ALT SERPL W P-5'-P-CCNC: 52 U/L (ref 1–41)
ANION GAP SERPL CALCULATED.3IONS-SCNC: 10 MMOL/L (ref 5–15)
AST SERPL-CCNC: 54 U/L (ref 1–40)
BACTERIA SPEC AEROBE CULT: ABNORMAL
BACTERIA SPEC AEROBE CULT: ABNORMAL
BASOPHILS # BLD AUTO: 0.05 10*3/MM3 (ref 0–0.2)
BASOPHILS NFR BLD AUTO: 0.8 % (ref 0–1.5)
BILIRUB SERPL-MCNC: 0.4 MG/DL (ref 0–1.2)
BUN SERPL-MCNC: 25 MG/DL (ref 8–23)
BUN/CREAT SERPL: 17.6 (ref 7–25)
CALCIUM SPEC-SCNC: 9.4 MG/DL (ref 8.6–10.5)
CHLORIDE SERPL-SCNC: 103 MMOL/L (ref 98–107)
CO2 SERPL-SCNC: 27 MMOL/L (ref 22–29)
CREAT SERPL-MCNC: 1.42 MG/DL (ref 0.76–1.27)
DEPRECATED RDW RBC AUTO: 42.5 FL (ref 37–54)
EGFRCR SERPLBLD CKD-EPI 2021: 50 ML/MIN/1.73
EOSINOPHIL # BLD AUTO: 0.14 10*3/MM3 (ref 0–0.4)
EOSINOPHIL NFR BLD AUTO: 2.1 % (ref 0.3–6.2)
ERYTHROCYTE [DISTWIDTH] IN BLOOD BY AUTOMATED COUNT: 13.8 % (ref 12.3–15.4)
FERRITIN SERPL-MCNC: 1035 NG/ML (ref 30–400)
GLOBULIN UR ELPH-MCNC: 3 GM/DL
GLUCOSE SERPL-MCNC: 102 MG/DL (ref 65–99)
HCT VFR BLD AUTO: 26.9 % (ref 37.5–51)
HGB BLD-MCNC: 8.9 G/DL (ref 13–17.7)
IMM GRANULOCYTES # BLD AUTO: 0.05 10*3/MM3 (ref 0–0.05)
IMM GRANULOCYTES NFR BLD AUTO: 0.8 % (ref 0–0.5)
IRON 24H UR-MRATE: 30 MCG/DL (ref 59–158)
IRON SATN MFR SERPL: 11 % (ref 20–50)
LYMPHOCYTES # BLD AUTO: 0.34 10*3/MM3 (ref 0.7–3.1)
LYMPHOCYTES NFR BLD AUTO: 5.1 % (ref 19.6–45.3)
MAGNESIUM SERPL-MCNC: 2.1 MG/DL (ref 1.6–2.4)
MCH RBC QN AUTO: 28.6 PG (ref 26.6–33)
MCHC RBC AUTO-ENTMCNC: 33.1 G/DL (ref 31.5–35.7)
MCV RBC AUTO: 86.5 FL (ref 79–97)
MONOCYTES # BLD AUTO: 0.82 10*3/MM3 (ref 0.1–0.9)
MONOCYTES NFR BLD AUTO: 12.4 % (ref 5–12)
NEUTROPHILS NFR BLD AUTO: 5.22 10*3/MM3 (ref 1.7–7)
NEUTROPHILS NFR BLD AUTO: 78.8 % (ref 42.7–76)
NRBC BLD AUTO-RTO: 0 /100 WBC (ref 0–0.2)
PHOSPHATE SERPL-MCNC: 3.3 MG/DL (ref 2.5–4.5)
PLATELET # BLD AUTO: 201 10*3/MM3 (ref 140–450)
PMV BLD AUTO: 9.4 FL (ref 6–12)
POTASSIUM SERPL-SCNC: 4.4 MMOL/L (ref 3.5–5.2)
PROT SERPL-MCNC: 6.8 G/DL (ref 6–8.5)
RBC # BLD AUTO: 3.11 10*6/MM3 (ref 4.14–5.8)
SODIUM SERPL-SCNC: 140 MMOL/L (ref 136–145)
TIBC SERPL-MCNC: 265 MCG/DL (ref 298–536)
TRANSFERRIN SERPL-MCNC: 178 MG/DL (ref 200–360)
WBC NRBC COR # BLD AUTO: 6.62 10*3/MM3 (ref 3.4–10.8)

## 2023-11-20 PROCEDURE — 83735 ASSAY OF MAGNESIUM: CPT | Performed by: NURSE PRACTITIONER

## 2023-11-20 PROCEDURE — 85025 COMPLETE CBC W/AUTO DIFF WBC: CPT | Performed by: NURSE PRACTITIONER

## 2023-11-20 PROCEDURE — 83540 ASSAY OF IRON: CPT | Performed by: NURSE PRACTITIONER

## 2023-11-20 PROCEDURE — 84100 ASSAY OF PHOSPHORUS: CPT | Performed by: NURSE PRACTITIONER

## 2023-11-20 PROCEDURE — 84466 ASSAY OF TRANSFERRIN: CPT | Performed by: NURSE PRACTITIONER

## 2023-11-20 PROCEDURE — 80053 COMPREHEN METABOLIC PANEL: CPT | Performed by: NURSE PRACTITIONER

## 2023-11-20 PROCEDURE — 82728 ASSAY OF FERRITIN: CPT | Performed by: NURSE PRACTITIONER

## 2023-11-21 ENCOUNTER — APPOINTMENT (OUTPATIENT)
Dept: CT IMAGING | Facility: HOSPITAL | Age: 78
End: 2023-11-21
Payer: MEDICARE

## 2023-11-21 ENCOUNTER — HOSPITAL ENCOUNTER (EMERGENCY)
Facility: HOSPITAL | Age: 78
Discharge: HOME | End: 2023-11-21
Attending: EMERGENCY MEDICINE
Payer: MEDICARE

## 2023-11-21 ENCOUNTER — APPOINTMENT (OUTPATIENT)
Dept: INTERVENTIONAL RADIOLOGY/VASCULAR | Facility: HOSPITAL | Age: 78
End: 2023-11-21
Payer: MEDICARE

## 2023-11-21 VITALS
HEART RATE: 77 BPM | HEIGHT: 75 IN | SYSTOLIC BLOOD PRESSURE: 148 MMHG | WEIGHT: 184.53 LBS | DIASTOLIC BLOOD PRESSURE: 64 MMHG | OXYGEN SATURATION: 93 % | TEMPERATURE: 98.5 F | BODY MASS INDEX: 22.94 KG/M2 | RESPIRATION RATE: 16 BRPM

## 2023-11-21 DIAGNOSIS — T83.022A NEPHROSTOMY TUBE DISPLACED: Primary | ICD-10-CM

## 2023-11-21 LAB
ALBUMIN SERPL-MCNC: 3.6 G/DL (ref 3.5–5.2)
ALBUMIN/GLOB SERPL: 1 G/DL
ALP SERPL-CCNC: 254 U/L (ref 39–117)
ALT SERPL W P-5'-P-CCNC: 49 U/L (ref 1–41)
ANION GAP SERPL CALCULATED.3IONS-SCNC: 12.8 MMOL/L (ref 5–15)
AST SERPL-CCNC: 50 U/L (ref 1–40)
BASOPHILS # BLD AUTO: 0.06 10*3/MM3 (ref 0–0.2)
BASOPHILS NFR BLD AUTO: 0.6 % (ref 0–1.5)
BILIRUB SERPL-MCNC: 0.5 MG/DL (ref 0–1.2)
BUN SERPL-MCNC: 32 MG/DL (ref 8–23)
BUN/CREAT SERPL: 18.9 (ref 7–25)
CALCIUM SPEC-SCNC: 9.1 MG/DL (ref 8.6–10.5)
CHLORIDE SERPL-SCNC: 99 MMOL/L (ref 98–107)
CO2 SERPL-SCNC: 26.2 MMOL/L (ref 22–29)
CREAT SERPL-MCNC: 1.69 MG/DL (ref 0.76–1.27)
DEPRECATED RDW RBC AUTO: 46.7 FL (ref 37–54)
EGFRCR SERPLBLD CKD-EPI 2021: 40.5 ML/MIN/1.73
EOSINOPHIL # BLD AUTO: 0.04 10*3/MM3 (ref 0–0.4)
EOSINOPHIL NFR BLD AUTO: 0.4 % (ref 0.3–6.2)
ERYTHROCYTE [DISTWIDTH] IN BLOOD BY AUTOMATED COUNT: 14.6 % (ref 12.3–15.4)
GLOBULIN UR ELPH-MCNC: 3.6 GM/DL
GLUCOSE SERPL-MCNC: 130 MG/DL (ref 65–99)
HCT VFR BLD AUTO: 29.5 % (ref 37.5–51)
HGB BLD-MCNC: 9.2 G/DL (ref 13–17.7)
HOLD SPECIMEN: NORMAL
HOLD SPECIMEN: NORMAL
IMM GRANULOCYTES # BLD AUTO: 0.03 10*3/MM3 (ref 0–0.05)
IMM GRANULOCYTES NFR BLD AUTO: 0.3 % (ref 0–0.5)
INR PPP: 1.21 (ref 0.86–1.15)
LYMPHOCYTES # BLD AUTO: 0.36 10*3/MM3 (ref 0.7–3.1)
LYMPHOCYTES NFR BLD AUTO: 3.6 % (ref 19.6–45.3)
MCH RBC QN AUTO: 27.3 PG (ref 26.6–33)
MCHC RBC AUTO-ENTMCNC: 31.2 G/DL (ref 31.5–35.7)
MCV RBC AUTO: 87.5 FL (ref 79–97)
MONOCYTES # BLD AUTO: 0.88 10*3/MM3 (ref 0.1–0.9)
MONOCYTES NFR BLD AUTO: 8.7 % (ref 5–12)
NEUTROPHILS NFR BLD AUTO: 8.71 10*3/MM3 (ref 1.7–7)
NEUTROPHILS NFR BLD AUTO: 86.4 % (ref 42.7–76)
NRBC BLD AUTO-RTO: 0 /100 WBC (ref 0–0.2)
PLATELET # BLD AUTO: 182 10*3/MM3 (ref 140–450)
PMV BLD AUTO: 9.2 FL (ref 6–12)
POTASSIUM SERPL-SCNC: 4.2 MMOL/L (ref 3.5–5.2)
PROT SERPL-MCNC: 7.2 G/DL (ref 6–8.5)
PROTHROMBIN TIME: 15.5 SECONDS (ref 11.8–14.9)
RBC # BLD AUTO: 3.37 10*6/MM3 (ref 4.14–5.8)
SODIUM SERPL-SCNC: 138 MMOL/L (ref 136–145)
WBC NRBC COR # BLD AUTO: 10.08 10*3/MM3 (ref 3.4–10.8)
WHOLE BLOOD HOLD SPECIMEN: NORMAL

## 2023-11-21 PROCEDURE — 25010000002 ONDANSETRON PER 1 MG: Performed by: RADIOLOGY

## 2023-11-21 PROCEDURE — 25510000001 IOPAMIDOL PER 1 ML: Performed by: EMERGENCY MEDICINE

## 2023-11-21 PROCEDURE — 99285 EMERGENCY DEPT VISIT HI MDM: CPT

## 2023-11-21 PROCEDURE — 36415 COLL VENOUS BLD VENIPUNCTURE: CPT

## 2023-11-21 PROCEDURE — 25010000002 FENTANYL CITRATE (PF) 50 MCG/ML SOLUTION: Performed by: RADIOLOGY

## 2023-11-21 PROCEDURE — 87077 CULTURE AEROBIC IDENTIFY: CPT | Performed by: RADIOLOGY

## 2023-11-21 PROCEDURE — 80053 COMPREHEN METABOLIC PANEL: CPT | Performed by: EMERGENCY MEDICINE

## 2023-11-21 PROCEDURE — 87186 SC STD MICRODIL/AGAR DIL: CPT | Performed by: RADIOLOGY

## 2023-11-21 PROCEDURE — 85610 PROTHROMBIN TIME: CPT | Performed by: EMERGENCY MEDICINE

## 2023-11-21 PROCEDURE — 85025 COMPLETE CBC W/AUTO DIFF WBC: CPT | Performed by: EMERGENCY MEDICINE

## 2023-11-21 PROCEDURE — 25010000002 PIPERACILLIN SOD-TAZOBACTAM PER 1 G: Performed by: RADIOLOGY

## 2023-11-21 PROCEDURE — 87086 URINE CULTURE/COLONY COUNT: CPT | Performed by: RADIOLOGY

## 2023-11-21 PROCEDURE — 96365 THER/PROPH/DIAG IV INF INIT: CPT

## 2023-11-21 RX ORDER — FENTANYL CITRATE 50 UG/ML
INJECTION, SOLUTION INTRAMUSCULAR; INTRAVENOUS AS NEEDED
Status: COMPLETED | OUTPATIENT
Start: 2023-11-21 | End: 2023-11-21

## 2023-11-21 RX ORDER — FENTANYL CITRATE 50 UG/ML
INJECTION, SOLUTION INTRAMUSCULAR; INTRAVENOUS
Status: DISPENSED
Start: 2023-11-21 | End: 2023-11-22

## 2023-11-21 RX ORDER — ONDANSETRON 2 MG/ML
INJECTION INTRAMUSCULAR; INTRAVENOUS AS NEEDED
Status: COMPLETED | OUTPATIENT
Start: 2023-11-21 | End: 2023-11-21

## 2023-11-21 RX ORDER — LIDOCAINE HYDROCHLORIDE 20 MG/ML
10 INJECTION, SOLUTION INFILTRATION; PERINEURAL ONCE
Status: COMPLETED | OUTPATIENT
Start: 2023-11-21 | End: 2023-11-21

## 2023-11-21 RX ADMIN — FENTANYL CITRATE 50 MCG: 50 INJECTION, SOLUTION INTRAMUSCULAR; INTRAVENOUS at 16:27

## 2023-11-21 RX ADMIN — IOPAMIDOL 5 ML: 755 INJECTION, SOLUTION INTRAVENOUS at 15:30

## 2023-11-21 RX ADMIN — LIDOCAINE HYDROCHLORIDE 5 ML: 20 INJECTION, SOLUTION INFILTRATION; PERINEURAL at 15:30

## 2023-11-21 RX ADMIN — ONDANSETRON 4 MG: 2 INJECTION INTRAMUSCULAR; INTRAVENOUS at 16:03

## 2023-11-21 NOTE — ED PROVIDER NOTES
Time: 1:34 PM EST  Date of encounter:  11/21/2023  Independent Historian/Clinical History and Information was obtained by:   Patient    History is limited by: N/A    Chief Complaint: Nephrostomy tube dislodged      History of Present Illness:  Patient is a 78 y.o. year old male who presents to the emergency department for evaluation of nephrostomy tube dislodged.  This patient has a history of urothelial carcinoma status post cystectomy and urostomy diversion.  The patient was recently admitted to the hospital after he developed an obstructive component of hydronephrosis and renal failure which was felt to be secondary to a failure of the urostomy.  The patient had nephrostomy tubes placed bilaterally.  The patient's renal function improved after nephrostomy tube placement bilaterally.  Today the patient states that the right nephrostomy tube became dislodged.  He has no other new symptoms.    HPI    Patient Care Team  Primary Care Provider: Dimitri Hargrove MD    Past Medical History:     No Known Allergies  Past Medical History:   Diagnosis Date    AAA (abdominal aortic aneurysm)     BEING MONITORED NO REPAIR NEEDED YET    Aneurysm of abdominal aorta branch vessel     Cancer     BLADDER AND SKIN CANCERS/REMOVED    Chronic deep vein thrombosis (DVT) of tibial vein of left lower extremity 6/27/2023    Essential tremor     History of DVT of lower extremity 12/2022    on Xarelto    History of urostomy     PT CURRENTLY HAS HOME HEALTH TO HELP WITH OSTOMY    Hyperlipidemia      Past Surgical History:   Procedure Laterality Date    COLONOSCOPY  2018    CYSTECTOMY      12/2022  W/UROSTOMY    SKIN CANCER EXCISION Right     TRANSURETHRAL RESECTION OF BLADDER TUMOR Right 10/19/2022    Procedure: Cystoscopy with transurethral resection of bladder tumor;  Surgeon: Aung Westfall MD;  [facility]  Service: Urology;  Laterality: Right;    VENOUS ACCESS DEVICE (PORT) INSERTION Right 01/09/2023    Procedure:  INSERTION VENOUS ACCESS DEVICE;  Surgeon: Richie Edward MD;  [facility] [facility] [place] [facility]  Service: General;  Laterality: Right;     Family History   Problem Relation Age of Onset    Cancer Mother     Cancer Father     Hearing loss Father     Hypertension Father     Malig Hyperthermia Neg Hx        Home Medications:  Prior to Admission medications    Medication Sig Start Date End Date Taking? Authorizing Provider   atorvastatin (LIPITOR) 20 MG tablet Take 1 tablet by mouth Every Night. 22   Provider, MD Kristel   HYDROcodone-acetaminophen (Norco) 7.5-325 MG per tablet 1-2 tablets up to 3 times a day as needed for abdominal pain. 23   Dimtiri Hargrove MD   polyethylene glycol (MIRALAX) 17 g packet Take 17 g by mouth 2 (Two) Times a Day As Needed (constipation). 23   Itz Knox MD        Social History:   Social History     Tobacco Use    Smoking status: Former     Packs/day: 1.00     Years: 20.00     Additional pack years: 0.00     Total pack years: 20.00     Types: Cigarettes     Start date: 1962     Quit date: 1990     Years since quittin.9    Smokeless tobacco: Never   Vaping Use    Vaping Use: Never used   Substance Use Topics    Alcohol use: Yes     Alcohol/week: 1.0 standard drink of alcohol     Types: 1 Cans of beer per week     Comment: 1 beer daily    Drug use: Never         Review of Systems:  Review of Systems   Constitutional:  Negative for chills and fever.   HENT:  Negative for congestion, ear pain and sore throat.    Eyes:  Negative for pain.   Respiratory:  Negative for cough, chest tightness and shortness of breath.    Cardiovascular:  Negative for chest pain.   Gastrointestinal:  Negative for abdominal pain, diarrhea, nausea and vomiting.   Genitourinary:  Negative for flank pain and hematuria.   Musculoskeletal:  Negative for joint swelling.   Skin:  Negative for pallor.   Neurological:  Negative for seizures and headaches.   All other systems reviewed and are  "negative.       Physical Exam:  /70 (BP Location: Right arm, Patient Position: Lying)   Pulse 77   Temp 98.5 °F (36.9 °C) (Oral)   Resp 16   Ht 190.5 cm (75\")   Wt 83.7 kg (184 lb 8.4 oz)   SpO2 96%   BMI 23.06 kg/m²     Physical Exam  Vitals and nursing note reviewed.   Constitutional:       General: He is not in acute distress.     Appearance: Normal appearance. He is not toxic-appearing.   HENT:      Head: Normocephalic and atraumatic.      Mouth/Throat:      Mouth: Mucous membranes are moist.   Eyes:      General: No scleral icterus.  Cardiovascular:      Rate and Rhythm: Normal rate and regular rhythm.      Pulses: Normal pulses.      Heart sounds: Normal heart sounds.   Pulmonary:      Effort: Pulmonary effort is normal. No respiratory distress.      Breath sounds: Normal breath sounds.   Abdominal:      General: Abdomen is flat.      Palpations: Abdomen is soft.      Tenderness: There is no abdominal tenderness.   Musculoskeletal:         General: Normal range of motion.      Cervical back: Normal range of motion and neck supple.      Comments: Intact nephrostomy on the left with dislodged nephrostomy and resultant stoma on the right.   Skin:     General: Skin is warm and dry.      Capillary Refill: Capillary refill takes less than 2 seconds.   Neurological:      Mental Status: He is alert and oriented to person, place, and time. Mental status is at baseline.   Psychiatric:         Mood and Affect: Mood normal.         Behavior: Behavior normal.         Thought Content: Thought content normal.         Judgment: Judgment normal.                  Procedures:  Procedures      Medical Decision Making:      Comorbidities that affect care:    Chronic Kidney Disease    External Notes reviewed:    Previous Admission Note: Admission for nephrostomy tube placement.      The following orders were placed and all results were independently analyzed by me:  Orders Placed This Encounter   Procedures    Urine " Culture - Urine, Nephrostomy Right    IR Nephrostogram    CT Guided Nephrostomy Tube Placement    Comprehensive Metabolic Panel    Urinalysis With Microscopic If Indicated (No Culture) - Urine, Clean Catch    Stowell Draw    CBC Auto Differential    Protime-INR    CBC & Differential    Green Top (Gel)    Lavender Top    Gold Top - SST       Medications Given in the Emergency Department:  Medications   lidocaine (XYLOCAINE) 2% injection 10 mL (5 mL Injection Given 11/21/23 1530)   iopamidol (ISOVUE-370) 76 % injection 50 mL (5 mL Intravenous Given 11/21/23 1530)   piperacillin-tazobactam (ZOSYN) 3.375 g/100 mL 0.9% NS IVPB (mbp) (0 g Intravenous Stopped 11/21/23 1841)   ondansetron (ZOFRAN) injection (4 mg Intravenous Given 11/21/23 1603)   fentaNYL citrate (PF) (SUBLIMAZE) injection (50 mcg Intravenous Given 11/21/23 1627)        ED Course:         Labs:    Lab Results (last 24 hours)       Procedure Component Value Units Date/Time    CBC & Differential [[id number]]  (Abnormal) Collected: 11/21/23 1333    Specimen: Blood from Arm, Left Updated: 11/21/23 1343    Narrative:      The following orders were created for panel order CBC & Differential.  Procedure                               Abnormality         Status                     ---------                               -----------         ------                     CBC Auto Differential[[id number]]        Abnormal            Final result                 Please view results for these tests on the individual orders.    Comprehensive Metabolic Panel [[id number]]  (Abnormal) Collected: 11/21/23 1333    Specimen: Blood from Arm, Left Updated: 11/21/23 1406     Glucose 130 mg/dL      BUN 32 mg/dL      Creatinine 1.69 mg/dL      Sodium 138 mmol/L      Potassium 4.2 mmol/L      Chloride 99 mmol/L      CO2 26.2 mmol/L      Calcium 9.1 mg/dL      Total Protein 7.2 g/dL      Albumin 3.6 g/dL      ALT (SGPT) 49 U/L      AST (SGOT) 50 U/L      Alkaline Phosphatase 254 U/L       Total Bilirubin 0.5 mg/dL      Globulin 3.6 gm/dL      A/G Ratio 1.0 g/dL      BUN/Creatinine Ratio 18.9     Anion Gap 12.8 mmol/L      eGFR 40.5 mL/min/1.73     Narrative:      GFR Normal >60  Chronic Kidney Disease <60  Kidney Failure <15    The GFR formula is only valid for adults with stable renal function between ages 18 and 70.    CBC Auto Differential [[id number]]  (Abnormal) Collected: 11/21/23 1333    Specimen: Blood from Arm, Left Updated: 11/21/23 1343     WBC 10.08 10*3/mm3      RBC 3.37 10*6/mm3      Hemoglobin 9.2 g/dL      Hematocrit 29.5 %      MCV 87.5 fL      MCH 27.3 pg      MCHC 31.2 g/dL      RDW 14.6 %      RDW-SD 46.7 fl      MPV 9.2 fL      Platelets 182 10*3/mm3      Neutrophil % 86.4 %      Lymphocyte % 3.6 %      Monocyte % 8.7 %      Eosinophil % 0.4 %      Basophil % 0.6 %      Immature Grans % 0.3 %      Neutrophils, Absolute 8.71 10*3/mm3      Lymphocytes, Absolute 0.36 10*3/mm3      Monocytes, Absolute 0.88 10*3/mm3      Eosinophils, Absolute 0.04 10*3/mm3      Basophils, Absolute 0.06 10*3/mm3      Immature Grans, Absolute 0.03 10*3/mm3      nRBC 0.0 /100 WBC     Protime-INR [[id number]]  (Abnormal) Collected: 11/21/23 1334    Specimen: Blood from Arm, Left Updated: 11/21/23 1356     Protime 15.5 Seconds      INR 1.21    Narrative:      Suggested Therapeutic Ranges For Oral Anticoagulant Therapy:  Level of Therapy                      INR Target Range  Standard Dose                            2.0-3.0  High Dose                                2.5-3.5  Patients not receiving anticoagulant  Therapy Normal Range                     0.86-1.15    Urine Culture - Urine, Nephrostomy Right [[id number]] Collected: 11/21/23 1643    Specimen: Urine from Nephrostomy Right Updated: 11/21/23 1720             Imaging:    CT Guided Nephrostomy Tube Placement    Result Date: 11/21/2023  PROCEDURE: CT GUIDED NEPHROSTOMY TUBE PLACEMENT  COMPARISON: [facility], CT, CT GUIDED NEPHROSTOMY TUBE  PLACEMENT, 11/09/2023, 11:48.  INDICATIONS: Hydronephrosis  FINDINGS:  The risks, benefits and alternatives of the procedure were explained to the patient.  The chief risks discussed were bleeding, infection and injury to the kidney or other adjacent organs.  The chief options discussed were doing nothing and internal stent placement.  The patient indicated he understood what was discussed and elected to proceed.  He provided written consent.  Patient was given Zosyn 3.375 g intravenously prior to beginning the procedure.  Please refer to the nursing notes for additional medications administered.  Patient was placed on the CT scanner in a prone position.  Preliminary CT was performed through the kidneys which demonstrates a moderate right hydronephrosis.  The overlying skin was prepped and draped in normal sterile fashion.  2% lidocaine was injected along the anticipated tract of the needle.  A 5 Wolof 7 cm PayActiv catheter was advanced into the intrarenal collection system.  A 0.035 in [employer] guidewire was advanced through the catheter which was then withdrawn.  An 8 Wolof nephrostomy tube was advanced over the guidewire which was withdrawn.  The pigtail loop was formed and locked.  Urine was draining from the nephrostomy tube at the end of the procedure.  A sample of urine was collected for culture.  Patient tolerated procedure well without evidence of complication.  He will be transferred back to the emergency department for further observation and care.  I discussed case with Dr. Townsend.        1. Successful placement of a right 8 Wolof nephrostomy tube without evidence of complication 2. A sample of urine from the right nephrostomy tube was sent for culture.      Alvaor Christian M.D.       Electronically Signed and Approved By: Alvaro Christian M.D. on 11/21/2023 at 17:51             IR Nephrostogram    Result Date: 11/21/2023  PROCEDURE: IR NEPHROSTOGRAM  COMPARISON: None  INDICATIONS: Possible nephrostomy tube  replacement. FLUORO TIME 2.0 MINUTES. 10.4 mGy. 1 IMAGE. ISOVUE 370-10ML, SODIUM CHLORIDE 0.9% - 10ML.  FINDINGS:  The patient's right nephrostomy tube has previously fallen out.  Patient was placed on the fluoroscopy table in a prone position.  The skin surrounding the right nephrostomy tube track was cleansed and draped in normal sterile fashion.  A small amount of contrast was injected in the tract.  However, this did not communicate with the intrarenal collection system.  A 0.035 in glidewire was passed into the tract and was attempted to be advanced.  This was unsuccessful.  Patient will be taken to CT for placement of a new right nephrostomy tube.        1. As above      Alvaro Christian M.D.       Electronically Signed and Approved By: Alvaro Christian M.D. on 11/21/2023 at 17:48                Differential Diagnosis and Discussion:    Nephrostomy tube dislodgment: Infection, traumatic dislodgment, accidental dislodgment.    All labs were reviewed and interpreted by me.    MDM     Amount and/or Complexity of Data Reviewed  Clinical lab tests: reviewed                 Patient Care Considerations:    CT ABDOMEN AND PELVIS: I considered ordering a CT scan of the abdomen and pelvis however the patient will have a nephrostogram prior to nephrostomy tube replacement.      Consultants/Shared Management Plan:    Consultant: I have discussed the case with radiologist who states the patient have nephrostomy tube replaced and then he will be discharged home.    Social Determinants of Health:    Patient has presented with family members who are responsible, reliable and will ensure follow up care.      Disposition and Care Coordination:    Discharged: The patient is suitable and stable for discharge with no need for consideration of observation or admission.    I have explained discharge medications and the need for follow up with the patient/caretakers. This was also printed in the discharge instructions. Patient was  discharged with the following medications and follow up:      Medication List      No changes were made to your prescriptions during this visit.      Dimitri Hargrove MD  [address] [facility] [address]  [facility] [id number] [phone]    In 1 day      Horacio Bansal MD  [address] [id number] [phone]    In 1 day  call for appointment       Final diagnoses:   Nephrostomy tube displaced        ED Disposition       ED Disposition   Discharge    Condition   Stable    Comment   --               This medical record created using voice recognition software.             Chilo Pelletier, DO  11/21/23 2235

## 2023-11-21 NOTE — DISCHARGE INSTRUCTIONS
Continue routine care of your nephrostomy tubes.  Drink plenty of fluids.  Return for worsening symptoms.  Follow-up with your urologist this week.

## 2023-11-22 NOTE — ED NOTES
Assisted pt with replacing nephrostomy bag on left side to a smaller size per pt request. Pt resting comfortably, family at bedside.

## 2023-11-25 LAB — BACTERIA SPEC AEROBE CULT: ABNORMAL

## 2023-11-27 ENCOUNTER — APPOINTMENT (OUTPATIENT)
Dept: CARDIOLOGY | Facility: HOSPITAL | Age: 78
DRG: 689 | End: 2023-11-27
Payer: MEDICARE

## 2023-11-27 ENCOUNTER — APPOINTMENT (OUTPATIENT)
Dept: CT IMAGING | Facility: HOSPITAL | Age: 78
DRG: 689 | End: 2023-11-27
Payer: MEDICARE

## 2023-11-27 ENCOUNTER — HOSPITAL ENCOUNTER (INPATIENT)
Facility: HOSPITAL | Age: 78
LOS: 2 days | Discharge: HOME HEALTH | DRG: 689 | End: 2023-11-29
Attending: EMERGENCY MEDICINE | Admitting: INTERNAL MEDICINE
Payer: MEDICARE

## 2023-11-27 DIAGNOSIS — N39.0 ACUTE UTI: Primary | ICD-10-CM

## 2023-11-27 DIAGNOSIS — R26.2 DIFFICULTY WALKING: ICD-10-CM

## 2023-11-27 PROBLEM — R74.8 ELEVATED LIVER ENZYMES: Status: ACTIVE | Noted: 2023-01-01

## 2023-11-27 LAB
ALBUMIN SERPL-MCNC: 3.1 G/DL (ref 3.5–5.2)
ALBUMIN/GLOB SERPL: 0.9 G/DL
ALP SERPL-CCNC: 485 U/L (ref 39–117)
ALT SERPL W P-5'-P-CCNC: 83 U/L (ref 1–41)
ANION GAP SERPL CALCULATED.3IONS-SCNC: 11.3 MMOL/L (ref 5–15)
AST SERPL-CCNC: 87 U/L (ref 1–40)
BACTERIA UR QL AUTO: ABNORMAL /HPF
BASOPHILS # BLD AUTO: 0.03 10*3/MM3 (ref 0–0.2)
BASOPHILS NFR BLD AUTO: 0.4 % (ref 0–1.5)
BILIRUB SERPL-MCNC: 0.5 MG/DL (ref 0–1.2)
BILIRUB UR QL STRIP: NEGATIVE
BUN SERPL-MCNC: 28 MG/DL (ref 8–23)
BUN/CREAT SERPL: 22.8 (ref 7–25)
CALCIUM SPEC-SCNC: 8.7 MG/DL (ref 8.6–10.5)
CHLORIDE SERPL-SCNC: 100 MMOL/L (ref 98–107)
CLARITY UR: CLEAR
CO2 SERPL-SCNC: 24.7 MMOL/L (ref 22–29)
COLOR UR: YELLOW
CREAT SERPL-MCNC: 1.23 MG/DL (ref 0.76–1.27)
D-LACTATE SERPL-SCNC: 1.4 MMOL/L (ref 0.5–2)
DEPRECATED RDW RBC AUTO: 45.5 FL (ref 37–54)
EGFRCR SERPLBLD CKD-EPI 2021: 59.3 ML/MIN/1.73
EOSINOPHIL # BLD AUTO: 0.01 10*3/MM3 (ref 0–0.4)
EOSINOPHIL NFR BLD AUTO: 0.1 % (ref 0.3–6.2)
ERYTHROCYTE [DISTWIDTH] IN BLOOD BY AUTOMATED COUNT: 14.4 % (ref 12.3–15.4)
FERRITIN SERPL-MCNC: 2959 NG/ML (ref 30–400)
FOLATE SERPL-MCNC: 12.1 NG/ML (ref 4.78–24.2)
GLOBULIN UR ELPH-MCNC: 3.4 GM/DL
GLUCOSE SERPL-MCNC: 120 MG/DL (ref 65–99)
GLUCOSE UR STRIP-MCNC: NEGATIVE MG/DL
HCT VFR BLD AUTO: 29.6 % (ref 37.5–51)
HGB BLD-MCNC: 9.1 G/DL (ref 13–17.7)
HGB UR QL STRIP.AUTO: ABNORMAL
HOLD SPECIMEN: NORMAL
HOLD SPECIMEN: NORMAL
HYALINE CASTS UR QL AUTO: ABNORMAL /LPF
IMM GRANULOCYTES # BLD AUTO: 0.09 10*3/MM3 (ref 0–0.05)
IMM GRANULOCYTES NFR BLD AUTO: 1.2 % (ref 0–0.5)
IRON 24H UR-MRATE: 24 MCG/DL (ref 59–158)
IRON SATN MFR SERPL: 12 % (ref 20–50)
KETONES UR QL STRIP: NEGATIVE
LEUKOCYTE ESTERASE UR QL STRIP.AUTO: ABNORMAL
LIPASE SERPL-CCNC: 24 U/L (ref 13–60)
LYMPHOCYTES # BLD AUTO: 0.28 10*3/MM3 (ref 0.7–3.1)
LYMPHOCYTES NFR BLD AUTO: 3.7 % (ref 19.6–45.3)
MCH RBC QN AUTO: 26.7 PG (ref 26.6–33)
MCHC RBC AUTO-ENTMCNC: 30.7 G/DL (ref 31.5–35.7)
MCV RBC AUTO: 86.8 FL (ref 79–97)
MONOCYTES # BLD AUTO: 0.63 10*3/MM3 (ref 0.1–0.9)
MONOCYTES NFR BLD AUTO: 8.3 % (ref 5–12)
NEUTROPHILS NFR BLD AUTO: 6.52 10*3/MM3 (ref 1.7–7)
NEUTROPHILS NFR BLD AUTO: 86.3 % (ref 42.7–76)
NITRITE UR QL STRIP: NEGATIVE
NRBC BLD AUTO-RTO: 0 /100 WBC (ref 0–0.2)
NT-PROBNP SERPL-MCNC: 1866 PG/ML (ref 0–1800)
PH UR STRIP.AUTO: 6 [PH] (ref 5–8)
PLATELET # BLD AUTO: 147 10*3/MM3 (ref 140–450)
PMV BLD AUTO: 10.2 FL (ref 6–12)
POTASSIUM SERPL-SCNC: 3.9 MMOL/L (ref 3.5–5.2)
PROT SERPL-MCNC: 6.5 G/DL (ref 6–8.5)
PROT UR QL STRIP: ABNORMAL
RBC # BLD AUTO: 3.41 10*6/MM3 (ref 4.14–5.8)
RBC # UR STRIP: ABNORMAL /HPF
REF LAB TEST METHOD: ABNORMAL
SODIUM SERPL-SCNC: 136 MMOL/L (ref 136–145)
SP GR UR STRIP: 1.02 (ref 1–1.03)
SQUAMOUS #/AREA URNS HPF: ABNORMAL /HPF
TIBC SERPL-MCNC: 200 MCG/DL (ref 298–536)
TRANSFERRIN SERPL-MCNC: 134 MG/DL (ref 200–360)
UROBILINOGEN UR QL STRIP: ABNORMAL
VIT B12 BLD-MCNC: 1150 PG/ML (ref 211–946)
WBC # UR STRIP: ABNORMAL /HPF
WBC NRBC COR # BLD AUTO: 7.56 10*3/MM3 (ref 3.4–10.8)
WHOLE BLOOD HOLD COAG: NORMAL
WHOLE BLOOD HOLD SPECIMEN: NORMAL

## 2023-11-27 PROCEDURE — 84466 ASSAY OF TRANSFERRIN: CPT | Performed by: INTERNAL MEDICINE

## 2023-11-27 PROCEDURE — 93306 TTE W/DOPPLER COMPLETE: CPT

## 2023-11-27 PROCEDURE — 99223 1ST HOSP IP/OBS HIGH 75: CPT | Performed by: INTERNAL MEDICINE

## 2023-11-27 PROCEDURE — 99285 EMERGENCY DEPT VISIT HI MDM: CPT

## 2023-11-27 PROCEDURE — 82728 ASSAY OF FERRITIN: CPT | Performed by: INTERNAL MEDICINE

## 2023-11-27 PROCEDURE — 36415 COLL VENOUS BLD VENIPUNCTURE: CPT

## 2023-11-27 PROCEDURE — 87040 BLOOD CULTURE FOR BACTERIA: CPT

## 2023-11-27 PROCEDURE — 82746 ASSAY OF FOLIC ACID SERUM: CPT | Performed by: INTERNAL MEDICINE

## 2023-11-27 PROCEDURE — 74176 CT ABD & PELVIS W/O CONTRAST: CPT

## 2023-11-27 PROCEDURE — 87186 SC STD MICRODIL/AGAR DIL: CPT

## 2023-11-27 PROCEDURE — 25010000002 FUROSEMIDE PER 20 MG: Performed by: INTERNAL MEDICINE

## 2023-11-27 PROCEDURE — 25810000003 SODIUM CHLORIDE 0.9 % SOLUTION: Performed by: EMERGENCY MEDICINE

## 2023-11-27 PROCEDURE — 83690 ASSAY OF LIPASE: CPT

## 2023-11-27 PROCEDURE — 99222 1ST HOSP IP/OBS MODERATE 55: CPT | Performed by: INTERNAL MEDICINE

## 2023-11-27 PROCEDURE — 87077 CULTURE AEROBIC IDENTIFY: CPT

## 2023-11-27 PROCEDURE — 93306 TTE W/DOPPLER COMPLETE: CPT | Performed by: INTERNAL MEDICINE

## 2023-11-27 PROCEDURE — 83880 ASSAY OF NATRIURETIC PEPTIDE: CPT | Performed by: INTERNAL MEDICINE

## 2023-11-27 PROCEDURE — 82607 VITAMIN B-12: CPT | Performed by: INTERNAL MEDICINE

## 2023-11-27 PROCEDURE — 80053 COMPREHEN METABOLIC PANEL: CPT

## 2023-11-27 PROCEDURE — 25010000002 LINEZOLID 600 MG/300ML SOLUTION: Performed by: EMERGENCY MEDICINE

## 2023-11-27 PROCEDURE — 81001 URINALYSIS AUTO W/SCOPE: CPT

## 2023-11-27 PROCEDURE — 25010000002 CEFTRIAXONE PER 250 MG

## 2023-11-27 PROCEDURE — 83605 ASSAY OF LACTIC ACID: CPT

## 2023-11-27 PROCEDURE — 25010000002 ONDANSETRON PER 1 MG: Performed by: EMERGENCY MEDICINE

## 2023-11-27 PROCEDURE — 87086 URINE CULTURE/COLONY COUNT: CPT

## 2023-11-27 PROCEDURE — 83540 ASSAY OF IRON: CPT | Performed by: INTERNAL MEDICINE

## 2023-11-27 PROCEDURE — 85025 COMPLETE CBC W/AUTO DIFF WBC: CPT

## 2023-11-27 RX ORDER — QUETIAPINE FUMARATE 25 MG/1
12.5 TABLET, FILM COATED ORAL NIGHTLY PRN
Status: DISCONTINUED | OUTPATIENT
Start: 2023-11-27 | End: 2023-11-29 | Stop reason: HOSPADM

## 2023-11-27 RX ORDER — CALCIUM CARBONATE 500 MG/1
2 TABLET, CHEWABLE ORAL 2 TIMES DAILY PRN
Status: DISCONTINUED | OUTPATIENT
Start: 2023-11-27 | End: 2023-11-29 | Stop reason: HOSPADM

## 2023-11-27 RX ORDER — CHOLECALCIFEROL (VITAMIN D3) 125 MCG
5 CAPSULE ORAL NIGHTLY PRN
Status: DISCONTINUED | OUTPATIENT
Start: 2023-11-27 | End: 2023-11-29 | Stop reason: HOSPADM

## 2023-11-27 RX ORDER — ONDANSETRON 2 MG/ML
4 INJECTION INTRAMUSCULAR; INTRAVENOUS ONCE
Status: COMPLETED | OUTPATIENT
Start: 2023-11-27 | End: 2023-11-27

## 2023-11-27 RX ORDER — SODIUM CHLORIDE 0.9 % (FLUSH) 0.9 %
10 SYRINGE (ML) INJECTION AS NEEDED
Status: DISCONTINUED | OUTPATIENT
Start: 2023-11-27 | End: 2023-11-27

## 2023-11-27 RX ORDER — HYDROCODONE BITARTRATE AND ACETAMINOPHEN 7.5; 325 MG/1; MG/1
1 TABLET ORAL EVERY 6 HOURS PRN
Status: DISCONTINUED | OUTPATIENT
Start: 2023-11-27 | End: 2023-11-29 | Stop reason: HOSPADM

## 2023-11-27 RX ORDER — LINEZOLID 600 MG/1
600 TABLET, FILM COATED ORAL EVERY 12 HOURS SCHEDULED
Status: DISCONTINUED | OUTPATIENT
Start: 2023-11-27 | End: 2023-11-29 | Stop reason: HOSPADM

## 2023-11-27 RX ORDER — FERROUS SULFATE 325(65) MG
1 TABLET ORAL
COMMUNITY
Start: 2023-11-21 | End: 2023-12-06

## 2023-11-27 RX ORDER — ONDANSETRON 2 MG/ML
4 INJECTION INTRAMUSCULAR; INTRAVENOUS EVERY 6 HOURS PRN
Status: DISCONTINUED | OUTPATIENT
Start: 2023-11-27 | End: 2023-11-29 | Stop reason: HOSPADM

## 2023-11-27 RX ORDER — SODIUM CHLORIDE 0.9 % (FLUSH) 0.9 %
10 SYRINGE (ML) INJECTION AS NEEDED
Status: DISCONTINUED | OUTPATIENT
Start: 2023-11-27 | End: 2023-11-29 | Stop reason: HOSPADM

## 2023-11-27 RX ORDER — NALOXONE HCL 0.4 MG/ML
0.4 VIAL (ML) INJECTION
Status: DISCONTINUED | OUTPATIENT
Start: 2023-11-27 | End: 2023-11-29 | Stop reason: HOSPADM

## 2023-11-27 RX ORDER — FUROSEMIDE 10 MG/ML
20 INJECTION INTRAMUSCULAR; INTRAVENOUS 2 TIMES DAILY
Status: DISCONTINUED | OUTPATIENT
Start: 2023-11-27 | End: 2023-11-29

## 2023-11-27 RX ORDER — FAMOTIDINE 20 MG/1
20 TABLET, FILM COATED ORAL DAILY
Status: DISCONTINUED | OUTPATIENT
Start: 2023-11-27 | End: 2023-11-29 | Stop reason: HOSPADM

## 2023-11-27 RX ORDER — ENOXAPARIN SODIUM 100 MG/ML
40 INJECTION SUBCUTANEOUS DAILY
Status: DISCONTINUED | OUTPATIENT
Start: 2023-11-27 | End: 2023-11-29 | Stop reason: HOSPADM

## 2023-11-27 RX ORDER — ACETAMINOPHEN 650 MG/1
650 SUPPOSITORY RECTAL EVERY 4 HOURS PRN
Status: DISCONTINUED | OUTPATIENT
Start: 2023-11-27 | End: 2023-11-29 | Stop reason: HOSPADM

## 2023-11-27 RX ORDER — ACETAMINOPHEN 325 MG/1
650 TABLET ORAL EVERY 4 HOURS PRN
Status: DISCONTINUED | OUTPATIENT
Start: 2023-11-27 | End: 2023-11-29 | Stop reason: HOSPADM

## 2023-11-27 RX ORDER — BISACODYL 5 MG/1
5 TABLET, DELAYED RELEASE ORAL DAILY PRN
Status: DISCONTINUED | OUTPATIENT
Start: 2023-11-27 | End: 2023-11-29 | Stop reason: HOSPADM

## 2023-11-27 RX ORDER — ACETAMINOPHEN 160 MG/5ML
650 SOLUTION ORAL EVERY 4 HOURS PRN
Status: DISCONTINUED | OUTPATIENT
Start: 2023-11-27 | End: 2023-11-29 | Stop reason: HOSPADM

## 2023-11-27 RX ORDER — POLYETHYLENE GLYCOL 3350 17 G/17G
17 POWDER, FOR SOLUTION ORAL DAILY PRN
Status: DISCONTINUED | OUTPATIENT
Start: 2023-11-27 | End: 2023-11-29 | Stop reason: HOSPADM

## 2023-11-27 RX ORDER — SODIUM CHLORIDE, SODIUM LACTATE, POTASSIUM CHLORIDE, CALCIUM CHLORIDE 600; 310; 30; 20 MG/100ML; MG/100ML; MG/100ML; MG/100ML
75 INJECTION, SOLUTION INTRAVENOUS CONTINUOUS
Status: CANCELLED | OUTPATIENT
Start: 2023-11-27

## 2023-11-27 RX ORDER — CEFTRIAXONE SODIUM 1 G/50ML
1000 INJECTION, SOLUTION INTRAVENOUS ONCE
Status: COMPLETED | OUTPATIENT
Start: 2023-11-27 | End: 2023-11-27

## 2023-11-27 RX ORDER — CEFTRIAXONE SODIUM 1 G/50ML
1000 INJECTION, SOLUTION INTRAVENOUS ONCE
Status: DISCONTINUED | OUTPATIENT
Start: 2023-11-27 | End: 2023-11-27

## 2023-11-27 RX ORDER — ALUMINA, MAGNESIA, AND SIMETHICONE 2400; 2400; 240 MG/30ML; MG/30ML; MG/30ML
15 SUSPENSION ORAL EVERY 6 HOURS PRN
Status: DISCONTINUED | OUTPATIENT
Start: 2023-11-27 | End: 2023-11-29 | Stop reason: HOSPADM

## 2023-11-27 RX ORDER — KETOROLAC TROMETHAMINE 30 MG/ML
15 INJECTION, SOLUTION INTRAMUSCULAR; INTRAVENOUS EVERY 6 HOURS PRN
Status: DISCONTINUED | OUTPATIENT
Start: 2023-11-27 | End: 2023-11-29 | Stop reason: HOSPADM

## 2023-11-27 RX ORDER — FAMOTIDINE 10 MG/ML
20 INJECTION, SOLUTION INTRAVENOUS ONCE
Status: CANCELLED | OUTPATIENT
Start: 2023-11-27 | End: 2023-11-27

## 2023-11-27 RX ORDER — SODIUM CHLORIDE 0.9 % (FLUSH) 0.9 %
10 SYRINGE (ML) INJECTION EVERY 12 HOURS SCHEDULED
Status: DISCONTINUED | OUTPATIENT
Start: 2023-11-27 | End: 2023-11-29 | Stop reason: HOSPADM

## 2023-11-27 RX ORDER — SODIUM CHLORIDE 9 MG/ML
40 INJECTION, SOLUTION INTRAVENOUS AS NEEDED
Status: DISCONTINUED | OUTPATIENT
Start: 2023-11-27 | End: 2023-11-29 | Stop reason: HOSPADM

## 2023-11-27 RX ORDER — BISACODYL 10 MG
10 SUPPOSITORY, RECTAL RECTAL DAILY PRN
Status: DISCONTINUED | OUTPATIENT
Start: 2023-11-27 | End: 2023-11-29 | Stop reason: HOSPADM

## 2023-11-27 RX ORDER — LINEZOLID 2 MG/ML
600 INJECTION, SOLUTION INTRAVENOUS ONCE
Status: COMPLETED | OUTPATIENT
Start: 2023-11-27 | End: 2023-11-27

## 2023-11-27 RX ORDER — AMOXICILLIN 250 MG
2 CAPSULE ORAL 2 TIMES DAILY
Status: DISCONTINUED | OUTPATIENT
Start: 2023-11-27 | End: 2023-11-29 | Stop reason: HOSPADM

## 2023-11-27 RX ORDER — HYDROCODONE BITARTRATE AND ACETAMINOPHEN 5; 325 MG/1; MG/1
1 TABLET ORAL EVERY 8 HOURS PRN
Status: DISCONTINUED | OUTPATIENT
Start: 2023-11-27 | End: 2023-11-29 | Stop reason: HOSPADM

## 2023-11-27 RX ADMIN — SODIUM CHLORIDE 1000 ML: 9 INJECTION, SOLUTION INTRAVENOUS at 14:24

## 2023-11-27 RX ADMIN — FAMOTIDINE 20 MG: 20 TABLET ORAL at 21:22

## 2023-11-27 RX ADMIN — Medication 10 ML: at 21:23

## 2023-11-27 RX ADMIN — DOCUSATE SODIUM 50MG AND SENNOSIDES 8.6MG 2 TABLET: 8.6; 5 TABLET, FILM COATED ORAL at 21:22

## 2023-11-27 RX ADMIN — FUROSEMIDE 20 MG: 10 INJECTION, SOLUTION INTRAMUSCULAR; INTRAVENOUS at 21:22

## 2023-11-27 RX ADMIN — ACETAMINOPHEN 650 MG: 325 TABLET ORAL at 21:26

## 2023-11-27 RX ADMIN — ONDANSETRON 4 MG: 2 INJECTION INTRAMUSCULAR; INTRAVENOUS at 15:33

## 2023-11-27 RX ADMIN — CEFTRIAXONE SODIUM 1000 MG: 1 INJECTION, SOLUTION INTRAVENOUS at 14:23

## 2023-11-27 RX ADMIN — LINEZOLID 600 MG: 600 INJECTION, SOLUTION INTRAVENOUS at 15:34

## 2023-11-27 RX ADMIN — LINEZOLID 600 MG: 600 TABLET, FILM COATED ORAL at 21:22

## 2023-11-27 NOTE — H&P
[facility]   HOSPITALIST HISTORY AND PHYSICAL  Date: 2023   Patient Name: Korey Rodriguez  : 1945  MRN: [id number]  Primary Care Physician:  Dimitri Hargrove MD  Date of admission: 2023    Subjective   Subjective     Chief Complaint: Generalized weakness unable to take care of himself increasing low back pain and mid abdominal pain for past 5 days    HPI:    Korey Rodriguez is a 78 y.o. male with past medical history of urothelial cancer s/p cystectomy on  by Dr. Westfall and diverting urostomy, abdominal aortic aneurysm chronic DVT of left lower extremity on Xarelto.   Patient was recently at [facility] because of acute kidney injury due to obstructive uropathy where his urostomy was not functioning and patient had bilateral nephrostomy tubes placed.  Patient had 2 sessions of hemodialysis and followed by Dr. Sanchez.  Patient was seen by urologist Dr. Bansal at [facility].  His liver lesion was biopsied.  Patient was discharged home on November 15.  Patient was seen in ED on  and urine culture grew VRE.  Patient presented again to [facility] ED because of above symptoms described as abdominal pain and feels constipated with no BM for 3 days.  Some nausea and vomiting.  No fever or chills.  Patient low back pain has gotten worse and he feels like it is because of laying in bed.  No new numbness tingling in the toes or pain radiating into lower extremities.  Work-up in the ED showed stable vital signs.  BNP is negative.  Liver enzymes are elevated especially alk phos.  White cell count of 7.5.  Hemoglobin of 9.  Lactate and lipase within normal range.  UA consistent with UTI with bacteria RBCs and WBCs.  CT scan abdominal pelvis showed right mid abdomen ileostomy with right parastomal hernia and nonobstructing bowels.  There are small bilateral pleural effusions with anasarca.  Extensive mets in the liver.  Has gallstones.  Hospitalist  has been called to admit and further evaluation.  Urology was consulted by ED physician.      Personal History     Past Medical History:  Past Medical History:   Diagnosis Date    AAA (abdominal aortic aneurysm)     BEING MONITORED NO REPAIR NEEDED YET    Aneurysm of abdominal aorta branch vessel     Cancer     BLADDER AND SKIN CANCERS/REMOVED    Chronic deep vein thrombosis (DVT) of tibial vein of left lower extremity 6/27/2023    Essential tremor     History of DVT of lower extremity 12/2022    on Xarelto    History of urostomy     PT CURRENTLY HAS HOME HEALTH TO HELP WITH OSTOMY    Hyperlipidemia        Past Surgical History:  Past Surgical History:   Procedure Laterality Date    COLONOSCOPY  2018    CYSTECTOMY      12/2022  W/UROSTOMY    SKIN CANCER EXCISION Right     TRANSURETHRAL RESECTION OF BLADDER TUMOR Right 10/19/2022    Procedure: Cystoscopy with transurethral resection of bladder tumor;  Surgeon: Aung Westfall MD;  Location: [facility] MAIN OR;  Service: Urology;  Laterality: Right;    VENOUS ACCESS DEVICE (PORT) INSERTION Right 01/09/2023    Procedure: INSERTION VENOUS ACCESS DEVICE;  Surgeon: Rihcie Edward MD;  Location: [facility] OR [facility] [place];  Service: General;  Laterality: Right;       Family History:   family history includes Cancer in his father and mother; Hearing loss in his father; Hypertension in his father.    Social History:    reports that he quit smoking about 33 years ago. His smoking use included cigarettes. He started smoking about 61 years ago. He has a 20.00 pack-year smoking history. He has never used smokeless tobacco. He reports current alcohol use of about 1.0 standard drink of alcohol per week. He reports that he does not use drugs.    Home Medications:  atorvastatin and ferrous sulfate    Allergies:  No Known Allergies    Review of Systems   All systems were reviewed and negative except for: Summary and interval follow-up    Objective   Objective     Vitals:   Temp:  [97.8 °F (36.6  °C)] 97.8 °F (36.6 °C)  Heart Rate:  [63-80] 63  Resp:  [16-18] 16  BP: (122-172)/(58-69) 172/69    Physical Exam    Constitutional: Awake, alert, no acute distress   Eyes: Pupils equal, sclerae anicteric, no conjunctival injection   HENT: NCAT, mucous membranes moist   Neck: Supple, no thyromegaly, no lymphadenopathy, trachea midline   Respiratory: Decreased to auscultation bilaterally, nonlabored respirations    Cardiovascular: RRR, no murmurs, rubs, or gallops, palpable pedal pulses bilaterally   Gastrointestinal: Positive bowel sounds, soft, nontender, nondistended.  Diverting urostomy right upper quadrant with empty bag.  No CVA tenderness.  Bilateral nephrostomies draining clear urine   Musculoskeletal: No T/L-spine tenderness, +2 bilateral ankle edema, no clubbing or cyanosis to extremities   Psychiatric: Appropriate affect, cooperative   Neurologic: Oriented x 3, strength symmetric in all extremities, Cranial Nerves grossly intact to confrontation, speech clear   Skin: No rashes     Result Review    Result Review:  I have personally reviewed the results from the time of this admission to 11/27/2023 16:37 EST and agree with these findings:  [x]  Laboratory  [x]  Microbiology  [x]  Radiology  []  EKG/Telemetry   []  Cardiology/Vascular   []  Pathology  [x]  Old records  [x]  Other: Medications      Assessment & Plan   Assessment / Plan     Assessment:  Increasing generalized weakness inability to take care of himself.  Possible UTI.  Recent urine culture positive for VRE.  Urothelial cancer s/p cystectomy and diverting urostomy.  Nonfunctional.  Parastomal hernia with nonobstructing bowel.   s/p bilateral nephrostomy tube for obstructive uropathy.  Improved  Extensive mets to the liver.  Biopsy pending.  Elevated liver enzymes due to above.  Abdominal aortic aneurysm.  Stable.  Chronic DVT left lower extremity?  Xarelto.  Anasarca with lower extremity edema and bilateral pleural effusions.  Asymptomatic  gallstones.  Anemia.  No evidence of bleeding.    Plan:   Admit to regular floor.  P.o. Zyvox.  Await urine culture data.  Check 2D echo   BNP.  IV Lasix  IV and oral narcotics for pain control along with Toradol as needed.  Bowel regimen.  Discussed with urology to evaluate patient.  Discussed with heme-onc to evaluate patient.  Check the results of liver biopsy from .  Resume appropriate home medications.  PT OT.  Discussed with ED physician  Likely need rehab placement         DVT prophylaxis:  No DVT prophylaxis order currently exists.  SCD.      CODE STATUS:    Code Status (Patient has no pulse and is not breathing): CPR (Attempt to Resuscitate)  Medical Interventions (Patient has pulse or is breathing): Full Support      Admission Status:  I believe this patient meets inpatient status.    Part of this note may be an electronic transcription/translation of spoken language to printed text using the Dragon Dictation System.     Electronically signed by True Caputo MD, 11/27/23, 4:37 PM EST.

## 2023-11-27 NOTE — CONSULTS
[facility]   Hematology/Oncology  Consult Note    Patient Name: Korey Rodriguez  : 1945  MRN: [id number]  Primary Care Physician:  Dimitri Hargrove MD  Referring Physician: True Caputo MD  Date of admission: 2023    Subjective   Subjective     Reason for Consult/ Chief Complaint: weakness    HPI:  Korey Rodriguez is a 78 y.o. male  with past medical history of urothelial cancer s/p cystectomy and diverting urostomy followed by chemotherapy and radiation, abdominal aortic aneurysm, chronic DVT of left lower extremity on Xarelto, and recent ANGEL LUIS 2/2 obstruction s/p bilateral nephrostomy tubes who presented today to [facility] ED due to generalized weakness. Patient was recently discharged from [facility] after nephrostomy tube placement for ANGEL LUIS.  Patient had 2 sessions of hemodialysis and followed by Dr. Sanchez.  He had liver lesion biopsied at [facility] while admitted and this revealed metastatic bladder cancer. Patient was seen in ED on  and urine culture grew VRE. He has not had a BM for 3 days.  Some nausea and vomiting.  Denies fever or chills.  Patient low back pain has gotten worse and he feels like it is because of laying in bed, pain does not radiate. Patient HDS on presentation. He remains anemic. Liver function tests are worse. Lactate normal. CT scan abdominal pelvis showed right mid abdomen ileostomy with right parastomal hernia and nonobstructing bowels.  There are small bilateral pleural effusions with anasarca.  Extensive mets in the liver.  Urine culture pending. Paitent has been placed on Linezolid and admitted for further care. Oncology consulted regarding metastatic cancer.     Oncology/Hematology History Overview Note   10/14/22: CT abdomen/pelvis obtained due to gross hematuria for 6 weeks. This showed right hydroureteronephrosis down to bladder with findings concerning for large bladder tumor. He also had 20 lbs weight loss during this time.      10/19/2022: TURBT performed by Dr. Westfall. Per op reprot large area resected (7 x 4 cm), visualization difficult due to clot burden. 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     11/17/2022: PET CT showed asymmetric thickening along the right lateral and posterior walls of the bladder. No evidence of metastatic disease.     12/12/22: Radical cystoprostectomy, bilateral PLND, ileal conduit by Dr. Bansal ([facility]): Left distal and right distal ureter both positive for carcinoma, 2/3 right external lymph nodes positive for metastatic carcinoma, 2/3 right common external lymph nodes positive for carcinoma, bladder and prostate with urothelial carcinoma with micropapillary features and invades directly into prostate, extensive CIS noted, 4/22 total lymph nodes positive, [id number].    1/18//23: C1D1 Cisplatin/Gemcitabine. Tolerated well    2/8/23: C2D1 Cisplatin/Gemcitabine.    4/7/23: Last dose of radiation.     4/17/23: C3D1 chemotherapy. Cisplatin switched to Carboplatin due to drug shortage.     5/16/23: C4D1 chemotherapy. Cis/Coos. Delayed 1 week due to thrombocytoepnia. Complicated by hospitalization for staph and alpha hemolytic strep UTI. C4D8 chemotherapy held.          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   10/26/2022 Initial Diagnosis    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 (HCC)     1/13/2023 - 1/13/2023 Chemotherapy    OP BLADDER MitoMYcin / Fluorouracil CIV + XRT     1/18/2023 - 2/15/2023 Chemotherapy    OP BLADDER CISplatin D1 / Gemcitabine D1,D8     4/18/2023 - 4/25/2023 Chemotherapy    OP BLADDER CARBOplatin / Gemcitabine     5/16/2023 -  Chemotherapy    OP BLADDER CISplatin D1 / Gemcitabine D1,D8     Urothelial carcinoma of bladder with invasion of muscle (Resolved)   10/25/2022 Initial Diagnosis    Urothelial carcinoma of bladder with invasion of muscle (HCC)     1/13/2023 - 1/13/2023 Chemotherapy    OP BLADDER MitoMYcin / Fluorouracil CIV +  XRT     Malignant neoplasm of overlapping sites of bladder   2/21/2023 Initial Diagnosis    Malignant neoplasm of overlapping sites of bladder (HCC)     3/8/2023 -  Radiation    RADIATION THERAPY Treatment Details (Noted on 2/21/2023)  Site: Bilateral Pelvis  Technique: IMRT  Goal: Curative  Planned Treatment Start Date: 3/8/2023     4/18/2023 - 4/25/2023 Chemotherapy    OP BLADDER CARBOplatin / Gemcitabine     5/16/2023 -  Chemotherapy    OP BLADDER CISplatin D1 / Gemcitabine D1,D8             Review of Systems   All systems were reviewed and negative except for as mentioned above.     Personal History     Past Medical History:   Diagnosis Date    AAA (abdominal aortic aneurysm)     BEING MONITORED NO REPAIR NEEDED YET    Aneurysm of abdominal aorta branch vessel     Cancer     BLADDER AND SKIN CANCERS/REMOVED    Chronic deep vein thrombosis (DVT) of tibial vein of left lower extremity 6/27/2023    Essential tremor     History of DVT of lower extremity 12/2022    on Xarelto    History of urostomy     PT CURRENTLY HAS HOME HEALTH TO HELP WITH OSTOMY    Hyperlipidemia        Past Surgical History:   Procedure Laterality Date    COLONOSCOPY  2018    CYSTECTOMY      12/2022  W/UROSTOMY    SKIN CANCER EXCISION Right     TRANSURETHRAL RESECTION OF BLADDER TUMOR Right 10/19/2022    Procedure: Cystoscopy with transurethral resection of bladder tumor;  Surgeon: Aung Westfall MD;  Location: [facility] MAIN OR;  Service: Urology;  Laterality: Right;    VENOUS ACCESS DEVICE (PORT) INSERTION Right 01/09/2023    Procedure: INSERTION VENOUS ACCESS DEVICE;  Surgeon: Richie Edward MD;  Location: [facility] [place] [facility];  Service: General;  Laterality: Right;       Family History: family history includes Cancer in his father and mother; Hearing loss in his father; Hypertension in his father. Otherwise pertinent FHx was reviewed and not pertinent to current issue.    Social History:  reports that he quit smoking about 33 years ago. His  smoking use included cigarettes. He started smoking about 61 years ago. He has a 20.00 pack-year smoking history. He has never used smokeless tobacco. He reports current alcohol use of about 1.0 standard drink of alcohol per week. He reports that he does not use drugs.    Home Medications:  atorvastatin and ferrous sulfate    Allergies:  No Known Allergies    Objective    Objective     Vitals:   Temp:  [97.8 °F (36.6 °C)] 97.8 °F (36.6 °C)  Heart Rate:  [63-80] 63  Resp:  [16-18] 16  BP: (122-172)/(58-69) 172/69    Physical Exam:   Constitutional: Awake, alert, appears weak and fatigued   Eyes: PERRLA, sclerae anicteric, no conjunctival injection   HENT: NCAT, mucous membranes moist   Respiratory: Clear to auscultation bilaterally, nonlabored respirations    Cardiovascular: RRR, no murmurs, no edema in the extremities   Gastrointestinal: Positive bowel sounds, soft, nontender, nondistended   Musculoskeletal: Normal strength and tone, no joint swelling   Psychiatric: Appropriate affect, cooperative   Neurologic: Awake, alert, speech clear   Skin: Normal tone, no rashes    Result Review    Result Review:  I have personally reviewed the results from the time of this admission to 11/27/2023 16:58 EST and agree with these findings:  [x]  Laboratory  [x]  Microbiology  [x]  Radiology  []  EKG/Telemetry   [x]  Cardiology/Vascular   [x]  Pathology  []  Old records  []  Other:  Most notable findings include: CT abdomen personally reviewed and and per my independent read with multiple liver lesions. H and P personally reviewed. Anemia present. Creatinine normal. Alk phos elevated. LFTs increased. Lactate normal.     Assessment & Plan   Assessment / Plan     Brief Patient Summary:  Korey Rodriguez is a 78 y.o. male with history of radical cystoprostatctomy, bilateral PLND for urothelial carcinoma treated initally with chemotherapy and radiation adjuvantly who presents with metastatic lesions to liver. Chemo last  completed 5/9/23. Patient has recurrence 6 months post chemotherapy. Patient recently admitted early November due to obstructive uropathy, severe ANGEL LUIS requiring bilateral nephrostomy tubes and UTI. Recent urine culture showed VRE on 11/21/23. Admitted with generalized weakness.     Active Hospital Problems:  Active Hospital Problems    Diagnosis     **UTI (urinary tract infection)     Elevated liver enzymes     Acute UTI (urinary tract infection)     Infrarenal abdominal aortic aneurysm (AAA) without rupture        Plan:   Urothelial Carcinoma  S/p radical cystectomy with diverting ileostomy followed by adjuvant chemotherapy and radiation due to high risk stage III disease and positive margins. Last cycle of chemotherapy early May. CT from OSH with liver lesions, this was biopsied on 11/13/23 and was consistent with metastatic bladder cancer. CT A/P from 11/27 showing multiple liver mets. Recommend CT chest without contrast for completion of staging imaging. Discussed with patient and his wife that he has metastatic cancer. This is unfortunately incurable, but can be treated. Option of comfort based approach was provided to patient. Discussed that without treatment, the cancer would continue to spread and this would limit his life to 2 months or so, however discussed that this is often difficult to predict and could be shorter or longer. Systemic treatment could help to extend his life but is not a gurantee. Further the duration of response to treatment cannot be guaranteed either. Recommended treatment would be with immunotherapy alone with option of adding in Enfortumumab if patient is tolerating treatment and his performance status improves. These are both IV medications that are given on outpatient basis. Immunotherapy is generally well tolerated and offers a chance of extending his life but again discussed that this is not a guarantee. Ideally, he would have improvement in his performance status prior to  initiating systemic therapy. Discussed this with patient and wife. This would likely involve rehab, however the waiting till patient completes rehab potentially compromises additional time for tumor growth and progression which could present a situation where patient is never able to start therapy. Fortunately with use of immunotherapy, in general a patient's performance status does not need to be at a level that it would otherwise need to be for chemo; it needs to be at a level where he is able to come into clinic to receive treatment. Hopefully, patient can be optimized while admitted and then can plan for follow up outpatient with bypass of rehab if he clinically improves while admitted. He does have follow up with me on 12/1/23 in clinic if he is discharged by then for further discussion of treatment plan. If still admitted, this can be delayed till later.  Discussed that final decision does not need to be made today however patient and his wife are leaning towards immunotherapy alone.        VRE UTI  Per nephrostomy urine culture obtained 11/21/23. Rpeeat culture pending. Patient on PO zyvox, continue.     Anemia  Normocytic. Add on iron labs, B12, folate. Worse than baseline but stable with labs from earlier this month.     Constipation  Bowel regimen    Electronically signed by Arvin Coburn MD, 11/27/23, 4:58 PM EST.

## 2023-11-27 NOTE — PLAN OF CARE
Problem: Adult Inpatient Plan of Care  Goal: Plan of Care Review  Outcome: Ongoing, Progressing  Flowsheets (Taken 11/27/2023 1836)  Progress: no change  Plan of Care Reviewed With: patient  Outcome Evaluation: Patient is alert and oriented x4. Patient has no complaints at this time.  Goal: Patient-Specific Goal (Individualized)  Outcome: Ongoing, Progressing  Goal: Absence of Hospital-Acquired Illness or Injury  Outcome: Ongoing, Progressing  Intervention: Identify and Manage Fall Risk  Recent Flowsheet Documentation  Taken 11/27/2023 1824 by Fernanda Madrigal RN  Safety Promotion/Fall Prevention:   assistive device/personal items within reach   clutter free environment maintained   fall prevention program maintained   lighting adjusted   nonskid shoes/slippers when out of bed   room organization consistent   safety round/check completed  Intervention: Prevent Infection  Recent Flowsheet Documentation  Taken 11/27/2023 1824 by Fernanda Madrigal RN  Infection Prevention:   cohorting utilized   environmental surveillance performed   equipment surfaces disinfected   hand hygiene promoted   personal protective equipment utilized   rest/sleep promoted   single patient room provided  Goal: Optimal Comfort and Wellbeing  Outcome: Ongoing, Progressing  Intervention: Provide Person-Centered Care  Recent Flowsheet Documentation  Taken 11/27/2023 1824 by Fernanda Madrigal RN  Trust Relationship/Rapport:   care explained   choices provided   emotional support provided   empathic listening provided   questions answered   questions encouraged   reassurance provided   thoughts/feelings acknowledged  Goal: Readiness for Transition of Care  Outcome: Ongoing, Progressing  Intervention: Mutually Develop Transition Plan  Recent Flowsheet Documentation  Taken 11/27/2023 1826 by Fernanda Madrigal RN  Transportation Anticipated: family or friend will provide  Patient/Family Anticipated Services at Transition: none  Patient/Family  Anticipates Transition to: home with family  Taken 11/27/2023 1823 by Fernanda Madrigal, RN  Equipment Currently Used at Home: walker, standard     Problem: Skin Injury Risk Increased  Goal: Skin Health and Integrity  Outcome: Ongoing, Progressing   Goal Outcome Evaluation:  Plan of Care Reviewed With: patient        Progress: no change  Outcome Evaluation: Patient is alert and oriented x4. Patient has no complaints at this time.

## 2023-11-27 NOTE — ED PROVIDER NOTES
Time: 12:56 PM EST  Date of encounter:  11/27/2023  Independent Historian/Clinical History and Information was obtained by:   Patient and Family    History is limited by: N/A    Chief Complaint   Patient presents with    Weakness - Generalized     Patient was reportedly in the ED last week due to issue with urostomy. Patient has since developed abdominal pain, nausea, vomiting and back pain.  Patient reportedly has an urostomy since Dec of last year.         History of Present Illness:  Patient is a 78 y.o. year old male who presents to the emergency department for evaluation of weakness, abdominal pain, back pain.  Patient states that the symptoms have began over the last week.  Patient is having bilateral back pain.  Patient states that he has bilateral nephrostomy tubes in place from previous kidney failure and states that he had been on dialysis twice in the past.  Patient also has ostomy.  Patient states he has been having nausea and vomiting and feels that the abdominal pain is like a constipation type pain.  Patient states last bowel movement was 3 days ago.  (Provider in triage, Chandra Worthy PA-C)    Patient Care Team  Primary Care Provider: Dimitri Hargrove MD    Past Medical History:     No Known Allergies  Past Medical History:   Diagnosis Date    AAA (abdominal aortic aneurysm)     BEING MONITORED NO REPAIR NEEDED YET    Aneurysm of abdominal aorta branch vessel     Cancer     BLADDER AND SKIN CANCERS/REMOVED    Chronic deep vein thrombosis (DVT) of tibial vein of left lower extremity 6/27/2023    Essential tremor     History of DVT of lower extremity 12/2022    on Xarelto    History of urostomy     PT CURRENTLY HAS HOME HEALTH TO HELP WITH OSTOMY    Hyperlipidemia      Past Surgical History:   Procedure Laterality Date    COLONOSCOPY  2018    CYSTECTOMY      12/2022  W/UROSTOMY    SKIN CANCER EXCISION Right     TRANSURETHRAL RESECTION OF BLADDER TUMOR Right 10/19/2022    Procedure: Cystoscopy with  transurethral resection of bladder tumor;  Surgeon: Aung Westfall MD;  Location: [facility] MAIN OR;  Service: Urology;  Laterality: Right;    VENOUS ACCESS DEVICE (PORT) INSERTION Right 2023    Procedure: INSERTION VENOUS ACCESS DEVICE;  Surgeon: Richie Edward MD;  Location: [facility] OR [facility];  Service: General;  Laterality: Right;     Family History   Problem Relation Age of Onset    Cancer Mother     Cancer Father     Hearing loss Father     Hypertension Father     Malig Hyperthermia Neg Hx        Home Medications:  Prior to Admission medications    Medication Sig Start Date End Date Taking? Authorizing Provider   atorvastatin (LIPITOR) 20 MG tablet Take 1 tablet by mouth Every Night. 22   Provider, MD Kristel   HYDROcodone-acetaminophen (Norco) 7.5-325 MG per tablet 1-2 tablets up to 3 times a day as needed for abdominal pain. 23   Dimitri Hargrove MD   polyethylene glycol (MIRALAX) 17 g packet Take 17 g by mouth 2 (Two) Times a Day As Needed (constipation). 23   Itz Knox MD        Social History:   Social History     Tobacco Use    Smoking status: Former     Packs/day: 1.00     Years: 20.00     Additional pack years: 0.00     Total pack years: 20.00     Types: Cigarettes     Start date: 1962     Quit date: 1990     Years since quittin.9    Smokeless tobacco: Never   Vaping Use    Vaping Use: Never used   Substance Use Topics    Alcohol use: Yes     Alcohol/week: 1.0 standard drink of alcohol     Types: 1 Cans of beer per week     Comment: 1 beer daily    Drug use: Never         Review of Systems:  Review of Systems   Constitutional:  Negative for chills and fever.   HENT:  Negative for congestion, rhinorrhea and sore throat.    Eyes:  Negative for pain and visual disturbance.   Respiratory:  Negative for apnea, cough, chest tightness and shortness of breath.    Cardiovascular:  Negative for chest pain and palpitations.   Gastrointestinal:  Negative for abdominal pain,  "diarrhea, nausea and vomiting.   Genitourinary:  Negative for difficulty urinating and dysuria.   Musculoskeletal:  Negative for joint swelling and myalgias.   Skin:  Negative for color change.   Neurological:  Negative for seizures and headaches.   Psychiatric/Behavioral: Negative.     All other systems reviewed and are negative.       Physical Exam:  /69   Pulse 63   Temp 97.8 °F (36.6 °C) (Oral)   Resp 16   Ht 190.5 cm (75\")   Wt 83.9 kg (184 lb 15.5 oz)   SpO2 96%   BMI 23.12 kg/m²         Physical Exam  Vitals and nursing note reviewed.   Constitutional:       General: He is not in acute distress.     Appearance: Normal appearance. He is not toxic-appearing.   HENT:      Head: Normocephalic and atraumatic.      Jaw: There is normal jaw occlusion.   Eyes:      General: Lids are normal.      Extraocular Movements: Extraocular movements intact.      Conjunctiva/sclera: Conjunctivae normal.      Pupils: Pupils are equal, round, and reactive to light.   Cardiovascular:      Rate and Rhythm: Normal rate and regular rhythm.      Pulses: Normal pulses.      Heart sounds: Normal heart sounds.   Pulmonary:      Effort: Pulmonary effort is normal. No respiratory distress.      Breath sounds: Normal breath sounds. No wheezing or rhonchi.   Abdominal:      General: Abdomen is flat.      Palpations: Abdomen is soft.      Tenderness: There is no abdominal tenderness. There is no guarding or rebound.   Musculoskeletal:         General: Normal range of motion.      Cervical back: Normal range of motion and neck supple.      Right lower leg: No edema.      Left lower leg: No edema.   Skin:     General: Skin is warm and dry.   Neurological:      Mental Status: He is alert and oriented to person, place, and time. Mental status is at baseline.   Psychiatric:         Mood and Affect: Mood normal.                      Procedures:  Procedures      Medical Decision Making:      Comorbidities that affect care:    Recent " nephrostomy placement    External Notes reviewed:    Hospital Discharge Summary: Patient was recently admitted for acute renal insufficiency.      The following orders were placed and all results were independently analyzed by me:  Orders Placed This Encounter   Procedures    Blood Culture - Blood,    Blood Culture - Blood,    Urine Culture - Urine, Urine, Clean Catch    CT Abdomen Pelvis Without Contrast    [place] Draw    Comprehensive Metabolic Panel    Lipase    Urinalysis With Microscopic If Indicated (No Culture) - Urine, Clean Catch    Lactic Acid, Plasma    CBC Auto Differential    Urinalysis, Microscopic Only - Urine, Clean Catch    NPO Diet NPO Type: Strict NPO    Undress & Gown    Code Status and Medical Interventions:    Inpatient Hospitalist Consult    Inpatient Urology Consult    Insert Peripheral IV    Inpatient Admission    CBC & Differential    Green Top (Gel)    Lavender Top    Gold Top - SST    Light Blue Top       Medications Given in the Emergency Department:  Medications   sodium chloride 0.9 % flush 10 mL (has no administration in time range)   cefTRIAXone (ROCEPHIN) IVPB 1,000 mg (1,000 mg Intravenous Not Given 11/27/23 1424)   cefTRIAXone (ROCEPHIN) IVPB 1,000 mg (0 mg Intravenous Stopped 11/27/23 1533)   sodium chloride 0.9 % bolus 1,000 mL (0 mL Intravenous Stopped 11/27/23 1534)   Linezolid (ZYVOX) 600 mg 300 mL (600 mg Intravenous New Bag 11/27/23 1534)   ondansetron (ZOFRAN) injection 4 mg (4 mg Intravenous Given 11/27/23 1533)        ED Course:    The patient was initially evaluated in the triage area where orders were placed. The patient was later dispositioned by Jatin Edwards MD.      The patient was advised to stay for completion of workup which includes but is not limited to communication of labs and radiological results, reassessment and plan. The patient was advised that leaving prior to disposition by a provider could result in critical findings that are not communicated  to the patient.     ED Course as of 11/27/23 1643   Mon Nov 27, 2023   1257 PROVIDER IN TRIAGE  Patient was evaluated by me in triage, Chandra Worthy PA-C.  Orders were placed and patient is currently awaiting final results and disposition.  [MD]      ED Course User Index  [MD] Chandra Worthy PA-C       Labs:    Lab Results (last 24 hours)       Procedure Component Value Units Date/Time    CBC & Differential [[id number]]  (Abnormal) Collected: 11/27/23 1057    Specimen: Blood Updated: 11/27/23 1111    Narrative:      The following orders were created for panel order CBC & Differential.  Procedure                               Abnormality         Status                     ---------                               -----------         ------                     CBC Auto Differential[[id number]]        Abnormal            Final result                 Please view results for these tests on the individual orders.    Comprehensive Metabolic Panel [[id number]]  (Abnormal) Collected: 11/27/23 1057    Specimen: Blood Updated: 11/27/23 1145     Glucose 120 mg/dL      BUN 28 mg/dL      Creatinine 1.23 mg/dL      Sodium 136 mmol/L      Potassium 3.9 mmol/L      Chloride 100 mmol/L      CO2 24.7 mmol/L      Calcium 8.7 mg/dL      Total Protein 6.5 g/dL      Albumin 3.1 g/dL      ALT (SGPT) 83 U/L      AST (SGOT) 87 U/L      Alkaline Phosphatase 485 U/L      Total Bilirubin 0.5 mg/dL      Globulin 3.4 gm/dL      A/G Ratio 0.9 g/dL      BUN/Creatinine Ratio 22.8     Anion Gap 11.3 mmol/L      eGFR 59.3 mL/min/1.73     Narrative:      GFR Normal >60  Chronic Kidney Disease <60  Kidney Failure <15    The GFR formula is only valid for adults with stable renal function between ages 18 and 70.    Lipase [[id number]]  (Normal) Collected: 11/27/23 1057    Specimen: Blood Updated: 11/27/23 1130     Lipase 24 U/L     Lactic Acid, Plasma [[id number]]  (Normal) Collected: 11/27/23 1057    Specimen: Blood Updated: 11/27/23 1127     Lactate 1.4  mmol/L     CBC Auto Differential [[id number]]  (Abnormal) Collected: 11/27/23 1057    Specimen: Blood Updated: 11/27/23 1111     WBC 7.56 10*3/mm3      RBC 3.41 10*6/mm3      Hemoglobin 9.1 g/dL      Hematocrit 29.6 %      MCV 86.8 fL      MCH 26.7 pg      MCHC 30.7 g/dL      RDW 14.4 %      RDW-SD 45.5 fl      MPV 10.2 fL      Platelets 147 10*3/mm3      Neutrophil % 86.3 %      Lymphocyte % 3.7 %      Monocyte % 8.3 %      Eosinophil % 0.1 %      Basophil % 0.4 %      Immature Grans % 1.2 %      Neutrophils, Absolute 6.52 10*3/mm3      Lymphocytes, Absolute 0.28 10*3/mm3      Monocytes, Absolute 0.63 10*3/mm3      Eosinophils, Absolute 0.01 10*3/mm3      Basophils, Absolute 0.03 10*3/mm3      Immature Grans, Absolute 0.09 10*3/mm3      nRBC 0.0 /100 WBC     Urinalysis With Microscopic If Indicated (No Culture) - Urine, Clean Catch [[id number]]  (Abnormal) Collected: 11/27/23 1218    Specimen: Urine, Clean Catch Updated: 11/27/23 1231     Color, UA Yellow     Appearance, UA Clear     pH, UA 6.0     Specific Gravity, UA 1.020     Glucose, UA Negative     Ketones, UA Negative     Bilirubin, UA Negative     Blood, UA Large (3+)     Protein, UA 30 mg/dL (1+)     Leuk Esterase, UA Moderate (2+)     Nitrite, UA Negative     Urobilinogen, UA 0.2 E.U./dL    Urinalysis, Microscopic Only - Urine, Clean Catch [[id number]]  (Abnormal) Collected: 11/27/23 1218    Specimen: Urine, Clean Catch Updated: 11/27/23 1231     RBC, UA 21-50 /HPF      WBC, UA 21-50 /HPF      Bacteria, UA 1+ /HPF      Squamous Epithelial Cells, UA 0-2 /HPF      Hyaline Casts, UA 7-12 /LPF      Methodology Automated Microscopy    Urine Culture - Urine, Urine, Clean Catch [[id number]] Collected: 11/27/23 1218    Specimen: Urine, Clean Catch Updated: 11/27/23 1257    Blood Culture - Blood, Arm, Left [[id number]] Collected: 11/27/23 1312    Specimen: Blood from Arm, Left Updated: 11/27/23 1316    Blood Culture - Blood, Arm, Left [[id number]] Collected: 11/27/23  1312    Specimen: Blood from Arm, Left Updated: 11/27/23 1316             Imaging:    CT Abdomen Pelvis Without Contrast    Result Date: 11/27/2023  PROCEDURE: CT ABDOMEN PELVIS WO CONTRAST  COMPARISON: [facility], CT, CT GUIDED NEPHROSTOMY TUBE PLACEMENT, 11/21/2023, 16:08.  [facility], CT, CT ABDOMEN PELVIS WO CONTRAST, 11/07/2023, 19:37.  INDICATIONS: abdominal pain and bilateral back pain/bilat. nephrostomy tubes placed approx. 2 weeks ago  TECHNIQUE: CT images were created without intravenous contrast.   PROTOCOL:   Standard imaging protocol performed    RADIATION:   DLP: 501.1mGy*cm   Automated exposure control was utilized to minimize radiation dose.  FINDINGS:  Small bilateral pleural effusions are noted measuring up to 2.8 cm in thickness on the left and 1.1 cm in thickness on the right.   There are numerous masses throughout the liver consistent with metastatic disease.  Increased density in the gallbladder could represent stones and or sludge.  The spleen, pancreas and adrenal glands appear unremarkable.  Patient has had a previous cystectomy with diverting ileostomy in the right mid abdomen.  There is a parastomal hernia containing nonobstructed small bowel.  The hernia sac measures 7.6 cm transverse.  Bilateral nephrostomy tubes have been placed.  The left intrarenal collection system appears well decompressed.  There is a 0.7 cm nonobstructing left renal stone.  The right nephrostomy tube appears in satisfactory position.  There is persistent dilatation of the renal pelvis, however, the moderate right hydronephrosis previously noted on the 11/7/2023 exam appears improved.  Three small nonobstructing right renal stones measure up to 0.8 cm in size.  There is moderate atherosclerotic calcification in the abdominal aorta.  There is fusiform infrarenal abdominal aortic aneurysm measuring up to 4.5 cm by 5.6 cm.  No adenopathy is identified.  Sub cm portal triad lymph nodes are  noted.  A small amount of free fluid is noted in the pelvis.  A prostatectomy has been performed.  No acute bowel abnormality is identified.  There is edema in the subcutaneous fat in the abdomen and pelvis consistent with anasarca.  No focal osseous lesion is seen.        1. Previous cystectomy and prostatectomy. 2. Right mid abdomen diverting ileostomy with associated parastomal hernia containing nonobstructed small bowel 3. Bilateral nephrostomy tubes in place appearing in good position. 4. Small bilateral pleural effusions 5. Widespread metastatic disease throughout the liver 6. Cholelithiasis 7. Evidence of anasarca.  Small amount of ascites.     Alvaro Christian M.D.       Electronically Signed and Approved By: Alvaro Christian M.D. on 11/27/2023 at 13:59                Differential Diagnosis and Discussion:      Weakness: Based on the patient's history, signs, and symptoms, the diffential diagnosis includes but is not limited to meningitis, stroke, sepsis, subarachnoid hemorrhage, intracranial bleeding, encephalitis, acute uti, dehydration, MS, myasthenia gravis, Guillan Luttrell, migraine variant, neuromuscular disorders vertigo, electrolyte imbalance, and metabolic disorders.    All labs were reviewed and interpreted by me.  CT scan radiology impression was interpreted by me.    MDM     Amount and/or Complexity of Data Reviewed  Clinical lab tests: reviewed  Tests in the radiology section of CPT®: reviewed  Decide to obtain previous medical records or to obtain history from someone other than the patient: yes    The patient´s CBC that was reviewed and interpreted by me shows no abnormalities of critical concern. Of note, there is no anemia requiring a blood transfusion and the platelet count is acceptable.  The patient´s CMP that was reviewed and interpretted by me shows no abnormalities of critical concern. Of note, the patient´s sodium and potassium are acceptable. The patient´s liver enzymes are unremarkable. The  patient´s renal function (creatinine) is preserved. The patient has a normal anion gap.  Urinalysis shows +1 bacteriuria.    The patient's cultures and sensitivity were reviewed and after discussion with pharmacy the patient was started on linezolid.            Patient Care Considerations:    SEPSIS was considered but is NOT present in the emergency department as SIRS criteria is not present.      Consultants/Shared Management Plan:    Case was discussed with Dr. He who agrees with admission.  Case was discussed with Dr. Perez.    Social Determinants of Health:    Patient is independent, reliable, and has access to care.       Disposition and Care Coordination:    Admit:   Through independent evaluation of the patient's history, physical, and imperical data, the patient meets criteria for observation/admission to the hospital.        Final diagnoses:   Acute UTI        ED Disposition       ED Disposition   Decision to Admit    Condition   --    Comment   Level of Care: Med/Surg [1]   Diagnosis: UTI (urinary tract infection) [965926]   Admitting Physician: SO HE [[id number]]   Attending Physician: SO HE [[id number]]   Isolate for COVID?: No [0]   Certification: I Certify That Inpatient Hospital Services Are Medically Necessary For Greater Than 2 Midnights                 This medical record created using voice recognition software.             Jatin Edwards MD  11/27/23 9907

## 2023-11-28 LAB
ALBUMIN SERPL-MCNC: 2.7 G/DL (ref 3.5–5.2)
ALBUMIN/GLOB SERPL: 0.8 G/DL
ALP SERPL-CCNC: 495 U/L (ref 39–117)
ALT SERPL W P-5'-P-CCNC: 82 U/L (ref 1–41)
ANION GAP SERPL CALCULATED.3IONS-SCNC: 9.9 MMOL/L (ref 5–15)
ASCENDING AORTA: 3.9 CM
AST SERPL-CCNC: 87 U/L (ref 1–40)
BASOPHILS # BLD AUTO: 0.05 10*3/MM3 (ref 0–0.2)
BASOPHILS NFR BLD AUTO: 0.7 % (ref 0–1.5)
BH CV ECHO MEAS - AO MAX PG: 4 MMHG
BH CV ECHO MEAS - AO MEAN PG: 2 MMHG
BH CV ECHO MEAS - AO ROOT DIAM: 4.3 CM
BH CV ECHO MEAS - AO V2 MAX: 104 CM/SEC
BH CV ECHO MEAS - AO V2 VTI: 18.1 CM
BH CV ECHO MEAS - AVA(I,D): 6.2 CM2
BH CV ECHO MEAS - EDV(CUBED): 105.8 ML
BH CV ECHO MEAS - EDV(MOD-SP2): 166 ML
BH CV ECHO MEAS - EDV(MOD-SP4): 149 ML
BH CV ECHO MEAS - EF(MOD-BP): 54.3 %
BH CV ECHO MEAS - EF(MOD-SP2): 60.4 %
BH CV ECHO MEAS - EF(MOD-SP4): 47.4 %
BH CV ECHO MEAS - ESV(CUBED): 42.5 ML
BH CV ECHO MEAS - ESV(MOD-SP2): 65.7 ML
BH CV ECHO MEAS - ESV(MOD-SP4): 78.4 ML
BH CV ECHO MEAS - FS: 26.2 %
BH CV ECHO MEAS - IVS/LVPW: 1.05 CM
BH CV ECHO MEAS - IVSD: 1.48 CM
BH CV ECHO MEAS - LA DIMENSION: 3.4 CM
BH CV ECHO MEAS - LAT PEAK E' VEL: 11.4 CM/SEC
BH CV ECHO MEAS - LV MASS(C)D: 280.7 GRAMS
BH CV ECHO MEAS - LV MAX PG: 3.8 MMHG
BH CV ECHO MEAS - LV MEAN PG: 2 MMHG
BH CV ECHO MEAS - LV V1 MAX: 97.9 CM/SEC
BH CV ECHO MEAS - LV V1 VTI: 21.1 CM
BH CV ECHO MEAS - LVIDD: 4.7 CM
BH CV ECHO MEAS - LVIDS: 3.5 CM
BH CV ECHO MEAS - LVOT AREA: 5.3 CM2
BH CV ECHO MEAS - LVOT DIAM: 2.6 CM
BH CV ECHO MEAS - LVPWD: 1.41 CM
BH CV ECHO MEAS - MED PEAK E' VEL: 8.5 CM/SEC
BH CV ECHO MEAS - MV A MAX VEL: 80.6 CM/SEC
BH CV ECHO MEAS - MV DEC SLOPE: 224 CM/SEC2
BH CV ECHO MEAS - MV DEC TIME: 0.24 SEC
BH CV ECHO MEAS - MV E MAX VEL: 54.4 CM/SEC
BH CV ECHO MEAS - MV E/A: 0.67
BH CV ECHO MEAS - PA V2 MAX: 104 CM/SEC
BH CV ECHO MEAS - SV(LVOT): 112 ML
BH CV ECHO MEAS - SV(MOD-SP2): 100.3 ML
BH CV ECHO MEAS - SV(MOD-SP4): 70.6 ML
BH CV ECHO MEAS - TAPSE (>1.6): 2.5 CM
BH CV ECHO MEASUREMENTS AVERAGE E/E' RATIO: 5.47
BH CV XLRA - TDI S': 14 CM/SEC
BILIRUB SERPL-MCNC: 0.3 MG/DL (ref 0–1.2)
BUN SERPL-MCNC: 23 MG/DL (ref 8–23)
BUN/CREAT SERPL: 19.3 (ref 7–25)
CALCIUM SPEC-SCNC: 8.4 MG/DL (ref 8.6–10.5)
CHLORIDE SERPL-SCNC: 102 MMOL/L (ref 98–107)
CO2 SERPL-SCNC: 25.1 MMOL/L (ref 22–29)
CREAT SERPL-MCNC: 1.19 MG/DL (ref 0.76–1.27)
DEPRECATED RDW RBC AUTO: 44.1 FL (ref 37–54)
EGFRCR SERPLBLD CKD-EPI 2021: 61.8 ML/MIN/1.73
EOSINOPHIL # BLD AUTO: 0.09 10*3/MM3 (ref 0–0.4)
EOSINOPHIL NFR BLD AUTO: 1.2 % (ref 0.3–6.2)
ERYTHROCYTE [DISTWIDTH] IN BLOOD BY AUTOMATED COUNT: 14.4 % (ref 12.3–15.4)
FERRITIN SERPL-MCNC: 2802 NG/ML (ref 30–400)
GLOBULIN UR ELPH-MCNC: 3.2 GM/DL
GLUCOSE SERPL-MCNC: 104 MG/DL (ref 65–99)
HCT VFR BLD AUTO: 27 % (ref 37.5–51)
HGB BLD-MCNC: 8.2 G/DL (ref 13–17.7)
IMM GRANULOCYTES # BLD AUTO: 0.11 10*3/MM3 (ref 0–0.05)
IMM GRANULOCYTES NFR BLD AUTO: 1.5 % (ref 0–0.5)
IRON 24H UR-MRATE: 26 MCG/DL (ref 59–158)
IRON SATN MFR SERPL: 14 % (ref 20–50)
IVRT: 63 MS
LEFT ATRIUM VOLUME INDEX: 21.7 ML/M2
LYMPHOCYTES # BLD AUTO: 0.34 10*3/MM3 (ref 0.7–3.1)
LYMPHOCYTES NFR BLD AUTO: 4.6 % (ref 19.6–45.3)
MAGNESIUM SERPL-MCNC: 1.8 MG/DL (ref 1.6–2.4)
MCH RBC QN AUTO: 26.2 PG (ref 26.6–33)
MCHC RBC AUTO-ENTMCNC: 30.4 G/DL (ref 31.5–35.7)
MCV RBC AUTO: 86.3 FL (ref 79–97)
MONOCYTES # BLD AUTO: 0.71 10*3/MM3 (ref 0.1–0.9)
MONOCYTES NFR BLD AUTO: 9.5 % (ref 5–12)
NEUTROPHILS NFR BLD AUTO: 6.14 10*3/MM3 (ref 1.7–7)
NEUTROPHILS NFR BLD AUTO: 82.5 % (ref 42.7–76)
NRBC BLD AUTO-RTO: 0 /100 WBC (ref 0–0.2)
PHOSPHATE SERPL-MCNC: 3.3 MG/DL (ref 2.5–4.5)
PLATELET # BLD AUTO: 138 10*3/MM3 (ref 140–450)
PMV BLD AUTO: 10 FL (ref 6–12)
POTASSIUM SERPL-SCNC: 4 MMOL/L (ref 3.5–5.2)
PROCALCITONIN SERPL-MCNC: 0.18 NG/ML (ref 0–0.25)
PROT SERPL-MCNC: 5.9 G/DL (ref 6–8.5)
RBC # BLD AUTO: 3.13 10*6/MM3 (ref 4.14–5.8)
SODIUM SERPL-SCNC: 137 MMOL/L (ref 136–145)
TIBC SERPL-MCNC: 186 MCG/DL (ref 298–536)
TRANSFERRIN SERPL-MCNC: 125 MG/DL (ref 200–360)
WBC NRBC COR # BLD AUTO: 7.44 10*3/MM3 (ref 3.4–10.8)

## 2023-11-28 PROCEDURE — 84466 ASSAY OF TRANSFERRIN: CPT | Performed by: INTERNAL MEDICINE

## 2023-11-28 PROCEDURE — 99233 SBSQ HOSP IP/OBS HIGH 50: CPT | Performed by: INTERNAL MEDICINE

## 2023-11-28 PROCEDURE — 80053 COMPREHEN METABOLIC PANEL: CPT | Performed by: INTERNAL MEDICINE

## 2023-11-28 PROCEDURE — 25010000002 NA FERRIC GLUC CPLX PER 12.5 MG: Performed by: INTERNAL MEDICINE

## 2023-11-28 PROCEDURE — 84100 ASSAY OF PHOSPHORUS: CPT | Performed by: INTERNAL MEDICINE

## 2023-11-28 PROCEDURE — 83735 ASSAY OF MAGNESIUM: CPT | Performed by: INTERNAL MEDICINE

## 2023-11-28 PROCEDURE — 99221 1ST HOSP IP/OBS SF/LOW 40: CPT | Performed by: UROLOGY

## 2023-11-28 PROCEDURE — 82728 ASSAY OF FERRITIN: CPT | Performed by: INTERNAL MEDICINE

## 2023-11-28 PROCEDURE — 85025 COMPLETE CBC W/AUTO DIFF WBC: CPT | Performed by: INTERNAL MEDICINE

## 2023-11-28 PROCEDURE — 25810000003 SODIUM CHLORIDE 0.9 % SOLUTION: Performed by: INTERNAL MEDICINE

## 2023-11-28 PROCEDURE — 84145 PROCALCITONIN (PCT): CPT | Performed by: INTERNAL MEDICINE

## 2023-11-28 PROCEDURE — 83540 ASSAY OF IRON: CPT | Performed by: INTERNAL MEDICINE

## 2023-11-28 PROCEDURE — 25010000002 FUROSEMIDE PER 20 MG: Performed by: INTERNAL MEDICINE

## 2023-11-28 RX ORDER — LINEZOLID 600 MG/1
600 TABLET, FILM COATED ORAL 2 TIMES DAILY
Qty: 10 TABLET | Refills: 0 | Status: SHIPPED | OUTPATIENT
Start: 2023-11-29 | End: 2023-12-04

## 2023-11-28 RX ADMIN — Medication 10 ML: at 22:11

## 2023-11-28 RX ADMIN — DOCUSATE SODIUM 50MG AND SENNOSIDES 8.6MG 2 TABLET: 8.6; 5 TABLET, FILM COATED ORAL at 22:08

## 2023-11-28 RX ADMIN — LINEZOLID 600 MG: 600 TABLET, FILM COATED ORAL at 22:08

## 2023-11-28 RX ADMIN — FUROSEMIDE 20 MG: 10 INJECTION, SOLUTION INTRAMUSCULAR; INTRAVENOUS at 22:11

## 2023-11-28 RX ADMIN — LINEZOLID 600 MG: 600 TABLET, FILM COATED ORAL at 09:11

## 2023-11-28 RX ADMIN — SODIUM CHLORIDE 250 MG: 9 INJECTION, SOLUTION INTRAVENOUS at 13:49

## 2023-11-28 RX ADMIN — Medication 10 ML: at 09:11

## 2023-11-28 RX ADMIN — FAMOTIDINE 20 MG: 20 TABLET ORAL at 09:11

## 2023-11-28 RX ADMIN — FUROSEMIDE 20 MG: 10 INJECTION, SOLUTION INTRAMUSCULAR; INTRAVENOUS at 09:11

## 2023-11-28 RX ADMIN — DOCUSATE SODIUM 50MG AND SENNOSIDES 8.6MG 2 TABLET: 8.6; 5 TABLET, FILM COATED ORAL at 09:11

## 2023-11-28 NOTE — PLAN OF CARE
Problem: Adult Inpatient Plan of Care  Goal: Plan of Care Review  Outcome: Ongoing, Progressing  Flowsheets (Taken 11/28/2023 1610)  Progress: no change  Plan of Care Reviewed With: patient  Outcome Evaluation: Patient is alert and oriented x4. Patient has had no complaints this shift.  Goal: Patient-Specific Goal (Individualized)  Outcome: Ongoing, Progressing  Goal: Absence of Hospital-Acquired Illness or Injury  Outcome: Ongoing, Progressing  Intervention: Identify and Manage Fall Risk  Recent Flowsheet Documentation  Taken 11/28/2023 1600 by Fernanda Madrigal RN  Safety Promotion/Fall Prevention:   nonskid shoes/slippers when out of bed   safety round/check completed  Taken 11/28/2023 1349 by Fernanda Madrigal RN  Safety Promotion/Fall Prevention:   nonskid shoes/slippers when out of bed   safety round/check completed  Taken 11/28/2023 1100 by Fernanda Madrigal RN  Safety Promotion/Fall Prevention:   nonskid shoes/slippers when out of bed   safety round/check completed  Taken 11/28/2023 0914 by Fernanda Madrigal RN  Safety Promotion/Fall Prevention:   assistive device/personal items within reach   clutter free environment maintained   fall prevention program maintained   lighting adjusted   nonskid shoes/slippers when out of bed   room organization consistent   safety round/check completed  Taken 11/28/2023 0715 by Fernanda Madrigal, RN  Safety Promotion/Fall Prevention:   nonskid shoes/slippers when out of bed   safety round/check completed  Intervention: Prevent Infection  Recent Flowsheet Documentation  Taken 11/28/2023 0914 by Fernanda Madrigal RN  Infection Prevention:   cohorting utilized   environmental surveillance performed   equipment surfaces disinfected   hand hygiene promoted   personal protective equipment utilized   rest/sleep promoted   single patient room provided  Goal: Optimal Comfort and Wellbeing  Outcome: Ongoing, Progressing  Intervention: Provide Person-Centered Care  Recent Flowsheet  Documentation  Taken 11/28/2023 0914 by Fernanda Madrigal RN  Trust Relationship/Rapport:   care explained   choices provided   emotional support provided   empathic listening provided   questions answered   questions encouraged   reassurance provided   thoughts/feelings acknowledged  Goal: Readiness for Transition of Care  Outcome: Ongoing, Progressing     Problem: Skin Injury Risk Increased  Goal: Skin Health and Integrity  Outcome: Ongoing, Progressing  Intervention: Optimize Skin Protection  Recent Flowsheet Documentation  Taken 11/28/2023 1600 by Fernanda Madrigal RN  Head of Bed (HOB) Positioning: HOB elevated  Taken 11/28/2023 1349 by Fernanda Madrigal RN  Head of Bed (HOB) Positioning: HOB elevated  Taken 11/28/2023 1100 by Fernanda Madrigal RN  Head of Bed (HOB) Positioning: HOB elevated  Taken 11/28/2023 0715 by Fernanda Madrigal RN  Head of Bed (HOB) Positioning: HOB elevated   Goal Outcome Evaluation:  Plan of Care Reviewed With: patient        Progress: no change  Outcome Evaluation: Patient is alert and oriented x4. Patient has had no complaints this shift.

## 2023-11-28 NOTE — PROGRESS NOTES
[facility]   Hospitalist Progress Note  Date: 2023  Patient Name: Korey Rodriguez  : 1945  MRN: [id number]  Date of admission: 2023      Subjective   Subjective     Chief Complaint: Generalized weakness unable to walk    Summary:   Korey Rodriguez is a 78 y.o. male with past medical history of urothelial cancer s/p radical cystectomy  and diverting urostomy, abdominal aortic aneurysm, chronic DVT of left lower extremity off Xarelto.   Patient was recently at [facility] because of acute kidney injury due to obstructive uropathy where his urostomy was not functioning and patient had bilateral nephrostomy tubes placed.  Patient had 2 sessions of hemodialysis and followed by Dr. Sanchez.  Patient was seen by urologist Dr. Bansal at [facility].  His liver lesion was biopsied.  Patient was discharged home on November 15.  Patient was seen in ED on  and urine culture grew VRE.  Patient presented again to [facility] ED because of above symptoms described as abdominal pain and feels constipated with no BM for 3 days.  Some nausea and vomiting.  No fever or chills.  Patient low back pain has gotten worse and he feels like it is because of laying in bed.  No new numbness tingling in the toes or pain radiating into lower extremities.  Work-up in the ED showed stable vital signs.  BNP is negative.  Liver enzymes are elevated especially alk phos.  White cell count of 7.5.  Hemoglobin of 9.  Lactate and lipase within normal range.  UA consistent with UTI with bacteria RBCs and WBCs.  CT scan abdominal pelvis showed right mid abdomen ileostomy with right parastomal hernia and nonobstructing bowels.  There are small bilateral pleural effusions with anasarca.  Extensive mets in the liver.  Has gallstones.  Hospitalist has been called to admit and further evaluation.  Urology was consulted by ED physician.  Patient evaluated by his heme-onc and urologist.   Patient eventually decided to be comfort care.    Interval Followup:   Vital signs stable on room air  Generalized weakness improving.  Abdominal pain resolved.  Good urine output via nephrostomy tubes  Has been refusing Lovenox injections due to fear of bleeding.    Review of Systems   All systems were reviewed and negative except for: Summary and interval follow-up    Objective   Objective     Vitals:   Temp:  [97.6 °F (36.4 °C)-98.6 °F (37 °C)] 98.6 °F (37 °C)  Heart Rate:  [62-72] 67  Resp:  [18-20] 20  BP: (129-167)/(54-79) 129/68  Physical Exam      Constitutional: Awake, alert, no acute distress              Eyes: Pupils equal, sclerae anicteric, no conjunctival injection              HENT: NCAT, mucous membranes moist              Neck: Supple, no thyromegaly, no lymphadenopathy, trachea midline              Respiratory: Decreased to auscultation bilaterally, nonlabored respirations               Cardiovascular: RRR, no murmurs, rubs, or gallops, palpable pedal pulses bilaterally              Gastrointestinal: Positive bowel sounds, soft, nontender, nondistended.  Diverting urostomy right upper quadrant with empty bag.  No CVA tenderness.  Bilateral nephrostomies draining clear urine              Musculoskeletal: No T/L-spine tenderness, +2 bilateral ankle edema, no clubbing or cyanosis to extremities              Psychiatric: Appropriate affect, cooperative              Neurologic: Oriented x 3, strength symmetric in all extremities, Cranial Nerves grossly intact to confrontation, speech clear              Skin: No rashes     Result Review    Result Review:  I have personally reviewed the results for the past 24 hours and agree with these findings:  [x]  Laboratory  [x]  Microbiology  [x]  Radiology  []  EKG/Telemetry   []  Cardiology/Vascular   []  Pathology  [x]  Old records  [x]  Other: Medications    Assessment & Plan   Assessment / Plan     Assessment:    Increasing generalized weakness inability to  take care of himself.  Possible UTI.previous urine culture positive for VRE.  Not growing gram-positive cocci  Urothelial cancer s/p cystectomy and diverting urostomy.  Nonfunctional.  Parastomal hernia with nonobstructing bowel.   s/p bilateral nephrostomy tube for obstructive uropathy.  Improved  Extensive mets to the liver.  Biopsy positive for metastatic cancer.  Elevated liver enzymes due to above.  5.6 infrarenal abdominal aortic aneurysm.  Stable.  Chronic DVT left lower extremity.  Patient has been taken off of Xarelto.  Anasarca with lower extremity edema and bilateral pleural effusions.  Acute on chronic diastolic CHF.  EF of 54%  Asymptomatic gallstones.  Iron deficient anemia.  No evidence of bleeding.     Plan:   IV iron  P.o. Zyvox.  Await urine culture data.  2D echo noted  BNP.  Elevated  Continue IV Lasix  IV and oral narcotics for pain control along with Toradol as needed.  Bowel regimen.  Discussed with urology.  Appreciate input.  To follow-up as an outpatient couple of months  Discussed with heme-onc.  Needs CT chest for staging.  Possible immunotherapy.  Has appointment on December 1.  Resumed appropriate home medications.  PT OT.  Palliative care consulted per patient request for comfort measures  Patient not taking Xarelto at home.        Discussed plan with RN and .  Recommend discharging home with hospice if wife agreeable.  Zyvox prescribed for home prior Auth.    DVT prophylaxis:  Medical and mechanical DVT prophylaxis orders are present.  Lovenox and SCD    CODE STATUS:   Medical Intervention Limits: NO intubation (DNI)  Level Of Support Discussed With: Patient; Health Care Surrogate  Code Status (Patient has no pulse and is not breathing): No CPR (Do Not Attempt to Resuscitate)  Medical Interventions (Patient has pulse or is breathing): Limited Support      Part of this note may be an electronic transcription/translation of spoken language to printed text using the Dragon  Dictation System.     Electronically signed by True Caputo MD, 11/28/23, 5:12 PM EST.

## 2023-11-28 NOTE — CONSULTS
[facility]   UROLOGY Consult Note    Patient Name: Korey Rodriguez  : 1945  MRN: [id number]  Primary Care Physician:  Dimitri Hargrove MD  Referring Physician: True Caputo MD  Date of admission: 2023    Subjective   Subjective     Chief Complaint: Urinary obstruction    HPI:  Korey Rodriguez is a 78 y.o. male     urothelial cancer s/p cystectomy and diverting urostomy followed by chemotherapy and radiation    Bilateral nephrostomy tubes in place draining well    Minimal pain other than some lower back pain     abdominal aortic aneurysm, chronic DVT of left lower extremity on Xarelto, and recent ANGEL LUIS 2/2 obstruction s/p bilateral nephrostomy tubes at [facility]      liver lesion biopsied at [facility] while admitted and this revealed metastatic bladder cancer.     2023 1.2, GFR 59  2023   6.5    GFR 8.0    2023 CT abdomen/pelvis without - bilateral nephrostomy tubes in good position.  Widespread metastatic disease throughout the liver.    2023 CT-guided nephrostomy tube placement. -Right 8 [place] nephrostomy tube placed -previous nephrostomy tube dislodged  2023 bilateral placement of 8 [place] nephrostomy tubes without evidence of complication      22: Radical cystoprostectomy, bilateral PLND, ileal conduit by Dr. Bansal ([facility]): Left distal and right distal ureter both positive for carcinoma, 2/3 right external lymph nodes positive for metastatic carcinoma, 2/3 right common external lymph nodes positive for carcinoma, bladder and prostate with urothelial carcinoma with micropapillary features and invades directly into prostate, extensive CIS noted, 4/22 total lymph nodes positive, [id number].     10/19/2022 TURBT 7 x 4 cm -60% of bladder involved.  Grossly abnormal  path - HG TCC, invasive into muscularis propria  T2      Review of Systems     10 systems reviewed and are negative other than what is listed in the HPI    Personal History      Past Medical History:   Diagnosis Date    AAA (abdominal aortic aneurysm)     BEING MONITORED NO REPAIR NEEDED YET    Aneurysm of abdominal aorta branch vessel     Cancer     BLADDER AND SKIN CANCERS/REMOVED    Chronic deep vein thrombosis (DVT) of tibial vein of left lower extremity 6/27/2023    Essential tremor     History of DVT of lower extremity 12/2022    on Xarelto    History of urostomy     PT CURRENTLY HAS HOME HEALTH TO HELP WITH OSTOMY    Hyperlipidemia        Past Surgical History:   Procedure Laterality Date    COLONOSCOPY  2018    CYSTECTOMY      12/2022  W/UROSTOMY    SKIN CANCER EXCISION Right     TRANSURETHRAL RESECTION OF BLADDER TUMOR Right 10/19/2022    Procedure: Cystoscopy with transurethral resection of bladder tumor;  Surgeon: Aung Westfall MD;  Location: [facility] MAIN OR;  Service: Urology;  Laterality: Right;    VENOUS ACCESS DEVICE (PORT) INSERTION Right 01/09/2023    Procedure: INSERTION VENOUS ACCESS DEVICE;  Surgeon: Richie Edward MD;  Location[place] [facility] [place] [facility];  Service: General;  Laterality: Right;       Family History: family history includes Cancer in his father and mother; Hearing loss in his father; Hypertension in his father. Otherwise pertinent FHx was reviewed and not pertinent to current issue.    Social History:  reports that he quit smoking about 33 years ago. His smoking use included cigarettes. He started smoking about 61 years ago. He has a 20.00 pack-year smoking history. He has never used smokeless tobacco. He reports current alcohol use of about 1.0 standard drink of alcohol per week. He reports that he does not use drugs.    Home Medications:  atorvastatin and ferrous sulfate    Allergies:  No Known Allergies    Objective    Objective     Vitals:   Temp:  [97.6 °F (36.4 °C)-98.5 °F (36.9 °C)] 97.6 °F (36.4 °C)  Heart Rate:  [62-80] 62  Resp:  [16-18] 18  BP: (122-172)/(54-79) 146/70    Physical Exam:   Constitutional: Awake, alert    Respiratory: Clear to  auscultation bilaterally, nonlabored respirations    Cardiovascular: RRR, no murmurs, rubs, or gallops, palpable pedal pulses bilaterally   Gastrointestinal: Positive bowel sounds, soft, nontender, nondistended       Bilateral nephrostomy tubes in place draining clear/yellow urine    Result Review    Result Review:  I have personally reviewed the results from the time of this admission to 11/28/2023 06:29 EST and agree with these findings:  []  Laboratory  []  Microbiology  []  Radiology  []  EKG/Telemetry   []  Cardiology/Vascular   []  Pathology  []  Old records  []  Other:      Assessment & Plan   Assessment / Plan     Brief Patient Summary:  Korey Rodriguez is a 78 y.o. male     Active Hospital Problems:  Active Hospital Problems    Diagnosis     **UTI (urinary tract infection)     Elevated liver enzymes     Acute UTI (urinary tract infection)     Infrarenal abdominal aortic aneurysm (AAA) without rupture        Bilateral ureteral obstruction  Metastatic TCC      Continue nephrostomy tubes.  Patient okay with continuing these.  Not bothering him too bad.  Depending on patient's overall clinical course will need to be switched out in about 3 months.  I can see him in the office in 2 mths to get this set up if need be.    Flush nephrostomy tubes daily with a few cc of normal saline.  Nursing order placed.      Thank you for the consult  I will continue to follow                  Electronically signed by Aung Westfall MD, 11/28/23, 6:29 AM EST.

## 2023-11-28 NOTE — SIGNIFICANT NOTE
" Wound Eval / Progress Noted     [place]     Patient Name: Korey Rodriguez  : 1945  MRN: [id number]  Today's Date: 2023                 Admit Date: 2023    Visit Dx:    ICD-10-CM ICD-9-CM   1. Acute UTI  N39.0 599.0         UTI (urinary tract infection)    Infrarenal abdominal aortic aneurysm (AAA) without rupture    Acute UTI (urinary tract infection)    Elevated liver enzymes        Past Medical History:   Diagnosis Date    AAA (abdominal aortic aneurysm)     BEING MONITORED NO REPAIR NEEDED YET    Aneurysm of abdominal aorta branch vessel     Cancer     BLADDER AND SKIN CANCERS/REMOVED    Chronic deep vein thrombosis (DVT) of tibial vein of left lower extremity 2023    Essential tremor     History of DVT of lower extremity 2022    on Xarelto    History of urostomy     PT CURRENTLY HAS HOME HEALTH TO HELP WITH OSTOMY    Hyperlipidemia         Past Surgical History:   Procedure Laterality Date    COLONOSCOPY  2018    CYSTECTOMY      2022  W/UROSTOMY    SKIN CANCER EXCISION Right     TRANSURETHRAL RESECTION OF BLADDER TUMOR Right 10/19/2022    Procedure: Cystoscopy with transurethral resection of bladder tumor;  Surgeon: Aung Westfall MD;  Location: [facility] MAIN OR;  Service: Urology;  Laterality: Right;    VENOUS ACCESS DEVICE (PORT) INSERTION Right 2023    Procedure: INSERTION VENOUS ACCESS DEVICE;  Surgeon: Richie Edward MD;  Location: [facility] OR [facility];  Service: General;  Laterality: Right;         Physical Assessment:       23 0555   Nephrostomy Right 8 Fr.   Placement date: If unknown, DO NOT use \"Add Comment\" note/Placement time: If unknown, DO NOT use \"Add Comment\" note: 23 1643   Inserted by: Dr Christian  Hand Hygiene Completed: Yes  Location: Right  Tube Size (Fr.): 8 Fr.  Urine Returned: Yes   Site Assessment Clean;Intact;Dry   Dressing Status Clean;Dry;Intact   Nephrostomy Left   Placement date: If unknown, DO NOT use \"Add Comment\" " "note/Placement time: If unknown, DO NOT use \"Add Comment\" note: 11/09/23 1407   Location: Left   Site Assessment Clean;Intact;Dry   Dressing Status Clean;Dry;Intact   Urostomy RLQ   Placement date: If unknown, DO NOT use \"Add Comment\" note: 12/12/22   Location: RLQ   Stomal Appliance 2 piece;Clean;Dry;Intact   Stoma Appearance red;moist   Peristomal Assessment POP     Wound Check / Follow-up:  Patient seen today for a wound consult. Patient with an existing urostomy in place; 2 piece pouch in place, pouch is clean dry and intact. No needs voiced at this time. Wife is primary caregiver and assists with pouch changes.  Bilateral nephrostomy tubes in place and draining; dressings clean dry and intact.     Impression: urostomy,  bilateral nephrostomy tubes    Short term goals:  urostomy support    Rosemarie Vázquez RN    11/28/2023    07:11 EST   "

## 2023-11-28 NOTE — CONSULTS
Discharge Planning Assessment   [place]     Patient Name: Korey Rodriguez  MRN: [id number]  Today's Date: 11/28/2023    Admit Date: 11/27/2023  Plan: Pt admitted due to generalized weakness and UTI. SW spoke with pt to assess needs. Pt lives with his wife and reports independence in ADLs. Pt does express recent weakness. SW discussed potential need for rehab, however pt would prefer home health care at this time, no preference in agency. PTOT pending. Pharmacy/PCP confirmed. Will continue to follow for discharge needs.   Discharge Needs Assessment       Row Name 11/28/23 3261       Living Environment    People in Home spouse    Name(s) of People in Home Pt lives in [place] with his wife, Mimi ROWLAND    Current Living Arrangements home    Potentially Unsafe Housing Conditions none    Primary Care Provided by self    Provides Primary Care For no one    Quality of Family Relationships supportive;involved;helpful    Able to Return to Prior Arrangements yes       Resource/Environmental Concerns    Resource/Environmental Concerns none       Transition Planning    Patient/Family Anticipates Transition to home with help/services    Patient/Family Anticipated Services at Transition home health care    Transportation Anticipated family or friend will provide       Discharge Needs Assessment    Readmission Within the Last 30 Days no previous admission in last 30 days    Equipment Currently Used at Home walker, rolling    Concerns to be Addressed basic needs;discharge planning    Outpatient/Agency/Support Group Needs homecare agency    Discharge Facility/Level of Care Needs home with home health              Discharge Plan       Row Name 11/28/23 1462       Plan    Plan Pt admitted due to generalized weakness and UTI. SW spoke with pt to assess needs. Pt lives with his wife and reports independence in ADLs. Pt does express recent weakness. SW discussed potential need for rehab, however pt would prefer home health care  at this time, no preference in agency. PTOT pending. Pharmacy/PCP confirmed. Will continue to follow for discharge needs.    Patient/Family in Agreement with Plan yes              Demographic Summary       Row Name 11/28/23 9250       General Information    Admission Type inpatient    Arrived From emergency department    Referral Source admission list    Reason for Consult discharge planning    Preferred Language English       Contact Information    Permission Granted to Share Info With family/designee              Functional Status       Row Name 11/28/23 3201       Functional Status    Usual Activity Tolerance good    Current Activity Tolerance moderate       Functional Status, IADL    Medications independent    Meal Preparation assistive equipment and person    Housekeeping assistive equipment and person    Laundry assistive equipment and person    Shopping assistive equipment and person       Mental Status Summary    Recent Changes in Mental Status/Cognitive Functioning no changes              Psychosocial       Row Name 11/28/23 9255       Behavior WDL    Behavior WDL WDL       Emotion Mood WDL    Emotion/Mood/Affect WDL WDL       Speech WDL    Speech WDL WDL       Perceptual State WDL    Perceptual State WDL WDL       Thought Process WDL    Thought Process WDL WDL       Intellectual Performance WDL    Intellectual Performance WDL WDL       Coping/Stress    Sources of Support spouse    Techniques to [place] with Loss/Stress/Change not applicable    Reaction to Health Status accepting    Understanding of Condition and Treatment adequate understanding of medical condition       Developmental Stage (Eriksson's)    Developmental Stage Stage 8 (63 years-death/Late Adulthood) Integrity vs. Despair             MISSY Aguilar

## 2023-11-28 NOTE — PLAN OF CARE
Goal Outcome Evaluation:  Plan of Care Reviewed With: patient        Progress: no change  Outcome Evaluation: VSS. No complaints voiced. Rested well overnight

## 2023-11-29 ENCOUNTER — TELEPHONE (OUTPATIENT)
Dept: UROLOGY | Facility: CLINIC | Age: 78
End: 2023-11-29
Payer: MEDICARE

## 2023-11-29 ENCOUNTER — READMISSION MANAGEMENT (OUTPATIENT)
Dept: CALL CENTER | Facility: HOSPITAL | Age: 78
End: 2023-11-29
Payer: MEDICARE

## 2023-11-29 VITALS
SYSTOLIC BLOOD PRESSURE: 125 MMHG | HEART RATE: 91 BPM | BODY MASS INDEX: 23 KG/M2 | TEMPERATURE: 97.4 F | HEIGHT: 75 IN | RESPIRATION RATE: 18 BRPM | DIASTOLIC BLOOD PRESSURE: 68 MMHG | OXYGEN SATURATION: 95 % | WEIGHT: 184.97 LBS

## 2023-11-29 PROBLEM — N39.0 ACUTE UTI (URINARY TRACT INFECTION): Status: RESOLVED | Noted: 2023-01-01 | Resolved: 2023-01-01

## 2023-11-29 PROBLEM — N39.0 UTI (URINARY TRACT INFECTION): Status: RESOLVED | Noted: 2023-11-27 | Resolved: 2023-11-29

## 2023-11-29 LAB
ALBUMIN SERPL-MCNC: 2.7 G/DL (ref 3.5–5.2)
ALBUMIN/GLOB SERPL: 0.9 G/DL
ALP SERPL-CCNC: 474 U/L (ref 39–117)
ALT SERPL W P-5'-P-CCNC: 75 U/L (ref 1–41)
ANION GAP SERPL CALCULATED.3IONS-SCNC: 10 MMOL/L (ref 5–15)
AST SERPL-CCNC: 74 U/L (ref 1–40)
BACTERIA SPEC AEROBE CULT: ABNORMAL
BASOPHILS # BLD AUTO: 0.04 10*3/MM3 (ref 0–0.2)
BASOPHILS NFR BLD AUTO: 0.5 % (ref 0–1.5)
BILIRUB SERPL-MCNC: 0.5 MG/DL (ref 0–1.2)
BUN SERPL-MCNC: 21 MG/DL (ref 8–23)
BUN/CREAT SERPL: 15.2 (ref 7–25)
CALCIUM SPEC-SCNC: 8.3 MG/DL (ref 8.6–10.5)
CHLORIDE SERPL-SCNC: 100 MMOL/L (ref 98–107)
CO2 SERPL-SCNC: 27 MMOL/L (ref 22–29)
CREAT SERPL-MCNC: 1.38 MG/DL (ref 0.76–1.27)
DEPRECATED RDW RBC AUTO: 45.6 FL (ref 37–54)
EGFRCR SERPLBLD CKD-EPI 2021: 51.7 ML/MIN/1.73
EOSINOPHIL # BLD AUTO: 0.06 10*3/MM3 (ref 0–0.4)
EOSINOPHIL NFR BLD AUTO: 0.7 % (ref 0.3–6.2)
ERYTHROCYTE [DISTWIDTH] IN BLOOD BY AUTOMATED COUNT: 14.6 % (ref 12.3–15.4)
GLOBULIN UR ELPH-MCNC: 3 GM/DL
GLUCOSE SERPL-MCNC: 103 MG/DL (ref 65–99)
HCT VFR BLD AUTO: 28.4 % (ref 37.5–51)
HGB BLD-MCNC: 8.7 G/DL (ref 13–17.7)
IMM GRANULOCYTES # BLD AUTO: 0.13 10*3/MM3 (ref 0–0.05)
IMM GRANULOCYTES NFR BLD AUTO: 1.6 % (ref 0–0.5)
LYMPHOCYTES # BLD AUTO: 0.36 10*3/MM3 (ref 0.7–3.1)
LYMPHOCYTES NFR BLD AUTO: 4.3 % (ref 19.6–45.3)
MCH RBC QN AUTO: 26.6 PG (ref 26.6–33)
MCHC RBC AUTO-ENTMCNC: 30.6 G/DL (ref 31.5–35.7)
MCV RBC AUTO: 86.9 FL (ref 79–97)
MONOCYTES # BLD AUTO: 0.68 10*3/MM3 (ref 0.1–0.9)
MONOCYTES NFR BLD AUTO: 8.1 % (ref 5–12)
NEUTROPHILS NFR BLD AUTO: 7.11 10*3/MM3 (ref 1.7–7)
NEUTROPHILS NFR BLD AUTO: 84.8 % (ref 42.7–76)
NRBC BLD AUTO-RTO: 0 /100 WBC (ref 0–0.2)
PHOSPHATE SERPL-MCNC: 3.6 MG/DL (ref 2.5–4.5)
PLATELET # BLD AUTO: 139 10*3/MM3 (ref 140–450)
PMV BLD AUTO: 10.3 FL (ref 6–12)
POTASSIUM SERPL-SCNC: 4.1 MMOL/L (ref 3.5–5.2)
PROT SERPL-MCNC: 5.7 G/DL (ref 6–8.5)
RBC # BLD AUTO: 3.27 10*6/MM3 (ref 4.14–5.8)
SODIUM SERPL-SCNC: 137 MMOL/L (ref 136–145)
WBC NRBC COR # BLD AUTO: 8.38 10*3/MM3 (ref 3.4–10.8)

## 2023-11-29 PROCEDURE — 85025 COMPLETE CBC W/AUTO DIFF WBC: CPT | Performed by: INTERNAL MEDICINE

## 2023-11-29 PROCEDURE — 25010000002 ENOXAPARIN PER 10 MG: Performed by: INTERNAL MEDICINE

## 2023-11-29 PROCEDURE — 97161 PT EVAL LOW COMPLEX 20 MIN: CPT

## 2023-11-29 PROCEDURE — 25010000002 FUROSEMIDE PER 20 MG: Performed by: INTERNAL MEDICINE

## 2023-11-29 PROCEDURE — 99232 SBSQ HOSP IP/OBS MODERATE 35: CPT | Performed by: INTERNAL MEDICINE

## 2023-11-29 PROCEDURE — 25810000003 SODIUM CHLORIDE 0.9 % SOLUTION: Performed by: INTERNAL MEDICINE

## 2023-11-29 PROCEDURE — 84100 ASSAY OF PHOSPHORUS: CPT | Performed by: INTERNAL MEDICINE

## 2023-11-29 PROCEDURE — 97165 OT EVAL LOW COMPLEX 30 MIN: CPT

## 2023-11-29 PROCEDURE — 80053 COMPREHEN METABOLIC PANEL: CPT | Performed by: INTERNAL MEDICINE

## 2023-11-29 PROCEDURE — 99239 HOSP IP/OBS DSCHRG MGMT >30: CPT | Performed by: INTERNAL MEDICINE

## 2023-11-29 PROCEDURE — 25010000002 NA FERRIC GLUC CPLX PER 12.5 MG: Performed by: INTERNAL MEDICINE

## 2023-11-29 RX ORDER — ONDANSETRON 4 MG/1
4 TABLET, FILM COATED ORAL EVERY 6 HOURS PRN
Qty: 12 TABLET | Refills: 0 | Status: SHIPPED | OUTPATIENT
Start: 2023-11-29 | End: 2023-12-02

## 2023-11-29 RX ORDER — FAMOTIDINE 20 MG/1
20 TABLET, FILM COATED ORAL 2 TIMES DAILY PRN
Qty: 60 TABLET | Refills: 0 | Status: SHIPPED | OUTPATIENT
Start: 2023-11-29 | End: 2023-12-29

## 2023-11-29 RX ADMIN — DOCUSATE SODIUM 50MG AND SENNOSIDES 8.6MG 2 TABLET: 8.6; 5 TABLET, FILM COATED ORAL at 09:34

## 2023-11-29 RX ADMIN — Medication 10 ML: at 09:35

## 2023-11-29 RX ADMIN — ENOXAPARIN SODIUM 40 MG: 100 INJECTION SUBCUTANEOUS at 09:34

## 2023-11-29 RX ADMIN — FAMOTIDINE 20 MG: 20 TABLET ORAL at 09:34

## 2023-11-29 RX ADMIN — SODIUM CHLORIDE 250 MG: 9 INJECTION, SOLUTION INTRAVENOUS at 09:34

## 2023-11-29 RX ADMIN — LINEZOLID 600 MG: 600 TABLET, FILM COATED ORAL at 09:34

## 2023-11-29 RX ADMIN — FUROSEMIDE 20 MG: 10 INJECTION, SOLUTION INTRAMUSCULAR; INTRAVENOUS at 09:34

## 2023-11-29 NOTE — DISCHARGE SUMMARY
[facility]        HOSPITALIST  DISCHARGE SUMMARY    Patient Name: Korey Rodriguez  : 1945  MRN: [id number]    Date of Admission: 2023  Date of Discharge:  2023  Primary Care Physician: Dimitri Hargrove MD    Consults       Date and Time Order Name Status Description    2023  6:16 PM Hematology & Oncology Inpatient Consult Completed     2023  6:16 PM Inpatient Urology Consult Completed     2023  6:16 PM Inpatient Urology Consult Completed     2023  8:00 AM Inpatient Pulmonology Consult Completed     2023  3:09 AM Inpatient Nephrology Consult Completed             Active and Resolved Hospital Problems:  Active Hospital Problems    Diagnosis POA    Elevated liver enzymes [R74.8] Yes    Infrarenal abdominal aortic aneurysm (AAA) without rupture [I71.43] Yes      Resolved Hospital Problems    Diagnosis POA    **UTI (urinary tract infection) [N39.0] Yes    Acute UTI (urinary tract infection) [N39.0] Yes     Increasing generalized weakness inability to take care of himself.  Improving   UTI causing above.due to VRE.  Urothelial cancer s/p cystectomy and diverting urostomy.  Nonfunctional.  Parastomal hernia with nonobstructing bowel.   s/p bilateral nephrostomy tube for obstructive uropathy.  Improved  Extensive mets to the liver.  Biopsy positive for metastatic cancer.  Elevated liver enzymes due to above.  5.6 infrarenal abdominal aortic aneurysm.  Stable.  Chronic DVT left lower extremity.  Patient has been taken off of Xarelto.  Anasarca with lower extremity edema and bilateral pleural effusions.  Acute on chronic diastolic CHF.  EF of 54%.  Lasix stopped due to increase in creatinine.  Asymptomatic gallstones.  Iron deficient anemia.  No evidence of bleeding.  Hospital Course     Hospital Course:  Korey Rodriguez is a 78 y.o. male with past medical history of urothelial cancer s/p radical cystectomy  and diverting urostomy, abdominal  aortic aneurysm, chronic DVT of left lower extremity off Xarelto.   Patient was recently at [facility] because of acute kidney injury due to obstructive uropathy where his urostomy was not functioning and patient had bilateral nephrostomy tubes placed.  Patient had 2 sessions of hemodialysis and followed by Dr. Sanchez.  Patient was seen by urologist Dr. Bansal at [facility].  His liver lesion was biopsied.  Patient was discharged home on November 15.  Patient was seen in ED on November 21 and urine culture grew VRE.  Patient presented again to [facility] ED because of above symptoms described as abdominal pain and feels constipated with no BM for 3 days.  Some nausea and vomiting.  No fever or chills.  Patient low back pain has gotten worse and he feels like it is because of laying in bed.  No new numbness tingling in the toes or pain radiating into lower extremities.  Work-up in the ED showed stable vital signs.  BNP is negative.  Liver enzymes are elevated especially alk phos.  White cell count of 7.5.  Hemoglobin of 9.  Lactate and lipase within normal range.  UA consistent with UTI with bacteria RBCs and WBCs.  CT scan abdominal pelvis showed right mid abdomen ileostomy with right parastomal hernia and nonobstructing bowels.  There are small bilateral pleural effusions with anasarca.  Extensive mets in the liver.  Has gallstones.  Hospitalist has been called to admit and further evaluation.  Urology was consulted by ED physician.  Patient evaluated by his heme-onc and urologist.  Patient eventually decided to be comfort care.     Interval Followup:   Vital signs stable on room air  Generalized weakness improving.  Abdominal pain resolved.  Good urine output via nephrostomy tubes  Has been refusing Lovenox injections due to fear of bleeding.  Urine culture grew VRE during this admission.  Patient and wife decided to go home with home health and transition to hospice as an  outpatient.    DISCHARGE Follow Up Recommendations for labs and diagnostics: PCP, oncology, urology and hospice.  Have CMP checked by PCP in next few days.      Day of Discharge     Vital Signs:  Temp:  [97.4 °F (36.3 °C)-98.6 °F (37 °C)] 97.4 °F (36.3 °C)  Heart Rate:  [67-91] 91  Resp:  [18-20] 18  BP: (125-143)/(63-74) 125/68    Physical Exam:   Constitutional: Awake, alert, no acute distress              Eyes: Pupils equal, sclerae anicteric, no conjunctival injection              HENT: NCAT, mucous membranes moist              Neck: Supple, no thyromegaly, no lymphadenopathy, trachea midline              Respiratory: Decreased to auscultation bilaterally, nonlabored respirations               Cardiovascular: RRR, no murmurs, rubs, or gallops, palpable pedal pulses bilaterally              Gastrointestinal: Positive bowel sounds, soft, nontender, nondistended.  Diverting urostomy right upper quadrant with empty bag.  No CVA tenderness.  Bilateral nephrostomies draining clear urine              Musculoskeletal: No T/L-spine tenderness, +2 bilateral ankle edema, no clubbing or cyanosis to extremities              Psychiatric: Appropriate affect, cooperative              Neurologic: Oriented x 3, strength symmetric in all extremities, Cranial Nerves grossly intact to confrontation, speech clear              Skin: No rashes     Discharge Details        Discharge Medications        New Medications        Instructions Start Date   linezolid 600 MG tablet  Commonly known as: Zyvox   600 mg, Oral, 2 Times Daily             Continue These Medications        Instructions Start Date   atorvastatin 20 MG tablet  Commonly known as: LIPITOR   20 mg, Oral, Nightly      FeroSul 325 (65 Fe) MG tablet  Generic drug: ferrous sulfate   1 tablet, Oral, Daily With Breakfast               No Known Allergies    Discharge Disposition:  Home-Health Care [facility]    Diet:  Diet Instructions       Diet: Regular/House Diet; Regular Texture  (IDDSI 7); Thin (IDDSI 0)      Discharge Diet: Regular/House Diet    Texture: Regular Texture (IDDSI 7)    Fluid Consistency: Thin (IDDSI 0)            Discharge Activity:   Activity Instructions       Activity as Tolerated              CODE STATUS:  Code Status and Medical Interventions:   Ordered at: 11/28/23 1606     Medical Intervention Limits:    NO intubation (DNI)     Level Of Support Discussed With:    Patient    Health Care Surrogate     Code Status (Patient has no pulse and is not breathing):    No CPR (Do Not Attempt to Resuscitate)     Medical Interventions (Patient has pulse or is breathing):    Limited Support         Future Appointments   Date Time Provider Department Center   12/1/2023  8:30 AM Arvin Coburn MD [facility] ONC E521 [facility]   12/11/2023  8:00 AM Dimitri Hargrove MD [facility] PC MALENA [facility]   12/18/2023  1:00 PM JE NM 1 (SPECT) BH JE NM [facility]   12/22/2023  2:15 PM INJ ROOM 01 JE OP INFU [facility] OPIF [facility]       Additional Instructions for the Follow-ups that You Need to Schedule       Discharge Follow-up with PCP   As directed       Currently Documented PCP:    Dimitri Hargrove MD    PCP Phone Number:    [phone]     Follow Up Details: 7 days        Discharge Follow-up with Specified Provider: Dr. Coburn   As directed      To: Dr. Coburn   Follow Up Details: As scheduled        Discharge Follow-up with Specified Provider: Dr. Westfall; 1 Month   As directed      To: Dr. Westfall   Follow Up: 1 Month                Pertinent  and/or Most Recent Results     PROCEDURES:   * Cannot find OR case *     LAB RESULTS:      Lab 11/29/23  0529 11/28/23  0443 11/27/23  1057   WBC 8.38 7.44 7.56   HEMOGLOBIN 8.7* 8.2* 9.1*   HEMATOCRIT 28.4* 27.0* 29.6*   PLATELETS 139* 138* 147   NEUTROS ABS 7.11* 6.14 6.52   IMMATURE GRANS (ABS) 0.13* 0.11* 0.09*   LYMPHS ABS 0.36* 0.34* 0.28*   MONOS ABS 0.68 0.71 0.63   EOS ABS 0.06 0.09 0.01   MCV 86.9 86.3 86.8   PROCALCITONIN  --  0.18  --    LACTATE  --   --  1.4          Lab 11/29/23  0529 11/28/23  0443 11/27/23  1057   SODIUM 137 137 136   POTASSIUM 4.1 4.0 3.9   CHLORIDE 100 102 100   CO2 27.0 25.1 24.7   ANION GAP 10.0 9.9 11.3   BUN 21 23 28*   CREATININE 1.38* 1.19 1.23   EGFR 51.7* 61.8 59.3*   GLUCOSE 103* 104* 120*   CALCIUM 8.3* 8.4* 8.7   MAGNESIUM  --  1.8  --    PHOSPHORUS 3.6 3.3  --          Lab 11/29/23  0529 11/28/23  0443 11/27/23  1057   TOTAL PROTEIN 5.7* 5.9* 6.5   ALBUMIN 2.7* 2.7* 3.1*   GLOBULIN 3.0 3.2 3.4   ALT (SGPT) 75* 82* 83*   AST (SGOT) 74* 87* 87*   BILIRUBIN 0.5 0.3 0.5   ALK PHOS 474* 495* 485*   LIPASE  --   --  24         Lab 11/27/23  1057   PROBNP 1,866.0*             Lab 11/28/23  0443 11/27/23  1057   IRON 26* 24*   IRON SATURATION (TSAT) 14* 12*   TIBC 186* 200*   TRANSFERRIN 125* 134*   FERRITIN 2,802.00* 2,959.00*   FOLATE  --  12.10   VITAMIN B 12  --  1,150*         Brief Urine Lab Results  (Last result in the past 365 days)        Color   Clarity   Blood   Leuk Est   Nitrite   Protein   CREAT   Urine HCG        11/27/23 1218 Yellow   Clear   Large (3+)   Moderate (2+)   Negative   30 mg/dL (1+)                 Microbiology Results (last 10 days)       Procedure Component Value - Date/Time    Blood Culture - Blood, Arm, Left [[id number]]  (Normal) Collected: 11/27/23 1312    Lab Status: Preliminary result Specimen: Blood from Arm, Left Updated: 11/28/23 1330     Blood Culture No growth at 24 hours    Blood Culture - Blood, Arm, Left [[id number]]  (Normal) Collected: 11/27/23 1312    Lab Status: Preliminary result Specimen: Blood from Arm, Left Updated: 11/28/23 1330     Blood Culture No growth at 24 hours    Urine Culture - Urine, Urine, Clean Catch [[id number]]  (Abnormal)  (Susceptibility) Collected: 11/27/23 1218    Lab Status: Final result Specimen: Urine, Clean Catch Updated: 11/29/23 1025     Urine Culture 50,000 CFU/mL Enterococcus faecium, VRE    Narrative:      Colonization of the urinary tract without infection is common.  Treatment is discouraged unless the patient is symptomatic, pregnant, or undergoing an invasive urologic procedure.    Susceptibility        Enterococcus faecium, VRE      JENIFFER      Ampicillin Resistant      Levofloxacin Resistant      Linezolid Susceptible      Nitrofurantoin Susceptible      Tetracycline Resistant      Vancomycin Resistant                           Urine Culture - Urine, Nephrostomy Right [[id number]]  (Abnormal)  (Susceptibility) Collected: 11/21/23 1647    Lab Status: Final result Specimen: Urine from Nephrostomy Right Updated: 11/25/23 0434     Urine Culture 50,000 CFU/mL Enterococcus faecium, VRE     Comment:   Vancomycin Resistant Enterococcus species. Patient may be an isolation risk.       Narrative:          Colonization of the urinary tract without infection is common. Treatment is discouraged unless the patient is symptomatic, pregnant, or undergoing an invasive urologic procedure.    Susceptibility        Enterococcus faecium, VRE      JENIFFER      Ampicillin Resistant      Levofloxacin Resistant      Linezolid Susceptible      Nitrofurantoin Susceptible      Tetracycline Resistant      Vancomycin Resistant                                   CT Abdomen Pelvis Without Contrast    Result Date: 11/27/2023  Impression:   1. Previous cystectomy and prostatectomy. 2. Right mid abdomen diverting ileostomy with associated parastomal hernia containing nonobstructed small bowel 3. Bilateral nephrostomy tubes in place appearing in good position. 4. Small bilateral pleural effusions 5. Widespread metastatic disease throughout the liver 6. Cholelithiasis 7. Evidence of anasarca.  Small amount of ascites.     Alvaro Christian M.D.       Electronically Signed and Approved By: Alavro Christian M.D. on 11/27/2023 at 13:59             CT Guided Nephrostomy Tube Placement    Result Date: 11/21/2023  Impression:   1. Successful placement of a right 8 [place] nephrostomy tube without evidence of complication 2. A sample  of urine from the right nephrostomy tube was sent for culture.      Alvaro Christian M.D.       Electronically Signed and Approved By: Alvaro Christian M.D. on 11/21/2023 at 17:51             IR Nephrostogram    Result Date: 11/21/2023  Impression:   1. As above      Alvaro Christian M.D.       Electronically Signed and Approved By: Alvaro Christian M.D. on 11/21/2023 at 17:48             CT Guided Nephrostomy Tube Placement    Result Date: 11/9/2023  Impression:   1. Technically successful placement of bilateral 8 [place] nephrostomy tubes without evidence of complication      Alvaro Christian M.D.       Electronically Signed and Approved By: Alvaro Christian M.D. on 11/09/2023 at 14:39             CT Guided Nephrostomy Tube Placement    Result Date: 11/9/2023  Impression:   1. Technically successful placement of bilateral 8 [place] nephrostomy tubes without evidence of complication      Alvaro Christian M.D.       Electronically Signed and Approved By: Alvaro Christian M.D. on 11/09/2023 at 14:08             CT Abdomen Pelvis Without Contrast    Result Date: 11/7/2023  Impression:   1. Abnormal appearance to the liver which could is concerning for metastatic disease which has developed. 2. Bibasilar effusions and bibasilar atelectasis. 3. Evidence for some underlying ascites. 4. Pelvocaliectasis bilaterally with nonobstructing intrarenal calculi.  Findings could relate to cystectomy and diverting ileostomy.  There was pelvocaliectasis on prior exam. 5. Moderate stool burden which could relate to constipation. 6. Infrarenal abdominal aortic aneurysm which has been noted. 7. Scoliosis lumbar spine with multilevel degenerative change.     SHUBHAM BELL MD       Electronically Signed and Approved By: SHUBHAM BELL MD on 11/07/2023 at 19:52             XR Chest 1 View    Result Date: 11/7/2023  Impression:   1. Cardiomegaly.       SHUBHAM BELL MD       Electronically Signed and Approved By: SHUBHAM BELL MD on 11/07/2023 at 18:12               Results for orders placed during the hospital encounter of 07/07/23    Duplex Carotid Ultrasound CAR    Interpretation Summary    Proximal right internal carotid artery plaque without significant stenosis.    Proximal left internal carotid artery plaque without significant stenosis.    Moderate carotid bulb bifurcation plaque bilaterally.    Vertebral flow is antegrade bilaterally.      Results for orders placed during the hospital encounter of 07/07/23    Duplex Carotid Ultrasound CAR    Interpretation Summary    Proximal right internal carotid artery plaque without significant stenosis.    Proximal left internal carotid artery plaque without significant stenosis.    Moderate carotid bulb bifurcation plaque bilaterally.    Vertebral flow is antegrade bilaterally.      Results for orders placed during the hospital encounter of 11/27/23    Adult Transthoracic Echo Complete W/ Cont if Necessary Per Protocol    Interpretation Summary    Left ventricular systolic function is normal. Calculated left ventricular EF = 54.3%    Left ventricular wall thickness is consistent with mild to moderate concentric hypertrophy.    Left ventricular diastolic function is consistent with (grade I) impaired relaxation and age.    Mild dilation of the aortic root is present measuring 4.3 cm. Mild dilation of the ascending aorta is present measuring 3.9 cm.    Cardiac valves were not well visualized on this study.  No hemodynamically significant valvular pathology was noted by color flow/Doppler velocity.      Imaging Results (All)       Procedure Component Value Units Date/Time    CT Abdomen Pelvis Without Contrast [[id number]] Collected: 11/27/23 1359     Updated: 11/27/23 1402    Narrative:      PROCEDURE: CT ABDOMEN PELVIS WO CONTRAST     COMPARISON: [facility], CT, CT GUIDED NEPHROSTOMY TUBE PLACEMENT, 11/21/2023, 16:08.    [facility], CT, CT ABDOMEN PELVIS WO CONTRAST, 11/07/2023, 19:37.      INDICATIONS: abdominal pain and bilateral back pain/bilat. nephrostomy tubes placed approx. 2 weeks   ago     TECHNIQUE: CT images were created without intravenous contrast.       PROTOCOL:   Standard imaging protocol performed      RADIATION:   DLP: 501.1mGy*cm    Automated exposure control was utilized to minimize radiation dose.      FINDINGS:   Small bilateral pleural effusions are noted measuring up to 2.8 cm in thickness on the left and 1.1   cm in thickness on the right.       There are numerous masses throughout the liver consistent with metastatic disease.  Increased   density in the gallbladder could represent stones and or sludge.  The spleen, pancreas and adrenal   glands appear unremarkable.  Patient has had a previous cystectomy with diverting ileostomy in the   right mid abdomen.  There is a parastomal hernia containing nonobstructed small bowel.  The hernia   sac measures 7.6 cm transverse.  Bilateral nephrostomy tubes have been placed.  The left intrarenal   collection system appears well decompressed.  There is a 0.7 cm nonobstructing left renal stone.    The right nephrostomy tube appears in satisfactory position.  There is persistent dilatation of the   renal pelvis, however, the moderate right hydronephrosis previously noted on the 11/7/2023 exam   appears improved.  Three small nonobstructing right renal stones measure up to 0.8 cm in size.     There is moderate atherosclerotic calcification in the abdominal aorta.  There is fusiform   infrarenal abdominal aortic aneurysm measuring up to 4.5 cm by 5.6 cm.  No adenopathy is   identified.  Sub cm portal triad lymph nodes are noted.  A small amount of free fluid is noted in   the pelvis.  A prostatectomy has been performed.  No acute bowel abnormality is identified.     There is edema in the subcutaneous fat in the abdomen and pelvis consistent with anasarca.     No focal osseous lesion is seen.       Impression:         1. Previous cystectomy  and prostatectomy.  2. Right mid abdomen diverting ileostomy with associated parastomal hernia containing nonobstructed   small bowel  3. Bilateral nephrostomy tubes in place appearing in good position.  4. Small bilateral pleural effusions  5. Widespread metastatic disease throughout the liver  6. Cholelithiasis  7. Evidence of anasarca.  Small amount of ascites.            Alvaro Christian M.D.         Electronically Signed and Approved By: Alvaro Christian M.D. on 11/27/2023 at 13:59                              Labs Pending at Discharge:  Pending Labs       Order Current Status    Blood Culture - Blood, Arm, Left Preliminary result    Blood Culture - Blood, Arm, Left Preliminary result                Time spent on Discharge including face to face service: 35 minutes  Part of this note may be an electronic transcription/translation of spoken language to printed text using the Dragon Dictation System.     TElectronically signed by True Caputo MD, 11/29/23, 1:29 PM EST.

## 2023-11-29 NOTE — THERAPY EVALUATION
Acute Care - Physical Therapy Initial Evaluation  MEAGHAN Campo     Patient Name: Korey Rodriguez  : 1945  MRN: [id number]  Today's Date: 2023      Visit Dx:     ICD-10-CM ICD-9-CM   1. Acute UTI  N39.0 599.0   2. Difficulty walking  R26.2 719.7     Patient Active Problem List   Diagnosis    Nephrolithiasis    Infrarenal abdominal aortic aneurysm (AAA) without rupture    Chronic right-sided low back pain without sciatica    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    Squamous cell carcinoma in situ (SCCIS) of skin    Aneurysm of iliac artery    Mixed hyperlipidemia    Encounter for adjustment or management of vascular access device    Malignant neoplasm of overlapping sites of bladder    Primary osteoarthritis involving multiple joints    Well adult exam    Chronic deep vein thrombosis (DVT) of tibial vein of left lower extremity    Iron deficiency anemia    Medicare annual wellness visit, subsequent    Stage 3a chronic kidney disease    Decreased urine output    Generalized weakness    Elevated blood pressure reading in office without diagnosis of hypertension    Acute renal failure    Hyperkalemia    Bilateral hydronephrosis    Hospital discharge follow-up    Elevated liver enzymes     Past Medical History:   Diagnosis Date    AAA (abdominal aortic aneurysm)     BEING MONITORED NO REPAIR NEEDED YET    Aneurysm of abdominal aorta branch vessel     Cancer     BLADDER AND SKIN CANCERS/REMOVED    Chronic deep vein thrombosis (DVT) of tibial vein of left lower extremity 2023    Essential tremor     History of DVT of lower extremity 2022    on Xarelto    History of urostomy     PT CURRENTLY HAS HOME HEALTH TO HELP WITH OSTOMY    Hyperlipidemia      Past Surgical History:   Procedure Laterality Date    COLONOSCOPY  2018    CYSTECTOMY      2022  W/UROSTOMY    SKIN CANCER EXCISION Right     TRANSURETHRAL RESECTION OF BLADDER TUMOR Right 10/19/2022    Procedure: Cystoscopy with  transurethral resection of bladder tumor;  Surgeon: Aung Westfall MD;  Location: [facility] MAIN OR;  Service: Urology;  Laterality: Right;    VENOUS ACCESS DEVICE (PORT) INSERTION Right 01/09/2023    Procedure: INSERTION VENOUS ACCESS DEVICE;  Surgeon: Richie Edward MD;  Location: [facility] OR OSC;  Service: General;  Laterality: Right;     PT Assessment (last 12 hours)       PT Evaluation and Treatment       Row Name 11/29/23 1300          Physical Therapy Time and Intention    Subjective Information complains of;weakness (P)   -RR     Document Type evaluation (P)   -RR     Mode of Treatment individual therapy;physical therapy (P)   -RR     Patient Effort good (P)   -RR     Symptoms Noted During/After Treatment fatigue (P)   minimal  -RR       Row Name 11/29/23 1300          General Information    Patient Profile Reviewed yes (P)   -RR     Patient Observations alert;cooperative;agree to therapy (P)   -RR     Prior Level of Function independent:;gait;transfer;bed mobility;ADL's (P)   -RR     Equipment Currently Used at Home none (P)   -RR     Existing Precautions/Restrictions fall (P)   -RR     Barriers to Rehab none identified (P)   -RR     Comment, General Information Pt lives with wife, no home O2. Reports independence with mobility/ADLs at baseline, but 1-2 days before hospital admission he had a decline and has been using a rolling walker. (P)   -RR       Row Name 11/29/23 1300          Living Environment    Current Living Arrangements home (P)   -RR     Home Accessibility stairs to enter home (P)   -RR     People in Home spouse (P)   wife  -RR     Name(s) of People in Home Mimi (P)   -RR     Primary Care Provided by self (P)   -RR       Row Name 11/29/23 1300          Home Main Entrance    Number of Stairs, Main Entrance two (P)   -RR       Row Name 11/29/23 1300          Home Use of Assistive/Adaptive Equipment    Equipment Currently Used at Home none (P)   -RR       Row Name 11/29/23 1300           Cognition    Orientation Status (Cognition) oriented x 4 (P)   -RR     Personal Safety Interventions gait belt;nonskid shoes/slippers when out of bed (P)   -RR       Row Name 11/29/23 1300          Range of Motion Comprehensive    General Range of Motion bilateral lower extremity ROM WFL (P)   -RR       Row Name 11/29/23 1300          Strength (Manual Muscle Testing)    Strength (Manual Muscle Testing) bilateral lower extremities (P)   4+/5 except B hip flexion 4/5  -RR       Row Name 11/29/23 1300          Bed Mobility    Comment, (Bed Mobility) Not tested - pt seated upright in recliner upon therapist entry (P)   -RR       Row Name 11/29/23 1300          Transfers    Transfers sit-stand transfer (P)   -RR       Row Name 11/29/23 1300          Sit-Stand Transfer    Sit-Stand [place] (Transfers) contact guard (P)   -RR     Assistive Device (Sit-Stand Transfers) walker, front-wheeled (P)   -RR       Row Name 11/29/23 1300          Gait/Stairs (Locomotion)    Gait/Stairs Locomotion gait/ambulation assistive device (P)   -RR     [place] Level (Gait) contact guard (P)   -RR     Assistive Device (Gait) walker, front-wheeled (P)   -RR     Patient was able to Ambulate yes (P)   -RR     Distance in Feet (Gait) 35 (P)   -RR       Row Name 11/29/23 1300          Safety Issues, Functional Mobility    Impairments Affecting Function (Mobility) balance;endurance/activity tolerance;strength (P)   -RR       Row Name 11/29/23 1300          Balance    Balance Assessment standing dynamic balance (P)   -RR     Dynamic Standing Balance contact guard (P)   -RR     Position/Device Used, Standing Balance supported;walker, front-wheeled (P)   -RR       Row Name 11/29/23 1300          Plan of Care Review    Plan of Care Reviewed With patient;spouse (P)   -RR     Outcome Evaluation Pt presents with impairments in balance, strength, and endurance/activity tolerance affecting safety and stamina with functional mobility and ambulation.  He will benefit from skilled PT services during inpatient stay and home health PT services upon discharge to address impairments and return to OF.` (P)   -RR       Row Name 11/29/23 1300          Positioning and Restraints    Pre-Treatment Position sitting in chair/recliner (P)   -RR     Post Treatment Position chair (P)   -RR     In Chair reclined;call light within reach;encouraged to call for assist;exit alarm on;with family/caregiver (P)   -RR       Row Name 11/29/23 1300          Therapy Assessment/Plan (PT)    Rehab Potential (PT) good, to achieve stated therapy goals (P)   -RR     Criteria for Skilled Interventions Met (PT) yes;meets criteria;skilled treatment is necessary (P)   -RR     Therapy Frequency (PT) daily (P)   -RR     Predicted Duration of Therapy Intervention (PT) 10 days (P)   -RR     Problem List (PT) problems related to;balance;mobility;strength (P)   -RR     Activity Limitations Related to Problem List (PT) unable to ambulate safely (P)   -RR       Row Name 11/29/23 1300          PT Evaluation Complexity    History, PT Evaluation Complexity no personal factors and/or comorbidities (P)   -RR     Examination of Body Systems (PT Eval Complexity) total of 4 or more elements (P)   -RR     Clinical Presentation (PT Evaluation Complexity) stable (P)   -RR     Clinical Decision Making (PT Evaluation Complexity) low complexity (P)   -RR     Overall Complexity (PT Evaluation Complexity) low complexity (P)   -RR       Row Name 11/29/23 1300          Therapy Plan Review/Discharge Plan (PT)    Therapy Plan Review (PT) evaluation/treatment results reviewed;care plan/treatment goals reviewed;patient;spouse/significant other (P)   -RR       Row Name 11/29/23 1300          Physical Therapy Goals    Transfer Goal Selection (PT) transfer, PT goal 1 (P)   -RR     Gait Training Goal Selection (PT) gait training, PT goal 1 (P)   -RR       Row Name 11/29/23 1300          Transfer Goal 1 (PT)    Activity/Assistive  Device (Transfer Goal 1, PT) sit-to-stand/stand-to-sit (P)   -RR     [place] Level/Cues Needed (Transfer Goal 1, PT) supervision required (P)   -RR     Time Frame (Transfer Goal 1, PT) 10 days (P)   -RR       Row Name 11/29/23 1300          Gait Training Goal 1 (PT)    Activity/Assistive Device (Gait Training Goal 1, PT) gait (walking locomotion);assistive device use;improve balance and speed;increase endurance/gait distance;walker, rolling (P)   -RR     [place] Level (Gait Training Goal 1, PT) supervision required (P)   -RR     Distance (Gait Training Goal 1, PT) 300 (P)   -RR     Time Frame (Gait Training Goal 1, PT) 10 days (P)   -RR               User Key  (r) = Recorded By, (t) = Taken By, (c) = Cosigned By      Initials Name Provider Type    RR Rosalba Tolbert, PT Student PT Student                    Physical Therapy Education       Title: PT OT SLP Therapies (Done)       Topic: Physical Therapy (Done)       Point: Mobility training (Done)       Learning Progress Summary             Patient Acceptance, E,D, DU by PG at 11/29/2023 0912                         Point: Home exercise program (Done)       Learning Progress Summary             Patient Acceptance, E,D, DU by PG at 11/29/2023 0912                         Point: Body mechanics (Done)       Learning Progress Summary             Patient Acceptance, E,D, DU by PG at 11/29/2023 0912                         Point: Precautions (Done)       Learning Progress Summary             Patient Acceptance, E,D, DU by PG at 11/29/2023 0912                                         User Key       Initials Effective Dates Name Provider Type Discipline    PG 06/16/21 -  Og Bajwa OT Occupational Therapist OT                  PT Recommendation and Plan  Anticipated Discharge Disposition (PT): (P) home with home health  Planned Therapy Interventions (PT): (P) balance training, bed mobility training, gait training, strengthening, transfer training  Therapy  Frequency (PT): (P) daily  Plan of Care Reviewed With: (P) patient, spouse  Outcome Evaluation: (P) Pt presents with impairments in balance, strength, and endurance/activity tolerance affecting safety and stamina with functional mobility and ambulation. He will benefit from skilled PT services during inpatient stay and home health PT services upon discharge to address impairments and return to PLOF.`   Outcome Measures       Row Name 11/29/23 1300             How much help from another person do you currently need...    Turning from your back to your side while in flat bed without using bedrails? 4 (P)   -RR      Moving from lying on back to sitting on the side of a flat bed without bedrails? 4 (P)   -RR      Moving to and from a bed to a chair (including a wheelchair)? 3 (P)   -RR      Standing up from a chair using your arms (e.g., wheelchair, bedside chair)? 3 (P)   -RR      Climbing 3-5 steps with a railing? 3 (P)   -RR      To walk in hospital room? 3 (P)   -RR      AM-PAC 6 Clicks Score (PT) 20 (P)   -RR      Highest Level of Mobility Goal 6 --> Walk 10 steps or more (P)   -RR                User Key  (r) = Recorded By, (t) = Taken By, (c) = Cosigned By      Initials Name Provider Type    Rosalba Jaramillo, PT Student PT Student                     Time Calculation:    PT Charges       Row Name 11/29/23 1334             Time Calculation    PT Received On 11/29/23 (P)   -RR      PT Goal Re-Cert Due Date 12/08/23 (P)   -RR         Untimed Charges    PT Eval/Re-eval Minutes 35 (P)   -RR         Total Minutes    Untimed Charges Total Minutes 35 (P)   -RR       Total Minutes 35 (P)   -RR                User Key  (r) = Recorded By, (t) = Taken By, (c) = Cosigned By      Initials Name Provider Type    Rosalba Jaramillo, PT Student PT Student                      PT G-Codes  Outcome Measure Options: AM-PAC 6 Clicks Daily Activity (OT), Optimal Instrument  AM-PAC 6 Clicks Score (PT): (P) 20  AM-PAC 6 Clicks Score  (OT): 16    Rosalba Tolbert, PT Student  11/29/2023

## 2023-11-29 NOTE — TELEPHONE ENCOUNTER
Caller: ELVIE    Relationship to patient: Provider    Best call back number: [phone]    Chief complaint: HOSPITAL FOLLOW-UP    Type of visit: HOSPITAL FOLLOW-UP    Requested date: IN 1 MONTH     If rescheduling, when is the original appointment: N/A     Additional notes:  MR. GARNER IS BEING DISCHARGED FROM THE HOSPITAL TODAY. DR. WU SAW MR. GARNER TODAY IN THE EMERGENCY ROOM AND WANTS PATIENT TO FOLLOW-UP IN 1 MONTH. [facility] NEXT AVAILABLE IS 02/02/24.    PLEASE CALL MR. GARNER TO SCHEDULE.       ---UNABLE TO WARM TRANSFER

## 2023-11-29 NOTE — PLAN OF CARE
Goal Outcome Evaluation:  Plan of Care Reviewed With: patient        Progress: no change  Outcome Evaluation: Patient presents with limitations affecting strength, activity tolerance, and balance impacting patient's ability to return home safely and independently.  The skills of a therapist will be required to safely and effectively implement the following treatment plan to restore maximal level of function      Anticipated Discharge Disposition (OT): home with home health, home with assist

## 2023-11-29 NOTE — NURSING NOTE
Visited with Mr Rodriguez in his room. His wife is at bedside. Mr Rodriguez is sitting in the chair at bedside. He has no complaints of pain or discomfort however he tells me he did not rest well last night and is sleepy.    I corrected the contact phone number for his wife and sent the new number to hospice. They still and to have an EOS with hospice as they know they will eventually require their services.    Plan for Mr Rodriguez, according to he and his wife. They want to go home with Home  health services for PT and OT to try to regain some strength. When the times comes they would like to enroll with hospice.    Palliative care to follow and support as needed

## 2023-11-29 NOTE — PLAN OF CARE
Goal Outcome Evaluation:               Pt is to d/c home with home health. Vitals stable at this time. No complaints. Wife at bedside during shift. Care plan complete.

## 2023-11-29 NOTE — PROGRESS NOTES
[facility]   Hematology/Oncology  Progress Note    Patient Name: Korey Rodriguez  : 1945  MRN: [id number]  Primary Care Physician:  Dimitri Hargrove MD  Date of admission: 2023    Subjective   Subjective     Chief Complaint: generalized weakness    HPI:  Patient Reports he remains weak. Vital signs stable. No fever. He is getting IV iron today. Also got it yesterday. No bleeding. Eating and drinking. Has gotten up to walk today. He has yet to have BM. He is on stool softener. He is interested in going home with home health and home health PT to regain some strength. After discussion with his wife, he will forgo further systemic therapy.    Review of Systems   All systems were reviewed and negative except for: as stated above    Objective   Objective     Vitals:   Temp:  [97.4 °F (36.3 °C)-98.6 °F (37 °C)] 97.4 °F (36.3 °C)  Heart Rate:  [67-91] 91  Resp:  [18-20] 18  BP: (125-143)/(63-74) 125/68  Physical Exam    Constitutional: Awake, alert, sitting up in bedside chair   Eyes: PERRLA, sclerae anicteric, no conjunctival injection   HENT: NCAT, mucous membranes moist   Respiratory: Clear to auscultation bilaterally, nonlabored respirations    Cardiovascular: RRR, no murmurs, no edema in the extremities   Gastrointestinal: Positive bowel sounds, soft, nontender, nondistended   Musculoskeletal: Normal strength and tone, no joint swelling   Psychiatric: Appropriate affect, cooperative   Neurologic: Awake, alert, speech clear   Skin: Normal tone, no rashes    Result Review    Result Review:  I have personally reviewed the results from the time of this admission to 2023 14:12 EST and agree with these findings:  [x]  Laboratory  [x]  Microbiology  [x]  Radiology  [x]  EKG/Telemetry   [x]  Cardiology/Vascular   []  Pathology  []  Old records  []  Other:  Most notable findings include: 23 urine culture with VRE, ferritin elevated, iron sat low, anemia worse since admission, creatinine up  slightly, Ca low, Total protein low, H&P personally reviewed      Assessment & Plan   Assessment / Plan     Brief Patient Summary:  Korey Rodriguez is a 78 y.o. male with history of radical cystoprostatctomy, bilateral PLND for urothelial carcinoma treated initally with chemotherapy and radiation adjuvantly who presents with metastatic lesions to liver. Chemo last completed 5/9/23. Patient has recurrence 6 months post chemotherapy. Patient recently admitted early November due to obstructive uropathy, severe ANGEL LUIS requiring bilateral nephrostomy tubes and UTI. Recent urine culture showed VRE on 11/21/23 and again on 11/27/23. Admitted with generalized weakness, consitpation and anemia.      Active Hospital Problems:  Active Hospital Problems    Diagnosis     Elevated liver enzymes     Infrarenal abdominal aortic aneurysm (AAA) without rupture        Plan:   Urothelial Carcinoma  S/p radical cystectomy with diverting ileostomy followed by adjuvant chemotherapy and radiation due to high risk stage III disease and positive margins. Last cycle of chemotherapy early May. CT from OSH with liver lesions, this was biopsied on 11/13/23 and was consistent with metastatic bladder cancer. CT A/P from 11/27 showing multiple liver mets. Previously discussed with patient and his wife that he has metastatic cancer. This is unfortunately incurable, but can be treated. Option of comfort based approach was provided to patient. Discussed that without treatment, the cancer would continue to spread and this would limit his life to 2 months or so, however discussed that this is often difficult to predict and could be shorter or longer. Systemic treatment could help to extend his life but is not a gurantee. Further the duration of response to treatment cannot be guaranteed either. Recommended treatment would be with immunotherapy alone with option of adding in Enfortumumab if patient is tolerating treatment and his performance status  improves. These are both IV medications that are given on outpatient basis. Immunotherapy is generally well tolerated and offers a chance of extending his life but again discussed that this is not a guarantee. Ideally, he would have improvement in his performance status prior to initiating systemic therapy.     Patient has met with palliative care team and has discussed his options with his wife. He has decided to not pursue any additional treatments and would focus on comfort. He is hoping to be discharged with home health with physical therapy to regain some strength. Thereafter he could transition to hospice care. Discussed that this is a very reasonable decision considering his circumstances. Did discuss that he would qualify for hospice currently and that it is best to consider enrolling in hospice as soon as possible. He would like to get some physical therapy at home prior to this. Oncology follow up is not necessary as we are not pursuing immunotherapy, however patient will still need port flushes and I can help manage his symptoms prior to him enrolling in hospice. He has appointment on Friday with cancer center, but this can be delayed if needed.       VRE UTI  Continue linezolid.     Constipation  Daily stool softener. This provider added PRN milk of magnesia as he has not had a BM in 5 days.     Anemia  Likely from chronic disease, UTI, linezolid antibiotic, and recent ANGEL LUIS. Ferritin elevated to 2800 from acute phase. Iron saturation low. Consistent with anemia of chronic disease. Patient received IV iron with IV ferric gluconate x 2 in hospital. No indication for outpatient oral iron therapy. Hold transfusion unless hgb less than 7.5.     Electronically signed by Arvin Coburn MD, 11/29/23, 2:12 PM EST.

## 2023-11-29 NOTE — PLAN OF CARE
Goal Outcome Evaluation:  Plan of Care Reviewed With: (P) patient, spouse           Outcome Evaluation: (P) Pt presents with impairments in balance, strength, and endurance/activity tolerance affecting safety and stamina with functional mobility and ambulation. He will benefit from skilled PT services during inpatient stay and home health PT services upon discharge to address impairments and return to PLOF.`      Anticipated Discharge Disposition (PT): (P) home with home health

## 2023-11-29 NOTE — THERAPY EVALUATION
Patient Name: Korey Rodriguez  : 1945    MRN: [id number]                              Today's Date: 2023       Admit Date: 2023    Visit Dx:     ICD-10-CM ICD-9-CM   1. Acute UTI  N39.0 599.0     Patient Active Problem List   Diagnosis    Nephrolithiasis    Infrarenal abdominal aortic aneurysm (AAA) without rupture    Chronic right-sided low back pain without sciatica    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    Squamous cell carcinoma in situ (SCCIS) of skin    Acute UTI (urinary tract infection)    Aneurysm of iliac artery    Mixed hyperlipidemia    Encounter for adjustment or management of vascular access device    Malignant neoplasm of overlapping sites of bladder    Primary osteoarthritis involving multiple joints    Well adult exam    Chronic deep vein thrombosis (DVT) of tibial vein of left lower extremity    Iron deficiency anemia    Medicare annual wellness visit, subsequent    Stage 3a chronic kidney disease    Decreased urine output    Generalized weakness    Elevated blood pressure reading in office without diagnosis of hypertension    Acute renal failure    Hyperkalemia    Bilateral hydronephrosis    Hospital discharge follow-up    Elevated liver enzymes    UTI (urinary tract infection)     Past Medical History:   Diagnosis Date    AAA (abdominal aortic aneurysm)     BEING MONITORED NO REPAIR NEEDED YET    Aneurysm of abdominal aorta branch vessel     Cancer     BLADDER AND SKIN CANCERS/REMOVED    Chronic deep vein thrombosis (DVT) of tibial vein of left lower extremity 2023    Essential tremor     History of DVT of lower extremity 2022    on Xarelto    History of urostomy     PT CURRENTLY HAS HOME HEALTH TO HELP WITH OSTOMY    Hyperlipidemia      Past Surgical History:   Procedure Laterality Date    COLONOSCOPY  2018    CYSTECTOMY      2022  W/UROSTOMY    SKIN CANCER EXCISION Right     TRANSURETHRAL RESECTION OF BLADDER TUMOR Right 10/19/2022    Procedure:  Cystoscopy with transurethral resection of bladder tumor;  Surgeon: Aung Westfall MD;  Location: [facility] MAIN OR;  Service: Urology;  Laterality: Right;    VENOUS ACCESS DEVICE (PORT) INSERTION Right 01/09/2023    Procedure: INSERTION VENOUS ACCESS DEVICE;  Surgeon: Richie Edward MD;  Location: [facility] OR [facility];  Service: General;  Laterality: Right;      General Information       Row Name 11/29/23 0903          OT Time and Intention    Document Type evaluation  -PG     Mode of Treatment individual therapy;occupational therapy  -PG       Row Name 11/29/23 0903          General Information    Patient Profile Reviewed yes  Lives with wife.  Started using rolling walker recently due to weakness.  Normally independent with all self-care and transfers.  -PG     Prior Level of Function independent:;transfer;ADL's  -PG     Existing Precautions/Restrictions fall  -PG     Barriers to Rehab none identified  -PG       Row Name 11/29/23 0903          Occupational Profile    Reason for Services/Referral (Occupational Profile) Patient is a 78-year-old male admitted for UTI and progressive weakness.  No previous OT services identified.  Patient is being evaluated by Occupational Therapy due to recent decline in ADL function.  -PG       Row Name 11/29/23 0903          Living Environment    People in Home spouse  -PG       Row Name 11/29/23 0903          Cognition    Orientation Status (Cognition) oriented x 3  -PG       Row Name 11/29/23 0903          Safety Issues, Functional Mobility    Impairments Affecting Function (Mobility) balance;endurance/activity tolerance;strength  -PG               User Key  (r) = Recorded By, (t) = Taken By, (c) = Cosigned By      Initials Name Provider Type    PG Og Bajwa OT Occupational Therapist                     Mobility/ADL's       Row Name 11/29/23 0908          Transfers    Transfers bed-chair transfer  -PG       Row Name 11/29/23 0908          Bed-Chair Transfer    Bed-Chair  Tucker (Transfers) contact guard;verbal cues  -PG     Assistive Device (Bed-Chair Transfers) walker, front-wheeled  -PG       Row Name 11/29/23 0908          Activities of Daily Living    BADL Assessment/Intervention bathing;upper body dressing;lower body dressing;grooming;toileting  -PG       Row Name 11/29/23 0908          Bathing Assessment/Intervention    [place] Level (Bathing) bathing skills  -PG       Row Name 11/29/23 0908          Upper Body Dressing Assessment/Training    [place] Level (Upper Body Dressing) upper body dressing skills;set up  -PG       Row Name 11/29/23 0908          Lower Body Dressing Assessment/Training    [place] Level (Lower Body Dressing) lower body dressing skills;moderate assist (50% patient effort)  -PG       Row Name 11/29/23 0908          Grooming Assessment/Training    [place] Level (Grooming) grooming skills;set up  -PG       Row Name 11/29/23 0908          Toileting Assessment/Training    [place] Level (Toileting) toileting skills;minimum assist (75% patient effort)  -PG               User Key  (r) = Recorded By, (t) = Taken By, (c) = Cosigned By      Initials Name Provider Type    PG Og Bajwa OT Occupational Therapist                   Obj/Interventions       Row Name 11/29/23 0910          Sensory Assessment (Somatosensory)    Sensory Assessment (Somatosensory) sensation intact  -       Row Name 11/29/23 0910          Vision Assessment/Intervention    Visual Impairment/Limitations WFL  -       Row Name 11/29/23 0910          Range of Motion Comprehensive    General Range of Motion no range of motion deficits identified  -       Row Name 11/29/23 0910          Strength Comprehensive (MMT)    General Manual Muscle Testing (MMT) Assessment no strength deficits identified  -       Row Name 11/29/23 0910          Motor Skills    Motor Skills coordination;functional endurance  -PG     Coordination WFL  -PG     Functional Endurance Fair  minus  -PG               User Key  (r) = Recorded By, (t) = Taken By, (c) = Cosigned By      Initials Name Provider Type    PG Og Bajwa, OT Occupational Therapist                   Goals/Plan       Row Name 11/29/23 0910          Transfer Goal 1 (OT)    Activity/Assistive Device (Transfer Goal 1, OT) transfers, all  -PG     [place] Level/Cues Needed (Transfer Goal 1, OT) modified independence  -PG     Time Frame (Transfer Goal 1, OT) long term goal (LTG);10 days  -PG       Row Name 11/29/23 0910          Bathing Goal 1 (OT)    Activity/Device (Bathing Goal 1, OT) bathing skills, all  -PG     [place] Level/Cues Needed (Bathing Goal 1, OT) modified independence  -PG     Time Frame (Bathing Goal 1, OT) long term goal (LTG);10 days  -PG       Row Name 11/29/23 0910          Dressing Goal 1 (OT)    Activity/Device (Dressing Goal 1, OT) dressing skills, all  -PG     [place]/Cues Needed (Dressing Goal 1, OT) modified independence  -PG     Time Frame (Dressing Goal 1, OT) long term goal (LTG);10 days  -PG       Row Name 11/29/23 0910          Toileting Goal 1 (OT)    Activity/Device (Toileting Goal 1, OT) toileting skills, all  -PG     [place] Level/Cues Needed (Toileting Goal 1, OT) modified independence  -PG     Time Frame (Toileting Goal 1, OT) long term goal (LTG);10 days  -PG       Row Name 11/29/23 0910          Grooming Goal 1 (OT)    Activity/Device (Grooming Goal 1, OT) grooming skills, all  -PG     [place] (Grooming Goal 1, OT) modified independence  -PG     Time Frame (Grooming Goal 1, OT) long term goal (LTG);10 days  -PG       Row Name 11/29/23 0910          Problem Specific Goal 1 (OT)    Problem Specific Goal 1 (OT) Patient will improve activity tolerance to fair plus to support independence and engagement with ADL activities  -PG     Time Frame (Problem Specific Goal 1, OT) long term goal (LTG);10 days  -PG       Row Name 11/29/23 0910          Therapy Assessment/Plan (OT)     Planned Therapy Interventions (OT) activity tolerance training;BADL retraining;strengthening exercise;transfer/mobility retraining;patient/caregiver education/training;occupation/activity based interventions  -PG               User Key  (r) = Recorded By, (t) = Taken By, (c) = Cosigned By      Initials Name Provider Type    PG Og Bajwa OT Occupational Therapist                   Clinical Impression       Row Name 11/29/23 0910          Pain Assessment    Pretreatment Pain Rating 0/10 - no pain  -PG     Posttreatment Pain Rating 0/10 - no pain  -PG       Row Name 11/29/23 0910          Plan of Care Review    Plan of Care Reviewed With patient  -PG     Progress no change  -PG     Outcome Evaluation Patient presents with limitations affecting strength, activity tolerance, and balance impacting patient's ability to return home safely and independently.  The skills of a therapist will be required to safely and effectively implement the following treatment plan to restore maximal level of function  -PG       Row Name 11/29/23 0910          Therapy Assessment/Plan (OT)    Patient/Family Therapy Goal Statement (OT) Get stronger and return home independently  -PG     Rehab Potential (OT) good, to achieve stated therapy goals  -PG     Criteria for Skilled Therapeutic Interventions Met (OT) yes;meets criteria;skilled treatment is necessary  -PG     Therapy Frequency (OT) 5 times/wk  -PG       Row Name 11/29/23 0910          Therapy Plan Review/Discharge Plan (OT)    Anticipated Discharge Disposition (OT) home with home health;home with assist  -PG       Row Name 11/29/23 0912          Positioning and Restraints    Post Treatment Position chair  -PG     In Chair exit alarm on  -PG               User Key  (r) = Recorded By, (t) = Taken By, (c) = Cosigned By      Initials Name Provider Type    PG Og Bajwa OT Occupational Therapist                   Outcome Measures       Row Name 11/29/23 0911          How much help  from another is currently needed...    Putting on and taking off regular lower body clothing? 2  -PG     Bathing (including washing, rinsing, and drying) 2  -PG     Toileting (which includes using toilet bed pan or urinal) 2  -PG     Putting on and taking off regular upper body clothing 3  -PG     Taking care of personal grooming (such as brushing teeth) 3  -PG     Eating meals 4  -PG     AM-PAC 6 Clicks Score (OT) 16  -PG       Row Name 11/28/23 2204          How much help from another person do you currently need...    Turning from your back to your side while in flat bed without using bedrails? 4  -SARATH     Moving from lying on back to sitting on the side of a flat bed without bedrails? 4  -SARATH     Moving to and from a bed to a chair (including a wheelchair)? 3  -SARATH     Standing up from a chair using your arms (e.g., wheelchair, bedside chair)? 3  -SARATH     Climbing 3-5 steps with a railing? 2  -SARATH     To walk in hospital room? 3  -SARATH     AM-PAC 6 Clicks Score (PT) 19  -SARATH     Highest Level of Mobility Goal 6 --> Walk 10 steps or more  -SARATH       Row Name 11/29/23 0911          Functional Assessment    Outcome Measure Options AM-PAC 6 Clicks Daily Activity (OT);Optimal Instrument  -PG       Row Name 11/29/23 0911          Optimal Instrument    Optimal Instrument Optimal - 3  -PG     Bending/Stooping 3  -PG     Standing 2  -PG     Reaching 1  -PG     From the list, choose the 3 activities you would most like to be able to do without any difficulty Bending/stooping;Standing;Reaching  -PG     Total Score Optimal - 3 6  -PG               User Key  (r) = Recorded By, (t) = Taken By, (c) = Cosigned By      Initials Name Provider Type    Tres Scott, RN Registered Nurse    Og England OT Occupational Therapist                    Occupational Therapy Education       Title: PT OT SLP Therapies (Done)       Topic: Occupational Therapy (Done)       Point: ADL training (Done)       Description:   Instruct learner(s)  on proper safety adaptation and remediation techniques during self care or transfers.   Instruct in proper use of assistive devices.                  Learning Progress Summary             Patient Acceptance, E,D, DU by PG at 11/29/2023 0912                         Point: Home exercise program (Done)       Description:   Instruct learner(s) on appropriate technique for monitoring, assisting and/or progressing therapeutic exercises/activities.                  Learning Progress Summary             Patient Acceptance, E,D, DU by PG at 11/29/2023 0912                         Point: Precautions (Done)       Description:   Instruct learner(s) on prescribed precautions during self-care and functional transfers.                  Learning Progress Summary             Patient Acceptance, E,D, DU by PG at 11/29/2023 0912                         Point: Body mechanics (Done)       Description:   Instruct learner(s) on proper positioning and spine alignment during self-care, functional mobility activities and/or exercises.                  Learning Progress Summary             Patient Acceptance, E,D, DU by PG at 11/29/2023 0912                                         User Key       Initials Effective Dates Name Provider Type Discipline     06/16/21 -  Og Bajwa OT Occupational Therapist OT                  OT Recommendation and Plan  Planned Therapy Interventions (OT): activity tolerance training, BADL retraining, strengthening exercise, transfer/mobility retraining, patient/caregiver education/training, occupation/activity based interventions  Therapy Frequency (OT): 5 times/wk  Plan of Care Review  Plan of Care Reviewed With: patient  Progress: no change  Outcome Evaluation: Patient presents with limitations affecting strength, activity tolerance, and balance impacting patient's ability to return home safely and independently.  The skills of a therapist will be required to safely and effectively implement the following  treatment plan to restore maximal level of function     Time Calculation:   Evaluation Complexity (OT)  Review Occupational Profile/Medical/Therapy History Complexity: brief/low complexity  Assessment, Occupational Performance/Identification of Deficit Complexity: 3-5 performance deficits  Clinical Decision Making Complexity (OT): problem focused assessment/low complexity  Overall Complexity of Evaluation (OT): low complexity     Time Calculation- OT       Row Name 11/29/23 0913             Time Calculation- OT    OT Received On 11/29/23  -PG      OT Goal Re-Cert Due Date 12/08/23  -PG         Untimed Charges    OT Eval/Re-eval Minutes 30  -PG         Total Minutes    Untimed Charges Total Minutes 30  -PG       Total Minutes 30  -PG                User Key  (r) = Recorded By, (t) = Taken By, (c) = Cosigned By      Initials Name Provider Type    PG Og Bajwa OT Occupational Therapist                  Therapy Charges for Today       Code Description Service Date Service Provider Modifiers Qty    [id number] HC OT EVAL LOW COMPLEXITY 2 11/29/2023 Og Bajwa OT GO 1                 Og Bajwa OT  11/29/2023

## 2023-11-30 ENCOUNTER — TRANSITIONAL CARE MANAGEMENT TELEPHONE ENCOUNTER (OUTPATIENT)
Dept: CALL CENTER | Facility: HOSPITAL | Age: 78
End: 2023-11-30
Payer: MEDICARE

## 2023-11-30 NOTE — TELEPHONE ENCOUNTER
CALLED PT BACK TO BOOK 2 MONTH F/U PER [facility] [employer]    SPOKE W/ WIFE AND BOOKED APPT    Future Appointments         Provider Department Center    12/1/2023 8:30 AM Arvin Coburn MD [facility] HEMATOLOGY & ONCOLOGY [facility]    12/5/2023 9:15 AM Dimitri Hargrove MD [facility] INTERNAL MEDICINE [facility]    12/11/2023 8:00 AM Dimitri Hargrove MD [facility] INTERNAL MEDICINE [facility]    12/18/2023 1:00 PM JE NM 1 (SPECT) [facility] NUC MED [facility]    12/22/2023 2:15 PM INJ ROOM 01 JE OP INFU [facility] OUTPATIENT INFUSION ONCOLOGY [facility]    1/30/2024 11:30 AM Aung Westfall MD [facility] UROLOGY [facility]

## 2023-11-30 NOTE — OUTREACH NOTE
Call Center TCM Note      Flowsheet Row Responses   [facility] patient discharged from? [place]   Does the patient have one of the following disease processes/diagnoses(primary or secondary)? Other   TCM attempt successful? No   Unsuccessful attempts Attempt 1   Call Status Left message            Anneliese Aldrich RN    11/30/2023, 08:29 EST

## 2023-12-02 LAB
BACTERIA SPEC AEROBE CULT: NORMAL
BACTERIA SPEC AEROBE CULT: NORMAL

## 2023-12-06 PROBLEM — I63.512 ACUTE ISCHEMIC LEFT MCA STROKE: Status: ACTIVE | Noted: 2023-01-01

## 2023-12-06 PROBLEM — I69.391 DYSPHAGIA DUE TO RECENT CEREBRAL INFARCTION: Status: ACTIVE | Noted: 2023-01-01

## 2023-12-06 PROBLEM — R47.01 EXPRESSIVE APHASIA: Status: ACTIVE | Noted: 2023-01-01

## 2023-12-06 PROBLEM — D69.6 THROMBOCYTOPENIA: Status: ACTIVE | Noted: 2023-01-01

## 2023-12-06 NOTE — ED PROVIDER NOTES
Time: 2:18 PM EST  Date of encounter:  12/6/2023  Independent Historian/Clinical History and Information was obtained by:   Family    History is limited by: N/A    Chief Complaint: Aphasia      History of Present Illness:  Patient is a 78 y.o. year old male who presents to the emergency department for evaluation of aphasia.  The patient's last known normal time was last night.  The patient has had difficulty swallowing and communicating.  The patient has been able to move all extremities.  However a facial droop was noted.    HPI    Patient Care Team  Primary Care Provider: Dimitri Hargrove MD    Past Medical History:     No Known Allergies  Past Medical History:   Diagnosis Date    AAA (abdominal aortic aneurysm)     BEING MONITORED NO REPAIR NEEDED YET    Aneurysm of abdominal aorta branch vessel     Cancer     BLADDER AND SKIN CANCERS/REMOVED    Chronic deep vein thrombosis (DVT) of tibial vein of left lower extremity 6/27/2023    Essential tremor     History of DVT of lower extremity 12/2022    on Xarelto    History of urostomy     PT CURRENTLY HAS HOME HEALTH TO HELP WITH OSTOMY    Hyperlipidemia      Past Surgical History:   Procedure Laterality Date    COLONOSCOPY  2018    CYSTECTOMY      12/2022  W/UROSTOMY    SKIN CANCER EXCISION Right     TRANSURETHRAL RESECTION OF BLADDER TUMOR Right 10/19/2022    Procedure: Cystoscopy with transurethral resection of bladder tumor;  Surgeon: Aung Westfall MD;  Location: [facility] MAIN OR;  Service: Urology;  Laterality: Right;    VENOUS ACCESS DEVICE (PORT) INSERTION Right 01/09/2023    Procedure: INSERTION VENOUS ACCESS DEVICE;  Surgeon: Richie Edward MD;  Location: [facility] [place] [facility] [place];  Service: General;  Laterality: Right;     Family History   Problem Relation Age of Onset    Cancer Mother     Cancer Father     Hearing loss Father     Hypertension Father     Malig Hyperthermia Neg Hx        Home Medications:  Prior to Admission medications    Medication Sig Start Date End  Date Taking? Authorizing Provider   famotidine (Pepcid) 20 MG tablet Take 1 tablet by mouth 2 (Two) Times a Day As Needed for Heartburn or Indigestion for up to 30 days. 23  True Caputo MD   atorvastatin (LIPITOR) 20 MG tablet Take 1 tablet by mouth Every Night. 22  Kristel Marshall MD   FeroSul 325 (65 Fe) MG tablet Take 1 tablet by mouth Daily With Breakfast. 23  ProviderKristel MD        Social History:   Social History     Tobacco Use    Smoking status: Former     Packs/day: 1.00     Years: 20.00     Additional pack years: 0.00     Total pack years: 20.00     Types: Cigarettes     Start date: 1962     Quit date: 1990     Years since quittin.9    Smokeless tobacco: Never   Vaping Use    Vaping Use: Never used   Substance Use Topics    Alcohol use: Yes     Alcohol/week: 1.0 standard drink of alcohol     Types: 1 Cans of beer per week     Comment: 1 beer daily    Drug use: Never         Review of Systems:  Review of Systems   Unable to perform ROS: Patient nonverbal        Physical Exam:  /93   Pulse 85   Temp 97.7 °F (36.5 °C) (Oral)   Resp 16   Wt 86.2 kg (190 lb 0.6 oz)   SpO2 93%   BMI 23.75 kg/m²     Physical Exam  Vitals and nursing note reviewed.   Constitutional:       General: He is not in acute distress.     Appearance: Normal appearance. He is not toxic-appearing.   HENT:      Head: Normocephalic and atraumatic.      Jaw: There is normal jaw occlusion.   Eyes:      General: Lids are normal.      Extraocular Movements: Extraocular movements intact.      Conjunctiva/sclera: Conjunctivae normal.      Pupils: Pupils are equal, round, and reactive to light.   Cardiovascular:      Rate and Rhythm: Normal rate and regular rhythm.      Pulses: Normal pulses.      Heart sounds: Normal heart sounds.   Pulmonary:      Effort: Pulmonary effort is normal. No respiratory distress.      Breath sounds: Normal breath sounds. No wheezing or  rhonchi.   Abdominal:      General: Abdomen is flat.      Palpations: Abdomen is soft.      Tenderness: There is no abdominal tenderness. There is no guarding or rebound.   Musculoskeletal:         General: Normal range of motion.      Cervical back: Normal range of motion and neck supple.      Right lower leg: No edema.      Left lower leg: No edema.   Skin:     General: Skin is warm and dry.   Neurological:      Mental Status: He is alert.      Comments: (+) Aphasia    (+) Right facial droop   Psychiatric:         Mood and Affect: Mood normal.                  Procedures:  Procedures      Medical Decision Making:      Comorbidities that affect care:    Aortic aneurysm    External Notes reviewed:    Hospital Discharge Summary: Patient was last admitted for a acute UTI      The following orders were placed and all results were independently analyzed by me:  Orders Placed This Encounter   Procedures    CT Head Without Contrast Stroke Protocol    XR Chest 1 View    MRI Brain Without Contrast    [place] Draw    Comprehensive Metabolic Panel    Protime-INR    aPTT    Single High Sensitivity Troponin T    Urinalysis With Microscopic If Indicated (No Culture) - Urine, Clean Catch    CBC Auto Differential    Scan Slide    Urinalysis, Microscopic Only - Urine, Clean Catch    NPO Diet NPO Type: Strict NPO    Initiate Department's Acute Stroke Process (Team D, Code 19, etc)    Perform NIH Stroke Scale    Measure Actual Weight    Head of Bed 30 Degrees or Less    Undress and Gown    Continuous Pulse Oximetry    Vital Signs    Neuro Checks    Notify MD for SBP < 80 or > 200    Notify Provider for SBP greater than 140 if hemorrhagic Stroke    No Hypotonic Fluids    Nursing Dysphagia Screening (Complete Prior to Giving anything PO)    RN to Place Order SLP Consult (IF swallow screen failed) - Eval & Treat Choosing Reason of RN Dysphagia Screen Failed    Inpatient Hospitalist Consult    Oxygen Therapy- Nasal Cannula; Titrate 1-6  LPM Per SpO2; 90 - 95%    SLP Consult: Eval & Treat RN Dysphagia Screen Failed    POC Glucose Once    POC Glucose Once    ECG 12 Lead ED Triage Standing Order; Acute Stroke (Onset <12 hrs)    Type & Screen    Insert Large Bore Peripheral IV - Right AC Preferred    CBC & Differential    Green Top (Gel)    Lavender Top    Gold Top - SST    Light Blue Top       Medications Given in the Emergency Department:  Medications   sodium chloride 0.9 % flush 10 mL (has no administration in time range)   ondansetron (ZOFRAN) injection 4 mg (4 mg Intravenous Given 12/6/23 1159)        ED Course:         Labs:    Lab Results (last 24 hours)       Procedure Component Value Units Date/Time    Comprehensive Metabolic Panel [[id number]]  (Abnormal) Collected: 12/06/23 0746    Specimen: Blood Updated: 12/06/23 0812     Glucose 108 mg/dL      BUN 41 mg/dL      Creatinine 1.62 mg/dL      Sodium 136 mmol/L      Potassium 4.7 mmol/L      Chloride 99 mmol/L      CO2 24.0 mmol/L      Calcium 8.7 mg/dL      Total Protein 6.2 g/dL      Albumin 3.2 g/dL      ALT (SGPT) 135 U/L      AST (SGOT) 141 U/L      Alkaline Phosphatase 768 U/L      Total Bilirubin 1.0 mg/dL      Globulin 3.0 gm/dL      A/G Ratio 1.1 g/dL      BUN/Creatinine Ratio 25.3     Anion Gap 13.0 mmol/L      eGFR 42.6 mL/min/1.73     Narrative:      GFR Normal >60  Chronic Kidney Disease <60  Kidney Failure <15    The GFR formula is only valid for adults with stable renal function between ages 18 and 70.    Protime-INR [[id number]]  (Abnormal) Collected: 12/06/23 0746    Specimen: Blood Updated: 12/06/23 0805     Protime 16.3 Seconds      INR 1.29    Narrative:      Suggested Therapeutic Ranges For Oral Anticoagulant Therapy:  Level of Therapy                      INR Target Range  Standard Dose                            2.0-3.0  High Dose                                2.5-3.5  Patients not receiving anticoagulant  Therapy Normal Range                     0.86-1.15    aPTT  [[id number]]  (Normal) Collected: 12/06/23 0746    Specimen: Blood Updated: 12/06/23 0805     PTT 30.8 seconds     Single High Sensitivity Troponin T [[id number]]  (Abnormal) Collected: 12/06/23 0746    Specimen: Blood Updated: 12/06/23 0812     HS Troponin T 38 ng/L     Narrative:      High Sensitive Troponin T Reference Range:  <14.0 ng/L- Negative Female for AMI  <22.0 ng/L- Negative Male for AMI  >=14 - Abnormal Female indicating possible myocardial injury.  >=22 - Abnormal Male indicating possible myocardial injury.   Clinicians would have to utilize clinical acumen, EKG, Troponin, and serial changes to determine if it is an Acute Myocardial Infarction or myocardial injury due to an underlying chronic condition.         POC Glucose Once [[id number]]  (Abnormal) Collected: 12/06/23 0809    Specimen: Blood Updated: 12/06/23 0811     Glucose 111 mg/dL      Comment: Serial Number: [id number]:  [id number]       CBC & Differential [[id number]]  (Abnormal) Collected: 12/06/23 0814    Specimen: Blood Updated: 12/06/23 0841    Narrative:      The following orders were created for panel order CBC & Differential.  Procedure                               Abnormality         Status                     ---------                               -----------         ------                     CBC Auto Differential[[id number]]        Abnormal            Final result               Scan Slide[[id number]]                                       Final result                 Please view results for these tests on the individual orders.    CBC Auto Differential [[id number]]  (Abnormal) Collected: 12/06/23 0814    Specimen: Blood Updated: 12/06/23 0841     WBC 10.67 10*3/mm3      RBC 3.48 10*6/mm3      Hemoglobin 9.5 g/dL      Hematocrit 30.6 %      MCV 87.9 fL      MCH 27.3 pg      MCHC 31.0 g/dL      RDW 16.1 %      RDW-SD 48.2 fl      MPV 11.1 fL      Platelets 30 10*3/mm3      Neutrophil % 92.0 %      Lymphocyte % 2.6 %      Monocyte %  4.1 %      Eosinophil % 0.4 %      Basophil % 0.2 %      Immature Grans % 0.7 %      Neutrophils, Absolute 9.81 10*3/mm3      Lymphocytes, Absolute 0.28 10*3/mm3      Monocytes, Absolute 0.44 10*3/mm3      Eosinophils, Absolute 0.04 10*3/mm3      Basophils, Absolute 0.02 10*3/mm3      Immature Grans, Absolute 0.08 10*3/mm3      nRBC 0.4 /100 WBC     Narrative:      Appended report. These results have been appended to a previously verified report.    Scan Slide [[id number]] Collected: 12/06/23 0814    Specimen: Blood Updated: 12/06/23 0841     RBC Morphology Normal     WBC Morphology Normal     Platelet Estimate Decreased     Comment: No platelet clumping observed on peripheral smear.        Urinalysis With Microscopic If Indicated (No Culture) - Nephrostomy Right [[id number]]  (Abnormal) Collected: 12/06/23 1313    Specimen: Urine from Nephrostomy Right Updated: 12/06/23 1403     Color, UA Yellow     Appearance, UA Clear     pH, UA 5.5     Specific Gravity, UA 1.020     Glucose, UA Negative     Ketones, UA Negative     Bilirubin, UA Negative     Blood, UA Large (3+)     Protein, UA 30 mg/dL (1+)     Leuk Esterase, UA Small (1+)     Nitrite, UA Negative     Urobilinogen, UA 1.0 E.U./dL    Urinalysis, Microscopic Only - Nephrostomy Right [[id number]]  (Abnormal) Collected: 12/06/23 1313    Specimen: Urine from Nephrostomy Right Updated: 12/06/23 1404     RBC, UA Too Numerous to Count /HPF      WBC, UA 11-20 /HPF      Bacteria, UA None Seen /HPF      Squamous Epithelial Cells, UA 0-2 /HPF      Hyaline Casts, UA 3-6 /LPF      Methodology Automated Microscopy             Imaging:    MRI Brain Without Contrast    Result Date: 12/6/2023  PROCEDURE: MRI BRAIN WO CONTRAST  COMPARISON: [facility], CT, CT HEAD WO CONTRAST STROKE PROTOCOL, 12/06/2023, 7:50.  INDICATIONS: evaluate for stroke      TECHNIQUE: A variety of imaging planes and parameters were utilized for visualization of suspected pathology.  Images were  performed without contrast.  FINDINGS:  There is restricted diffusion in the cortical gray and subcortical white matter of the inferior mid left frontal lobe with involvement of the left insular cortex.  There may be involvement of the medial left temporal lobe.  This is hypointense on the ADC mapping consistent with an acute left MCA distribution cerebral infarct.  There is minor volume loss with prominence of the sulci, fissures, ventricles, and basal cisterns.  There are few punctate areas of periventricular and subcortical white matter signal felt to represent chronic microvascular ischemia.  There is no acute hemorrhage, midline shift, or suspicious extra-axial fluid collections.  There are normal signal voids within the intracranial arteries and the major dural sinuses.  The orbital contents are normal.  The visualized paranasal and mastoid sinuses are clear.        1. Abnormal restricted diffusion in the inferior mid left frontal lobe with involvement of the left insular cortex and questionable involvement of the medial left temporal lobe.  This is consistent with an evolving acute left MCA distribution cerebral infarct. 2. No acute hemorrhage or mass effect. 3. Mild chronic white matter microvascular ischemia.      SHONNA VERAS MD       Electronically Signed and Approved By: SHONNA VERAS MD on 12/06/2023 at 13:05             XR Chest 1 View    Result Date: 12/6/2023  PROCEDURE: XR CHEST 1 VW  COMPARISON: [facility], CR, XR CHEST 1 VW, 11/07/2023, 18:05.  INDICATIONS: Acute Stroke Protocol (Onset < 12 hrs)  FINDINGS:  No focal or diffuse infiltrate is identified. No pleural effusion or pneumothorax.  There is a right subclavian port catheter with tip projecting in the SVC, as before.  There is atherosclerosis of the aorta.  Heart size and mediastinal contour appear within normal limits.         No radiographic findings of acute cardiopulmonary abnormality.       ERIC GODINEZ MD       Electronically  Signed and Approved By: ERIC GODINEZ MD on 12/06/2023 at 8:55             CT Head Without Contrast Stroke Protocol    Result Date: 12/6/2023  PROCEDURE: CT HEAD WO CONTRAST STROKE PROTOCOL  COMPARISON:  None INDICATIONS: STROKE ALERT WITH RIGHT FACIAL DROOPING AND UNBLE TO SPEAK THIS AM  PROTOCOL:   Standard imaging protocol performed    RADIATION:   DLP: 1018.2mGy*cm   MA and/or KV was adjusted to minimize radiation dose.     TECHNIQUE: After obtaining the patient's consent, CT images were obtained without non-ionic intravenous contrast material.  FINDINGS:  No midline shift.  The ventricles and sulci appear within normal limits.  Basal cisterns appear patent.  Small fat density, likely a lipoma, is seen posteriorly along the right tentorium.  No definite mass lesion, edema, or CT findings of an acute infarction at this time.  No acute hemorrhage or extra-axial collection is seen.  There is calcific atherosclerosis of the carotid arteries.  No displaced calvarial fracture.  Paranasal sinuses and mastoid air cells appear clear.        No CT findings of acute intracranial abnormality.     ERIC GODINEZ MD       Electronically Signed and Approved By: ERIC GODINEZ MD on 12/06/2023 at 8:02                Differential Diagnosis and Discussion:    Altered Mental Status: Based on the patient's signs and symptoms, differential diagnosis includes but is not limited to meningitis, stroke, sepsis, subarachnoid hemorrhage, intracranial bleeding, encephalitis, and metabolic encephalopathy.    All labs were reviewed and interpreted by me.  All X-rays impressions were independently interpreted by me.  CT scan radiology impression was interpreted by me.  MRI impression was interpreted by me.     MDM     The patient´s CBC that was reviewed and interpreted by me shows no abnormalities of critical concern. Of note, there is no anemia requiring a blood transfusion and the platelet count is low at 30.  CMP shows an elevated creatinine  and elevated liver enzymes.  Urinalysis is negative for bacteriuria.  CT head is negative for acute intracranial abnormalities.  MRI of the brain does show a evolving acute left MCA infarct.    Critical Care Note: Total Critical Care time of 50 minutes. Total critical care time documented does not include time spent on separately billed procedures for services of nurses or physician assistants. I personally saw and examined the patient. I have reviewed all diagnostic interpretations and treatment plans as written. I was present for the key portions of any procedures performed and the inclusive time noted in any critical care statement. Critical care time includes patient management by me, time spent at the patients bedside,  time to review lab and imaging results, discussing patient care, documentation in the medical record, and time spent with family or caregiver.        Patient Care Considerations:    I considered giving tPA, however the patient is out of the window for thrombolytics.      Consultants/Shared Management Plan:    Case was discussed with teleneuro specialist who states that the patient is out of the window for thrombolytics.  Case was discussed with Dr. Caputo who agrees with admission.    Social Determinants of Health:    Patient has presented with family members who are responsible, reliable and will ensure follow up care.      Disposition and Care Coordination:    Admit:   Through independent evaluation of the patient's history, physical, and imperical data, the patient meets criteria for observation/admission to the hospital.        Final diagnoses:   Cerebrovascular accident (CVA), unspecified mechanism        ED Disposition       None            This medical record created using voice recognition software.             Jatin Edwards MD  12/06/23 0439

## 2023-12-06 NOTE — CONSULTS
[facility]  [facility]      Patient Name:   Korey Rodriguez  YOB: 1945  Identification Number:   MRN - [id number]  Date of Service:   12/06/2023 07:41:59    Diagnosis:        R47.01 - Aphasia        I63.9 - Cerebrovascular accident (CVA), unspecified mechanism (HCC)    Impression:       78 y.o. 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 He was last discharged 11/27 with decision for comfort care and home hospice. He presents with right facial droop and aphasia. Stroke suspected. Out of window for thrombolytics and given pt/family election for comfort care not a candidate for neurointervention.   Recommend further discussion regarding goals of care.   If family would like admission for testing, MRI brain w/o recommended, swallow study recommend and pt should be NPO until swallow assessment.   If they would like to return home with hospice this is appropriate.    Our recommendations are outlined below.    Recommendations:    Dysphagia:        Swallow Evaluation, Bedside        NPO Until Swallow Evaluation    Disposition:        Sign off    Sign Out:        Discussed with Emergency Department Provider        ------------------------------------------------------------------------------    Advanced Imaging:  Advanced Imaging Deferred because:    pt on comfort care      Metrics:  Last Known Well: 12/05/2023 22:00:00  [facility] Notification Time: 12/06/2023 07:41:37  Arrival Time: 12/06/2023 07:40:00  Stamp Time: 12/06/2023 07:41:59  Initial Response Time: 12/06/2023 07:47:17  Symptoms: speech disturbance, facial droop.  Initial patient interaction: 12/06/2023 07:53:42  NIHSS Assessment Completed: 12/06/2023 07:57:10  Patient is not a  candidate for Thrombolytic.  Thrombolytic Medical Decision: 12/06/2023 07:57:20  Patient was not deemed candidate for Thrombolytic because of following reasons:  Last Well Known Above 4.5 Hours.    I personally Reviewed the CT Head and it Showed no hemorrhage    Primary Provider Notified of Diagnostic Impression and Management Plan on: 12/06/2023 08:05:36        ------------------------------------------------------------------------------    History of Present Illness:  Patient is a 78 year old Male.    Patient was brought by EMS for symptoms of speech disturbance, facial droop.  78 y.o. 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 He was last discharged 11/27 with decision for comfort care and home hospice.  The pt was last seen normal/at current baseline last night at bedtime. The patient was noted to have a facial droop on the right overnight and be unable to speak coherently this morning by his wife (she is not at the hospital yet).       Past Medical History:  [place] PMH:  CKD   DVT    Medications:    No Anticoagulant use   No Antiplatelet use  Reviewed EMR for current medications    Allergies:   Reviewed    Social History:  Drug Use: No    Family History:    There is no family history of premature cerebrovascular disease pertinent to this consultation    ROS :  14 Points Review of Systems was performed and was negative except mentioned in HPI.    Past Surgical History:  There Is No Surgical History Contributory To Today’s Visit         Examination:  BP(163/67), Pulse(96), Blood Glucose(156)  1A: Level of Consciousness - Alert; keenly responsive + 0  1B: Ask Month and Age - Aphasic + 2  1C: Blink Eyes & Squeeze Hands - Performs Both Tasks + 0  2: Test Horizontal Extraocular Movements - Normal +  0  3: Test Visual Fields - No Visual Loss + 0  4: Test Facial Palsy (Use Grimace if Obtunded) - Minor paralysis (flat nasolabial fold, smile asymmetry) + 1  5A: Test Left Arm Motor Drift - No Drift for 10 Seconds + 0  5B: Test Right Arm Motor Drift - No Drift for 10 Seconds + 0  6A: Test Left Leg Motor Drift - No Drift for 5 Seconds + 0  6B: Test Right Leg Motor Drift - No Drift for 5 Seconds + 0  7: Test Limb Ataxia (FNF/Heel-Shin) - No Ataxia + 0  8: Test Sensation - Normal; No sensory loss + 0  9: Test Language/Aphasia - Severe Aphasia: Fragmentary Expression, Inference Needed, Cannot Identify Materials + 2  10: Test Dysarthria - Mild-Moderate Dysarthria: Slurring but can be understood + 1  11: Test Extinction/Inattention - No abnormality + 0    NIHSS Score: 6    Pre-Morbid Modified [place] Scale:  2 Points = Slight disability; unable to carry out all previous activities, but able to look after own affairs without assistance    Spoke with : Dr. Villasenor  I reviewed the available imaging via Rapid and initiated discussion with the primary provider    Patient/Family was informed the Neurology Consult would occur via TeleHealth consult by way of interactive audio and video telecommunications and consented to receiving care in this manner.      Patient is being evaluated for possible acute neurologic impairment and high probability of imminent or life-threatening deterioration. I spent total of 36 minutes providing care to this patient, including time for face to face visit via telemedicine, review of medical records, imaging studies and discussion of findings with providers, the patient and/or family.      Dr Bo Dukes      [facility]  For Inpatient follow-up with [facility] physician please call [facility] [phone]. This is not an outpatient service. Post hospital discharge, please contact hospital directly.

## 2023-12-06 NOTE — H&P
[facility]   HOSPITALIST HISTORY AND PHYSICAL  Date: 2023   Patient Name: Korey Rodriguez  : 1945  MRN: [id number]  Primary Care Physician:  Dimitri Hargrove MD  Date of admission: 2023    Subjective   Subjective     Chief Complaint: Unable to speak, swallow, right facial droop with right upper extremity weakness noticed today    HPI:    Korey Rodriguez is a 78 y.o. male with a past medical history of urothelial bladder cancer with extensive metastasis including liver s/p radical cystectomy, diverting urostomy and bilateral nephrostomy tubes, history of chronic left lower extremity DVT off Xarelto, elevated LFTs due to liver mets, chronic kidney disease, abdominal aortic aneurysm and essential tremors.  Patient presented to [facility] ED due to inability to speak having difficulty swallowing and choking with right upper extremity weakness.  History is obtained by talking to wife and ED physician.  Wife also noticed right facial droop.  Patient was last seen well last night but he woke up with the symptoms.  Work-up in the ED showed stable vital signs good saturation room air.  Creatinine is 1.6 up from 1.3 few days ago.  Elevated liver enzymes including alk phos of 768,  and .  INR is 1.2.  Platelet count of 30 acutely dropped from 139 few days ago.  UA does not show any bacteria.  EKG shows sinus rhythm.  CT head no acute finding.  MRI of the brain showed acute left MCA infarct involving inferior mid left frontal lobe, left insular cortex and may be extending to medial left temporal lobe.  Tele neurology consulted by ED.  Patient was recently discharged from this hospital on  because of generalized weakness and VRE UTI on Zyvox.  Patient at that time decided to home health and transition to comfort care afterwards.  Patient was seen by hospice care last week per wife.  Patient has been getting increasingly weak as of last week and wife has  not been able to take care of him.  Patient and wife are not sure about Feeding tube and comfort care at this time.  Hospitalist has been called to admit him for further evaluation      Personal History     Past Medical History:  Past Medical History:   Diagnosis Date    AAA (abdominal aortic aneurysm)     BEING MONITORED NO REPAIR NEEDED YET    Acute ischemic left MCA stroke 12/6/2023    Aneurysm of abdominal aorta branch vessel     Cancer     BLADDER AND SKIN CANCERS/REMOVED    Chronic deep vein thrombosis (DVT) of tibial vein of left lower extremity 6/27/2023    Essential tremor     History of DVT of lower extremity 12/2022    on Xarelto    History of urostomy     PT CURRENTLY HAS HOME HEALTH TO HELP WITH OSTOMY    Hyperlipidemia        Past Surgical History:  Past Surgical History:   Procedure Laterality Date    COLONOSCOPY  2018    CYSTECTOMY      12/2022  W/UROSTOMY    SKIN CANCER EXCISION Right     TRANSURETHRAL RESECTION OF BLADDER TUMOR Right 10/19/2022    Procedure: Cystoscopy with transurethral resection of bladder tumor;  Surgeon: Aung Westfall MD;  Location: [facility] OR;  Service: Urology;  Laterality: Right;    VENOUS ACCESS DEVICE (PORT) INSERTION Right 01/09/2023    Procedure: INSERTION VENOUS ACCESS DEVICE;  Surgeon: Richie Edward MD;  Location: [facility] OR [facility];  Service: General;  Laterality: Right;       Family History:   family history includes Cancer in his father and mother; Hearing loss in his father; Hypertension in his father.    Social History:    reports that he quit smoking about 33 years ago. His smoking use included cigarettes. He started smoking about 61 years ago. He has a 20.00 pack-year smoking history. He has never used smokeless tobacco. He reports current alcohol use of about 1.0 standard drink of alcohol per week. He reports that he does not use drugs.    Home Medications:  famotidine    Allergies:  No Known Allergies    Review of Systems   All systems were reviewed and  negative except for: As per H&P    Objective   Objective     Vitals:   Temp:  [97.7 °F (36.5 °C)] 97.7 °F (36.5 °C)  Heart Rate:  [] 86  Resp:  [16] 16  BP: (114-163)/(65-93) 161/70    Physical Exam    Constitutional: Awake, alert, no acute distress   Eyes: Pupils equal, sclerae anicteric, no conjunctival injection   HENT: NCAT, mucous membranes dry   Neck: Supple, no thyromegaly, no lymphadenopathy, trachea midline   Respiratory: Clear to auscultation bilaterally, nonlabored respirations    Cardiovascular: RRR, no murmurs, rubs, or gallops, palpable pedal pulses bilaterally.  Has right upper chest port   Gastrointestinal: Positive bowel sounds, soft, nontender, nondistended   Musculoskeletal: +2 bilateral ankle edema, no clubbing or cyanosis to extremities   Psychiatric: Appropriate affect, cooperative   Neurologic: Oriented x 0, -5/5 strength right upper extremity with decreased  , does have right facial droop with weakness of right tongue deviation to the right side.  Patient is completely aphasic   Skin: No rashes     Result Review    Result Review:  I have personally reviewed the results from the time of this admission to 12/6/2023 16:12 EST and agree with these findings:  [x]  Laboratory  []  Microbiology  [x]  Radiology  [x]  EKG/Telemetry normal sinus rhythm 94.  LVH  []  Cardiology/Vascular   []  Pathology  [x]  Old records  [x]  Other: Medications      Assessment & Plan   Assessment / Plan     Assessment:  Acute aphasia, dysphagia, right facial droop and weakness of right upper extremity.  Acute left MCA infarct involving inferior mid left frontal lobe, left insular cortex and probably extending to medial left temporal lobe causing above.  Extensive urothelial bladder cancer with mets to liver.  Elevated liver enzymes due to above.  S/p urourostomy nonfunctional now.  S/p bilateral nephrostomy tubes.  Recent VRE UTI.  Finished Zyvox  Acute thrombocytopenia question from Zyvox  Abdominal aortic  aneurysm.  Chronic DVT left lower extremity off Xarelto due to bleeding.  Essential tremors.  Hyperlipidemia  Acute renal insufficiency on chronic kidney disease.  Anemia.  Hypertension with LVH.  Essential tremors    Plan:   Neurochecks.  MRI of the brain noted and discussed with patient and family.  Appreciate tele neuro input.  We will carotid ultrasound.  Hold off 2D echo as patient recently had echo November 27.  Palliative/hospice care consulted to discuss comfort measures.  IV fluids.  NPO.  Discussed with patient and wife not a good candidate for tube feeding/PEG tube.  PT OT.  Hold off statin because of elevated liver enzymes.  Hold of aspirin due to low platelet count.  SCD.  IV Protonix.  Speech to evaluate patient.  Discussed with ED physician         DVT prophylaxis:  No DVT prophylaxis order currently exists.  SCD    CODE STATUS:         Admission Status:  I believe this patient meets inpatient status.    Part of this note may be an electronic transcription/translation of spoken language to printed text using the Dragon Dictation System.     Electronically signed by True Caputo MD, 12/06/23, 4:12 PM EST.

## 2023-12-06 NOTE — CONSULTS
Palliative care consult for [facility] referral. Updated by provider on patients condition. Patient is nonverbal at time of visit, left side affected- patient reportedly unable to swallow, not a candidate for peg tube. Met with patient and patients spouse at bedside. Introduced self and explained role. Patients spouse reports they did meet with [facility] last Friday- but goals did not align at that time- patient was eligible. Patient and patients spouse agreeable to meet again due to limited options now. Discussed communication options for option including white board for patient to attempt to write with left hand and speech boards. [facility] referral placed for follow up. Palliative care will continue to follow up.    Alyce EVERETT, RN, PN  Palliative Care

## 2023-12-07 NOTE — THERAPY EVALUATION
Patient Name: Korey Rodriguez  : 1945    MRN: [id number]                              Today's Date: 2023       Admit Date: 2023    Visit Dx:     ICD-10-CM ICD-9-CM   1. Cerebrovascular accident (CVA), unspecified mechanism  I63.9 434.91   2. Decreased activities of daily living (ADL)  Z78.9 V49.89   3. Oropharyngeal dysphagia  R13.12 787.22     Patient Active Problem List   Diagnosis    Nephrolithiasis    Infrarenal abdominal aortic aneurysm (AAA) without rupture    Chronic right-sided low back pain without sciatica    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    Squamous cell carcinoma in situ (SCCIS) of skin    Aneurysm of iliac artery    Mixed hyperlipidemia    Encounter for adjustment or management of vascular access device    Malignant neoplasm of overlapping sites of bladder    Primary osteoarthritis involving multiple joints    Well adult exam    Chronic deep vein thrombosis (DVT) of tibial vein of left lower extremity    Iron deficiency anemia    Medicare annual wellness visit, subsequent    Stage 3a chronic kidney disease    Decreased urine output    Generalized weakness    Elevated blood pressure reading in office without diagnosis of hypertension    Acute renal failure    Hyperkalemia    Bilateral hydronephrosis    Hospital discharge follow-up    Elevated liver enzymes    Acute ischemic left MCA stroke    Expressive aphasia    Dysphagia due to recent cerebral infarction    Thrombocytopenia     Past Medical History:   Diagnosis Date    AAA (abdominal aortic aneurysm)     BEING MONITORED NO REPAIR NEEDED YET    Acute ischemic left MCA stroke 2023    Aneurysm of abdominal aorta branch vessel     Cancer     BLADDER AND SKIN CANCERS/REMOVED    Chronic deep vein thrombosis (DVT) of tibial vein of left lower extremity 2023    Essential tremor     History of DVT of lower extremity 2022    on Xarelto    History of urostomy     PT CURRENTLY HAS HOME HEALTH TO HELP WITH  OSTOMY    Hyperlipidemia      Past Surgical History:   Procedure Laterality Date    COLONOSCOPY  2018    CYSTECTOMY      12/2022  W/UROSTOMY    SKIN CANCER EXCISION Right     TRANSURETHRAL RESECTION OF BLADDER TUMOR Right 10/19/2022    Procedure: Cystoscopy with transurethral resection of bladder tumor;  Surgeon: Aung Westfall MD;  Location: [facility] OR;  Service: Urology;  Laterality: Right;    VENOUS ACCESS DEVICE (PORT) INSERTION Right 01/09/2023    Procedure: INSERTION VENOUS ACCESS DEVICE;  Surgeon: Richie Edward MD;  Location: [facility] OR [facility];  Service: General;  Laterality: Right;      General Information       Row Name 12/07/23 0956          OT Time and Intention    Document Type evaluation  -ES     Mode of Treatment individual therapy;occupational therapy  -ES       Row Name 12/07/23 0956          General Information    Patient Profile Reviewed yes  -ES     Prior Level of Function independent:;ADL's;all household mobility;community mobility  Patient reports independent with ADLs at baseline, spouse assist with IADLs. RW for functional mobility. Sponge bathe at baseline. [place] in seated. No home O2 use. Denies recent falls. Prior level gatherd with Y/N questioning as patient is non verbal  -ES     Existing Precautions/Restrictions fall  -ES     Barriers to Rehab none identified  -ES       Row Name 12/07/23 0956          Occupational Profile    Reason for Services/Referral (Occupational Profile) Patient is 78 yr old male admitted to [facility] on 12/[facility]/2023 with inability to speak, RUE weakness, and difficulty swallowing. OT evaluation and treatment ordered d/t recent decline in ADLs/transfer ability and discharge planning recommendations. No previous OT services for current condition.  -ES       Row Name 12/07/23 0956          Living Environment    People in Home spouse  -ES       Row Name 12/07/23 0956          Home Main Entrance    Number of Stairs, Main Entrance none  -ES       Row Name  12/07/23 0956          Stairs Within Home, Primary    Number of Stairs, Within Home, Primary none  -ES       Row Name 12/07/23 0956          Cognition    Orientation Status (Cognition) oriented x 3  Patient is nonverbal (not at baseline), however is able to follow all commands and is cooperative throughout evaluation  -ES       Row Name 12/07/23 0956          Safety Issues, Functional Mobility    Impairments Affecting Function (Mobility) balance;coordination;endurance/activity tolerance;grasp;motor control;strength  -ES               User Key  (r) = Recorded By, (t) = Taken By, (c) = Cosigned By      Initials Name Provider Type    ES Allyn Richard, OTR/L, CSRS Occupational Therapist                     Mobility/ADL's       Row Name 12/07/23 1006          Bed Mobility    Bed Mobility supine-sit;sit-supine  -ES     Supine-Sit [place] (Bed Mobility) moderate assist (50% patient effort);1 person assist  -ES     Sit-Supine [place] (Bed Mobility) moderate assist (50% patient effort);1 person assist  -ES     Assistive Device (Bed Mobility) draw sheet;head of bed elevated  -ES       Row Name 12/07/23 1006          Transfers    Transfers sit-stand transfer;stand-sit transfer  -ES     Comment, (Transfers) assist required to place RUE on rolling walker  -ES       Row Name 12/07/23 1006          Sit-Stand Transfer    Sit-Stand [place] (Transfers) 1 person assist;moderate assist (50% patient effort)  -ES     Assistive Device (Sit-Stand Transfers) walker, front-wheeled  -ES       Row Name 12/07/23 1006          Stand-Sit Transfer    Stand-Sit [place] (Transfers) moderate assist (50% patient effort);1 person assist  -ES     Assistive Device (Stand-Sit Transfers) walker, front-wheeled  -ES       Row Name 12/07/23 1006          Functional Mobility    Functional Mobility- Ind. Level not tested  -ES       Row Name 12/07/23 1006          Activities of Daily Living    BADL Assessment/Intervention bathing;upper body  dressing;lower body dressing;grooming;feeding;toileting  -ES       Row Name 12/07/23 1006          Bathing Assessment/Intervention    [place] Level (Bathing) bathing skills;moderate assist (50% patient effort)  -ES       Row Name 12/07/23 1006          Upper Body Dressing Assessment/Training    [place] Level (Upper Body Dressing) upper body dressing skills;minimum assist (75% patient effort)  -ES       Row Name 12/07/23 1006          Lower Body Dressing Assessment/Training    [place] Level (Lower Body Dressing) lower body dressing skills;maximum assist (25% patient effort)  -ES       Row Name 12/07/23 1006          Grooming Assessment/Training    [place] Level (Grooming) grooming skills;minimum assist (75% patient effort)  -ES       Row Name 12/07/23 1006          Self-Feeding Assessment/Training    [place] Level (Feeding) feeding skills;unable to assess  -ES     Comment, (Feeding) NPO. Infer patient will have difficulty secondary to right hand dominance  -ES       Row Name 12/07/23 1006          Toileting Assessment/Training    [place] Level (Toileting) toileting skills;maximum assist (25% patient effort);dependent (less than 25% patient effort)  -ES     Comment, (Toileting) bilateral urostomy  -ES               User Key  (r) = Recorded By, (t) = Taken By, (c) = Cosigned By      Initials Name Provider Type    Allyn Hein, OTR/L, CSRS Occupational Therapist                   Obj/Interventions       Row Name 12/07/23 1008          Vision Assessment/Intervention    Visual Impairment/Limitations WFL  -     Vision Assessment Comment patient is able to track and scan appropriatley. Denies visual changes at initial evaluation.  -ES       Row Name 12/07/23 1008          Range of Motion Comprehensive    Comment, General Range of Motion LUE AROM WFL. RUE AAROM [facility]  -ES       Row Name 12/07/23 1008          Strength Comprehensive (MMT)    General Manual Muscle Testing (MMT) Assessment  upper extremity strength deficits identified;lower extremity strength deficits identified  -ES     Comment, General Manual Muscle Testing (MMT) Assessment LUE 4+/5. R shuolder 1+/5, bicep/tricep 2-/5, grasp 1+/5 with significant right sided weakness  -ES       Row Name 12/07/23 1008          Motor Skills    Motor Skills functional endurance  -ES     Functional Endurance fair minus/poor plus  -ES       Row Name 12/07/23 1008          Balance    Balance Assessment sitting dynamic balance;standing dynamic balance  -ES     Dynamic Sitting Balance contact guard  -ES     Position, Sitting Balance supported;sitting edge of bed  -ES     Dynamic Standing Balance moderate assist;1-person assist  -ES     Position/Device Used, Standing Balance supported;walker, front-wheeled  -ES               User Key  (r) = Recorded By, (t) = Taken By, (c) = Cosigned By      Initials Name Provider Type    Allyn Hein, OTR/L, CSRS Occupational Therapist                   Goals/Plan       Row Name 12/07/23 1014          Transfer Goal 1 (OT)    Activity/Assistive Device (Transfer Goal 1, OT) transfers, all  -ES     [place] Level/Cues Needed (Transfer Goal 1, OT) modified independence  -ES     Time Frame (Transfer Goal 1, OT) long term goal (LTG);10 days  -ES       Row Name 12/07/23 1014          Bathing Goal 1 (OT)    Activity/Device (Bathing Goal 1, OT) bathing skills, all  -ES     [place] Level/Cues Needed (Bathing Goal 1, OT) minimum assist (75% or more patient effort)  -ES     Time Frame (Bathing Goal 1, OT) long term goal (LTG);10 days  -ES       Row Name 12/07/23 1014          Dressing Goal 1 (OT)    Activity/Device (Dressing Goal 1, OT) dressing skills, all  -ES     [place]/Cues Needed (Dressing Goal 1, OT) minimum assist (75% or more patient effort)  -ES     Time Frame (Dressing Goal 1, OT) long term goal (LTG);10 days  -ES       Row Name 12/07/23 1014          Toileting Goal 1 (OT)    Activity/Device (Toileting Goal  1, OT) toileting skills, all  -ES     [place] Level/Cues Needed (Toileting Goal 1, OT) minimum assist (75% or more patient effort)  -ES     Time Frame (Toileting Goal 1, OT) long term goal (LTG);10 days  -ES       Row Name 12/07/23 1014          Grooming Goal 1 (OT)    Activity/Device (Grooming Goal 1, OT) grooming skills, all  -ES     [place] (Grooming Goal 1, OT) set-up required  -ES     Time Frame (Grooming Goal 1, OT) long term goal (LTG);10 days  -ES       Row Name 12/07/23 1014          Strength Goal 1 (OT)    Strength Goal 1 (OT) Pt will improve RUE strength to 3/5 muscle grade for increased UE strength required for ADL transfers and task completion  -ES     Time Frame (Strength Goal 1, OT) long term goal (LTG);10 days  -ES       Row Name 12/07/23 1014          Problem Specific Goal 1 (OT)    Problem Specific Goal 1 (OT) Patient will demonstrate fair graded dynamic balance in preperation for independent ADL routine completion at time of discharge  -ES     Time Frame (Problem Specific Goal 1, OT) long term goal (LTG);10 days  -ES       Row Name 12/07/23 1014          Therapy Assessment/Plan (OT)    Planned Therapy Interventions (OT) activity tolerance training;BADL retraining;functional balance retraining;cognitive/visual perception retraining;neuromuscular control/coordination retraining;occupation/activity based interventions;patient/caregiver education/training;ROM/therapeutic exercise;strengthening exercise;transfer/mobility retraining  -ES               User Key  (r) = Recorded By, (t) = Taken By, (c) = Cosigned By      Initials Name Provider Type    Allyn Hein, OTR/L, CSRS Occupational Therapist                   Clinical Impression       Row Name 12/07/23 1013          Plan of Care Review    Plan of Care Reviewed With patient;spouse  -ES     Outcome Evaluation Patient has experienced decline in function from baseline status, presenting w/ deficits related to balance, strength,  coordination, transfers and mobility that impede patient independence with activities of daily living.  Patient would benefit from skilled Occupational Therapy intervention to maxamize patient safety, and promote return to baseline independence.  -ES       Row Name 12/07/23 1013          Therapy Assessment/Plan (OT)    Rehab Potential (OT) good, to achieve stated therapy goals  -ES     Criteria for Skilled Therapeutic Interventions Met (OT) yes;meets criteria;skilled treatment is necessary  -ES     Therapy Frequency (OT) 5 times/wk  -ES       Row Name 12/07/23 1013          Therapy Plan Review/Discharge Plan (OT)    Anticipated Discharge Disposition (OT) inpatient rehabilitation facility  -ES               User Key  (r) = Recorded By, (t) = Taken By, (c) = Cosigned By      Initials Name Provider Type    ES Allyn Richard, OTR/L, CSRS Occupational Therapist                   Outcome Measures       Row Name 12/07/23 1017          How much help from another is currently needed...    Putting on and taking off regular lower body clothing? 2  -ES     Bathing (including washing, rinsing, and drying) 2  -ES     Toileting (which includes using toilet bed pan or urinal) 1  -ES     Putting on and taking off regular upper body clothing 2  -ES     Taking care of personal grooming (such as brushing teeth) 2  -ES     Eating meals 1  -ES     AM-PAC 6 Clicks Score (OT) 10  -ES       Row Name 12/07/23 0839          How much help from another person do you currently need...    Turning from your back to your side while in flat bed without using bedrails? 2  -MS     Moving from lying on back to sitting on the side of a flat bed without bedrails? 2  -MS     Moving to and from a bed to a chair (including a wheelchair)? 2  -MS     Standing up from a chair using your arms (e.g., wheelchair, bedside chair)? 1  -MS     Climbing 3-5 steps with a railing? 1  -MS     To walk in hospital room? 1  -MS     AM-PAC 6 Clicks Score (PT) 9  -MS      Highest Level of Mobility Goal 3 --> Sit at edge of bed  -MS       Row Name 12/07/23 1017          Functional Assessment    Outcome Measure Options AM-PAC 6 Clicks Daily Activity (OT);Optimal Instrument  -ES       Row Name 12/07/23 1017          Optimal Instrument    Optimal Instrument Optimal - 3  -ES     Bending/Stooping 4  -ES     Standing 3  -ES     Reaching 3  -ES     From the list, choose the 3 activities you would most like to be able to do without any difficulty Bending/stooping;Standing;Reaching  -ES     Total Score Optimal - 3 10  -ES               User Key  (r) = Recorded By, (t) = Taken By, (c) = Cosigned By      Initials Name Provider Type    Jennifer Gonzalez, RN Registered Nurse    Allyn Hein, OTR/L, CSRS Occupational Therapist                    Occupational Therapy Education       Title: PT OT SLP Therapies (Not Started)       Topic: Occupational Therapy (Not Started)       Point: ADL training (Not Started)       Description:   Instruct learner(s) on proper safety adaptation and remediation techniques during self care or transfers.   Instruct in proper use of assistive devices.                  Learner Progress:  Not documented in this visit.              Point: Home exercise program (Not Started)       Description:   Instruct learner(s) on appropriate technique for monitoring, assisting and/or progressing therapeutic exercises/activities.                  Learner Progress:  Not documented in this visit.              Point: Precautions (Not Started)       Description:   Instruct learner(s) on prescribed precautions during self-care and functional transfers.                  Learner Progress:  Not documented in this visit.              Point: Body mechanics (Not Started)       Description:   Instruct learner(s) on proper positioning and spine alignment during self-care, functional mobility activities and/or exercises.                  Learner Progress:  Not documented in this visit.                                   OT Recommendation and Plan  Planned Therapy Interventions (OT): activity tolerance training, BADL retraining, functional balance retraining, cognitive/visual perception retraining, neuromuscular control/coordination retraining, occupation/activity based interventions, patient/caregiver education/training, ROM/therapeutic exercise, strengthening exercise, transfer/mobility retraining  Therapy Frequency (OT): 5 times/wk  Plan of Care Review  Plan of Care Reviewed With: patient, spouse  Outcome Evaluation: Patient has experienced decline in function from baseline status, presenting w/ deficits related to balance, strength, coordination, transfers and mobility that impede patient independence with activities of daily living.  Patient would benefit from skilled Occupational Therapy intervention to maxamize patient safety, and promote return to baseline independence.     Time Calculation:   Evaluation Complexity (OT)  Review Occupational Profile/Medical/Therapy History Complexity: brief/low complexity  Assessment, Occupational Performance/Identification of Deficit Complexity: 3-5 performance deficits  Clinical Decision Making Complexity (OT): problem focused assessment/low complexity  Overall Complexity of Evaluation (OT): low complexity     Time Calculation- OT       Row Name 12/07/23 1018             Time Calculation- OT    OT Received On 12/07/23  -ES      OT Goal Re-Cert Due Date 12/16/23  -ES         Untimed Charges    OT Eval/Re-eval Minutes 33  -ES         Total Minutes    Untimed Charges Total Minutes 33  -ES       Total Minutes 33  -ES                User Key  (r) = Recorded By, (t) = Taken By, (c) = Cosigned By      Initials Name Provider Type    ES Allyn Richard, OTR/L, CSRS Occupational Therapist                  Therapy Charges for Today       Code Description Service Date Service Provider Modifiers Qty    [id number]  OT EVAL LOW COMPLEXITY 3 12/7/2023 Allyn Richard, OTR/L, CSRS GO  1                 Allyn Richard, OTR/L, CSRS  12/7/2023

## 2023-12-07 NOTE — PLAN OF CARE
Goal Outcome Evaluation:      ASSESSMENT/ PLAN OF CARE:  Pt presents with limitations, noted below, that impede patient's ability to swallow safely and maintain nutrition. The skills of a therapist will be required to safely and effectively implement the following treatment plan to restore maximal level of function.    PROBLEMS:  1.  Risk of aspiration  TREATMENT: speech therapy targeting swallow function through exercises and po trials.       FREQUENCY/DURATION:  daily, 5 days a week    REHAB POTENTIAL:  Pt has good/fair rehab potential.  The following limitations may influence improvement/ length of tx  medical status.    RECOMMENDATIONS:   1.   DIET: cannot recommend safe po diet. Patient will require alternative nutrition.               Anticipated Discharge Disposition (SLP): anticipate therapy at next level of care, other (see comments) (reportedly wife also considering hospice)

## 2023-12-07 NOTE — NURSING NOTE
Received a call today from staff stating that patient and family are frustrated due to the patient's inability to communicate verbally what he needed s/p stroke. During my visit with the patient and spouse, I learned that patient was very good with a computer and that he and the spouse both have their own iPad. The patient nodded appropriately when asked if he would feel better if he had his personal iPad here at the hospital. The wife voiced relief as she felt this would be a great way for the two of them along with other family members to communicate.

## 2023-12-07 NOTE — PLAN OF CARE
Patient has transitioned to comfort measures only. RD will sign off at this time. If further clinical nutrition services are desired, please re-consult.      Zaynab Larkin RD

## 2023-12-07 NOTE — PLAN OF CARE
Goal Outcome Evaluation:  Plan of Care Reviewed With: patient, spouse           Outcome Evaluation: Patient has experienced decline in function from baseline status, presenting w/ deficits related to balance, strength, coordination, transfers and mobility that impede patient independence with activities of daily living.  Patient would benefit from skilled Occupational Therapy intervention to maxamize patient safety, and promote return to baseline independence.      Anticipated Discharge Disposition (OT): inpatient rehabilitation facility

## 2023-12-07 NOTE — THERAPY EVALUATION
Acute Care - Speech Language Pathology   Swallow Initial Evaluation MEAGHAN Campo     Patient Name: Korey Rodriguez  : 1945  MRN: [id number]  Today's Date: 2023               Admit Date: 2023    Visit Dx:     ICD-10-CM ICD-9-CM   1. Cerebrovascular accident (CVA), unspecified mechanism  I63.9 434.91   2. Decreased activities of daily living (ADL)  Z78.9 V49.89   3. Oropharyngeal dysphagia  R13.12 787.22     Patient Active Problem List   Diagnosis    Nephrolithiasis    Infrarenal abdominal aortic aneurysm (AAA) without rupture    Chronic right-sided low back pain without sciatica    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    Squamous cell carcinoma in situ (SCCIS) of skin    Aneurysm of iliac artery    Mixed hyperlipidemia    Encounter for adjustment or management of vascular access device    Malignant neoplasm of overlapping sites of bladder    Primary osteoarthritis involving multiple joints    Well adult exam    Chronic deep vein thrombosis (DVT) of tibial vein of left lower extremity    Iron deficiency anemia    Medicare annual wellness visit, subsequent    Stage 3a chronic kidney disease    Decreased urine output    Generalized weakness    Elevated blood pressure reading in office without diagnosis of hypertension    Acute renal failure    Hyperkalemia    Bilateral hydronephrosis    Hospital discharge follow-up    Elevated liver enzymes    Acute ischemic left MCA stroke    Expressive aphasia    Dysphagia due to recent cerebral infarction    Thrombocytopenia     Past Medical History:   Diagnosis Date    AAA (abdominal aortic aneurysm)     BEING MONITORED NO REPAIR NEEDED YET    Acute ischemic left MCA stroke 2023    Aneurysm of abdominal aorta branch vessel     Cancer     BLADDER AND SKIN CANCERS/REMOVED    Chronic deep vein thrombosis (DVT) of tibial vein of left lower extremity 2023    Essential tremor     History of DVT of lower extremity 2022    on Xarelto    History  of urostomy     PT CURRENTLY HAS HOME HEALTH TO HELP WITH OSTOMY    Hyperlipidemia      Past Surgical History:   Procedure Laterality Date    COLONOSCOPY  2018    CYSTECTOMY      12/2022  W/UROSTOMY    SKIN CANCER EXCISION Right     TRANSURETHRAL RESECTION OF BLADDER TUMOR Right 10/19/2022    Procedure: Cystoscopy with transurethral resection of bladder tumor;  Surgeon: Aung Westfall MD;  Location: [facility] OR;  Service: Urology;  Laterality: Right;    VENOUS ACCESS DEVICE (PORT) INSERTION Right 01/09/2023    Procedure: INSERTION VENOUS ACCESS DEVICE;  Surgeon: Richie Edward MD;  Location: [facility] OR [facility];  Service: General;  Laterality: Right;       SLP Recommendation and Plan             Inpatient Speech Pathology Dysphagia Evaluation        PAIN SCALE: None indicated    PRECAUTIONS/CONTRAINDICATIONS: Standard, fall    SUSPECTED ABUSE/NEGLECT/EXPLOITATION: None identified    SOCIAL/PSYCHOLOGICAL NEEDS/BARRIERS: New onset aphasia due to left MCA stroke    PAST SOCIAL HISTORY: 78-year-old male,lives at home with his wife    PRIOR FUNCTION:  on a diet at home    PATIENT GOALS/EXPECTATIONS:  per palliative care, family considering home with hospice and comfort feeds however no decision at this time.     HISTORY:  78 year old male with recent discharge from hospital. Family had previously met with hospice services prior to this new CVA. Patient with new left MCA stroke.    CURRENT DIET LEVEL:  npo    OBJECTIVE:    TEST ADMINISTERED:  clinical dysphagia evaluation    COGNITION/SAFETY AWARENESS:  not thoroughly evaluated    BEHAVIORAL OBSERVATIONS:  awake, shakes head yes/no appropriately to personally relevant questions. Follows simple commands. Vocalizations observed but not able to verbally communicate.    ORAL MOTOR EXAM:  right sided facial droop, lingual deviation. Decreased labial retraction right, 1/5. Labial retraction left, 4/5.    VOICE QUALITY:  weak    REFLEX EXAM:  not able to elicit cough. Weak  throat clearing observed.     POSTURE:  total assist    FEEDING/SWALLOWING FUNCTION:  assessed with ice chips, nectar thick and pureed     CLINICAL OBSERVATIONS:  Ice chips with poor oral control. Moderate lingual pumping prior to swallow completion. Multiple swallows. Nectar thick by spoon and pureed with multiple swallows, laryngeal wetness to auscultation. Patient not able to elicit cough upon command.     DYSPHAGIA CRITERIA:  high aspiration risk    FUNCTIONAL ASSESSMENT INSTRUMENT: Patient currently scored a level 1 of 7 on Functional Communication Measures for swallowing indicating a 100% limitation in function.    ASSESSMENT/ PLAN OF CARE:  Pt presents with limitations, noted below, that impede patient's ability to swallow safely and maintain nutrition. The skills of a therapist will be required to safely and effectively implement the following treatment plan to restore maximal level of function.    PROBLEMS:  1.  Risk of aspiration   LTG 1: 30 days. Patient will increase functional communication measures for swallowing to level 3 of 7, indicating a 60-79% limitation in function.    STG 1a: 14 days. Patient will improve swallow function for tolerance of ice chips with min to no s/s of aspiration.    STG 1b: 14 days. Patient will improve swallow function for tolerance of trials of honey thick liquids with min to no s/s of aspiration with 50% of trials.    STG 1c: 14 days. Patient will complete lingual strengthening exercises with min cues.    STG 1d: 14 days. Patient will produce effortful swallow during isometric exercises with min cues.    TREATMENT: speech therapy targeting swallow function through exercises and po trials.       FREQUENCY/DURATION:  daily, 5 days a week    REHAB POTENTIAL:  Pt has good/fair rehab potential.  The following limitations may influence improvement/ length of tx  medical status.    RECOMMENDATIONS:   1.   DIET: cannot recommend safe po diet. Patient will require alternative  nutrition.      Pt/responsible party agrees with plan of care and has been informed of all alternatives, risks and benefits.                 Anticipated Discharge Disposition (SLP): anticipate therapy at next level of care, other (see comments) (reportedly wife also considering hospice) (12/07/23 1013)                                                                  EDUCATION  The patient has been educated in the following areas:   Dysphagia (Swallowing Impairment).              Time Calculation:    Time Calculation- SLP       Row Name 12/07/23 1013             Time Calculation- SLP    SLP Start Time 0730  -SN      SLP Stop Time 0830  -SN      SLP Time Calculation (min) 60 min  -SN      SLP Received On 12/07/23  -SN         Untimed Charges    19149-IH Eval Oral Pharyng Swallow Minutes 60  -SN         Total Minutes    Untimed Charges Total Minutes 60  -SN       Total Minutes 60  -SN                User Key  (r) = Recorded By, (t) = Taken By, (c) = Cosigned By      Initials Name Provider Type    SN Sarah Peña MS-CCC/SLP, BEENA Speech and Language Pathologist                    Therapy Charges for Today       Code Description Service Date Service Provider Modifiers Qty    [id number] HC ST EVAL ORAL PHARYNG SWALLOW 4 12/7/2023 Sarah Peña MS-CCC/SLPBEENA GN 1                 SHELLEY Ojeda/BEENA MOSES  12/7/2023

## 2023-12-07 NOTE — NURSING NOTE
Met with patients family (spouse, daughter, son-in-law) at bedside. Patient getting ultrasound. Patient unable to be cleared for diet. Discussed comfort measures only and aspiration risk. Provided education on comfort measures and hospice services. Discussed comfort feeds. Family to discuss with patient further upon his return to room. Patients family expresses concerns with communication difficulty- provided resources to help. Stroke coordinator to provide assistance as well. Patients family wanting to focus on comfort- waiting to discuss with patient before making changes. Discussed code status- full code versus DNR. Patient has paperwork stating he is a DNR- updated provider for orders for No CPR/No Intubation. Updated provider and primary rn. Palliative care will continue to follow up.    Alyce EVERETT, RN, CHPN  Palliative Care

## 2023-12-07 NOTE — PROGRESS NOTES
[facility]   Hospitalist Progress Note  Date: 2023  Patient Name: Korey Rodriguez  : 1945  MRN: [id number]  Date of admission: 2023      Subjective   Subjective     Chief Complaint: Unable to speak, swallow, right facial droop with right upper extremity weakness noticed on day of admission.    Summary:   Korey Rodriguez is a 78 y.o. male with a past medical history of urothelial bladder cancer with extensive metastasis including liver s/p radical cystectomy, diverting urostomy and bilateral nephrostomy tubes, history of chronic left lower extremity DVT off Xarelto, elevated LFTs due to liver mets, chronic kidney disease, abdominal aortic aneurysm and essential tremors.  Patient presented to [facility] ED due to inability to speak having difficulty swallowing and choking with right upper extremity weakness.  History is obtained by talking to wife and ED physician.  Wife also noticed right facial droop.  Patient was last seen well last night but he woke up with the symptoms.  Work-up in the ED showed stable vital signs good saturation room air.  Creatinine is 1.6 up from 1.3 few days ago.  Elevated liver enzymes including alk phos of 768,  and .  INR is 1.2.  Platelet count of 30 acutely dropped from 139 few days ago.  UA does not show any bacteria.  EKG shows sinus rhythm.  CT head no acute finding.  MRI of the brain showed acute left MCA infarct involving inferior mid left frontal lobe, left insular cortex and may be extending to medial left temporal lobe.  Tele neurology consulted by ED.  Patient was recently discharged from this hospital on  because of generalized weakness and VRE UTI on Zyvox.  Patient at that time decided to home health and transition to comfort care afterwards.  Patient was seen by hospice care last week per wife.  Patient has been getting increasingly weak as of last week and wife has not been able to take care of  him.  Patient and wife are not sure about Feeding tube and comfort care at this time.  Hospitalist has been called to admit him for further evaluation     Interval Followup:   Vital signs stable on room air  Remains aphasic.  Worsening of right facial droop right upper extremity weakness and now new right lower extremity weakness.  Patient wants to eat.  Discussed with wife and patient at bedside.  Both agreeable to comfort care.  Agreeable to take risk of aspiration and choking with food.  Review of Systems   All systems were reviewed and negative except for: Summary and interval follow-up    Objective   Objective     Vitals:   Temp:  [97.6 °F (36.4 °C)-98.1 °F (36.7 °C)] 97.9 °F (36.6 °C)  Heart Rate:  [80-97] 87  Resp:  [12-18] 12  BP: (118-171)/(57-78) 146/66  Physical Exam        Constitutional: Awake, alert, no acute distress              Eyes: Pupils equal, sclerae anicteric, no conjunctival injection              HENT: NCAT, mucous membranes dry              Neck: Supple, no thyromegaly, no lymphadenopathy, trachea midline              Respiratory: Clear to auscultation bilaterally, nonlabored respirations               Cardiovascular: RRR, no murmurs, rubs, or gallops, palpable pedal pulses bilaterally.  Has right upper chest port              Gastrointestinal: Right lower quadrant urostomy bag without urine, positive bowel sounds, soft, nontender, nondistended.  Bilateral nephrostomies              Musculoskeletal: +2 bilateral ankle edema, no clubbing or cyanosis to extremities              Psychiatric: Appropriate affect, cooperative              Neurologic: Oriented x 0, 4/5 strength right upper extremity with decreased  , minus 5 /5 strength right lower extremity, does have right facial droop with weakness of right tongue deviation to the right side.  Patient is completely aphasic              Skin: No rashes    Result Review    Result Review:  I have personally reviewed the results for the past 24  hours and agree with these findings:  [x]  Laboratory  []  Microbiology  [x]  Radiology  []  EKG/Telemetry   []  Cardiology/Vascular   []  Pathology  [x]  Old records  [x]  Other: Medications    Assessment & Plan   Assessment / Plan     Assessment:   Acute aphasia, dysphagia, right facial droop and weakness of right upper more than lower extremity.  Acute left MCA infarct involving inferior mid left frontal lobe, left insular cortex and probably extending to medial left temporal lobe causing above.  Extensive urothelial bladder cancer s/p radical cystectomy with mets to liver.  Elevated liver enzymes due to above.  S/p diverting urostomy nonfunctional now.  S/p bilateral nephrostomy tubes.  Recent VRE UTI.  Finished Zyvox  Acute thrombocytopenia question from Zyvox  Abdominal aortic aneurysm.  Chronic DVT left lower extremity off Xarelto due to bleeding.  Essential tremors.  Hyperlipidemia  Acute renal insufficiency on chronic kidney disease.  Improving  Anemia.  Hypertension with LVH.  Essential tremors     Plan:  Patient not a candidate for statin or aspirin due to low platelet count and elevated liver enzymes.  Started on comfort medications  Palliative care consulted appreciate input.  Extensive discussion with wife at bedside along with the patient about poor prognosis.  Patient gesturing to have a small amount of food.  Comfort care discussed with patient and both agreeable.  They do not want tube feeding or PEG tube.  Agreeable to take risk of aspiration/choking with oral intake.  Hospice meeting set for December 8  Discussed plan with RN and .  Discharge home after hospice meeting    DVT prophylaxis:  Mechanical DVT prophylaxis orders are present.    CODE STATUS:   Level Of Support Discussed With: Health Care Surrogate; Patient  Code Status (Patient has no pulse and is not breathing): No CPR (Do Not Attempt to Resuscitate)  Medical Interventions (Patient has pulse or is breathing): Comfort  Measures      Part of this note may be an electronic transcription/translation of spoken language to printed text using the Dragon Dictation System.     Electronically signed by True Caputo MD, 12/07/23, 2:24 PM EST.

## 2023-12-07 NOTE — PLAN OF CARE
Goal Outcome Evaluation:           Progress: no change  Outcome Evaluation: Patient new admit from ER this shift. NIH 12. Patient unable to speak but is able to point and use hand motions in attempt to voice needs. Frequent rounding. Strict NPO due to dysphagia screen failed. R. chest port accessed. Attempted to call wife for further admission information, no answer. VSS. No acute changes.

## 2023-12-07 NOTE — THERAPY EVALUATION
Acute Care - Physical Therapy Initial Evaluation  MEAGHAN Alber     Patient Name: Korey Rodriguez  : 1945  MRN: [id number]  Today's Date: 2023     Admit date: 2023     Referring Physician: True Caputo MD     Surgery Date:* No surgery found *           Visit Dx:     ICD-10-CM ICD-9-CM   1. Cerebrovascular accident (CVA), unspecified mechanism  I63.9 434.91   2. Decreased activities of daily living (ADL)  Z78.9 V49.89   3. Oropharyngeal dysphagia  R13.12 787.22   4. Difficulty in walking  R26.2 719.7     Patient Active Problem List   Diagnosis    Nephrolithiasis    Infrarenal abdominal aortic aneurysm (AAA) without rupture    Chronic right-sided low back pain without sciatica    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    Squamous cell carcinoma in situ (SCCIS) of skin    Aneurysm of iliac artery    Mixed hyperlipidemia    Encounter for adjustment or management of vascular access device    Malignant neoplasm of overlapping sites of bladder    Primary osteoarthritis involving multiple joints    Well adult exam    Chronic deep vein thrombosis (DVT) of tibial vein of left lower extremity    Iron deficiency anemia    Medicare annual wellness visit, subsequent    Stage 3a chronic kidney disease    Decreased urine output    Generalized weakness    Elevated blood pressure reading in office without diagnosis of hypertension    Acute renal failure    Hyperkalemia    Bilateral hydronephrosis    Hospital discharge follow-up    Elevated liver enzymes    Acute ischemic left MCA stroke    Expressive aphasia    Dysphagia due to recent cerebral infarction    Thrombocytopenia     Past Medical History:   Diagnosis Date    AAA (abdominal aortic aneurysm)     BEING MONITORED NO REPAIR NEEDED YET    Acute ischemic left MCA stroke 2023    Aneurysm of abdominal aorta branch vessel     Cancer     BLADDER AND SKIN CANCERS/REMOVED    Chronic deep vein thrombosis (DVT) of tibial vein of left lower extremity  6/27/2023    Essential tremor     History of DVT of lower extremity 12/2022    on Xarelto    History of urostomy     PT CURRENTLY HAS HOME HEALTH TO HELP WITH OSTOMY    Hyperlipidemia      Past Surgical History:   Procedure Laterality Date    COLONOSCOPY  2018    CYSTECTOMY      12/2022  W/UROSTOMY    SKIN CANCER EXCISION Right     TRANSURETHRAL RESECTION OF BLADDER TUMOR Right 10/19/2022    Procedure: Cystoscopy with transurethral resection of bladder tumor;  Surgeon: Aung Westfall MD;  Location: [facility] MAIN OR;  Service: Urology;  Laterality: Right;    VENOUS ACCESS DEVICE (PORT) INSERTION Right 01/09/2023    Procedure: INSERTION VENOUS ACCESS DEVICE;  Surgeon: Richie Edward MD;  Location: [facility] OR [facility];  Service: General;  Laterality: Right;     PT Assessment (last 12 hours)       PT Evaluation and Treatment       Row Name 12/07/23 1100          Physical Therapy Time and Intention    Subjective Information no complaints  -MARIAM     Document Type evaluation  -MARIAM     Mode of Treatment individual therapy;physical therapy  -MARIAM     Patient Effort good  -MARIAM       Row Name 12/07/23 1100          General Information    Patient Observations alert;cooperative;agree to therapy  -MARIAM     Prior Level of Function --  pt unable to provide due to expressive aphasia  -MARIAM     Barriers to Rehab none identified  -MARIAM       Row Name 12/07/23 1100          Range of Motion (ROM)    Range of Motion ROM is WFL  -MARIAM       Row Name 12/07/23 1100          Strength (Manual Muscle Testing)    Strength (Manual Muscle Testing) right lower extremity  3/5 except ankle DF 0/5  -MARIAM       Row Name 12/07/23 1100          Bed Mobility    Bed Mobility bed mobility (all) activities;supine-sit  -MARIAM     All Activities, [place] (Bed Mobility) minimum assist (75% patient effort)  -MARIAM     Supine-Sit [place] (Bed Mobility) moderate assist (50% patient effort)  -MARIAM       Row Name 12/07/23 1100          Sit-Stand Transfer    Sit-Stand [place]  (Transfers) moderate assist (50% patient effort)  -MARIAM     Assistive Device (Sit-Stand Transfers) walker, front-wheeled  needed assist with R hand on RW  -MARIAM       Row Name 12/07/23 1100          Gait/Stairs (Locomotion)    Comment, (Gait/Stairs) unable to ambulate due to RLE weakness  -MARIAM       Row Name 12/07/23 1100          Safety Issues, Functional Mobility    Impairments Affecting Function (Mobility) balance;coordination;endurance/activity tolerance;grasp;motor control;strength  -MARIAM       Row Name 12/07/23 1100          Balance    Dynamic Standing Balance moderate assist  -MARIAM     Position/Device Used, Standing Balance walker, front-wheeled  -MARIAM       Row Name 12/07/23 1100          Plan of Care Review    Plan of Care Reviewed With patient  -MARIAM     Outcome Evaluation Patient presents with decreased strength, transfers and ambulation.  Skilled physical therapy services will be required to address these mobility deficits.  -MARIAM       Row Name 12/07/23 1100          Therapy Assessment/Plan (PT)    Rehab Potential (PT) good, to achieve stated therapy goals  -MARIAM     Criteria for Skilled Interventions Met (PT) skilled treatment is necessary  -MARIAM     Therapy Frequency (PT) daily  -MARIAM     Predicted Duration of Therapy Intervention (PT) 10 days  -MARIAM     Problem List (PT) problems related to;balance;mobility;strength  -MARIAM     Activity Limitations Related to Problem List (PT) unable to ambulate safely;unable to transfer safely  -MARIAM       Row Name 12/07/23 1100          PT Evaluation Complexity    History, PT Evaluation Complexity no personal factors and/or comorbidities  -MARIAM     Examination of Body Systems (PT Eval Complexity) total of 4 or more elements  -MARIAM     Clinical Presentation (PT Evaluation Complexity) stable  -MARIAM     Clinical Decision Making (PT Evaluation Complexity) low complexity  -MARIAM     Overall Complexity (PT Evaluation Complexity) low complexity  -MARIAM       Row Name 12/07/23 1100          Therapy Plan  Review/Discharge Plan (PT)    Therapy Plan Review (PT) evaluation/treatment results reviewed;participants voiced agreement with care plan;participants included;patient  -MARIAM       Row Name 12/07/23 1100          Physical Therapy Goals    Transfer Goal Selection (PT) transfer, PT goal 1  -MARIAM     Gait Training Goal Selection (PT) gait training, PT goal 1  -MARIAM       Row Name 12/07/23 1100          Transfer Goal 1 (PT)    Activity/Assistive Device (Transfer Goal 1, PT) transfers, all  -MARIAM     [place] Level/Cues Needed (Transfer Goal 1, PT) independent  -MARIAM     Time Frame (Transfer Goal 1, PT) long term goal (LTG);10 days  -MARIAM       Row Name 12/07/23 1100          Gait Training Goal 1 (PT)    Activity/Assistive Device (Gait Training Goal 1, PT) gait (walking locomotion);assistive device use;walker, rolling  -MARIAM     [place] Level (Gait Training Goal 1, PT) independent  -MARIAM     Distance (Gait Training Goal 1, PT) 300  -MARIAM     Time Frame (Gait Training Goal 1, PT) long term goal (LTG);10 days  -MARIAM               User Key  (r) = Recorded By, (t) = Taken By, (c) = Cosigned By      Initials Name Provider Type    MARIAMScotty Salgado, PT Physical Therapist                    Physical Therapy Education       Title: PT OT SLP Therapies (In Progress)       Topic: Physical Therapy (Done)       Point: Mobility training (Done)       Learning Progress Summary             Patient Acceptance, E,TB, VU by MARIAM at 12/7/2023 1120                         Point: Home exercise program (Done)       Learning Progress Summary             Patient Acceptance, E,TB, VU by MARIAM at 12/7/2023 1120                         Point: Body mechanics (Done)       Learning Progress Summary             Patient Acceptance, E,TB, VU by MARIAM at 12/7/2023 1120                         Point: Precautions (Done)       Learning Progress Summary             Patient Acceptance, E,TB, VU by MARIAM at 12/7/2023 1120                                         User Key        Initials Effective Dates Name Provider Type Discipline    MARIAM 06/03/21 -  Scotty Worthy PT Physical Therapist PT                  PT Recommendation and Plan  Anticipated Discharge Disposition (PT): inpatient rehabilitation facility  Planned Therapy Interventions (PT): balance training, bed mobility training, gait training, home exercise program, stair training, strengthening, transfer training  Therapy Frequency (PT): daily  Plan of Care Reviewed With: patient  Outcome Evaluation: Patient presents with decreased strength, transfers and ambulation.  Skilled physical therapy services will be required to address these mobility deficits.   Outcome Measures       Row Name 12/07/23 1100             How much help from another person do you currently need...    Turning from your back to your side while in flat bed without using bedrails? 2  -MARIAM      Moving from lying on back to sitting on the side of a flat bed without bedrails? 2  -MARIAM      Moving to and from a bed to a chair (including a wheelchair)? 2  -MARIAM      Standing up from a chair using your arms (e.g., wheelchair, bedside chair)? 2  -MARIAM      Climbing 3-5 steps with a railing? 1  -MARIAM      To walk in hospital room? 1  -MARIAM      AM-PAC 6 Clicks Score (PT) 10  -MARIAM      Highest Level of Mobility Goal 4 --> Transfer to chair/commode  -MARIAM         Functional Assessment    Outcome Measure Options AM-PAC 6 Clicks Basic Mobility (PT)  -MARIAM                User Key  (r) = Recorded By, (t) = Taken By, (c) = Cosigned By      Initials Name Provider Type    MARIAM Scotty Worthy PT Physical Therapist                     Time Calculation:    PT Charges       Row Name 12/07/23 1114             Time Calculation    PT Received On 12/07/23  -MARIAM      PT Goal Re-Cert Due Date 12/16/23  -MARIAM         Untimed Charges    PT Eval/Re-eval Minutes 30  -MARIAM         Total Minutes    Untimed Charges Total Minutes 30  -MARIAM       Total Minutes 30  -MARIAM                User Key  (r) = Recorded By, (t) =  Taken By, (c) = Cosigned By      Initials Name Provider Type    MARIAM Scotty Worthy, PT Physical Therapist                  Therapy Charges for Today       Code Description Service Date Service Provider Modifiers Qty    [id number] HC PT EVAL LOW COMPLEXITY 3 12/7/2023 Scotty Worthy, PT GP 1            PT G-Codes  Outcome Measure Options: AM-PAC 6 Clicks Basic Mobility (PT)  AM-PAC 6 Clicks Score (PT): 10  AM-PAC 6 Clicks Score (OT): 10    Scotty Worthy PT  12/7/2023

## 2023-12-07 NOTE — CASE MANAGEMENT/SOCIAL WORK
Discharge Planning Assessment   Alber     Patient Name: Korey Rodriguez  MRN: [id number]  Today's Date: 12/7/2023    Admit Date: 12/6/2023    Plan: Pt lives with wife. Pt is bedbound at home. Wife is main caregiver. PCP:JOSE Hargrove, PT/OT pending, wife states she is unsure if she wants inpt rehab. Pt is current with [facility]. Wife states that it is becoming more difficult to care for Pt at home. They do have children but they are unable to help 24/7, daughter does live close. Wife would be agreeable to referrals in [place] if she decides rehab is what is needed. Pt may need hospital bed at home, Pt does have a walker. SW will continue to follow for needs.   Discharge Needs Assessment       Row Name 12/07/23 1203       Living Environment    People in Home spouse    Current Living Arrangements home    Potentially Unsafe Housing Conditions none    In the past 12 months has the electric, gas, oil, or water company threatened to shut off services in your home? No    Primary Care Provided by self    Provides Primary Care For no one, unable/limited ability to care for self    Family Caregiver if Needed spouse    Quality of Family Relationships helpful;involved       Resource/Environmental Concerns    Resource/Environmental Concerns home accessibility    Transportation Concerns none       Food Insecurity    Within the past 12 months, you worried that your food would run out before you got the money to buy more. Never true    Within the past 12 months, the food you bought just didn't last and you didn't have money to get more. Never true       Transition Planning    Patient/Family Anticipates Transition to inpatient rehabilitation facility    Patient/Family Anticipated Services at Transition home health care    Transportation Anticipated health plan transportation       Discharge Needs Assessment    Equipment Currently Used at Home walker, rolling    Discharge Coordination/Progress Pt lives with wife. Pt  is bedbound at home. Wife is main caregiver. PCP:JOSE Hargrove, PT/OT pending, wife states she is unsure if she wants inpt rehab. Pt is current with [facility]. Wife states that it is becoming more difficult to care for Pt at home. They do have children but they are unable to help 24/7, daughter does live close. Wife would be agreeable to referrals in [place] if she decides rehab is what is needed. Pt may need hospital bed at home, Pt does have a walker. SW will continue to follow for needs.                   Discharge Plan       Row Name 12/07/23 1213       Plan    Plan Pt lives with wife. Pt is bedbound at home. Wife is main caregiver. PCP:JOSE Hargrove, PT/OT pending, wife states she is unsure if she wants inpt rehab. Pt is current with [facility]. Wife states that it is becoming more difficult to care for Pt at home. They do have children but they are unable to help 24/7, daughter does live close. Wife would be agreeable to referrals in [place] if she decides rehab is what is needed. Pt may need hospital bed at home, Pt does have a walker. SW will continue to follow for needs.                  Continued Care and Services - Admitted Since 12/6/2023    Coordination has not been started for this encounter.          Demographic Summary       Row Name 12/07/23 1159       General Information    Admission Type inpatient    Arrived From emergency department    Referral Source admission list    Reason for Consult discharge planning    Preferred Language English       Contact Information    Permission Granted to Share Info With lay caregiver    Contact Information Obtained for lay caregiver       Lay Caregiver Information    Name, Lay Caregiver Mimi Rodriguez    Phone, Lay Caregiver [phone]                   Functional Status       Row Name 12/07/23 1200       Functional Status    Usual Activity Tolerance moderate    Current Activity Tolerance moderate       Physical Activity    On average, how many days per  week do you engage in moderate to strenuous exercise (like a brisk walk)? 0 days    On average, how many minutes do you engage in exercise at this level? 0 min    Number of minutes of exercise per week 0       Assessment of Health Literacy    How often do you have someone help you read hospital materials? Often    How often do you have problems learning about your medical condition because of difficulty understanding written information? Often    How often do you have a problem understanding what is told to you about your medical condition? Often    How confident are you filling out medical forms by yourself? A little bit    Health Literacy Good       Functional Status, IADL    Medications assistive person    Meal Preparation assistive person    Housekeeping assistive person    Laundry assistive person    Shopping assistive person       Mental Status    General Appearance WDL WDL       Mental Status Summary    Recent Changes in Mental Status/Cognitive Functioning decision-making/judgment       Employment/    Employment Status retired                   Psychosocial    No documentation.                  Abuse/Neglect    No documentation.                  Legal       Row Name 12/07/23 1202       Financial Resource Strain    How hard is it for you to pay for the very basics like food, housing, medical care, and heating? Not hard       Financial/Legal    Source of Income social security    Application for Public Assistance not applied       Legal    Criminal Activity/Legal Involvement none                   Substance Abuse    No documentation.                  Patient Forms    No documentation.                     Rocio Guerrero

## 2023-12-07 NOTE — PLAN OF CARE
Goal Outcome Evaluation:  Plan of Care Reviewed With: spouse   No acute events this shift.  Wife at bedside.  VSS.

## 2023-12-08 NOTE — SIGNIFICANT NOTE
" Wound Eval / Progress Noted     [place]     Patient Name: Korey Rodriguez  : 1945  MRN: [id number]  Today's Date: 2023                 Admit Date: 2023    Visit Dx:    ICD-10-CM ICD-9-CM   1. Cerebrovascular accident (CVA), unspecified mechanism  I63.9 434.91   2. Decreased activities of daily living (ADL)  Z78.9 V49.89   3. Oropharyngeal dysphagia  R13.12 787.22   4. Difficulty in walking  R26.2 719.7         Acute ischemic left MCA stroke    Expressive aphasia    Dysphagia due to recent cerebral infarction    Thrombocytopenia        Past Medical History:   Diagnosis Date    AAA (abdominal aortic aneurysm)     BEING MONITORED NO REPAIR NEEDED YET    Acute ischemic left MCA stroke 2023    Aneurysm of abdominal aorta branch vessel     Cancer     BLADDER AND SKIN CANCERS/REMOVED    Chronic deep vein thrombosis (DVT) of tibial vein of left lower extremity 2023    Essential tremor     History of DVT of lower extremity 2022    on Xarelto    History of urostomy     PT CURRENTLY HAS HOME HEALTH TO HELP WITH OSTOMY    Hyperlipidemia         Past Surgical History:   Procedure Laterality Date    COLONOSCOPY  2018    CYSTECTOMY      2022  W/UROSTOMY    SKIN CANCER EXCISION Right     TRANSURETHRAL RESECTION OF BLADDER TUMOR Right 10/19/2022    Procedure: Cystoscopy with transurethral resection of bladder tumor;  Surgeon: Aung Westfall MD;  Location: [facility] MAIN OR;  Service: Urology;  Laterality: Right;    VENOUS ACCESS DEVICE (PORT) INSERTION Right 2023    Procedure: INSERTION VENOUS ACCESS DEVICE;  Surgeon: Richie Edward MD;  Location: [facility] OR [facility];  Service: General;  Laterality: Right;         Physical Assessment:       23 1145   Urostomy RLQ   Placement date: If unknown, DO NOT use \"Add Comment\" note: 22   Location: RLQ   Stomal Appliance 1 piece;Clean;Dry;Intact   Stoma Appearance red;moist   Peristomal Assessment POP     Wound Check / Follow-up:  " Patient seen today for an ostomy check. Patient is current comfort measures; appears to be resting comfortably with eyes closed.  Urostomy pouch is clean dry and intact; no evidence of lifting or leaking. Unable to assess the peristomal tissue. Recommend to change pouch if it is noted that the pouch is lifting or leaking    Short term goals:  urostomy support     Rosemarie Vázquez RN    12/8/2023    15:15 EST

## 2023-12-08 NOTE — ACP (ADVANCE CARE PLANNING)
Educated on and completed EMS DNR with patients spouse- original placed in chart.    Alyce PHAMN, RN, CHPN  Palliative Care

## 2023-12-08 NOTE — CONSULTS
Discharge Planning Assessment  MEAGHAN Campo     Patient Name: Korey Rodriguez  MRN: [id number]  Today's Date: 12/8/2023    Admit Date: 12/6/2023     Discharge Plan       Row Name 12/08/23 1030       Plan    Plan Pt has transitioned to comfort measures only at this time. SW will continue to follow and provide support as needed.    Patient/Family in Agreement with Plan yes                  MISSY Aguilar

## 2023-12-08 NOTE — NURSING NOTE
Patient presented to [facility] for left nephrostomy tube exchange. Upon presentation yellow urine was noted to be draining from tube into collection bag and no drainage or leakage noted around tube.  aware of patient's presentation and he consulted with  and decision made to not proceed with left nephrostomy tube exchange at this time. Report called to Bonnie CHRISTENSEN. No medications were given during this appointment.

## 2023-12-08 NOTE — PLAN OF CARE
Goal Outcome Evaluation:  Plan of Care Reviewed With: patient        Progress: no change  Outcome Evaluation: patient was a transfer this shift. patient is alert and nonverbal but can still follow commands and communicate with a note pad. spouse is suppose to bring in iPad to help with communication. pt is comfort measures only. prn pain medication given once during shift. frequently asked pt if he was in pain throughout shift, pt denied, and declined pain medication. no other issues at this time.

## 2023-12-08 NOTE — PROGRESS NOTES
[facility]   Hospitalist Progress Note  Date: 2023  Patient Name: oKrey Rodriguez  : 1945  MRN: [id number]  Date of admission: 2023      Subjective   Subjective     Chief Complaint: Unable to speak, swallow, right facial droop with right upper extremity weakness noticed on day of admission.    Summary:   Korey Rodriguez is a 78 y.o. male with a past medical history of urothelial bladder cancer with extensive metastasis including liver s/p radical cystectomy, diverting urostomy and bilateral nephrostomy tubes, history of chronic left lower extremity DVT off Xarelto, elevated LFTs due to liver mets, chronic kidney disease, abdominal aortic aneurysm and essential tremors.  Patient presented to [facility] ED due to inability to speak having difficulty swallowing and choking with right upper extremity weakness.  History is obtained by talking to wife and ED physician.  Wife also noticed right facial droop.  Patient was last seen well last night but he woke up with the symptoms.  Work-up in the ED showed stable vital signs good saturation room air.  Creatinine is 1.6 up from 1.3 few days ago.  Elevated liver enzymes including alk phos of 768,  and .  INR is 1.2.  Platelet count of 30 acutely dropped from 139 few days ago.  UA does not show any bacteria.  EKG shows sinus rhythm.  CT head no acute finding.  MRI of the brain showed acute left MCA infarct involving inferior mid left frontal lobe, left insular cortex and may be extending to medial left temporal lobe.  Tele neurology consulted by ED.  Patient was recently discharged from this hospital on  because of generalized weakness and VRE UTI on Zyvox.  Patient at that time decided to home health and transition to comfort care afterwards.  Patient was seen by hospice care last week per wife.  Patient has been getting increasingly weak as of last week and wife has not been able to take care of  him.  Patient and wife are not sure about Feeding tube and comfort care at this time.  Hospitalist has been called to admit him for further evaluation.  Patient wife eventually agreed to comfort care.  He was moved to regular floor with palliative care orders.  Plan to go home on Monday under hospice care.     Interval Followup:   Vital signs stable on room air  Remains aphasic.  Tolerating diet well per nursing staff  Worsening of right facial droop right upper extremity weakness and now new right lower extremity weakness.  Last night his left-sided nephrostomy tube was leaking soaking the dressing at insertion site.  Patient went down to IR for exchange of left nephrostomy tube.  His dressings remain dry for past couple hours and hence it was rescheduled for Monday.  His platelet counts are low making him high risk for any procedure.    Review of Systems   All systems were reviewed and negative except for: Summary and interval follow-up    Objective   Objective     Vitals:   Temp:  [97 °F (36.1 °C)-97.7 °F (36.5 °C)] 97 °F (36.1 °C)  Heart Rate:  [84-94] 84  Resp:  [12-20] 18  BP: (130-150)/(64-81) 148/68  Flow (L/min):  [2] 2  Physical Exam        Constitutional: Awake, alert, no acute distress              Eyes: Pupils equal, sclerae anicteric, no conjunctival injection              HENT: NCAT, mucous membranes dry              Neck: Supple, no thyromegaly, no lymphadenopathy, trachea midline              Respiratory: Clear to auscultation bilaterally, nonlabored respirations               Cardiovascular: RRR, no murmurs, rubs, or gallops, palpable pedal pulses bilaterally.  Has right upper chest port              Gastrointestinal: Right lower quadrant urostomy bag without urine, positive bowel sounds, soft, nontender, nondistended.  Bilateral nephrostomies              Musculoskeletal: +2 bilateral ankle edema, no clubbing or cyanosis to extremities              Psychiatric: Appropriate affect, cooperative               Neurologic: Oriented x 0, 3/5 strength right upper extremity with decreased  , 4 /5 strength right lower extremity, does have right facial droop with weakness of right tongue deviation to the right side.  Patient is completely aphasic              Skin: No rashes    Result Review    Result Review:  I have personally reviewed the results for the past 24 hours and agree with these findings:  [x]  Laboratory  []  Microbiology  [x]  Radiology  []  EKG/Telemetry   []  Cardiology/Vascular   []  Pathology  [x]  Old records  [x]  Other: Medications    Assessment & Plan   Assessment / Plan     Assessment:   Acute aphasia, dysphagia, right facial droop and weakness of right upper more than lower extremity.  Acute left MCA infarct involving inferior mid left frontal lobe, left insular cortex and probably extending to medial left temporal lobe causing above.  Extensive urothelial bladder cancer s/p radical cystectomy with mets to liver.  Elevated liver enzymes due to above.  S/p diverting urostomy nonfunctional now.  S/p bilateral nephrostomy tubes.  Recent VRE UTI.  Finished Zyvox  Acute thrombocytopenia question from Zyvox  Abdominal aortic aneurysm.  Chronic DVT left lower extremity off Xarelto due to bleeding.  Essential tremors.  Hyperlipidemia  Acute renal insufficiency on chronic kidney disease.  Improving  Anemia.  Hypertension with LVH.  Essential tremors     Plan:  Discussed with IR.  Okay to postpone exchange of left nephrostomy tube.  Will monitor the leakage.  Patient is high risk for any procedure due to low platelet count.  Patient not a candidate for statin or aspirin due to low platelet count and elevated liver enzymes.  Continue comfort medications  Palliative care consulted appreciate input.  Extensive discussion with wife at bedside along with the patient about poor prognosis.  Patient gesturing to have a small amount of food.  Comfort care discussed with patient and both agreeable.  They do not  want tube feeding or PEG tube.  Agreeable to take risk of aspiration/choking with oral intake.  Hospice will set him up for home care on Monday.  Discussed plan with RN and .  Discharge home with hospice on December 11.    DVT prophylaxis:  Mechanical DVT prophylaxis orders are present.    CODE STATUS:   Level Of Support Discussed With: Health Care Surrogate; Patient  Code Status (Patient has no pulse and is not breathing): No CPR (Do Not Attempt to Resuscitate)  Medical Interventions (Patient has pulse or is breathing): Comfort Measures      Part of this note may be an electronic transcription/translation of spoken language to printed text using the Dragon Dictation System.     Electronically signed by True Caputo MD, 12/08/23, 5:27 PM EST.

## 2023-12-08 NOTE — PLAN OF CARE
Goal Outcome Evaluation:  Plan of Care Reviewed With: patient, spouse        Progress: no change  Outcome Evaluation: Pleasant patient rested in bed throughout the day. Medicated per mar 1x for pain and scopalamine patch applied behind patient's left ear for secretions, along with SL atropine. Wife at bedside. Down to check placement of nephrostomy tube, dressings changed [facility] tube[facility] monday with [facility]. No concerns at this time.

## 2023-12-08 NOTE — NURSING NOTE
"Collaborated with [facility] services following EOS- [facility] RN reports the following:    \"Patient eligible for [facility] services- family would like to admit. We are unable to admit until Monday- family agreeable. He will need equipment placed prior to discharge. Pt will need to transport via ambulance.\"    Updated primary rn and provider. Met with patients wife at bedside- patients wife reports with assistance of various staff she believes communication with patient is improving. Patients spouse expresses gratitude to all staff for assisting. Emotional support provided. Palliative care will continue to follow up.    Alyce EVERETT, RN, [facility]  Palliative Care    "

## 2023-12-09 NOTE — DISCHARGE SUMMARY
[facility]        HOSPITALIST  DISCHARGE SUMMARY    Patient Name: Korey Rodriguez  : 1945  MRN: [id number]    Date of Admission: 2023  Date of Discharge:  2023  Primary Care Physician: Dimitri Hargrove MD    Consults       Date and Time Order Name Status Description    2023  6:16 PM Hematology & Oncology Inpatient Consult Completed     2023  6:16 PM Inpatient Urology Consult Completed     2023  6:16 PM Inpatient Urology Consult Completed     2023  8:00 AM Inpatient Pulmonology Consult Completed     2023  3:09 AM Inpatient Nephrology Consult Completed             Active and Resolved Hospital Problems:  Active Hospital Problems    Diagnosis POA    **Acute ischemic left MCA stroke [I63.512] Yes    Expressive aphasia [R47.01] Yes    Dysphagia due to recent cerebral infarction [I69.391] Not Applicable    Thrombocytopenia [D69.6] Yes      Resolved Hospital Problems   No resolved problems to display.   Acute aphasia, dysphagia, right facial droop and weakness of right upper more than lower extremity.  Acute left MCA infarct involving inferior mid left frontal lobe, left insular cortex and probably extending to medial left temporal lobe causing above.  Extensive urothelial bladder cancer s/p radical cystectomy with mets to liver.  Elevated liver enzymes due to above.  S/p diverting urostomy nonfunctional now.  S/p bilateral nephrostomy tubes.  Recent VRE UTI.  Finished Zyvox  Acute thrombocytopenia question from Zyvox  Abdominal aortic aneurysm.  Chronic DVT left lower extremity off Xarelto due to bleeding.  Essential tremors.  Hyperlipidemia  Acute renal insufficiency on chronic kidney disease.  Improving  Anemia.  Hypertension with LVH.  Essential tremors    Hospital Course     Hospital Course:  Korey Rodriguez is a 78 y.o. male  with a past medical history of urothelial bladder cancer with extensive metastasis including liver s/p radical  cystectomy, diverting urostomy and bilateral nephrostomy tubes, history of chronic left lower extremity DVT off Xarelto, elevated LFTs due to liver mets, chronic kidney disease, abdominal aortic aneurysm and essential tremors.  Patient presented to [facility] ED due to inability to speak having difficulty swallowing and choking with right upper extremity weakness.  History is obtained by talking to wife and ED physician.  Wife also noticed right facial droop.  Patient was last seen well last night but he woke up with the symptoms.  Work-up in the ED showed stable vital signs good saturation room air.  Creatinine is 1.6 up from 1.3 few days ago.  Elevated liver enzymes including alk phos of 768,  and .  INR is 1.2.  Platelet count of 30 acutely dropped from 139 few days ago.  UA does not show any bacteria.  EKG shows sinus rhythm.  CT head no acute finding.  MRI of the brain showed acute left MCA infarct involving inferior mid left frontal lobe, left insular cortex and may be extending to medial left temporal lobe.  Tele neurology consulted by ED.  Patient was recently discharged from this hospital on November 27 because of generalized weakness and VRE UTI on Zyvox.  Patient at that time decided to home health and transition to comfort care afterwards.  Patient was seen by hospice care last week per wife.  Patient has been getting increasingly weak as of last week and wife has not been able to take care of him.  Patient and wife are not sure about Feeding tube and comfort care at this time.  Hospitalist has been called to admit him for further evaluation.  Patient wife eventually agreed to comfort care.  He was moved to regular floor with palliative care orders.  Plan to go home on Monday under hospice care.  On the night of December 7 his left-sided nephrostomy tube was leaking soaking the dressing at insertion site.  Patient went down to IR for exchange of left nephrostomy tube.  His  dressings remain dry for few hours hours and hence it was rescheduled for Monday.  His platelet counts are low making him high risk for any procedure.     Interval Followup:   Patient morphine was increased to every hour.  Patient subsequently passed away      DISCHARGE Follow Up Recommendations for labs and diagnostics:       Day of Discharge     Vital Signs:  Temp:  [97.9 °F (36.6 °C)-98.1 °F (36.7 °C)] 98.1 °F (36.7 °C)  Heart Rate:  [] 115  Resp:  [20-40] 32  BP: (130-132)/(67-76) 130/67    Physical Exam:       Discharge Details        Discharge Medications        ASK your doctor about these medications        Instructions Start Date   famotidine 20 MG tablet  Commonly known as: Pepcid   20 mg, Oral, 2 Times Daily PRN               No Known Allergies    Discharge Disposition:      Diet:      Discharge Activity:       CODE STATUS:  Code Status and Medical Interventions:   Ordered at: 23 1421     Level Of Support Discussed With:    Health Care Surrogate    Patient     Code Status (Patient has no pulse and is not breathing):    No CPR (Do Not Attempt to Resuscitate)     Medical Interventions (Patient has pulse or is breathing):    Comfort Measures         No future appointments.        Pertinent  and/or Most Recent Results     PROCEDURES:   * Cannot find OR case *     LAB RESULTS:      Lab 23  0509 23  0814 23  0746   WBC 9.16 10.67  --    HEMOGLOBIN 9.1* 9.5*  --    HEMATOCRIT 28.4* 30.6*  --    PLATELETS 20* 30*  --    NEUTROS ABS  --  9.81*  --    IMMATURE GRANS (ABS)  --  0.08*  --    LYMPHS ABS  --  0.28*  --    MONOS ABS  --  0.44  --    EOS ABS  --  0.04  --    MCV 86.6 87.9  --    PROTIME  --   --  16.3*   APTT  --   --  30.8         Lab 23  0509 23  0746   SODIUM 135* 136   POTASSIUM 4.7 4.7   CHLORIDE 100 99   CO2 23.8 24.0   ANION GAP 11.2 13.0   BUN 43* 41*   CREATININE 1.46* 1.62*   EGFR 48.3* 42.6*   GLUCOSE 87 108*   CALCIUM 8.4* 8.7    HEMOGLOBIN A1C 5.70*  --          Lab 12/07/23  0509 12/06/23  0746   TOTAL PROTEIN 5.4* 6.2   ALBUMIN 2.8* 3.2*   GLOBULIN 2.6 3.0   ALT (SGPT) 125* 135*   AST (SGOT) 132* 141*   BILIRUBIN 1.1 1.0   ALK PHOS 616* 768*         Lab 12/06/23  0746   HSTROP T 38*   PROTIME 16.3*   INR 1.29*         Lab 12/07/23  0509   CHOLESTEROL 151   LDL CHOL 77   HDL CHOL 52   TRIGLYCERIDES 121             Brief Urine Lab Results  (Last result in the past 365 days)        Color   Clarity   Blood   Leuk Est   Nitrite   Protein   CREAT   Urine HCG        12/06/23 1313 Yellow   Clear   Large (3+)   Small (1+)   Negative   30 mg/dL (1+)                 Microbiology Results (last 10 days)       ** No results found for the last 240 hours. **            MRI Brain Without Contrast    Result Date: 12/6/2023  Impression:   1. Abnormal restricted diffusion in the inferior mid left frontal lobe with involvement of the left insular cortex and questionable involvement of the medial left temporal lobe.  This is consistent with an evolving acute left MCA distribution cerebral infarct. 2. No acute hemorrhage or mass effect. 3. Mild chronic white matter microvascular ischemia.      SHONNA VERAS MD       Electronically Signed and Approved By: SHONNA VERAS MD on 12/06/2023 at 13:05             XR Chest 1 View    Result Date: 12/6/2023  Impression:   No radiographic findings of acute cardiopulmonary abnormality.       ERIC GODINEZ MD       Electronically Signed and Approved By: ERIC GODINEZ MD on 12/06/2023 at 8:55             CT Head Without Contrast Stroke Protocol    Result Date: 12/6/2023  Impression:   No CT findings of acute intracranial abnormality.     ERIC GODINEZ MD       Electronically Signed and Approved By: ERIC GODINEZ MD on 12/06/2023 at 8:02             CT Abdomen Pelvis Without Contrast    Result Date: 11/27/2023  Impression:   1. Previous cystectomy and prostatectomy. 2. Right mid abdomen diverting ileostomy with associated  parastomal hernia containing nonobstructed small bowel 3. Bilateral nephrostomy tubes in place appearing in good position. 4. Small bilateral pleural effusions 5. Widespread metastatic disease throughout the liver 6. Cholelithiasis 7. Evidence of anasarca.  Small amount of ascites.     Alvaro Christian M.D.       Electronically Signed and Approved By: Alvaro Christian M.D. on 11/27/2023 at 13:59             CT Guided Nephrostomy Tube Placement    Result Date: 11/21/2023  Impression:   1. Successful placement of a right 8 Wolof nephrostomy tube without evidence of complication 2. A sample of urine from the right nephrostomy tube was sent for culture.      Alvaro Christian M.D.       Electronically Signed and Approved By: Alvaro Christian M.D. on 11/21/2023 at 17:51             IR Nephrostogram    Result Date: 11/21/2023  Impression:   1. As above      Alvaro Christian M.D.       Electronically Signed and Approved By: Alvaro Christian M.D. on 11/21/2023 at 17:48              Results for orders placed during the hospital encounter of 12/06/23    Bilateral Carotid Duplex    Interpretation Summary    Vertebral flow is antegrade bilaterally.    Proximal right internal carotid artery plaque without significant stenosis.    Proximal left internal carotid artery plaque without significant stenosis.    Mild to moderate nonstenotic carotid bulb bifurcation plaque bilaterally.    No significant change from study dated 7/7/2023.      Results for orders placed during the hospital encounter of 12/06/23    Bilateral Carotid Duplex    Interpretation Summary    Vertebral flow is antegrade bilaterally.    Proximal right internal carotid artery plaque without significant stenosis.    Proximal left internal carotid artery plaque without significant stenosis.    Mild to moderate nonstenotic carotid bulb bifurcation plaque bilaterally.    No significant change from study dated 7/7/2023.      Results for orders placed during the hospital encounter of  11/27/23    Adult Transthoracic Echo Complete W/ Cont if Necessary Per Protocol    Interpretation Summary    Left ventricular systolic function is normal. Calculated left ventricular EF = 54.3%    Left ventricular wall thickness is consistent with mild to moderate concentric hypertrophy.    Left ventricular diastolic function is consistent with (grade I) impaired relaxation and age.    Mild dilation of the aortic root is present measuring 4.3 cm. Mild dilation of the ascending aorta is present measuring 3.9 cm.    Cardiac valves were not well visualized on this study.  No hemodynamically significant valvular pathology was noted by color flow/Doppler velocity.      Imaging Results (All)       Procedure Component Value Units Date/Time    MRI Brain Without Contrast [[id number]] Collected: 12/06/23 1305     Updated: 12/06/23 1308    Narrative:      PROCEDURE: MRI BRAIN WO CONTRAST     COMPARISON: [facility], CT, CT HEAD WO CONTRAST STROKE PROTOCOL, 12/06/2023, 7:50.     INDICATIONS: evaluate for stroke             TECHNIQUE: A variety of imaging planes and parameters were utilized for visualization of suspected   pathology.  Images were performed without contrast.     FINDINGS:   There is restricted diffusion in the cortical gray and subcortical white matter of the inferior mid   left frontal lobe with involvement of the left insular cortex.  There may be involvement of the   medial left temporal lobe.  This is hypointense on the ADC mapping consistent with an acute left   MCA distribution cerebral infarct.  There is minor volume loss with prominence of the sulci,   fissures, ventricles, and basal cisterns.  There are few punctate areas of periventricular and   subcortical white matter signal felt to represent chronic microvascular ischemia.  There is no   acute hemorrhage, midline shift, or suspicious extra-axial fluid collections.  There are normal   signal voids within the intracranial arteries and the  major dural sinuses.  The orbital contents   are normal.  The visualized paranasal and mastoid sinuses are clear.       Impression:         1. Abnormal restricted diffusion in the inferior mid left frontal lobe with involvement of the left   insular cortex and questionable involvement of the medial left temporal lobe.  This is consistent   with an evolving acute left MCA distribution cerebral infarct.  2. No acute hemorrhage or mass effect.  3. Mild chronic white matter microvascular ischemia.               SHONNA VERAS MD         Electronically Signed and Approved By: SHONNA VERAS MD on 12/06/2023 at 13:05                     XR Chest 1 View [[id number]] Collected: 12/06/23 0855     Updated: 12/06/23 0858    Narrative:      PROCEDURE: XR CHEST 1 VW     COMPARISON: [facility], , XR CHEST 1 VW, 11/07/2023, 18:05.     INDICATIONS: Acute Stroke Protocol (Onset < 12 hrs)     FINDINGS:   No focal or diffuse infiltrate is identified. No pleural effusion or pneumothorax.  There is a   right subclavian port catheter with tip projecting in the SVC, as before.  There is atherosclerosis   of the aorta.  Heart size and mediastinal contour appear within normal limits.          Impression:         No radiographic findings of acute cardiopulmonary abnormality.                  ERIC GODINEZ MD         Electronically Signed and Approved By: ERIC GODINEZ MD on 12/06/2023 at 8:55                     CT Head Without Contrast Stroke Protocol [[id number]] Collected: 12/06/23 0802     Updated: 12/06/23 0805    Narrative:      PROCEDURE: CT HEAD WO CONTRAST STROKE PROTOCOL     COMPARISON:  None  INDICATIONS: STROKE ALERT WITH RIGHT FACIAL DROOPING AND UNBLE TO SPEAK THIS AM     PROTOCOL:   Standard imaging protocol performed      RADIATION:   DLP: 1018.2mGy*cm    MA and/or KV was adjusted to minimize radiation dose.          TECHNIQUE: After obtaining the patient's consent, CT images were obtained without non-ionic    intravenous contrast material.      FINDINGS:   No midline shift.  The ventricles and sulci appear within normal limits.  Basal cisterns appear   patent.  Small fat density, likely a lipoma, is seen posteriorly along the right tentorium.  No   definite mass lesion, edema, or CT findings of an acute infarction at this time.  No acute   hemorrhage or extra-axial collection is seen.  There is calcific atherosclerosis of the carotid   arteries.  No displaced calvarial fracture.  Paranasal sinuses and mastoid air cells appear clear.       Impression:         No CT findings of acute intracranial abnormality.            ERIC GODINEZ MD         Electronically Signed and Approved By: ERIC GODINEZ MD on 12/06/2023 at 8:02                              Labs Pending at Discharge:          Time spent on Discharge including face to face service: Minutes  Part of this note may be an electronic transcription/translation of spoken language to printed text using the Dragon Dictation System.     TElectronically signed by True Caputo MD, 12/09/23, 6:02 PM EST.

## 2025-02-24 NOTE — TELEPHONE ENCOUNTER
Caller: TRA GARNER    Relationship: Emergency Contact    Best call back number: [phone]    What is the best time to reach you: ANY    Who are you requesting to speak with (clinical staff, provider,  specific staff member): CLINICAL    What was the call regarding: TRA IS CALLING STATES THAT THE APPTS ON 2-28 SHOULD BE CANCELED. SHE STATED THAT IT WAS THE DECISION FROM DR NY AND DR DAN  PLEASE CANCEL APPTS      Do you require a callback: NO           No

## (undated) DEVICE — CATHETER,FOLEY,100%SILICONE,18FR,10ML,LF: Brand: MEDLINE

## (undated) DEVICE — EVAC BLDR ELLIK 1P/U

## (undated) DEVICE — SOL IRR NACL 0.9PCT 3000ML

## (undated) DEVICE — TOWEL,OR,DSP,ST,BLUE,STD,4/PK,20PK/CS: Brand: MEDLINE

## (undated) DEVICE — SOL IRR NACL 0.9PCT BT 1000ML

## (undated) DEVICE — VASCULAR ACCESS-LF: Brand: MEDLINE INDUSTRIES, INC.

## (undated) DEVICE — HF-RESECTION ELECTRODE PLASMALOOP LOOP, MEDIUM, 24 FR., 12°/16°, PK TURIS: Brand: OLYMPUS

## (undated) DEVICE — Y-TYPE TUR/BLADDER IRRIGATION SET, REGULATING CLAMP

## (undated) DEVICE — SYR CATH/TIP 50ML 2OZ STRL 1P/U

## (undated) DEVICE — Device

## (undated) DEVICE — GW SUREGLIDE FLXTIP ANG/TP .038 3X150CM EA/5

## (undated) DEVICE — INTENDED FOR TISSUE SEPARATION, AND OTHER PROCEDURES THAT REQUIRE A SHARP SURGICAL BLADE TO PUNCTURE OR CUT.: Brand: BARD-PARKER ® CARBON RIB-BACK BLADES

## (undated) DEVICE — CATH URETH FLEXIMA CONE TP 5F 70CM

## (undated) DEVICE — SYS IRR PUMP SGL ACTN VAC SYR 10CC

## (undated) DEVICE — CYSTO PACK: Brand: MEDLINE INDUSTRIES, INC.

## (undated) DEVICE — ENDOSCOPIC VALVE WITH ADAPTER.: Brand: SURSEAL® II

## (undated) DEVICE — ANTIBACTERIAL VIOLET BRAIDED (POLYGLACTIN 910), SYNTHETIC ABSORBABLE SUTURE: Brand: COATED VICRYL

## (undated) DEVICE — CATH FOL SIL/COAT BARDEX 2WY 22F 5CC

## (undated) DEVICE — DECANTER: Brand: UNBRANDED

## (undated) DEVICE — CUP SPECI 4OZ LF STRL

## (undated) DEVICE — MAT FLR ABS W/BLU/LINER 56X72IN WHT

## (undated) DEVICE — SYRINGE,TOOMEY,IRRIGATION,70CC,STERILE: Brand: MEDLINE

## (undated) DEVICE — STERILE POLYISOPRENE POWDER-FREE SURGICAL GLOVES: Brand: PROTEXIS

## (undated) DEVICE — TRAY,IRRIGATION,BULBSYRINGE,60ML,CSR,PVP: Brand: MEDLINE

## (undated) DEVICE — GLV SURG BIOGEL LTX PF 7 1/2

## (undated) DEVICE — PENCL E/S SMOKEEVAC W/TELESCP CANN

## (undated) DEVICE — ADHS SKIN SURG TISS VISC PREMIERPRO EXOFIN HI/VISC FAST/DRY

## (undated) DEVICE — SEAMLESS VIRAL BARRIER ADHESIVE PATCH, LATEX-FREE, 1.5"W X 4.5"L ON 3"W X 5"L, STERILE, ULTRASOUND PROBE COVER, INDIVIDUALLY WRAPPED: Brand: SHEATHES3D

## (undated) DEVICE — GOWN,REINFORCE,POLY,SIRUS,BREATH SLV,XLG: Brand: MEDLINE

## (undated) DEVICE — SKIN PREP TRAY W/CHG: Brand: MEDLINE INDUSTRIES, INC.

## (undated) DEVICE — SUT MNCRYL 4/0 PS2 18 IN

## (undated) DEVICE — SLV SCD KN/LEN ADJ EXPRSS BLENDED MD 1P/U